# Patient Record
Sex: MALE | Race: WHITE | Employment: UNEMPLOYED | URBAN - METROPOLITAN AREA
[De-identification: names, ages, dates, MRNs, and addresses within clinical notes are randomized per-mention and may not be internally consistent; named-entity substitution may affect disease eponyms.]

---

## 2017-05-10 ENCOUNTER — HOSPITAL ENCOUNTER (OUTPATIENT)
Dept: ULTRASOUND IMAGING | Age: 67
Discharge: HOME | End: 2017-05-10
Attending: UROLOGY
Payer: MEDICARE

## 2017-05-10 DIAGNOSIS — N31.9 NEUROGENIC BLADDER: ICD-10-CM

## 2017-05-10 PROCEDURE — 76770 US EXAM ABDO BACK WALL COMP: CPT

## 2017-05-16 ENCOUNTER — APPOINTMENT (OUTPATIENT)
Dept: PHYSICAL THERAPY | Age: 67
End: 2017-05-16

## 2017-05-25 ENCOUNTER — HOSPITAL ENCOUNTER (OUTPATIENT)
Dept: PHYSICAL THERAPY | Age: 67
End: 2017-05-25

## 2017-06-05 ENCOUNTER — HOSPITAL ENCOUNTER (OUTPATIENT)
Dept: PHYSICAL THERAPY | Age: 67
Discharge: HOME | End: 2017-06-05
Payer: MEDICARE

## 2017-06-05 PROCEDURE — G8979 MOBILITY GOAL STATUS: HCPCS

## 2017-06-05 PROCEDURE — 97162 PT EVAL MOD COMPLEX 30 MIN: CPT

## 2017-06-05 PROCEDURE — G8978 MOBILITY CURRENT STATUS: HCPCS

## 2017-06-05 NOTE — PROGRESS NOTES
Bindu Vaca  : 1950 [facility] [address]  [phone]   [phone]          OUTPATIENT PHYSICAL THERAPY:Initial Assessment 2017    ICD-10: Treatment Diagnosis: Hemiplegia and hemiparesis following cerebral infarction affecting left non-dominant side (I69.354); Difficulty in walking, not elsewhere classified (R26.2)  Precautions/Allergies:   Latex; Adhesive; and Sulfa (sulfonamide antibiotics)   Fall Risk Score: 4 (? 5 = High Risk)  MD Orders: PT eval and treat MEDICAL/REFERRING DIAGNOSIS:  CVA (cerebral vascular accident) [facility]) [I63.9]   DATE OF ONSET: 2016  REFERRING PHYSICIAN: Geetha Burrows, [id number] y 29: unknown  DATE OF PROGRESS NOTE:  62  RECERTIFICATION DATE: 12     INITIAL ASSESSMENT:  Mr. Kristen Sy presents to therapy s/p another Left CVA 2016. Prior CVA 2005. Pt presents with dense left hemiplegia, non functional swollen left arm and little movement in the left elg with swelling as well. Pt has a new double metal upright AFO that may need some adjustments as pt is hip hiking to clear his left foot and he has strong left knee  recurvatum in stance. Pt presents with poor balance and marked gait deviations but has good potential to get back to walking short distances with less assistance. Pt will benefit from at least 16 physical therapy visits to reach the goal of less assistance required for balance and gait. PROBLEM LIST (Impacting functional limitations):  1. Decreased Strength  2. Decreased ADL/Functional Activities  3. Decreased Transfer Abilities  4. Decreased Ambulation Ability/Technique  5. Decreased Balance  6. Increased Pain  7. Decreased Activity Tolerance  8. Decreased Flexibility/Joint Mobility  9. Edema/Girth  10. Decreased Mascot with Home Exercise Program INTERVENTIONS PLANNED:  1. Balance Exercise  2. Family Education  3. Gait Training  4.  Home Exercise Program (HEP)  5. Manual Therapy  6. Neuromuscular Re-education/Strengthening  7. Range of Motion (ROM)  8. Therapeutic Activites  9. Therapeutic Exercise/Strengthening  10. Transfer Training  11. orthotic management    TREATMENT PLAN:  Effective Dates: 6/5/17 TO 9/1/17. Frequency/Duration: 2 times a week for 8 weeks  GOALS: (Goals have been discussed and agreed upon with patient.)  Short-Term Functional Goals: Time Frame: 4 WEEKS  1. Pt will be independent with range of motion, strength and balance home exercise program.  2. Pt will ambulate with upright posture 40 - 60' and increased ant WS over left foot to decrease strong left knee hyperextension in stnace  Discharge Goals: Time Frame: 8 weeks  Pt will show increased range of motion and improved posture to allow for improved safety and ability with all functional mobility. Pt will decrease TUG score by 20 seconds indicating more normalized gait pattern and decrease risk for falls. Pt will increase Blanchard Balance Scale to 24/56 indicating increased balance and decreased risk for falls. Rehabilitation Potential For Stated Goals: Mando Abernathy's therapy, I certify that the treatment plan above will be carried out by a therapist or under their direction. Thank you for this referral,  Alfred Ornelas, MARIS     Referring Physician Signature: [facility], Not On File, MD              Date                    HISTORY:   GOAL:  \"I would like to get to where I was before the second stroke. (walking with straight cane)  Present Symptoms:    73year old right handed white male s/p CVA again 8/2016 - right CVA on the brain stem. 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 Went home [facility] a week later. 3 months. Getting therapy there. November 2016. 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 Lives with spouse. Walks with hemiwalker with supervision. Transfers independently.     Dependent with dressing - assist when he can. Wears pads for security - enlarged prostate. PAIN:  Lower back pain:  No pain now. Hospital bed has an air mattress. Worst pain 9/10 - have to get up. Sitting with support is better than being in bed. Tylenol at night. History of Present Injury/Illness (Reason for Referral):  See above  Past Medical History/Comorbidities:   Mr. Jodie Wang  has a past medical history of Dermatophytosis of nail; History of CVA (cerebrovascular accident); and History of paraplegia. Mr. Jodie Wang  has no past surgical history on file. Social History/Living Environment:     Lives with spouse. Suresh Nicolas is his aid who works with him 40 hours per week helping with exercise, mobility, self care and appointments  Prior Level of Function/Work/Activity:  Prior to 8/2016 pt was walking independent short distances with a straight cane. Current Medications:    Current Outpatient Prescriptions:     acetaminophen (TYLENOL) 500 mg tablet, Take  by mouth every six (6) hours as needed for Pain., Disp: , Rfl:     guaiFENesin (ORGANIDIN) 400 mg tablet, Take  by mouth every four (4) hours. , Disp: , Rfl:     lisinopril (PRINIVIL, ZESTRIL) 40 mg tablet, Take 40 mg by mouth daily. , Disp: , Rfl:     loratadine (CLARITIN) 10 mg tablet, Take 10 mg by mouth., Disp: , Rfl:     b complex-vitamin c-folic acid 0.8 mg (NEPHRO-ALICIA) 0.8 mg tab tablet, Take 1 Tab by mouth daily. , Disp: , Rfl:     Omeprazole delayed release (PRILOSEC D/R) 20 mg tablet, Take 20 mg by mouth daily. , Disp: , Rfl:     phenytoin ER (DILANTIN ER) 100 mg ER capsule, Take  by mouth., Disp: , Rfl:     rosuvastatin (CRESTOR) 40 mg tablet, Take 40 mg by mouth nightly., Disp: , Rfl:     senna (SENOKOT) 8.6 mg tablet, Take 1 Tab by mouth daily. , Disp: , Rfl:     sertraline (ZOLOFT) 100 mg tablet, Take  by mouth daily. , Disp: , Rfl:     tamsulosin (FLOMAX) 0.4 mg capsule, Take 0.4 mg by mouth daily. , Disp: , Rfl:     ALOE VERA/COLLAGEN (ALOE VESTA 2-N-1 CLEANSER EX), by Apply Externally route., Disp: , Rfl:     aspirin delayed-release 81 mg tablet, Take 81 mg by mouth., Disp: , Rfl:     MINERAL OIL/PETROLATUM,WHITE (CLIVE MOISTURE BARRIER EX), by Apply Externally route., Disp: , Rfl:     carvedilol (COREG) 12.5 mg tablet, Take 12.5 mg by mouth., Disp: , Rfl:     cholecalciferol (VITAMIN D3) 1,000 unit cap, Take 1,000 Units by mouth., Disp: , Rfl:     clopidogrel (PLAVIX) 75 mg tab, Take 75 mg by mouth., Disp: , Rfl:     docusate sodium 100 mg/5 mL enem, Insert  into rectum. , Disp: , Rfl:     fluticasone (FLOVENT DISKUS) 50 mcg/actuation inhaler, by Nasal route., Disp: , Rfl:     folic acid (FOLVITE) 1 mg tablet, Take 1 mg by mouth., Disp: , Rfl:    Date Last Reviewed:  6/5/17   Number of Personal Factors/Comorbidities that affect the Plan of Care: 1-2: MODERATE COMPLEXITY   EXAMINATION:   ROM:          Left UE - swollen. Compression sleeve on but still showing marked swelling. Dependent - non functional.  WFL for dressing. Left LE:  Increased swelling. WFL. Tight all end ranges - flexed hips in standing and walking. Tight calf. Strength:          Right side WFL  Left UE - barely a shoulder shrug   Left LE:  Able to sling hip and leg. Functional Mobility:         Gait/Ambulation:  Pt ambulates at least 40 - 61' with Hannah Lori almost daily using davis walker and AFO. Pt ambulates with extremely flexed posture and posterior retracted hip in stance with strong left knee hyperextension in left stance in spite of double metal upright AFO. Pt hip hikes and slings the left leg forward. Transfers:  Close supervision to independent        Bed Mobility:  Assist with lower body. Mental Status:          Alert and oriented x 4;  Pleasant and appropriate. Occ forgetful. Occ slight impulsive    Vision:          Wears glasses   Body Structures Involved:  1. Nerves  2. Thoracic Cage  3. Bones  4. Joints  5. Muscles  6.  Ligaments Body Functions Affected:  1. Mental  2. Sensory/Pain  3. Neuromusculoskeletal  4. Movement Related  5. Skin Related Activities and Participation Affected:  1. Learning and Applying Knowledge  2. General Tasks and Demands  3. Mobility  4. Self Care  5. Domestic Life  6. Interpersonal Interactions and Relationships  7. Community, Social and [place]   Number of elements that affect the Plan of Care: 4+: HIGH COMPLEXITY   CLINICAL PRESENTATION:   Presentation: Stable and uncomplicated: LOW COMPLEXITY   CLINICAL DECISION MAKING:   Outcome Measure: Tool Used: Blanchard Balance Scale  Score:  Initial: 16/56 Most Recent: X/56 (Date: -- )   Interpretation of Score: Each section is scored on a 0-4 scale, 0 representing the patients inability to perform the task and 4 representing independence. The scores of each section are added together for a total score of 56. The higher the patients score, the more independent the patient is. Any score below 45 indicates increased risk for falls. Score 56 55-45 44-34 33-23 22-12 11-1 0   Modifier CH CI CJ CK CL CM CN     ? Mobility - Walking and Moving Around:     - CURRENT STATUS: CL - 60%-79% impaired, limited or restricted    - GOAL STATUS: CK - 40%-59% impaired, limited or restricted    - D/C STATUS:  ---------------To be determined---------------    Tool Used: Timed Up and Go (TUG)  Score:  Initial: 1.23.21 minutes Most Recent: X seconds (Date: -- )   Interpretation of Score: The test measures, in seconds, the time taken by an individual to stand up from a standard arm chair (seat height 46 cm [18 in], arm height 65 cm [25.6 in]), walk a distance of 3 meters (118 in, approx 10 ft), turn, walk back to the chair and sit down. If the individual takes longer than 14 seconds to complete TUG, this indicates risk for falls.   Score 7 7.5-10.5 11-14 14.5-17.5 18-21 21.5-24.5 25+   Modifier CH CI CJ CK CL CM CN     Medical Necessity:   · Skilled intervention continues to be required due to hemiparesis and difficulty walking. Reason for Services/Other Comments:  · Patient continues to require skilled intervention due to hemiparesis and difficulty walking. Use of outcome tool(s) and clinical judgement create a POC that gives a: Questionable prediction of patient's progress: MODERATE COMPLEXITY   TREATMENT:   (In addition to Assessment/Re-Assessment sessions the following treatments were rendered)    ASSESSMENT    Treatment/Session Assessment:  See initial assessment above. Patients response to todays treatment session was tolerated well with no medical complications. .  · Post session pain:  NO PAIN  · Compliance with Program/Exercises: Will assess as treatment progresses. · Recommendations/Intent for next treatment session: \"Next visit will focus on PROGRESS GAIT AND BALANCE RETRAINING. \".  Total Treatment Duration:  PT Patient Time In/Time Out  Time In: 1400  Time Out: 1500    Cristopher Parmar PT

## 2017-06-09 NOTE — PROGRESS NOTES
Ambulatory/Rehab Services H2 Model Falls Risk Assessment    Risk Factor Pts. ·   Confusion/Disorientation/Impulsivity  []    4 ·   Symptomatic Depression  []   2 ·   Altered Elimination  []   1 ·   Dizziness/Vertigo  []   1 ·   Gender (Male)  [x]   1 ·   Any administered antiepileptics (anticonvulsants):  []   2 ·   Any administered benzodiazepines:  []   1 ·   Visual Impairment (specify):  []   1 ·   Portable Oxygen Use  []   1 ·   Orthostatic ? BP  []   1 ·   History of Recent Falls (within 3 mos.)  []   5     Ability to Rise from Chair (choose one) Pts. ·   Ability to rise in a single movement  []   0 ·   Pushes up, successful in one attempt  []   1 ·   Multiple attempts, but successful  [x]   3 ·   Unable to rise without assistance  []   4   Total: (5 or greater = High Risk) 4     Falls Prevention Plan:   []                Physical Limitations to Exercise (specify):   []                Mobility Assistance Device (type):   []                Exercise/Equipment Adaptation (specify):    ©2010 [facility] [address] Patent #[id number].  Federal Law prohibits the replication, distribution or use without written permission from [facility] [employer]

## 2017-06-12 ENCOUNTER — HOSPITAL ENCOUNTER (OUTPATIENT)
Dept: PHYSICAL THERAPY | Age: 67
Discharge: HOME | End: 2017-06-12
Payer: MEDICARE

## 2017-06-12 PROCEDURE — 97110 THERAPEUTIC EXERCISES: CPT

## 2017-06-12 PROCEDURE — 97530 THERAPEUTIC ACTIVITIES: CPT

## 2017-06-12 NOTE — PROGRESS NOTES
Elaine Hanley  : 1950 [facility] at [address]  [phone]   [phone]          OUTPATIENT PHYSICAL THERAPY:Daily Note 2017    ICD-10: Treatment Diagnosis: Hemiplegia and hemiparesis following cerebral infarction affecting left non-dominant side (I69.354); Difficulty in walking, not elsewhere classified (R26.2)  Precautions/Allergies:   Latex; Adhesive; and Sulfa (sulfonamide antibiotics)   Fall Risk Score: 4 (? 5 = High Risk)  MD Orders: PT eval and treat MEDICAL/REFERRING DIAGNOSIS:  CVA (cerebral vascular accident) [facility]) [I63.9]   DATE OF ONSET: 2016  REFERRING PHYSICIAN: Jarad, Not On File, MD  RETURN PHYSICIAN APPOINTMENT: unknown  DATE OF PROGRESS NOTE:  36  RECERTIFICATION DATE: 43     INITIAL ASSESSMENT:  Mr. Jesus Vasquez presents to therapy s/p another Left CVA 2016. Prior CVA 2005. Pt presents with dense left hemiplegia, non functional swollen left arm and little movement in the left elg with swelling as well. Pt has a new double metal upright AFO that may need some adjustments as pt is hip hiking to clear his left foot and he has strong left knee  recurvatum in stance. Pt presents with poor balance and marked gait deviations but has good potential to get back to walking short distances with less assistance. Pt will benefit from at least 16 physical therapy visits to reach the goal of less assistance required for balance and gait. PROBLEM LIST (Impacting functional limitations):  1. Decreased Strength  2. Decreased ADL/Functional Activities  3. Decreased Transfer Abilities  4. Decreased Ambulation Ability/Technique  5. Decreased Balance  6. Increased Pain  7. Decreased Activity Tolerance  8. Decreased Flexibility/Joint Mobility  9. Edema/Girth  10. Decreased [place] with Home Exercise Program INTERVENTIONS PLANNED:  1. Balance Exercise  2. Family Education  3. Gait Training  4.  Home Exercise Program (HEP)  5. Manual Therapy  6. Neuromuscular Re-education/Strengthening  7. Range of Motion (ROM)  8. Therapeutic Activites  9. Therapeutic Exercise/Strengthening  10. Transfer Training  11. orthotic management    TREATMENT PLAN:  Effective Dates: 6/5/17 TO 9/1/17. Frequency/Duration: 2 times a week for 8 weeks  GOALS: (Goals have been discussed and agreed upon with patient.)  Short-Term Functional Goals: Time Frame: 4 WEEKS  1. Pt will be independent with range of motion, strength and balance home exercise program.  2. Pt will ambulate with upright posture 40 - 60' and increased ant WS over left foot to decrease strong left knee hyperextension in stnace  Discharge Goals: Time Frame: 8 weeks  Pt will show increased range of motion and improved posture to allow for improved safety and ability with all functional mobility. Pt will decrease TUG score by 20 seconds indicating more normalized gait pattern and decrease risk for falls. Pt will increase Blanchard Balance Scale to 24/56 indicating increased balance and decreased risk for falls. Rehabilitation Potential For Stated Goals: Mando Abernathy's therapy, I certify that the treatment plan above will be carried out by a therapist or under their direction. Thank you for this referral,  Tesha Ceballos, PT                 HISTORY:   GOAL:  \"I would like to get to where I was before the second stroke. (walking with straight cane)  Present Symptoms:    73year old right handed white male s/p CVA again 8/2016 - right CVA on the brain stem. 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 Went home from [place] [id number] a week later. 3 months. Getting therapy there. November 2016. 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 Lives with spouse. Walks with hemiwalker with supervision. Transfers independently. Dependent with dressing - assist when he can. Wears pads for security - enlarged prostate.        PAIN:  Lower back pain:  No pain now. Hospital bed has an air mattress. Worst pain 9/10 - have to get up. Sitting with support is better than being in bed. Tylenol at night. History of Present Injury/Illness (Reason for Referral):  See above  Past Medical History/Comorbidities:   Mr. Inder Tamez  has a past medical history of Dermatophytosis of nail; History of CVA (cerebrovascular accident); and History of paraplegia. Mr. Inder Tamez  has no past surgical history on file. Social History/Living Environment:     Lives with spouse. LYFE Kitchen is his aid who works with him 40 hours per week helping with exercise, mobility, self care and appointments  Prior Level of Function/Work/Activity:  Prior to 8/2016 pt was walking independent short distances with a straight cane. Current Medications:    Current Outpatient Prescriptions:     acetaminophen (TYLENOL) 500 mg tablet, Take  by mouth every six (6) hours as needed for Pain., Disp: , Rfl:     guaiFENesin (ORGANIDIN) 400 mg tablet, Take  by mouth every four (4) hours. , Disp: , Rfl:     lisinopril (PRINIVIL, ZESTRIL) 40 mg tablet, Take 40 mg by mouth daily. , Disp: , Rfl:     loratadine (CLARITIN) 10 mg tablet, Take 10 mg by mouth., Disp: , Rfl:     b complex-vitamin c-folic acid 0.8 mg (NEPHRO-ALICIA) 0.8 mg tab tablet, Take 1 Tab by mouth daily. , Disp: , Rfl:     Omeprazole delayed release (PRILOSEC D/R) 20 mg tablet, Take 20 mg by mouth daily. , Disp: , Rfl:     phenytoin ER (DILANTIN ER) 100 mg ER capsule, Take  by mouth., Disp: , Rfl:     rosuvastatin (CRESTOR) 40 mg tablet, Take 40 mg by mouth nightly., Disp: , Rfl:     senna (SENOKOT) 8.6 mg tablet, Take 1 Tab by mouth daily. , Disp: , Rfl:     sertraline (ZOLOFT) 100 mg tablet, Take  by mouth daily. , Disp: , Rfl:     tamsulosin (FLOMAX) 0.4 mg capsule, Take 0.4 mg by mouth daily. , Disp: , Rfl:     ALOE VERA/COLLAGEN (ALOE VESTA 2-N-1 CLEANSER EX), by Apply Externally route., Disp: , Rfl:     aspirin delayed-release 81 mg tablet, Take 81 mg by mouth., Disp: , Rfl:     MINERAL OIL/PETROLATUM,WHITE (CLIVE MOISTURE BARRIER EX), by Apply Externally route., Disp: , Rfl:     carvedilol (COREG) 12.5 mg tablet, Take 12.5 mg by mouth., Disp: , Rfl:     cholecalciferol (VITAMIN D3) 1,000 unit cap, Take 1,000 Units by mouth., Disp: , Rfl:     clopidogrel (PLAVIX) 75 mg tab, Take 75 mg by mouth., Disp: , Rfl:     docusate sodium 100 mg/5 mL enem, Insert  into rectum. , Disp: , Rfl:     fluticasone (FLOVENT DISKUS) 50 mcg/actuation inhaler, by Nasal route., Disp: , Rfl:     folic acid (FOLVITE) 1 mg tablet, Take 1 mg by mouth., Disp: , Rfl:    Date Last Reviewed: 6/13/2017   Number of Personal Factors/Comorbidities that affect the Plan of Care: 1-2: MODERATE COMPLEXITY   EXAMINATION:   ROM:          Left UE - swollen. Compression sleeve on but still showing marked swelling. Dependent - non functional.  WFL for dressing. Left LE:  Increased swelling. WFL. Tight all end ranges - flexed hips in standing and walking. Tight calf. Strength:          Right side WFL  Left UE - barely a shoulder shrug   Left LE:  Able to sling hip and leg. Functional Mobility:         Gait/Ambulation:  Pt ambulates at least 40 - 61' with Ashley Crank almost daily using davis walker and AFO. Pt ambulates with extremely flexed posture and posterior retracted hip in stance with strong left knee hyperextension in left stance in spite of double metal upright AFO. Pt hip hikes and slings the left leg forward. Transfers:  Close supervision to independent        Bed Mobility:  Assist with lower body. Mental Status:          Alert and oriented x 4;  Pleasant and appropriate. Occ forgetful. Occ slight impulsive    Vision:          Wears glasses   Body Structures Involved:  1. Nerves  2. Thoracic Cage  3. Bones  4. Joints  5. Muscles  6. Ligaments Body Functions Affected:  1. Mental  2. Sensory/Pain  3. Neuromusculoskeletal  4.  Movement Related  5. Skin Related Activities and Participation Affected:  1. Learning and Applying Knowledge  2. General Tasks and Demands  3. Mobility  4. Self Care  5. Domestic Life  6. Interpersonal Interactions and Relationships  7. Community, Social and [place]   Number of elements that affect the Plan of Care: 4+: HIGH COMPLEXITY   CLINICAL PRESENTATION:   Presentation: Stable and uncomplicated: LOW COMPLEXITY   CLINICAL DECISION MAKING:   Outcome Measure: Tool Used: Blanchard Balance Scale  Score:  Initial: 16/56 Most Recent: X/56 (Date: -- )   Interpretation of Score: Each section is scored on a 0-4 scale, 0 representing the patients inability to perform the task and 4 representing independence. The scores of each section are added together for a total score of 56. The higher the patients score, the more independent the patient is. Any score below 45 indicates increased risk for falls. Score 56 55-45 44-34 33-23 22-12 11-1 0   Modifier CH CI CJ CK CL CM CN     ? Mobility - Walking and Moving Around:     - CURRENT STATUS: CL - 60%-79% impaired, limited or restricted    - GOAL STATUS: CK - 40%-59% impaired, limited or restricted    - D/C STATUS:  ---------------To be determined---------------    Tool Used: Timed Up and Go (TUG)  Score:  Initial: 1.23.21 minutes Most Recent: X seconds (Date: -- )   Interpretation of Score: The test measures, in seconds, the time taken by an individual to stand up from a standard arm chair (seat height 46 cm [18 in], arm height 65 cm [25.6 in]), walk a distance of 3 meters (118 in, approx 10 ft), turn, walk back to the chair and sit down. If the individual takes longer than 14 seconds to complete TUG, this indicates risk for falls. Score 7 7.5-10.5 11-14 14.5-17.5 18-21 21.5-24.5 25+   Modifier CH CI CJ CK CL CM CN     Medical Necessity:   · Skilled intervention continues to be required due to hemiparesis and difficulty walking.   Reason for Services/Other Comments:  · Patient continues to require skilled intervention due to hemiparesis and difficulty walking. Use of outcome tool(s) and clinical judgement create a POC that gives a: Questionable prediction of patient's progress: MODERATE COMPLEXITY   TREATMENT:   (In addition to Assessment/Re-Assessment sessions the following treatments were rendered)    THERAPEUTIC ACTIVITY: ( 40 minutes): Therapeutic activities per grid below to improve mobility, strength, balance and coordination. Required moderate visual, verbal and manual cues to perform activities correctly. .  THERAPEUTIC EXERCISE: (10 minutes):  Exercises per grid below to improve mobility. Required moderate visual, verbal and manual cues to promote proper body alignment, promote proper body posture and promote proper body mechanics. Progressed range, repetitions and complexity of movement as indicated. Pt comes to therapy in his power chair   Date:  6/12/17 Date: Date:   Activity/Exercise Parameters Parameters Parameters   In parallel bars:  Standing WS over left side x10 with PT giving max facilitation to get pt to WS - guarding to keep left knee from buckling  x10 WS with right foot taps. Pt tends to lock out his knee with left hip retracting vs. Extended WS over left leg. Walking in parallel bars 2 x 2 passes with cues and facilitation for ant WS over left leg in stance. Pt tends to be flexed at hip and retracted with strong left knee hyperextension in left       Supine bridging X10. Low on the left. Pt is performing 20 reps x 10 seconds each at home. Instructed to try to switch to 10 full high bridges every other day. Single leg (figure 4 with right leg up) x5     Left piriformis stretch 3 x 30 seconds     Passive right sidelying left hip extension stretch 3 x 30 seconds                               EDUCATION:  Instructed riccardo Brown in above exercises and stretches.   Ashley Brown verbalized understanding/  HEP:  Written HEP given for above    Treatment/Session Assessment: Mild SI pain on the right side after initial few stands and WS. Slightly better after warm up with walking. Overall decreased pain after piriformis stretch. Pt talks a lot. Takes long breaks between almost each activity whether fatigued or not. Patients response to todays treatment session was tolerated well with no medical complications. .  · Post session pain:  NO PAIN  · Compliance with Program/Exercises: Will assess as treatment progresses. · Recommendations/Intent for next treatment session: \"Next visit will focus on PROGRESS GAIT AND BALANCE RETRAINING. \".  Total Treatment Duration:  PT Patient Time In/Time Out  Time In: 1210  Time Out: 325 Juanita Pinon PT

## 2017-06-14 ENCOUNTER — HOSPITAL ENCOUNTER (OUTPATIENT)
Dept: PHYSICAL THERAPY | Age: 67
Discharge: HOME | End: 2017-06-14
Payer: MEDICARE

## 2017-06-14 NOTE — PROGRESS NOTES
Chester Munoz  : 1950 [facility] at [facility] [address]  [phone]   [phone]          2017        Patient's wife called to cancel his appointment due to no one able to bring him. She reports she is very sick and headed to the doctor and the aide had already left for the day. Will follow up with the patient at the next visit.   Idania Jaems, PTA

## 2017-06-20 ENCOUNTER — HOSPITAL ENCOUNTER (OUTPATIENT)
Dept: PHYSICAL THERAPY | Age: 67
Discharge: HOME | End: 2017-06-20
Payer: MEDICARE

## 2017-06-20 PROCEDURE — 97530 THERAPEUTIC ACTIVITIES: CPT

## 2017-06-20 PROCEDURE — 97110 THERAPEUTIC EXERCISES: CPT

## 2017-06-20 NOTE — PROGRESS NOTES
Alexander Peralta  : 1950 [facility]  [address] [place] [address]  [phone]   [phone]          OUTPATIENT PHYSICAL THERAPY:Daily Note 2017    ICD-10: Treatment Diagnosis: Hemiplegia and hemiparesis following cerebral infarction affecting left non-dominant side (I69.354); Difficulty in walking, not elsewhere classified (R26.2)  Precautions/Allergies:   Latex; Adhesive; and Sulfa (sulfonamide antibiotics)   Fall Risk Score: 4 (? 5 = High Risk)  MD Orders: PT eval and treat MEDICAL/REFERRING DIAGNOSIS:  CVA (cerebral vascular accident) [facility]) [I63.9]   DATE OF ONSET: 2016  REFERRING PHYSICIAN: Jarad, Not On File, MD  RETURN PHYSICIAN APPOINTMENT: unknown  DATE OF PROGRESS NOTE:  5/8/10  RECERTIFICATION DATE: 99     INITIAL ASSESSMENT:  Mr. Bry Carcamo presents to therapy s/p another Left CVA 2016. Prior CVA 2005. Pt presents with dense left hemiplegia, non functional swollen left arm and little movement in the left elg with swelling as well. Pt has a new double metal upright AFO that may need some adjustments as pt is hip hiking to clear his left foot and he has strong left knee  recurvatum in stance. Pt presents with poor balance and marked gait deviations but has good potential to get back to walking short distances with less assistance. Pt will benefit from at least 16 physical therapy visits to reach the goal of less assistance required for balance and gait. PROBLEM LIST (Impacting functional limitations):  1. Decreased Strength  2. Decreased ADL/Functional Activities  3. Decreased Transfer Abilities  4. Decreased Ambulation Ability/Technique  5. Decreased Balance  6. Increased Pain  7. Decreased Activity Tolerance  8. Decreased Flexibility/Joint Mobility  9. Edema/Girth  10. Decreased [place] with Home Exercise Program INTERVENTIONS PLANNED:  1. Balance Exercise  2. Family Education  3. Gait Training  4.  Home Exercise Program (HEP)  5. Manual Therapy  6. Neuromuscular Re-education/Strengthening  7. Range of Motion (ROM)  8. Therapeutic Activites  9. Therapeutic Exercise/Strengthening  10. Transfer Training  11. orthotic management    TREATMENT PLAN:  Effective Dates: 6/5/17 TO 9/1/17. Frequency/Duration: 2 times a week for 8 weeks  GOALS: (Goals have been discussed and agreed upon with patient.)  Short-Term Functional Goals: Time Frame: 4 WEEKS  1. Pt will be independent with range of motion, strength and balance home exercise program.  2. Pt will ambulate with upright posture 40 - 60' and increased ant WS over left foot to decrease strong left knee hyperextension in stnace  Discharge Goals: Time Frame: 8 weeks  Pt will show increased range of motion and improved posture to allow for improved safety and ability with all functional mobility. Pt will decrease TUG score by 20 seconds indicating more normalized gait pattern and decrease risk for falls. Pt will increase Blanchard Balance Scale to 24/56 indicating increased balance and decreased risk for falls. Rehabilitation Potential For Stated Goals: Mando Abernathy's therapy, I certify that the treatment plan above will be carried out by a therapist or under their direction. Thank you for this referral,  Cecilia Cano, PT                 HISTORY:   GOAL:  \"I would like to get to where I was before the second stroke. (walking with straight cane)  Present Symptoms:    73year old right handed white male s/p CVA again 8/2016 - right CVA on the brain stem. 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 Went home from [place] [id number] a week later. 3 months. Getting therapy there. November 2016. 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 Lives with spouse. Walks with hemiwalker with supervision. Transfers independently. Dependent with dressing - assist when he can. Wears pads for security - enlarged prostate.        PAIN:  Lower back pain:  No pain now. Hospital bed has an air mattress. Worst pain 9/10 - have to get up. Sitting with support is better than being in bed. Tylenol at night. History of Present Injury/Illness (Reason for Referral):  See above  Past Medical History/Comorbidities:   Mr. Byron Garcia  has a past medical history of Dermatophytosis of nail; History of CVA (cerebrovascular accident); and History of paraplegia. Mr. Byron Garcia  has no past surgical history on file. Social History/Living Environment:     Lives with spouse. Altagracia Cabral is his aid who works with him 40 hours per week helping with exercise, mobility, self care and appointments  Prior Level of Function/Work/Activity:  Prior to 8/2016 pt was walking independent short distances with a straight cane. Current Medications:    Current Outpatient Prescriptions:     acetaminophen (TYLENOL) 500 mg tablet, Take  by mouth every six (6) hours as needed for Pain., Disp: , Rfl:     guaiFENesin (ORGANIDIN) 400 mg tablet, Take  by mouth every four (4) hours. , Disp: , Rfl:     lisinopril (PRINIVIL, ZESTRIL) 40 mg tablet, Take 40 mg by mouth daily. , Disp: , Rfl:     loratadine (CLARITIN) 10 mg tablet, Take 10 mg by mouth., Disp: , Rfl:     b complex-vitamin c-folic acid 0.8 mg (NEPHRO-ALICIA) 0.8 mg tab tablet, Take 1 Tab by mouth daily. , Disp: , Rfl:     Omeprazole delayed release (PRILOSEC D/R) 20 mg tablet, Take 20 mg by mouth daily. , Disp: , Rfl:     phenytoin ER (DILANTIN ER) 100 mg ER capsule, Take  by mouth., Disp: , Rfl:     rosuvastatin (CRESTOR) 40 mg tablet, Take 40 mg by mouth nightly., Disp: , Rfl:     senna (SENOKOT) 8.6 mg tablet, Take 1 Tab by mouth daily. , Disp: , Rfl:     sertraline (ZOLOFT) 100 mg tablet, Take  by mouth daily. , Disp: , Rfl:     tamsulosin (FLOMAX) 0.4 mg capsule, Take 0.4 mg by mouth daily. , Disp: , Rfl:     ALOE VERA/COLLAGEN (ALOE VESTA 2-N-1 CLEANSER EX), by Apply Externally route., Disp: , Rfl:     aspirin delayed-release 81 mg tablet, Take 81 mg by mouth., Disp: , Rfl:     MINERAL OIL/PETROLATUM,WHITE (CLIVE MOISTURE BARRIER EX), by Apply Externally route., Disp: , Rfl:     carvedilol (COREG) 12.5 mg tablet, Take 12.5 mg by mouth., Disp: , Rfl:     cholecalciferol (VITAMIN D3) 1,000 unit cap, Take 1,000 Units by mouth., Disp: , Rfl:     clopidogrel (PLAVIX) 75 mg tab, Take 75 mg by mouth., Disp: , Rfl:     docusate sodium 100 mg/5 mL enem, Insert  into rectum. , Disp: , Rfl:     fluticasone (FLOVENT DISKUS) 50 mcg/actuation inhaler, by Nasal route., Disp: , Rfl:     folic acid (FOLVITE) 1 mg tablet, Take 1 mg by mouth., Disp: , Rfl:    Date Last Reviewed: 6/20/2017   Number of Personal Factors/Comorbidities that affect the Plan of Care: 1-2: MODERATE COMPLEXITY   EXAMINATION:   ROM:          Left UE - swollen. Compression sleeve on but still showing marked swelling. Dependent - non functional.  WFL for dressing. Left LE:  Increased swelling. WFL. Tight all end ranges - flexed hips in standing and walking. Tight calf. Strength:          Right side WFL  Left UE - barely a shoulder shrug   Left LE:  Able to sling hip and leg. Functional Mobility:         Gait/Ambulation:  Pt ambulates at least 40 - 61' with Deatra Christianity almost daily using davis walker and AFO. Pt ambulates with extremely flexed posture and posterior retracted hip in stance with strong left knee hyperextension in left stance in spite of double metal upright AFO. Pt hip hikes and slings the left leg forward. Transfers:  Close supervision to independent        Bed Mobility:  Assist with lower body. Mental Status:          Alert and oriented x 4;  Pleasant and appropriate. Occ forgetful. Occ slight impulsive    Vision:          Wears glasses   Body Structures Involved:  1. Nerves  2. Thoracic Cage  3. Bones  4. Joints  5. Muscles  6. Ligaments Body Functions Affected:  1. Mental  2. Sensory/Pain  3. Neuromusculoskeletal  4.  Movement Related  5. Skin Related Activities and Participation Affected:  1. Learning and Applying Knowledge  2. General Tasks and Demands  3. Mobility  4. Self Care  5. Domestic Life  6. Interpersonal Interactions and Relationships  7. Community, Social and [facility] [place] [facility] [place]   Number of elements that affect the Plan of Care: 4+: HIGH COMPLEXITY   CLINICAL PRESENTATION:   Presentation: Stable and uncomplicated: LOW COMPLEXITY   CLINICAL DECISION MAKING:   Outcome Measure: Tool Used: Blanchard Balance Scale  Score:  Initial: 16/56 Most Recent: X/56 (Date: -- )   Interpretation of Score: Each section is scored on a 0-4 scale, 0 representing the patients inability to perform the task and 4 representing independence. The scores of each section are added together for a total score of 56. The higher the patients score, the more independent the patient is. Any score below 45 indicates increased risk for falls. Score 56 55-45 44-34 33-23 22-12 11-1 0   Modifier CH CI CJ CK CL CM CN     ? Mobility - Walking and Moving Around:     - CURRENT STATUS: CL - 60%-79% impaired, limited or restricted    - GOAL STATUS: CK - 40%-59% impaired, limited or restricted    - D/C STATUS:  ---------------To be determined---------------    Tool Used: Timed Up and Go (TUG)  Score:  Initial: 1.23.21 minutes Most Recent: X seconds (Date: -- )   Interpretation of Score: The test measures, in seconds, the time taken by an individual to stand up from a standard arm chair (seat height 46 cm [18 in], arm height 65 cm [25.6 in]), walk a distance of 3 meters (118 in, approx 10 ft), turn, walk back to the chair and sit down. If the individual takes longer than 14 seconds to complete TUG, this indicates risk for falls. Score 7 7.5-10.5 11-14 14.5-17.5 18-21 21.5-24.5 25+   Modifier CH CI CJ CK CL CM CN     Medical Necessity:   · Skilled intervention continues to be required due to hemiparesis and difficulty walking.   Reason for Services/Other Comments:  · Patient continues to require skilled intervention due to hemiparesis and difficulty walking. Use of outcome tool(s) and clinical judgement create a POC that gives a: Questionable prediction of patient's progress: MODERATE COMPLEXITY   TREATMENT:   (In addition to Assessment/Re-Assessment sessions the following treatments were rendered)    THERAPEUTIC ACTIVITY: ( 40 minutes): Therapeutic activities per grid below to improve mobility, strength, balance and coordination. Required moderate visual, verbal and manual cues to perform activities correctly. .  THERAPEUTIC EXERCISE: (10 minutes):  Exercises per grid below to improve mobility. Required moderate visual, verbal and manual cues to promote proper body alignment, promote proper body posture and promote proper body mechanics. Progressed range, repetitions and complexity of movement as indicated. Pt comes to therapy in his power chair   Date:  6/12/17 Date: Date:   Activity/Exercise Parameters Parameters Parameters   In parallel bars:  Standing WS over left side x10 with PT giving max facilitation to get pt to WS - guarding to keep left knee from buckling  x10 WS with right foot taps. Pt tends to lock out his knee with left hip retracting vs. Extended WS over left leg. Walking in parallel bars 2 x 2 passes with cues and facilitation for ant WS over left leg in stance. Pt tends to be flexed at hip and retracted with strong left knee hyperextension in left       Supine bridging X10. Low on the left. Pt is performing 20 reps x 10 seconds each at home. Instructed to try to switch to 10 full high bridges every other day. x10 with cues to raise hips high  Then x 10 with small orange bolster under hips.     Single leg (figure 4 with right leg up) x5     Left piriformis stretch 3 x 30 seconds     Passive right sidelying left hip extension stretch 3 x 30 seconds                               EDUCATION:  Instructed riccardo Shine in above exercises and stretches. [facility] verbalized understanding/  HEP:  Written HEP given for above    Treatment/Session Assessment: Mild SI pain on the right side after initial few stands and WS. Slightly better after warm up with walking. Overall decreased pain after piriformis stretch. Pt talks a lot. Takes long breaks between almost each activity whether fatigued or not. Patients response to todays treatment session was tolerated well with no medical complications. .  · Post session pain:  NO PAIN  · Compliance with Program/Exercises: Will assess as treatment progresses. · Recommendations/Intent for next treatment session: \"Next visit will focus on PROGRESS GAIT AND BALANCE RETRAINING. \".  Total Treatment Duration:       Dina Pierre, PT              [facility]

## 2017-06-22 ENCOUNTER — HOSPITAL ENCOUNTER (OUTPATIENT)
Dept: PHYSICAL THERAPY | Age: 67
Discharge: HOME | End: 2017-06-22
Payer: MEDICARE

## 2017-06-22 PROCEDURE — 97530 THERAPEUTIC ACTIVITIES: CPT

## 2017-06-22 PROCEDURE — 97110 THERAPEUTIC EXERCISES: CPT

## 2017-06-22 NOTE — PROGRESS NOTES
[facility]  : [facility] [address]  [phone]   [phone]          OUTPATIENT PHYSICAL THERAPY:Daily Note 2017    ICD-10: Treatment Diagnosis: Hemiplegia and hemiparesis following cerebral infarction affecting left non-dominant side (I69.354); Difficulty in walking, not elsewhere classified (R26.2)  Precautions/Allergies:   Latex; Adhesive; and Sulfa (sulfonamide antibiotics)   Fall Risk Score: 4 (? 5 = High Risk)  MD Orders: PT eval and treat MEDICAL/REFERRING DIAGNOSIS:  CVA (cerebral vascular accident) [facility]) [I63.9]   DATE OF ONSET: 2016  REFERRING PHYSICIAN: Jarad, Not On File, MD  RETURN PHYSICIAN APPOINTMENT: unknown  DATE OF PROGRESS NOTE:  01  RECERTIFICATION DATE: 68     INITIAL ASSESSMENT:  Mr. Ramiro Hernadez presents to therapy s/p another Left CVA 2016. Prior CVA 2005. Pt presents with dense left hemiplegia, non functional swollen left arm and little movement in the left elg with swelling as well. Pt has a new double metal upright AFO that may need some adjustments as pt is hip hiking to clear his left foot and he has strong left knee  recurvatum in stance. Pt presents with poor balance and marked gait deviations but has good potential to get back to walking short distances with less assistance. Pt will benefit from at least 16 physical therapy visits to reach the goal of less assistance required for balance and gait. PROBLEM LIST (Impacting functional limitations):  1. Decreased Strength  2. Decreased ADL/Functional Activities  3. Decreased Transfer Abilities  4. Decreased Ambulation Ability/Technique  5. Decreased Balance  6. Increased Pain  7. Decreased Activity Tolerance  8. Decreased Flexibility/Joint Mobility  9. Edema/Girth  10. Decreased [place] with Home Exercise Program INTERVENTIONS PLANNED:  1. Balance Exercise  2. Family Education  3. Gait Training  4.  Home Exercise Program (HEP)  5. Manual Therapy  6. Neuromuscular Re-education/Strengthening  7. Range of Motion (ROM)  8. Therapeutic Activites  9. Therapeutic Exercise/Strengthening  10. Transfer Training  11. orthotic management    TREATMENT PLAN:  Effective Dates: 6/5/17 TO 9/1/17. Frequency/Duration: 2 times a week for 8 weeks  GOALS: (Goals have been discussed and agreed upon with patient.)  Short-Term Functional Goals: Time Frame: 4 WEEKS  1. Pt will be independent with range of motion, strength and balance home exercise program.  2. Pt will ambulate with upright posture 40 - 60' and increased ant WS over left foot to decrease strong left knee hyperextension in stnace  Discharge Goals: Time Frame: 8 weeks  Pt will show increased range of motion and improved posture to allow for improved safety and ability with all functional mobility. Pt will decrease TUG score by 20 seconds indicating more normalized gait pattern and decrease risk for falls. Pt will increase Blanchard Balance Scale to 24/56 indicating increased balance and decreased risk for falls. Rehabilitation Potential For Stated Goals: Mando Abernathy's therapy, I certify that the treatment plan above will be carried out by a therapist or under their direction. Thank you for this referral,  Caitie Sexton, PT                 HISTORY:   GOAL:  \"I would like to get to where I was before the second stroke. (walking with straight cane)  Present Symptoms:    73year old right handed white male s/p CVA again 8/2016 - right CVA on the brain stem. 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 Went home from [place] [id number] a week later. 3 months. Getting therapy there. November 2016. 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 Lives with spouse. Walks with hemiwalker with supervision. Transfers independently. Dependent with dressing - assist when he can. Wears pads for security - enlarged prostate.        PAIN:  Lower back pain:  No pain now. Hospital bed has an air mattress. Worst pain 9/10 - have to get up. Sitting with support is better than being in bed. Tylenol at night. History of Present Injury/Illness (Reason for Referral):  See above  Past Medical History/Comorbidities:   Mr. Gianni Richardson  has a past medical history of Dermatophytosis of nail; History of CVA (cerebrovascular accident); and History of paraplegia. Mr. Gianni Richardson  has no past surgical history on file. Social History/Living Environment:     Lives with spouse. [facility] is his aid who works with him 40 hours per week helping with exercise, mobility, self care and appointments  Prior Level of Function/Work/Activity:  Prior to 8/2016 pt was walking independent short distances with a straight cane. Current Medications:    Current Outpatient Prescriptions:     acetaminophen (TYLENOL) 500 mg tablet, Take  by mouth every six (6) hours as needed for Pain., Disp: , Rfl:     guaiFENesin (ORGANIDIN) 400 mg tablet, Take  by mouth every four (4) hours. , Disp: , Rfl:     lisinopril (PRINIVIL, ZESTRIL) 40 mg tablet, Take 40 mg by mouth daily. , Disp: , Rfl:     loratadine (CLARITIN) 10 mg tablet, Take 10 mg by mouth., Disp: , Rfl:     b complex-vitamin c-folic acid 0.8 mg (NEPHRO-ALICIA) 0.8 mg tab tablet, Take 1 Tab by mouth daily. , Disp: , Rfl:     Omeprazole delayed release (PRILOSEC D/R) 20 mg tablet, Take 20 mg by mouth daily. , Disp: , Rfl:     phenytoin ER (DILANTIN ER) 100 mg ER capsule, Take  by mouth., Disp: , Rfl:     rosuvastatin (CRESTOR) 40 mg tablet, Take 40 mg by mouth nightly., Disp: , Rfl:     senna (SENOKOT) 8.6 mg tablet, Take 1 Tab by mouth daily. , Disp: , Rfl:     sertraline (ZOLOFT) 100 mg tablet, Take  by mouth daily. , Disp: , Rfl:     tamsulosin (FLOMAX) 0.4 mg capsule, Take 0.4 mg by mouth daily. , Disp: , Rfl:     ALOE VERA/COLLAGEN (ALOE VESTA 2-N-1 CLEANSER EX), by Apply Externally route., Disp: , Rfl:     aspirin delayed-release 81 mg tablet, Take 81 mg by mouth., Disp: , Rfl:     MINERAL OIL/PETROLATUM,WHITE (CLIVE MOISTURE BARRIER EX), by Apply Externally route., Disp: , Rfl:     carvedilol (COREG) 12.5 mg tablet, Take 12.5 mg by mouth., Disp: , Rfl:     cholecalciferol (VITAMIN D3) 1,000 unit cap, Take 1,000 Units by mouth., Disp: , Rfl:     clopidogrel (PLAVIX) 75 mg tab, Take 75 mg by mouth., Disp: , Rfl:     docusate sodium 100 mg/5 mL enem, Insert  into rectum. , Disp: , Rfl:     fluticasone (FLOVENT DISKUS) 50 mcg/actuation inhaler, by Nasal route., Disp: , Rfl:     folic acid (FOLVITE) 1 mg tablet, Take 1 mg by mouth., Disp: , Rfl:    Date Last Reviewed: 6/22/2017   Number of Personal Factors/Comorbidities that affect the Plan of Care: 1-2: MODERATE COMPLEXITY   EXAMINATION:   ROM:          Left UE - swollen. Compression sleeve on but still showing marked swelling. Dependent - non functional.  WFL for dressing. Left LE:  Increased swelling. WFL. Tight all end ranges - flexed hips in standing and walking. Tight calf. Strength:          Right side WFL  Left UE - barely a shoulder shrug   Left LE:  Able to sling hip and leg. Functional Mobility:         Gait/Ambulation:  Pt ambulates at least 40 - 61' with Myriam Fátima almost daily using davis walker and AFO. Pt ambulates with extremely flexed posture and posterior retracted hip in stance with strong left knee hyperextension in left stance in spite of double metal upright AFO. Pt hip hikes and slings the left leg forward. Transfers:  Close supervision to independent        Bed Mobility:  Assist with lower body. Mental Status:          Alert and oriented x 4;  Pleasant and appropriate. Occ forgetful. Occ slight impulsive    Vision:          Wears glasses   Body Structures Involved:  1. Nerves  2. Thoracic Cage  3. Bones  4. Joints  5. Muscles  6. Ligaments Body Functions Affected:  1. Mental  2. Sensory/Pain  3. Neuromusculoskeletal  4.  Movement Related  5. Skin Related Activities and Participation Affected:  1. Learning and Applying Knowledge  2. General Tasks and Demands  3. Mobility  4. Self Care  5. Domestic Life  6. Interpersonal Interactions and Relationships  7. Community, Social and [place]   Number of elements that affect the Plan of Care: 4+: HIGH COMPLEXITY   CLINICAL PRESENTATION:   Presentation: Stable and uncomplicated: LOW COMPLEXITY   CLINICAL DECISION MAKING:   Outcome Measure: Tool Used: Blanchard Balance Scale  Score:  Initial: 16/56 Most Recent: X/56 (Date: -- )   Interpretation of Score: Each section is scored on a 0-4 scale, 0 representing the patients inability to perform the task and 4 representing independence. The scores of each section are added together for a total score of 56. The higher the patients score, the more independent the patient is. Any score below 45 indicates increased risk for falls. Score 56 55-45 44-34 33-23 22-12 11-1 0   Modifier CH CI CJ CK CL CM CN     ? Mobility - Walking and Moving Around:     - CURRENT STATUS: CL - 60%-79% impaired, limited or restricted    - GOAL STATUS: CK - 40%-59% impaired, limited or restricted    - D/C STATUS:  ---------------To be determined---------------    Tool Used: Timed Up and Go (TUG)  Score:  Initial: 1.23.21 minutes Most Recent: X seconds (Date: -- )   Interpretation of Score: The test measures, in seconds, the time taken by an individual to stand up from a standard arm chair (seat height 46 cm [18 in], arm height 65 cm [25.6 in]), walk a distance of 3 meters (118 in, approx 10 ft), turn, walk back to the chair and sit down. If the individual takes longer than 14 seconds to complete TUG, this indicates risk for falls. Score 7 7.5-10.5 11-14 14.5-17.5 18-21 21.5-24.5 25+   Modifier CH CI CJ CK CL CM CN     Medical Necessity:   · Skilled intervention continues to be required due to hemiparesis and difficulty walking.   Reason for Services/Other Comments:  · Patient continues to require skilled intervention due to hemiparesis and difficulty walking. Use of outcome tool(s) and clinical judgement create a POC that gives a: Questionable prediction of patient's progress: MODERATE COMPLEXITY   TREATMENT:   (In addition to Assessment/Re-Assessment sessions the following treatments were rendered)    THERAPEUTIC ACTIVITY: (15 minutes): Therapeutic activities per grid below to improve mobility, strength, balance and coordination. Required moderate visual, verbal and manual cues to perform activities correctly. .  THERAPEUTIC EXERCISE: (30 minutes):  Exercises per grid below to improve mobility. Required moderate visual, verbal and manual cues to promote proper body alignment, promote proper body posture and promote proper body mechanics. Progressed range, repetitions and complexity of movement as indicated. Date:  6/22/17 Date: Date:   Activity/Exercise Parameters Parameters Parameters   In parallel bars:  Standing WS over left side x10 with PT giving minima facilitation to get pt to [address] [place] [address] - guarding to keep left knee from buckling  x20 WS with right toe taps. Pt occ lock out his knee with left hip retracting vs. Extended WS over left leg. Much improved today. x20 heel taps  x10 left WS with good knee control      Walking in parallel bars 1 x 2 passes with cues and max facilitation for ant WS over left leg in stance. Pt tends to be flexed at hip and retracted with strong left knee hyperextension in left. Slight improved posture 50% of the time. Supine bridging X10. Even hips today. Single leg (figure 4 with right leg up) x5 very small contraction. Placed drawsheet under hips and stood over pt to increase height of contraction with good results. Jasmin cannot do at home as the pt has a single hospital bed. It would not be safe.        right piriformis stretch 3 x 30 seconds     Passive right sidelying left hip extension stretch 5 x 10 seconds. Left leg supported and stabilized on flat wedge. Pt actively turning hip and trunk ant for active stretch and contraction. EDUCATION:  Instructed riccardo Cabral in above exercises and stretches. Altagracia Cabral verbalized understanding/  HEP:  Written HEP given for above    Treatment/Session Assessment: Slow and less talkative today. Flat affect. 15 minutes late but still with good participation. Slowly increasing hip range and strength. AFO needs adjustments. Have scheduled tentative APPT FOR THE 29TH. Patients response to todays treatment session was tolerated well with no medical complications. .  · Post session pain:  NO PAIN  · Compliance with Program/Exercises: Will assess as treatment progresses. · Recommendations/Intent for next treatment session: \"Next visit will focus on PROGRESS GAIT AND BALANCE RETRAINING. \".  Total Treatment Duration:  PT Patient Time In/Time Out  Time In: 1415  Time Out: Vee 15, PT

## 2017-06-26 ENCOUNTER — HOSPITAL ENCOUNTER (OUTPATIENT)
Dept: PHYSICAL THERAPY | Age: 67
Discharge: HOME | End: 2017-06-26
Payer: MEDICARE

## 2017-06-26 PROCEDURE — 97110 THERAPEUTIC EXERCISES: CPT

## 2017-06-26 PROCEDURE — 97530 THERAPEUTIC ACTIVITIES: CPT

## 2017-06-29 NOTE — PROGRESS NOTES
Regi Bird  : 1950 [facility] [address]  [phone]   [phone]          OUTPATIENT PHYSICAL THERAPY:Daily Note 2017    ICD-10: Treatment Diagnosis: Hemiplegia and hemiparesis following cerebral infarction affecting left non-dominant side (I69.354); Difficulty in walking, not elsewhere classified (R26.2)  Precautions/Allergies:   Latex; Adhesive; and Sulfa (sulfonamide antibiotics)   Fall Risk Score: 4 (? 5 = High Risk)  MD Orders: PT eval and treat MEDICAL/REFERRING DIAGNOSIS:  CVA (cerebral vascular accident) [facility]) [I63.9]   DATE OF ONSET: 2016  REFERRING PHYSICIAN: Jarad, Not On File, MD  RETURN PHYSICIAN APPOINTMENT: unknown  DATE OF PROGRESS NOTE:  81  RECERTIFICATION DATE: 3/6/71     INITIAL ASSESSMENT:  Mr. Ariela Michelle presents to therapy s/p another Left CVA 2016. Prior CVA 2005. Pt presents with dense left hemiplegia, non functional swollen left arm and little movement in the left elg with swelling as well. Pt has a new double metal upright AFO that may need some adjustments as pt is hip hiking to clear his left foot and he has strong left knee  recurvatum in stance. Pt presents with poor balance and marked gait deviations but has good potential to get back to walking short distances with less assistance. Pt will benefit from at least 16 physical therapy visits to reach the goal of less assistance required for balance and gait. PROBLEM LIST (Impacting functional limitations):  1. Decreased Strength  2. Decreased ADL/Functional Activities  3. Decreased Transfer Abilities  4. Decreased Ambulation Ability/Technique  5. Decreased Balance  6. Increased Pain  7. Decreased Activity Tolerance  8. Decreased Flexibility/Joint Mobility  9. Edema/Girth  10. Decreased [place] with Home Exercise Program INTERVENTIONS PLANNED:  1. Balance Exercise  2. Family Education  3. Gait Training  4.  Home Exercise Program (HEP)  5. Manual Therapy  6. Neuromuscular Re-education/Strengthening  7. Range of Motion (ROM)  8. Therapeutic Activites  9. Therapeutic Exercise/Strengthening  10. Transfer Training  11. orthotic management    TREATMENT PLAN:  Effective Dates: 6/5/17 TO 9/1/17. Frequency/Duration: 2 times a week for 8 weeks  GOALS: (Goals have been discussed and agreed upon with patient.)  Short-Term Functional Goals: Time Frame: 4 WEEKS  1. Pt will be independent with range of motion, strength and balance home exercise program.  2. Pt will ambulate with upright posture 40 - 60' and increased ant WS over left foot to decrease strong left knee hyperextension in stnace  Discharge Goals: Time Frame: 8 weeks  Pt will show increased range of motion and improved posture to allow for improved safety and ability with all functional mobility. Pt will decrease TUG score by 20 seconds indicating more normalized gait pattern and decrease risk for falls. Pt will increase Blanchard Balance Scale to 24/56 indicating increased balance and decreased risk for falls. Rehabilitation Potential For Stated Goals: Mando Abernathy's therapy, I certify that the treatment plan above will be carried out by a therapist or under their direction. Thank you for this referral,  Yoli Payne, PT                 HISTORY:   GOAL:  \"I would like to get to where I was before the second stroke. (walking with straight cane)  Present Symptoms:    73year old right handed white male s/p CVA again 8/2016 - right CVA on the brain stem. 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 Went home from [place] [id number] a week later. 3 months. Getting therapy there. November 2016. 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 Lives with spouse. Walks with hemiwalker with supervision. Transfers independently. Dependent with dressing - assist when he can. Wears pads for security - enlarged prostate.        PAIN:  Lower back pain:  No pain now. Hospital bed has an air mattress. Worst pain 9/10 - have to get up. Sitting with support is better than being in bed. Tylenol at night. History of Present Injury/Illness (Reason for Referral):  See above  Past Medical History/Comorbidities:   Mr. Mirela Mathews  has a past medical history of Dermatophytosis of nail; History of CVA (cerebrovascular accident); and History of paraplegia. Mr. Mirela Mathews  has no past surgical history on file. Social History/Living Environment:     Lives with spouse. Ashley Brown is his aid who works with him 40 hours per week helping with exercise, mobility, self care and appointments  Prior Level of Function/Work/Activity:  Prior to 8/2016 pt was walking independent short distances with a straight cane. Current Medications:    Current Outpatient Prescriptions:     acetaminophen (TYLENOL) 500 mg tablet, Take  by mouth every six (6) hours as needed for Pain., Disp: , Rfl:     guaiFENesin (ORGANIDIN) 400 mg tablet, Take  by mouth every four (4) hours. , Disp: , Rfl:     lisinopril (PRINIVIL, ZESTRIL) 40 mg tablet, Take 40 mg by mouth daily. , Disp: , Rfl:     loratadine (CLARITIN) 10 mg tablet, Take 10 mg by mouth., Disp: , Rfl:     b complex-vitamin c-folic acid 0.8 mg (NEPHRO-ALICIA) 0.8 mg tab tablet, Take 1 Tab by mouth daily. , Disp: , Rfl:     Omeprazole delayed release (PRILOSEC D/R) 20 mg tablet, Take 20 mg by mouth daily. , Disp: , Rfl:     phenytoin ER (DILANTIN ER) 100 mg ER capsule, Take  by mouth., Disp: , Rfl:     rosuvastatin (CRESTOR) 40 mg tablet, Take 40 mg by mouth nightly., Disp: , Rfl:     senna (SENOKOT) 8.6 mg tablet, Take 1 Tab by mouth daily. , Disp: , Rfl:     sertraline (ZOLOFT) 100 mg tablet, Take  by mouth daily. , Disp: , Rfl:     tamsulosin (FLOMAX) 0.4 mg capsule, Take 0.4 mg by mouth daily. , Disp: , Rfl:     ALOE VERA/COLLAGEN (ALOE VESTA 2-N-1 CLEANSER EX), by Apply Externally route., Disp: , Rfl:     aspirin delayed-release 81 mg tablet, Take 81 mg by mouth., Disp: , Rfl:     MINERAL OIL/PETROLATUM,WHITE (CLIVE MOISTURE BARRIER EX), by Apply Externally route., Disp: , Rfl:     carvedilol (COREG) 12.5 mg tablet, Take 12.5 mg by mouth., Disp: , Rfl:     cholecalciferol (VITAMIN D3) 1,000 unit cap, Take 1,000 Units by mouth., Disp: , Rfl:     clopidogrel (PLAVIX) 75 mg tab, Take 75 mg by mouth., Disp: , Rfl:     docusate sodium 100 mg/5 mL enem, Insert  into rectum. , Disp: , Rfl:     fluticasone (FLOVENT DISKUS) 50 mcg/actuation inhaler, by Nasal route., Disp: , Rfl:     folic acid (FOLVITE) 1 mg tablet, Take 1 mg by mouth., Disp: , Rfl:    Date Last Reviewed: 6/26/2017   Number of Personal Factors/Comorbidities that affect the Plan of Care: 1-2: MODERATE COMPLEXITY   EXAMINATION:   ROM:          Left UE - swollen. Compression sleeve on but still showing marked swelling. Dependent - non functional.  WFL for dressing. Left LE:  Increased swelling. WFL. Tight all end ranges - flexed hips in standing and walking. Tight calf. Strength:          Right side WFL  Left UE - barely a shoulder shrug   Left LE:  Able to sling hip and leg. Functional Mobility:         Gait/Ambulation:  Pt ambulates at least 40 - 61' with Tito Liming almost daily using davis walker and AFO. Pt ambulates with extremely flexed posture and posterior retracted hip in stance with strong left knee hyperextension in left stance in spite of double metal upright AFO. Pt hip hikes and slings the left leg forward. Transfers:  Close supervision to independent        Bed Mobility:  Assist with lower body. Mental Status:          Alert and oriented x 4;  Pleasant and appropriate. Occ forgetful. Occ slight impulsive    Vision:          Wears glasses   Body Structures Involved:  1. Nerves  2. Thoracic Cage  3. Bones  4. Joints  5. Muscles  6. Ligaments Body Functions Affected:  1. Mental  2. Sensory/Pain  3. Neuromusculoskeletal  4.  Movement Related  5. Skin Related Activities and Participation Affected:  1. Learning and Applying Knowledge  2. General Tasks and Demands  3. Mobility  4. Self Care  5. Domestic Life  6. Interpersonal Interactions and Relationships  7. Community, Social and [place]   Number of elements that affect the Plan of Care: 4+: HIGH COMPLEXITY   CLINICAL PRESENTATION:   Presentation: Stable and uncomplicated: LOW COMPLEXITY   CLINICAL DECISION MAKING:   Outcome Measure: Tool Used: Blanchard Balance Scale  Score:  Initial: 16/56 Most Recent: X/56 (Date: -- )   Interpretation of Score: Each section is scored on a 0-4 scale, 0 representing the patients inability to perform the task and 4 representing independence. The scores of each section are added together for a total score of 56. The higher the patients score, the more independent the patient is. Any score below 45 indicates increased risk for falls. Score 56 55-45 44-34 33-23 22-12 11-1 0   Modifier CH CI CJ CK CL CM CN     ? Mobility - Walking and Moving Around:     - CURRENT STATUS: CL - 60%-79% impaired, limited or restricted    - GOAL STATUS: CK - 40%-59% impaired, limited or restricted    - D/C STATUS:  ---------------To be determined---------------    Tool Used: Timed Up and Go (TUG)  Score:  Initial: 1.23.21 minutes Most Recent: X seconds (Date: -- )   Interpretation of Score: The test measures, in seconds, the time taken by an individual to stand up from a standard arm chair (seat height 46 cm [18 in], arm height 65 cm [25.6 in]), walk a distance of 3 meters (118 in, approx 10 ft), turn, walk back to the chair and sit down. If the individual takes longer than 14 seconds to complete TUG, this indicates risk for falls. Score 7 7.5-10.5 11-14 14.5-17.5 18-21 21.5-24.5 25+   Modifier CH CI CJ CK CL CM CN     Medical Necessity:   · Skilled intervention continues to be required due to hemiparesis and difficulty walking.   Reason for Services/Other Comments:  · Patient continues to require skilled intervention due to hemiparesis and difficulty walking. Use of outcome tool(s) and clinical judgement create a POC that gives a: Questionable prediction of patient's progress: MODERATE COMPLEXITY   TREATMENT:   (In addition to Assessment/Re-Assessment sessions the following treatments were rendered)    THERAPEUTIC ACTIVITY: (30 minutes): Therapeutic activities per grid below to improve mobility, strength, balance and coordination. Required moderate visual, verbal and manual cues to perform activities correctly. .  THERAPEUTIC EXERCISE: (30 minutes):  Exercises per grid below to improve mobility. Required moderate visual, verbal and manual cues to promote proper body alignment, promote proper body posture and promote proper body mechanics. Progressed range, repetitions and complexity of movement as indicated. Date:  6/22/17 Date:  6/26/17 Date:   Activity/Exercise Parameters Parameters Parameters   In parallel bars:  Standing WS over left side x10 with PT giving minima facilitation to get pt to [address] [place] [address] - guarding to keep left knee from buckling  x20 WS with right toe taps. Pt occ lock out his knee with left hip retracting vs. Extended WS over left leg. Much improved today. x20 heel taps  x10 left WS with good knee control  x10 with PT giving moderate facilitation to get pt to WS - guarding to keep left knee from buckling  x20 WS with right toe taps. Pt occ lock out his knee with left hip retracting vs. Extended WS over left leg. Having to cue pt more to get a good WS  x20 heel taps  x10 left WS with good knee control     Walking in parallel bars 1 x 2 passes with cues and max facilitation for ant WS over left leg in stance. Pt tends to be flexed at hip and retracted with strong left knee hyperextension in left. Slight improved posture 50% of the time.    -    Pelvic clocks  12:00 to 6:00  2 x 10 with good mobility  3:00 to 9:00 2 x 10  Entire clock x 3 each direction. Supine bridging X10. Even hips today. x10 with post tilt first    Single leg (figure 4 with right leg up) x5 very small contraction. Placed drawsheet under hips and stood over pt to increase height of contraction with good results. Jasmin cannot do at home as the pt has a single hospital bed. It would not be safe. x5 with right leg over left.      right piriformis stretch 3 x 30 seconds 3 x 30     Passive right sidelying left hip extension stretch 5 x 10 seconds. Left leg supported and stabilized on flat wedge. Pt actively turning hip and trunk ant for active stretch and contraction. 5 x 10 seconds. Left leg supported and stabilized on flat wedge. Pt actively turning hip and trunk ant for active stretch and contraction    Active assisted sidelying hip extension  x10 with moderate guiding    Sidelying hamstring curl  Nothing active. walking  With hemiwalker x 60' with w/c behind. Tends to sling left leg forward with strong left hip flexion/retraction and knee hyperextension in left stance in spite of double metal upright AFO in place. Cues for larger right step. EDUCATION:  Instructed aide Rojean Leventhal in above exercises and stretches. Rojean Leventhal verbalized understanding/  HEP:  Written HEP given for above    Treatment/Session Assessment:  Good work today. Need more hip extension and better support from AFO. Appt on June 29th with [facility]. Patients response to todays treatment session was tolerated well with no medical complications. .  · Post session pain:  NO PAIN  · Compliance with Program/Exercises: Will assess as treatment progresses. · Recommendations/Intent for next treatment session: \"Next visit will focus on PROGRESS GAIT AND BALANCE RETRAINING. \".  Total Treatment Duration:  PT Patient Time In/Time Out  Time In: 1400  Time Out: 1500    Chacha Bueno, PT

## 2017-07-17 ENCOUNTER — HOSPITAL ENCOUNTER (OUTPATIENT)
Dept: PHYSICAL THERAPY | Age: 67
Discharge: HOME | End: 2017-07-17
Payer: MEDICARE

## 2017-07-17 PROCEDURE — 97530 THERAPEUTIC ACTIVITIES: CPT

## 2017-07-17 PROCEDURE — 97110 THERAPEUTIC EXERCISES: CPT

## 2017-07-17 NOTE — PROGRESS NOTES
Cami Phipps  : 1950 [facility] [address]  [phone]   [phone]          OUTPATIENT PHYSICAL THERAPY:Daily Note 2017    ICD-10: Treatment Diagnosis: Hemiplegia and hemiparesis following cerebral infarction affecting left non-dominant side (I69.354); Difficulty in walking, not elsewhere classified (R26.2)  Precautions/Allergies:   Latex; Adhesive; and Sulfa (sulfonamide antibiotics)   Fall Risk Score: 4 (? 5 = High Risk)  MD Orders: PT eval and treat MEDICAL/REFERRING DIAGNOSIS:  CVA (cerebral vascular accident) [facility]) [I63.9]   DATE OF ONSET: 2016  REFERRING PHYSICIAN: Jarad, Not On File, MD  RETURN PHYSICIAN APPOINTMENT: unknown  DATE OF PROGRESS NOTE:  97  RECERTIFICATION DATE: 82     INITIAL ASSESSMENT:  Mr. Cody Anthony presents to therapy s/p another Left CVA 2016. Prior CVA 2005. Pt presents with dense left hemiplegia, non functional swollen left arm and little movement in the left elg with swelling as well. Pt has a new double metal upright AFO that may need some adjustments as pt is hip hiking to clear his left foot and he has strong left knee  recurvatum in stance. Pt presents with poor balance and marked gait deviations but has good potential to get back to walking short distances with less assistance. Pt will benefit from at least 16 physical therapy visits to reach the goal of less assistance required for balance and gait. PROBLEM LIST (Impacting functional limitations):  1. Decreased Strength  2. Decreased ADL/Functional Activities  3. Decreased Transfer Abilities  4. Decreased Ambulation Ability/Technique  5. Decreased Balance  6. Increased Pain  7. Decreased Activity Tolerance  8. Decreased Flexibility/Joint Mobility  9. Edema/Girth  10. Decreased [place] with Home Exercise Program INTERVENTIONS PLANNED:  1. Balance Exercise  2. Family Education  3. Gait Training  4.  Home Exercise Program (HEP)  5. Manual Therapy  6. Neuromuscular Re-education/Strengthening  7. Range of Motion (ROM)  8. Therapeutic Activites  9. Therapeutic Exercise/Strengthening  10. Transfer Training  11. orthotic management    TREATMENT PLAN:  Effective Dates: 6/5/17 TO 9/1/17. Frequency/Duration: 2 times a week for 8 weeks  GOALS: (Goals have been discussed and agreed upon with patient.)  Short-Term Functional Goals: Time Frame: 4 WEEKS  1. Pt will be independent with range of motion, strength and balance home exercise program.  2. Pt will ambulate with upright posture 40 - 60' and increased ant WS over left foot to decrease strong left knee hyperextension in stnace  Discharge Goals: Time Frame: 8 weeks  Pt will show increased range of motion and improved posture to allow for improved safety and ability with all functional mobility. Pt will decrease TUG score by 20 seconds indicating more normalized gait pattern and decrease risk for falls. Pt will increase Blanchard Balance Scale to 24/56 indicating increased balance and decreased risk for falls. Rehabilitation Potential For Stated Goals: Mando Abernathy's therapy, I certify that the treatment plan above will be carried out by a therapist or under their direction. Thank you for this referral,  Zuleika King, PT                 HISTORY:   GOAL:  \"I would like to get to where I was before the second stroke. (walking with straight cane)  Present Symptoms:    73year old right handed white male s/p CVA again 8/2016 - right CVA on the brain stem. 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 Went home from [place] [id number] a week later. 3 months. Getting therapy there. November 2016. 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 Lives with spouse. Walks with hemiwalker with supervision. Transfers independently. Dependent with dressing - assist when he can. Wears pads for security - enlarged prostate.        PAIN:  Lower back pain:  No pain now. Hospital bed has an air mattress. Worst pain 9/10 - have to get up. Sitting with support is better than being in bed. Tylenol at night. History of Present Injury/Illness (Reason for Referral):  See above  Past Medical History/Comorbidities:   Mr. William Pulido  has a past medical history of Dermatophytosis of nail; History of CVA (cerebrovascular accident); and History of paraplegia. Mr. William Pulido  has no past surgical history on file. Social History/Living Environment:     Lives with spouse. Darnell Silva is his aid who works with him 40 hours per week helping with exercise, mobility, self care and appointments  Prior Level of Function/Work/Activity:  Prior to 8/2016 pt was walking independent short distances with a straight cane. Current Medications:    Current Outpatient Prescriptions:     acetaminophen (TYLENOL) 500 mg tablet, Take  by mouth every six (6) hours as needed for Pain., Disp: , Rfl:     guaiFENesin (ORGANIDIN) 400 mg tablet, Take  by mouth every four (4) hours. , Disp: , Rfl:     lisinopril (PRINIVIL, ZESTRIL) 40 mg tablet, Take 40 mg by mouth daily. , Disp: , Rfl:     loratadine (CLARITIN) 10 mg tablet, Take 10 mg by mouth., Disp: , Rfl:     b complex-vitamin c-folic acid 0.8 mg (NEPHRO-ALICIA) 0.8 mg tab tablet, Take 1 Tab by mouth daily. , Disp: , Rfl:     Omeprazole delayed release (PRILOSEC D/R) 20 mg tablet, Take 20 mg by mouth daily. , Disp: , Rfl:     phenytoin ER (DILANTIN ER) 100 mg ER capsule, Take  by mouth., Disp: , Rfl:     rosuvastatin (CRESTOR) 40 mg tablet, Take 40 mg by mouth nightly., Disp: , Rfl:     senna (SENOKOT) 8.6 mg tablet, Take 1 Tab by mouth daily. , Disp: , Rfl:     sertraline (ZOLOFT) 100 mg tablet, Take  by mouth daily. , Disp: , Rfl:     tamsulosin (FLOMAX) 0.4 mg capsule, Take 0.4 mg by mouth daily. , Disp: , Rfl:     ALOE VERA/COLLAGEN (ALOE VESTA 2-N-1 CLEANSER EX), by Apply Externally route., Disp: , Rfl:     aspirin delayed-release 81 mg tablet, Take 81 mg by mouth., Disp: , Rfl:     MINERAL OIL/PETROLATUM,WHITE (CLIVE MOISTURE BARRIER EX), by Apply Externally route., Disp: , Rfl:     carvedilol (COREG) 12.5 mg tablet, Take 12.5 mg by mouth., Disp: , Rfl:     cholecalciferol (VITAMIN D3) 1,000 unit cap, Take 1,000 Units by mouth., Disp: , Rfl:     clopidogrel (PLAVIX) 75 mg tab, Take 75 mg by mouth., Disp: , Rfl:     docusate sodium 100 mg/5 mL enem, Insert  into rectum. , Disp: , Rfl:     fluticasone (FLOVENT DISKUS) 50 mcg/actuation inhaler, by Nasal route., Disp: , Rfl:     folic acid (FOLVITE) 1 mg tablet, Take 1 mg by mouth., Disp: , Rfl:    Date Last Reviewed: 6/26/2017   Number of Personal Factors/Comorbidities that affect the Plan of Care: 1-2: MODERATE COMPLEXITY   EXAMINATION:   ROM:          Left UE - swollen. Compression sleeve on but still showing marked swelling. Dependent - non functional.  WFL for dressing. Left LE:  Increased swelling. WFL. Tight all end ranges - flexed hips in standing and walking. Tight calf. Strength:          Right side WFL  Left UE - barely a shoulder shrug   Left LE:  Able to sling hip and leg. Functional Mobility:         Gait/Ambulation:  Pt ambulates at least 40 - 61' with Con Colander almost daily using davis walker and AFO. Pt ambulates with extremely flexed posture and posterior retracted hip in stance with strong left knee hyperextension in left stance in spite of double metal upright AFO. Pt hip hikes and slings the left leg forward. Transfers:  Close supervision to independent        Bed Mobility:  Assist with lower body. Mental Status:          Alert and oriented x 4;  Pleasant and appropriate. Occ forgetful. Occ slight impulsive    Vision:          Wears glasses   Body Structures Involved:  1. Nerves  2. Thoracic Cage  3. Bones  4. Joints  5. Muscles  6. Ligaments Body Functions Affected:  1. Mental  2. Sensory/Pain  3. Neuromusculoskeletal  4.  Movement Related  5. Skin Related Activities and Participation Affected:  1. Learning and Applying Knowledge  2. General Tasks and Demands  3. Mobility  4. Self Care  5. Domestic Life  6. Interpersonal Interactions and Relationships  7. Community, Social and [place]   Number of elements that affect the Plan of Care: 4+: HIGH COMPLEXITY   CLINICAL PRESENTATION:   Presentation: Stable and uncomplicated: LOW COMPLEXITY   CLINICAL DECISION MAKING:   Outcome Measure: Tool Used: Blanchard Balance Scale  Score:  Initial: 16/56 Most Recent: X/56 (Date: -- )   Interpretation of Score: Each section is scored on a 0-4 scale, 0 representing the patients inability to perform the task and 4 representing independence. The scores of each section are added together for a total score of 56. The higher the patients score, the more independent the patient is. Any score below 45 indicates increased risk for falls. Score 56 55-45 44-34 33-23 22-12 11-1 0   Modifier CH CI CJ CK CL CM CN     ? Mobility - Walking and Moving Around:     - CURRENT STATUS: CL - 60%-79% impaired, limited or restricted    - GOAL STATUS: CK - 40%-59% impaired, limited or restricted    - D/C STATUS:  ---------------To be determined---------------    Tool Used: Timed Up and Go (TUG)  Score:  Initial: 1.23.21 minutes Most Recent: X seconds (Date: -- )   Interpretation of Score: The test measures, in seconds, the time taken by an individual to stand up from a standard arm chair (seat height 46 cm [18 in], arm height 65 cm [25.6 in]), walk a distance of 3 meters (118 in, approx 10 ft), turn, walk back to the chair and sit down. If the individual takes longer than 14 seconds to complete TUG, this indicates risk for falls. Score 7 7.5-10.5 11-14 14.5-17.5 18-21 21.5-24.5 25+   Modifier CH CI CJ CK CL CM CN     Medical Necessity:   · Skilled intervention continues to be required due to hemiparesis and difficulty walking.   Reason for Services/Other Comments:  · Patient continues to require skilled intervention due to hemiparesis and difficulty walking. Use of outcome tool(s) and clinical judgement create a POC that gives a: Questionable prediction of patient's progress: MODERATE COMPLEXITY   TREATMENT:   (In addition to Assessment/Re-Assessment sessions the following treatments were rendered)    THERAPEUTIC ACTIVITY: (20 minutes): Therapeutic activities per grid below to improve mobility, strength, balance and coordination. Required moderate visual, verbal and manual cues to perform activities correctly. Rodney Yates :  Here to assess double upright AFO. Agrees needs new higher one. Walking x 40+ with hemiwalker and supervision to contact guard. Pt uses hip and and leg sling to advance left leg and land foot flat contact with strongly extended knee in stance. Rodney states screws in AFO are fully engaged for max knee control. Agrees that AFO needs to be 1 - 2 inches high. Will obtain order for pt and send to Arvind and then pt will see him in his office. THERAPEUTIC EXERCISE: (40 minutes):  Exercises per grid below to improve mobility. Required moderate visual, verbal and manual cues to promote proper body alignment, promote proper body posture and promote proper body mechanics. Progressed range, repetitions and complexity of movement as indicated. Date:  6/22/17 Date:  6/26/17 Date:  7/17/17   Activity/Exercise Parameters Parameters Parameters   In parallel bars:  Standing WS over left side x10 with PT giving minima facilitation to get pt to [facility] - guarding to keep left knee from buckling  x20 WS with right toe taps. Pt occ lock out his knee with left hip retracting vs. Extended WS over left leg. Much improved today. x20 heel taps  x10 left WS with good knee control  x10 with PT giving moderate facilitation to get pt to WS - guarding to keep left knee from buckling  x20 WS with right toe taps.   Pt occ lock out his knee with left hip retracting vs. Extended WS over left leg. Having to cue pt more to get a good WS  x20 heel taps  x10 left WS with good knee control  -   Walking in parallel bars 1 x 2 passes with cues and max facilitation for ant WS over left leg in stance. Pt tends to be flexed at hip and retracted with strong left knee hyperextension in left. Slight improved posture 50% of the time. -    Pelvic clocks  12:00 to 6:00  2 x 10 with good mobility  3:00 to 9:00 2 x 10  Entire clock x 3 each direction. Supine bridging X10. Even hips today. x10 with post tilt first x10 then with small bolster under hips to increase range then without bolster with improved range. Single leg (figure 4 with right leg up) x5 very small contraction. Placed drawsheet under hips and stood over pt to increase height of contraction with good results. Jasmin cannot do at home as the pt has a single hospital bed. It would not be safe. x5 with right leg over left. x5   right piriformis stretch 3 x 30 seconds 3 x 30  3 x 30 seconds   Passive right sidelying left hip extension stretch 5 x 10 seconds. Left leg supported and stabilized on flat wedge. Pt actively turning hip and trunk ant for active stretch and contraction. 5 x 10 seconds. Left leg supported and stabilized on flat wedge. Pt actively turning hip and trunk ant for active stretch and contraction 5 x 10 seconds. Left leg supported and stabilized on flat wedge. Pt actively turning hip and trunk ant for active stretch and contraction. x10 resisted hip flexion into hip extension. Good contraction. Active assisted sidelying hip extension  x10 with moderate guiding    Sidelying hamstring curl  Nothing active. walking  With MemSQLwalker x 60' with w/c behind. Tends to sling left leg forward with strong left hip flexion/retraction and knee hyperextension in left stance in spite of double metal upright AFO in place. Cues for larger right step.  With hemiwalker x 60' with w/c behind. Tends to sling left leg forward with strong left hip flexion/retraction and knee hyperextension in left stance in spite of double metal upright AFO in place. Cues for larger right step. More upright - stronger in hip           EDUCATION:  Instructed riccardo Martinez in above exercises and stretches. Geetha Martinez verbalized understanding/  HEP:  Written HEP given for above    Treatment/Session Assessment:  Improved hip extension in stance and acive exercises. Will get order for new AFO. Patients response to todays treatment session was tolerated well with no medical complications. .  · Post session pain:  NO PAIN  · Compliance with Program/Exercises: Will assess as treatment progresses. · Recommendations/Intent for next treatment session: \"Next visit will focus on PROGRESS GAIT AND BALANCE RETRAINING. \".  Total Treatment Duration:  PT Patient Time In/Time Out  Time In: 1400  Time Out: 1500    Rivka Evans PT

## 2017-07-28 ENCOUNTER — HOSPITAL ENCOUNTER (OUTPATIENT)
Dept: PHYSICAL THERAPY | Age: 67
Discharge: HOME | End: 2017-07-28
Payer: MEDICARE

## 2017-07-28 PROCEDURE — G8985 CARRY GOAL STATUS: HCPCS

## 2017-07-28 PROCEDURE — 97167 OT EVAL HIGH COMPLEX 60 MIN: CPT

## 2017-07-28 PROCEDURE — G8984 CARRY CURRENT STATUS: HCPCS

## 2017-07-28 PROCEDURE — 97110 THERAPEUTIC EXERCISES: CPT

## 2017-07-28 PROCEDURE — G8979 MOBILITY GOAL STATUS: HCPCS

## 2017-07-28 PROCEDURE — G8978 MOBILITY CURRENT STATUS: HCPCS

## 2017-07-28 PROCEDURE — 97530 THERAPEUTIC ACTIVITIES: CPT

## 2017-07-28 NOTE — PROGRESS NOTES
Nilay Eubanks  : 1950 [facility], [address]  [phone]   [phone]         OUTPATIENT OCCUPATIONAL THERAPY: Initial Assessment 17   ICD-10: Treatment Diagnosis:   Hemiplegia, unspecified affecting left nondominant side (G81.94)  Pain in left shoulder (M25.512)    Precautions/Allergies:   Latex; Adhesive; and Sulfa (sulfonamide antibiotics)   Fall Risk Score: 4 (? 5 = High Risk)  MD Orders: OT Evaluate and Treat MEDICAL/REFERRING DIAGNOSIS:   Pain in left shoulder [M25.512]  CVA (cerebral vascular accident) [facility]) [I63.9]   DATE OF ONSET: 2016 and 2005  REFERRING PHYSICIAN: Lavelle English MD  RETURN PHYSICIAN APPOINTMENT: TBD     INITIAL ASSESSMENT:  Mr. Kyle Duarte presents to outpatient occupational therapy services to address L shoulder pain due to L hemiplegia since . Pt has no functional movement in L UE and reports he has not had rehab for L UE since initial CVA in . Pt has 2 finger width subluxation in L shoulder, which could be source of L shoulder pain. Pt also has poor posture in sitting with forward rounded shoulders. Pt will benefit from OT services to work on positioning of L UE during transfers/ADL to address L shoulder pain. Pt will benefit from modalities, manual therapy, therapeutic exercises/activities to address L shoulder pain. Pt will be seen by OT services to address the below stated goals and plan of care. PLAN OF CARE:   PROBLEM LIST:  1. Decreased Strength  2. Decreased ADL/Functional Activities  3. Decreased Transfer Abilities  4. Decreased Ambulation Ability/Technique  5. Decreased Balance  6. Increased Pain  7. Decreased Activity Tolerance  8. Decreased Flexibility/Joint Mobility  9. Edema/Girth  10. Decreased Clarke with Home Exercise Program  11. Decreased Cognition INTERVENTIONS PLANNED:  1. Activities of daily living training  2.  Adaptive equipment training  3. Balance training  4. Clothing management  5. Cognitive training  6. Manual therapy training  7. Modalities  8. Neuromuscular re-eduation  9. Therapeutic activity  10. Therapeutic exercise  11. Wheelchair management   TREATMENT PLAN:  Effective Dates: 7/28/17 TO 10/28/17. Frequency/Duration: 2 times a week for 8 weeks  GOALS: (Goals have been discussed and agreed upon with patient.)  Short-Term Functional Goals: Time Frame: 4 weeks   1. Jd Keller will report decreased pain in L shoulder from 8/10 to 5-6/10 to demonstrate improved comfort with daily activity. 2. Jd Keller will tolerate weightbearing activities in L UE with moderate facilitation to improve shoulder strength for daily activity. 3. Jd Keller will verbalize with independence 3 ways he is using positioning throughout the day/night to help with pain reduction in L shoulder. Jacquelyn Sheikh will complete ROM exercises in sitting with moderate cues for posture x 15 minutes to improve posture for daily activity. Discharge Goals: Time Frame: 8 weeks  1. dJ Keller will report decreased L shoulder pain from 8/10 to 0-2/10 to demonstrate improved positioning and comfort for ADL. 2. Jd Keller will tolerate weightbearing activities in L UE with minimal facilitation to improve L shoulder subluxation. 3. Jd Keller will limit L shoulder subluxation to 1 finger width to decrease pain in L shoulder for daily activity. 4. Jd Keller will be modified independent with HEP for L UE to decrease pain levels in L UE.   5. Jd Keller will complete sitting with fair+ posture for 10 minutes to improve sitting posture for ADL. Rehabilitation Potential For Stated Goals: Good  Regarding Debbie Abernathy's therapy, I certify that the treatment plan above will be carried out by a therapist or under their direction.   Thank you for this referral,  Ijeoma Enriquez, OT     Referring Physician Signature: Marco A Paulino, MD _________________________  Date _________            The information in this section was collected on 7/28/17 (except where otherwise noted). OCCUPATIONAL PROFILE & HISTORY:   History of Present Injury/Illness (Reason for Referral):  Patient had initial CVA in 2005 with L sided hemiplegia. Pt reports he had therapy for L UE at that time but has had no other rehab for his L UE since his initial CVA. Pt had second CVA in 2016 with pt declining in his ability to complete functional mobility, but states his L UE has been non-functional since 2005. Pt has severe swelling in the L UE, especially in the L hand. Pt has compression machine that he uses for 1 hour for once a day. Pt's arm is elevated on a pillow during the day and positions it at night on wedge. Pt has a sling but states he can't use it because it pulls on his neck and he has more severe pain with use of the sling. .Pt able to get up and complete functional mobility one time per day. Pt has arm wedge/trough but pt is having difficulty using his new wheelchair and the arm trough is on his new wheelchair and he is currently using his old wheelchair. Pt assists with bathing himself with minimal assistance; Assists with dressing with moderate assistance; minimal assistance with upper body ; grooming with minimal assistance. Pt presents today to outpatient therapy services due to pain in the L shoulder. Past Medical History/Comorbidities:   Mr. Cassandra Sorto  has a past medical history of Dermatophytosis of nail; History of CVA (cerebrovascular accident); and History of paraplegia. Mr. Cassandra Sorto  has no past surgical history on file.   Social History/Living Environment:    Lives with wife; [employer] is his aide and is with him 40 hours per week; tub-shower (grab bars) with getting into tub/shower; sits in showerr; Pt is R handed  Prior Level of Function/Work/Activity:  No function in the L UE prior to 8/2016; Pt hasn't had any rehab since 2005- Pt was getting therapy for L UE but states it wasn't very long; Pt was completing functional mobility with single-point cane   Personal Factors:          Patient behavior: Decreased attention to tasks         Past/Current Experience:  Limited rehab of L upper extremity        Other factors that influence how disability is experienced by the patient:  Cognitive deficits; hx of multiple CVAs  Current Medications:    Current Outpatient Prescriptions:     acetaminophen (TYLENOL) 500 mg tablet, Take  by mouth every six (6) hours as needed for Pain., Disp: , Rfl:     guaiFENesin (ORGANIDIN) 400 mg tablet, Take  by mouth every four (4) hours. , Disp: , Rfl:     lisinopril (PRINIVIL, ZESTRIL) 40 mg tablet, Take 40 mg by mouth daily. , Disp: , Rfl:     loratadine (CLARITIN) 10 mg tablet, Take 10 mg by mouth., Disp: , Rfl:     b complex-vitamin c-folic acid 0.8 mg (NEPHRO-ALICIA) 0.8 mg tab tablet, Take 1 Tab by mouth daily. , Disp: , Rfl:     Omeprazole delayed release (PRILOSEC D/R) 20 mg tablet, Take 20 mg by mouth daily. , Disp: , Rfl:     phenytoin ER (DILANTIN ER) 100 mg ER capsule, Take  by mouth., Disp: , Rfl:     rosuvastatin (CRESTOR) 40 mg tablet, Take 40 mg by mouth nightly., Disp: , Rfl:     senna (SENOKOT) 8.6 mg tablet, Take 1 Tab by mouth daily. , Disp: , Rfl:     sertraline (ZOLOFT) 100 mg tablet, Take  by mouth daily. , Disp: , Rfl:     tamsulosin (FLOMAX) 0.4 mg capsule, Take 0.4 mg by mouth daily. , Disp: , Rfl:     ALOE VERA/COLLAGEN (ALOE VESTA 2-N-1 CLEANSER EX), by Apply Externally route., Disp: , Rfl:     aspirin delayed-release 81 mg tablet, Take 81 mg by mouth., Disp: , Rfl:     MINERAL OIL/PETROLATUM,WHITE (CLIVE MOISTURE BARRIER EX), by Apply Externally route., Disp: , Rfl:     carvedilol (COREG) 12.5 mg tablet, Take 12.5 mg by mouth., Disp: , Rfl:     cholecalciferol (VITAMIN D3) 1,000 unit cap, Take 1,000 Units by mouth., Disp: , Rfl:     clopidogrel (PLAVIX) 75 mg tab, Take 75 mg by mouth., Disp: , Rfl:     docusate sodium 100 mg/5 mL enem, Insert  into rectum. , Disp: , Rfl:     fluticasone (FLOVENT DISKUS) 50 mcg/actuation inhaler, by Nasal route., Disp: , Rfl:     folic acid (FOLVITE) 1 mg tablet, Take 1 mg by mouth., Disp: , Rfl:             Date Last Reviewed:  7/28/17   Complexity Level : Extensive review of physical, cognitive, and psychosocial performance (3+):  HIGH COMPLEXITY   ASSESSMENT OF OCCUPATIONAL PERFORMANCE:   Palpation:          Patient noted to have 1.5 to 2 finger width subluxation in L shoulder with dense hemiplegia in L UE. Pt's L UE could be easily passively ranged with no increased tone at the shoulder, forearm, or wrist. Pt did have some increased tone in the finger flexors with hand resting in finger flexion.    Observation:  Patient sits in posterior pelvic tilt with severely rounded shoulders and forward head flexion  ROM/Strength:             Date:  7/28/17 Date:  7/28/17  Date:  7/28/17    AROM PROM  STRENGTH   Movement LEFT LEFT  LEFT   SCAPULAR       Protraction     0   Retraction    0   Elevation    1/5   SHOULDER:       Flexion  0 90 degrees  0/5   Abduction  0 90 degrees  0/5   External rotation  0 WFL (arm adducted at side)  0/5   Internal rotation  0 WFL  0/5   Horizontal abduction 0 WFL  0/5   Horizontal adduction  0 WFL  0/5   Extension  0 WFL  0/5   ELBOW:       Flexion  0 110  0/5   Extension  0 WFL  0/5   FOREARM:       Supination  0 48   0/5   Pronation        WRIST:       Wrist flexion  0 35  0/5   Wrist extension  0 70  0/5   HAND:       Finger Extension  0 WFL  0/5          Hand Strength:           0/5     Sensation:          Absent to light touch and deep pressure in L UE with vision occluded   Functional Mobility:         Gait/Ambulation:  Pt completes functional mobility using electric power chair: short distances with davis-walker with minimal assistance        Transfers:  Supervision to [facility] for stand pivot transfer   Coordination: Non-functional in L UE  Mental Status:          Decreased attention to tasks; to be further assessed    Edema/Girth: Severe edema in L UE   Left Right    Initial Most Recent Initial Most Recent   Upper  Extremity  L Hand DPC 10.1 inches          Lower  Extremity              Activities of Daily Living:           Basic ADLs (From Assessment) Complex ADLs (From Assessment)   Basic ADL  Feeding: Modified independent  Oral Facial Hygiene/Grooming: Minimum assistance  Bathing: Minimum assistance  Upper Body Dressing: Minimum assistance  Lower Body Dressing: Moderate assistance  Toileting: Moderate assistance Instrumental ADL  Meal Preparation: Total assistance  Homemaking: Maximum assistance   Grooming/Bathing/Dressing Activities of Daily Living                                      Physical Skills Involved:  1. Range of Motion  2. Balance  3. Strength  4. Activity Tolerance  5. Sensation  6. Fine Motor Control  7. Gross Motor Control  8. Pain (Chronic)  9. Edema Cognitive Skills Affected (resulting in the inability to perform in a timely and safe manner):  1. Executive Function  2. Short Term Recall  3. Sustained Attention  4. Divided Attention Psychosocial Skills Affected:  1. Habits/Routines   Number of elements that affect the Plan of Care: 5+:  HIGH COMPLEXITY   CLINICAL DECISION MAKING:   Outcome Measure: Tool Used: Disabilities of the Arm, Shoulder and Hand (DASH) Questionnaire - Quick Version  Score:  Initial: 38/55  Most Recent: X/55 (Date: -- )   Interpretation of Score: The DASH is designed to measure the activities of daily living in person's with upper extremity dysfunction or pain. Each section is scored on a 1-5 scale, 5 representing the greatest disability. The scores of each section are added together for a total score of 55. Score 11 12-19 20-28 29-37 38-45 46-54 55   Modifier CH CI CJ CK CL CM CN     ?  Carrying, Moving, and Handling Objects:     - CURRENT STATUS: CL - 60%-79% impaired, limited or restricted    - GOAL STATUS: CK - 40%-59% impaired, limited or restricted    - D/C STATUS:  ---------------To be determined---------------    Tool Used: Fugl-Malone Upper Extremity Motor Assessment   Score:  Initial: 1/66 Most Recent: X (Date: -- )   Interpretation of Score: Outcome Measure Conversion Site    ? Carrying, Moving, and Handling Objects:     - CURRENT STATUS: CM - 80%-99% impaired, limited or restricted    - GOAL STATUS: CL - 60%-79% impaired, limited or restricted    - D/C STATUS:  ---------------To be determined---------------       Medical Necessity:   · Patient demonstrates fair rehab potential due to higher previous functional level. Reason for Services/Other Comments:  · Patient continues to require skilled intervention due to increased pain and swelling in L UE limiting him with daily activity. Clinical Decision-Making Assessment:     Assessment process, impact of co-morbidities, assessment modification\need for assistance, and selection of interventions: Analytical Complexity:HIGH COMPLEXITY   TREATMENT:   (In addition to Assessment/Re-Assessment sessions the following treatments were rendered)    Pre-treatment Symptoms/Complaints:    Pain: Initial:   Pain Intensity 1: 8  Pain Location 1: Shoulder  Pain Orientation 1: Left  Post Session:  same     Assessment/Reassessment only, no treatment provided today      Treatment/Session Assessment:    · Response to Treatment:  Evaluation only. · Compliance with Program/Exercises: Will assess as treatment progresses. · Recommendations/Intent for next treatment session: \"Next visit will focus on advancements to more challenging activities and reduction in assistance provided\".   Total Treatment Duration:  OT Patient Time In/Time Out  Time In: 1345  Time Out: [address], OT

## 2017-07-28 NOTE — PROGRESS NOTES
Nain Serna  : 1950 [facility] [address]  [phone]   [phone]          OUTPATIENT PHYSICAL THERAPY:Daily Note 2017    ICD-10: Treatment Diagnosis: Hemiplegia and hemiparesis following cerebral infarction affecting left non-dominant side (I69.354); Difficulty in walking, not elsewhere classified (R26.2)  Precautions/Allergies:   Latex; Adhesive; and Sulfa (sulfonamide antibiotics)   Fall Risk Score: 4 (? 5 = High Risk)  MD Orders: PT eval and treat MEDICAL/REFERRING DIAGNOSIS:  CVA (cerebral vascular accident) [facility]) [I63.9]   DATE OF ONSET: 2016  REFERRING PHYSICIAN: Merlin Arts, NP  RETURN PHYSICIAN APPOINTMENT: unknown  DATE OF PROGRESS NOTE:  47  RECERTIFICATION DATE: 6/4/15     PROGRESS NOTE:  Pt has attended 6 physical therapy sessions from 17 to date including initial assessment. Sessions consist of range of motion, therapeutic activity, therapeutic exercise, balance and gait refinement. Pt has shown improvement in balance and gait ability. Pt has increased Blanchard Balance Score by 2 points indicating improved static standing balance and decreased risk for fall. Pt has decreased TUG time by 12 seconds indicating more normalized walking pattern. Pt's AFO was not tall enough and was not supporting his left knee hyperextension so a new brace has been ordered which should further increase his safety and gait ability. We are waiting for approval and fabrication for that. Pt will benefit from at least 6 more sessions once the new AFO is in place to maximize functional ability and safety with gait. INITIAL ASSESSMENT:  Mr. Luis Kenny presents to therapy s/p another Left CVA 2016. Prior CVA 2005. Pt presents with dense left hemiplegia, non functional swollen left arm and little movement in the left elg with swelling as well.   Pt has a new double metal upright AFO that may need some adjustments as pt is hip hiking to clear his left foot and he has strong left knee  recurvatum in stance. Pt presents with poor balance and marked gait deviations but has good potential to get back to walking short distances with less assistance. Pt will benefit from at least 16 physical therapy visits to reach the goal of less assistance required for balance and gait. PROBLEM LIST (Impacting functional limitations):  1. Decreased Strength  2. Decreased ADL/Functional Activities  3. Decreased Transfer Abilities  4. Decreased Ambulation Ability/Technique  5. Decreased Balance  6. Increased Pain  7. Decreased Activity Tolerance  8. Decreased Flexibility/Joint Mobility  9. Edema/Girth  10. Decreased [place] with Home Exercise Program INTERVENTIONS PLANNED:  1. Balance Exercise  2. Family Education  3. Gait Training  4. Home Exercise Program (HEP)  5. Manual Therapy  6. Neuromuscular Re-education/Strengthening  7. Range of Motion (ROM)  8. Therapeutic Activites  9. Therapeutic Exercise/Strengthening  10. Transfer Training  11. orthotic management    TREATMENT PLAN:  Effective Dates: 6/5/17 TO 9/1/17. Frequency/Duration: 2 times a week for 8 weeks  GOALS: (Goals have been discussed and agreed upon with patient.)  Short-Term Functional Goals: Time Frame: 4 WEEKS  1. Pt will be independent with range of motion, strength and balance home exercise program.  STATUS:  MET with Caregivers assistance. 2. Pt will ambulate with upright posture 40 - 60' and increased ant WS over left foot to decrease strong left knee hyperextension in stance. STATUS:  MET distance, progressing with gait - needs the new AFO. Discharge Goals: Time Frame: 8 weeks  Pt will show increased range of motion and improved posture to allow for improved safety and ability with all functional mobility. STATUS:  PROGRESSING, CONTINUE 4 WEEKS. Pt will decrease TUG score by 20 seconds indicating more normalized gait pattern and decrease risk for falls. STATUS:  PROGRESSING, CONTINUE 4 WEEKS. Pt will increase Blanchard Balance Scale to 24/56 indicating increased balance and decreased risk for falls. STATUS:  PROGRESSING, CONTINUE 4 WEEKS. Rehabilitation Potential For Stated Goals: Mando Abernathy's therapy, I certify that the treatment plan above will be carried out by a therapist or under their direction. Thank you for this referral,  Tushar Linares PTA                 HISTORY:   GOAL:  \"I would like to get to where I was before the second stroke. (walking with straight cane)  Present Symptoms:    73year old right handed white male s/p CVA again 8/2016 - right CVA on the brain stem. 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 Went home from [place] [id number] a week later. 3 months. Getting therapy there. November 2016. 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 Lives with spouse. Walks with hemiwalker with supervision. Transfers independently. Dependent with dressing - assist when he can. Wears pads for security - enlarged prostate. PAIN:  Lower back pain:  No pain now. Hospital bed has an air mattress. Worst pain 9/10 - have to get up. Sitting with support is better than being in bed. Tylenol at night. History of Present Injury/Illness (Reason for Referral):  See above  Past Medical History/Comorbidities:   Mr. Jeanie Chavez  has a past medical history of Dermatophytosis of nail; History of CVA (cerebrovascular accident); and History of paraplegia. Mr. Jeanie Chavez  has no past surgical history on file. Social History/Living Environment:     Lives with spouse. Pati Mcgrath is his aid who works with him 40 hours per week helping with exercise, mobility, self care and appointments  Prior Level of Function/Work/Activity:  Prior to 8/2016 pt was walking independent short distances with a straight cane.       Current Medications:    Current Outpatient Prescriptions:     acetaminophen (TYLENOL) 500 mg tablet, Take  by mouth every six (6) hours as needed for Pain., Disp: , Rfl:     guaiFENesin (ORGANIDIN) 400 mg tablet, Take  by mouth every four (4) hours. , Disp: , Rfl:     lisinopril (PRINIVIL, ZESTRIL) 40 mg tablet, Take 40 mg by mouth daily. , Disp: , Rfl:     loratadine (CLARITIN) 10 mg tablet, Take 10 mg by mouth., Disp: , Rfl:     b complex-vitamin c-folic acid 0.8 mg (NEPHRO-ALICIA) 0.8 mg tab tablet, Take 1 Tab by mouth daily. , Disp: , Rfl:     Omeprazole delayed release (PRILOSEC D/R) 20 mg tablet, Take 20 mg by mouth daily. , Disp: , Rfl:     phenytoin ER (DILANTIN ER) 100 mg ER capsule, Take  by mouth., Disp: , Rfl:     rosuvastatin (CRESTOR) 40 mg tablet, Take 40 mg by mouth nightly., Disp: , Rfl:     senna (SENOKOT) 8.6 mg tablet, Take 1 Tab by mouth daily. , Disp: , Rfl:     sertraline (ZOLOFT) 100 mg tablet, Take  by mouth daily. , Disp: , Rfl:     tamsulosin (FLOMAX) 0.4 mg capsule, Take 0.4 mg by mouth daily. , Disp: , Rfl:     ALOE VERA/COLLAGEN (ALOE VESTA 2-N-1 CLEANSER EX), by Apply Externally route., Disp: , Rfl:     aspirin delayed-release 81 mg tablet, Take 81 mg by mouth., Disp: , Rfl:     MINERAL OIL/PETROLATUM,WHITE (CLIVE MOISTURE BARRIER EX), by Apply Externally route., Disp: , Rfl:     carvedilol (COREG) 12.5 mg tablet, Take 12.5 mg by mouth., Disp: , Rfl:     cholecalciferol (VITAMIN D3) 1,000 unit cap, Take 1,000 Units by mouth., Disp: , Rfl:     clopidogrel (PLAVIX) 75 mg tab, Take 75 mg by mouth., Disp: , Rfl:     docusate sodium 100 mg/5 mL enem, Insert  into rectum. , Disp: , Rfl:     fluticasone (FLOVENT DISKUS) 50 mcg/actuation inhaler, by Nasal route., Disp: , Rfl:     folic acid (FOLVITE) 1 mg tablet, Take 1 mg by mouth., Disp: , Rfl:    Date Last Reviewed: 7/28/2017   Number of Personal Factors/Comorbidities that affect the Plan of Care: 1-2: MODERATE COMPLEXITY   EXAMINATION:   ROM:          Left UE - swollen. Compression sleeve on but still showing marked swelling. Dependent - non functional.  WFL for dressing. Left LE:  Increased swelling. WFL. Tight all end ranges - flexed hips in standing and walking. Tight calf. Strength:          Right side WFL  Left UE - barely a shoulder shrug   Left LE:  Able to sling hip and leg. Functional Mobility:         Gait/Ambulation:  Pt ambulates at least 40 - 61' with Genora Lis almost daily using davis walker and AFO. Pt ambulates with extremely flexed posture and posterior retracted hip in stance with strong left knee hyperextension in left stance in spite of double metal upright AFO. Pt hip hikes and slings the left leg forward. Transfers:  Close supervision to independent        Bed Mobility:  Assist with lower body. Mental Status:          Alert and oriented x 4;  Pleasant and appropriate. Occ forgetful. Occ slight impulsive    Vision:          Wears glasses   Body Structures Involved:  1. Nerves  2. Thoracic Cage  3. Bones  4. Joints  5. Muscles  6. Ligaments Body Functions Affected:  1. Mental  2. Sensory/Pain  3. Neuromusculoskeletal  4. Movement Related  5. Skin Related Activities and Participation Affected:  1. Learning and Applying Knowledge  2. General Tasks and Demands  3. Mobility  4. Self Care  5. Domestic Life  6. Interpersonal Interactions and Relationships  7. Community, Social and [place]   Number of elements that affect the Plan of Care: 4+: HIGH COMPLEXITY   CLINICAL PRESENTATION:   Presentation: Stable and uncomplicated: LOW COMPLEXITY   CLINICAL DECISION MAKING:   Outcome Measure: Tool Used: Blanchard Balance Scale  Score:  Initial: 16/56 Most Recent: 18/56 (Date: 7/28/17 )   Interpretation of Score: Each section is scored on a 0-4 scale, 0 representing the patients inability to perform the task and 4 representing independence. The scores of each section are added together for a total score of 56. The higher the patients score, the more independent the patient is.   Any score below 45 indicates increased risk for falls. Score 56 55-45 44-34 33-23 22-12 11-1 0   Modifier CH CI CJ CK CL CM CN     ? Mobility - Walking and Moving Around:     - CURRENT STATUS: CL - 60%-79% impaired, limited or restricted    - GOAL STATUS: CK - 40%-59% impaired, limited or restricted    - D/C STATUS:  ---------------To be determined---------------    Tool Used: Timed Up and Go (TUG)  Score:  Initial: 1.23.21 minutes Most Recent: X seconds (Date: 7/28/17 1.11 )   Interpretation of Score: The test measures, in seconds, the time taken by an individual to stand up from a standard arm chair (seat height 46 cm [18 in], arm height 65 cm [25.6 in]), walk a distance of 3 meters (118 in, approx 10 ft), turn, walk back to the chair and sit down. If the individual takes longer than 14 seconds to complete TUG, this indicates risk for falls. Score 7 7.5-10.5 11-14 14.5-17.5 18-21 21.5-24.5 25+   Modifier CH CI CJ CK CL CM CN     Medical Necessity:   · Skilled intervention continues to be required due to hemiparesis and difficulty walking. Reason for Services/Other Comments:  · Patient continues to require skilled intervention due to hemiparesis and difficulty walking. Use of outcome tool(s) and clinical judgement create a POC that gives a: Questionable prediction of patient's progress: MODERATE COMPLEXITY   TREATMENT:   (In addition to Assessment/Re-Assessment sessions the following treatments were rendered)    THERAPEUTIC ACTIVITY: (15 minutes): Therapeutic activities per above and  grid below to assess and  improve mobility, strength, balance and coordination. Required moderate visual, verbal and manual cues to perform activities correctly. THERAPEUTIC EXERCISE: (30 minutes):  Exercises per grid below to improve mobility. Required moderate visual, verbal and manual cues to promote proper body alignment, promote proper body posture and promote proper body mechanics.   Progressed range, repetitions and complexity of movement as indicated. Date:  6/22/17 Date:  6/26/17 Date:  7/17/17 Date  7/28/17   Activity/Exercise Parameters Parameters Parameters    In parallel bars:  Standing WS over left side x10 with PT giving minima facilitation to get pt to [facility] - guarding to keep left knee from buckling  x20 WS with right toe taps. Pt occ lock out his knee with left hip retracting vs. Extended WS over left leg. Much improved today. x20 heel taps  x10 left WS with good knee control  x10 with PT giving moderate facilitation to get pt to WS - guarding to keep left knee from buckling  x20 WS with right toe taps. Pt occ lock out his knee with left hip retracting vs. Extended WS over left leg. Having to cue pt more to get a good WS  x20 heel taps  x10 left WS with good knee control  -    Walking in parallel bars 1 x 2 passes with cues and max facilitation for ant WS over left leg in stance. Pt tends to be flexed at hip and retracted with strong left knee hyperextension in left. Slight improved posture 50% of the time. -     Pelvic clocks  12:00 to 6:00  2 x 10 with good mobility  3:00 to 9:00 2 x 10  Entire clock x 3 each direction. Supine bridging X10. Even hips today. x10 with post tilt first x10 then with small bolster under hips to increase range then without bolster with improved range. 3 x 5 reps   Single leg (figure 4 with right leg up) x5 very small contraction. Placed drawsheet under hips and stood over pt to increase height of contraction with good results. Jasmin cannot do at home as the pt has a single hospital bed. It would not be safe. x5 with right leg over left. x5 X 5 reps   right piriformis stretch 3 x 30 seconds 3 x 30  3 x 30 seconds 3 x 30 second hold    Passive right sidelying left hip extension stretch 5 x 10 seconds. Left leg supported and stabilized on flat wedge. Pt actively turning hip and trunk ant for active stretch and contraction. 5 x 10 seconds.   Left leg supported and stabilized on flat wedge. Pt actively turning hip and trunk ant for active stretch and contraction 5 x 10 seconds. Left leg supported and stabilized on flat wedge. Pt actively turning hip and trunk ant for active stretch and contraction. x10 resisted hip flexion into hip extension. Good contraction. x   Active assisted sidelying hip extension  x10 with moderate guiding     Sidelying hamstring curl  Nothing active. walking  With hemiwalker x 60' with w/c behind. Tends to sling left leg forward with strong left hip flexion/retraction and knee hyperextension in left stance in spite of double metal upright AFO in place. Cues for larger right step. With hemiwalker x 60' with w/c behind. Tends to sling left leg forward with strong left hip flexion/retraction and knee hyperextension in left stance in spite of double metal upright AFO in place. Cues for larger right step. More upright - stronger in hip TUG test    Ambulation with hemiwalker x 100'   With wheechair behind and therapist with CGA on left. Patient continues to sling left leg forward  With AFO in place. Cues for upright posture. Patient with good length steps most of the time. EDUCATION:    HEP:  Written HEP given for above    Treatment/Session Assessment:  Patient with improved upright posture when walking with fewer cues needed. Good increase in ambulation distance today. Patient well motivated. At times slow to respond but with time patient follows instructions well. Patients response to todays treatment session was tolerated well with no medical complications. .  · Post session pain:  NO PAIN  · Compliance with Program/Exercises: Will assess as treatment progresses. · Recommendations/Intent for next treatment session: \"Next visit will focus on PROGRESS GAIT AND BALANCE RETRAINING. \".  Total Treatment Duration:  PT Patient Time In/Time Out  Time In: 1300  Time Out: Azam Zamorano [facility], PTA

## 2017-08-04 ENCOUNTER — HOSPITAL ENCOUNTER (OUTPATIENT)
Dept: PHYSICAL THERAPY | Age: 67
Discharge: HOME | End: 2017-08-04
Payer: MEDICARE

## 2017-08-04 PROCEDURE — 97140 MANUAL THERAPY 1/> REGIONS: CPT

## 2017-08-04 PROCEDURE — 97112 NEUROMUSCULAR REEDUCATION: CPT

## 2017-08-04 NOTE — PROGRESS NOTES
Tremaine Narvaez  : 1950 [facility]  [address] [facility], [address] [place] [address], [address]  [phone]   [phone]         OUTPATIENT OCCUPATIONAL THERAPY: Daily Note 17   ICD-10: Treatment Diagnosis:   Hemiplegia, unspecified affecting left nondominant side (G81.94)  Pain in left shoulder (M25.512)    Precautions/Allergies:   Latex; Adhesive; and Sulfa (sulfonamide antibiotics)   Fall Risk Score: 4 (? 5 = High Risk)  MD Orders: OT Evaluate and Treat MEDICAL/REFERRING DIAGNOSIS:   Pain in left shoulder [M25.512]  CVA (cerebral vascular accident) [facility]) [I63.9]   DATE OF ONSET: 2016 and 2005  REFERRING PHYSICIAN: Marco A Lewis MD  RETURN PHYSICIAN APPOINTMENT: TBD     INITIAL ASSESSMENT:  Mr. Vera Cortez presents to outpatient occupational therapy services to address L shoulder pain due to L hemiplegia since . Pt has no functional movement in L UE and reports he has not had rehab for L UE since initial CVA in . Pt has 2 finger width subluxation in L shoulder, which could be source of L shoulder pain. Pt also has poor posture in sitting with forward rounded shoulders. Pt will benefit from OT services to work on positioning of L UE during transfers/ADL to address L shoulder pain. Pt will benefit from modalities, manual therapy, therapeutic exercises/activities to address L shoulder pain. Pt will be seen by OT services to address the below stated goals and plan of care. PLAN OF CARE:   PROBLEM LIST:  1. Decreased Strength  2. Decreased ADL/Functional Activities  3. Decreased Transfer Abilities  4. Decreased Ambulation Ability/Technique  5. Decreased Balance  6. Increased Pain  7. Decreased Activity Tolerance  8. Decreased Flexibility/Joint Mobility  9. Edema/Girth  10. Decreased [place] with Home Exercise Program  11. Decreased Cognition INTERVENTIONS PLANNED:  1. Activities of daily living training  2. Adaptive equipment training  3.  Balance training  4. Clothing management  5. Cognitive training  6. Manual therapy training  7. Modalities  8. Neuromuscular re-eduation  9. Therapeutic activity  10. Therapeutic exercise  11. Wheelchair management   TREATMENT PLAN:  Effective Dates: 7/28/17 TO 10/28/17. Frequency/Duration: 2 times a week for 8 weeks  GOALS: (Goals have been discussed and agreed upon with patient.)  Short-Term Functional Goals: Time Frame: 4 weeks   1. Juju Solo will report decreased pain in L shoulder from 8/10 to 5-6/10 to demonstrate improved comfort with daily activity. 2. Juju Solo will tolerate weightbearing activities in L UE with moderate facilitation to improve shoulder strength for daily activity. 3. Juju Solo will verbalize with independence 3 ways he is using positioning throughout the day/night to help with pain reduction in L shoulder. Jacquelyn Sheikh will complete ROM exercises in sitting with moderate cues for posture x 15 minutes to improve posture for daily activity. Discharge Goals: Time Frame: 8 weeks  1. Juju Solo will report decreased L shoulder pain from 8/10 to 0-2/10 to demonstrate improved positioning and comfort for ADL. 2. Juju Solo will tolerate weightbearing activities in L UE with minimal facilitation to improve L shoulder subluxation. 3. Juju Solo will limit L shoulder subluxation to 1 finger width to decrease pain in L shoulder for daily activity. 4. Juju Solo will be modified independent with HEP for L UE to decrease pain levels in L UE.   5. Juju Solo will complete sitting with fair+ posture for 10 minutes to improve sitting posture for ADL. Rehabilitation Potential For Stated Goals: Good  Regarding Radha Abernathy's therapy, I certify that the treatment plan above will be carried out by a therapist or under their direction.   Thank you for this referral,  Wolfgang Lorenzo OT                 The information in this section was collected on 7/28/17 (except where otherwise noted). OCCUPATIONAL PROFILE & HISTORY:   History of Present Injury/Illness (Reason for Referral):  Patient had initial CVA in 2005 with L sided hemiplegia. Pt reports he had therapy for L UE at that time but has had no other rehab for his L UE since his initial CVA. Pt had second CVA in 2016 with pt declining in his ability to complete functional mobility, but states his L UE has been non-functional since 2005. Pt has severe swelling in the L UE, especially in the L hand. Pt has compression machine that he uses for 1 hour for once a day. Pt's arm is elevated on a pillow during the day and positions it at night on wedge. Pt has a sling but states he can't use it because it pulls on his neck and he has more severe pain with use of the sling. .Pt able to get up and complete functional mobility one time per day. Pt has arm wedge/trough but pt is having difficulty using his new wheelchair and the arm trough is on his new wheelchair and he is currently using his old wheelchair. Pt assists with bathing himself with minimal assistance; Assists with dressing with moderate assistance; minimal assistance with upper body ; grooming with minimal assistance. Pt presents today to outpatient therapy services due to pain in the L shoulder. Past Medical History/Comorbidities:   Mr. Austin Salazar  has a past medical history of Dermatophytosis of nail; History of CVA (cerebrovascular accident); and History of paraplegia. Mr. Austin Salazar  has no past surgical history on file. Social History/Living Environment:    Lives with wife; Mely Roy is his aide and is with him 40 hours per week; tub-shower (grab bars) with getting into tub/shower; sits in showerr; Pt is R handed  Prior Level of Function/Work/Activity:  No function in the L UE prior to 8/2016; Pt hasn't had any rehab since 2005- Pt was getting therapy for L UE but states it wasn't very long;  Pt was completing functional mobility with single-point cane Personal Factors:          Patient behavior: Decreased attention to tasks         Past/Current Experience:  Limited rehab of L upper extremity        Other factors that influence how disability is experienced by the patient:  Cognitive deficits; hx of multiple CVAs  Current Medications:    Current Outpatient Prescriptions:     acetaminophen (TYLENOL) 500 mg tablet, Take  by mouth every six (6) hours as needed for Pain., Disp: , Rfl:     guaiFENesin (ORGANIDIN) 400 mg tablet, Take  by mouth every four (4) hours. , Disp: , Rfl:     lisinopril (PRINIVIL, ZESTRIL) 40 mg tablet, Take 40 mg by mouth daily. , Disp: , Rfl:     loratadine (CLARITIN) 10 mg tablet, Take 10 mg by mouth., Disp: , Rfl:     b complex-vitamin c-folic acid 0.8 mg (NEPHRO-ALICIA) 0.8 mg tab tablet, Take 1 Tab by mouth daily. , Disp: , Rfl:     Omeprazole delayed release (PRILOSEC D/R) 20 mg tablet, Take 20 mg by mouth daily. , Disp: , Rfl:     phenytoin ER (DILANTIN ER) 100 mg ER capsule, Take  by mouth., Disp: , Rfl:     rosuvastatin (CRESTOR) 40 mg tablet, Take 40 mg by mouth nightly., Disp: , Rfl:     senna (SENOKOT) 8.6 mg tablet, Take 1 Tab by mouth daily. , Disp: , Rfl:     sertraline (ZOLOFT) 100 mg tablet, Take  by mouth daily. , Disp: , Rfl:     tamsulosin (FLOMAX) 0.4 mg capsule, Take 0.4 mg by mouth daily. , Disp: , Rfl:     ALOE VERA/COLLAGEN (ALOE VESTA 2-N-1 CLEANSER EX), by Apply Externally route., Disp: , Rfl:     aspirin delayed-release 81 mg tablet, Take 81 mg by mouth., Disp: , Rfl:     MINERAL OIL/PETROLATUM,WHITE (CLIVE MOISTURE BARRIER EX), by Apply Externally route., Disp: , Rfl:     carvedilol (COREG) 12.5 mg tablet, Take 12.5 mg by mouth., Disp: , Rfl:     cholecalciferol (VITAMIN D3) 1,000 unit cap, Take 1,000 Units by mouth., Disp: , Rfl:     clopidogrel (PLAVIX) 75 mg tab, Take 75 mg by mouth., Disp: , Rfl:     docusate sodium 100 mg/5 mL enem, Insert  into rectum. , Disp: , Rfl:     fluticasone (FLOVENT DISKUS) 50 mcg/actuation inhaler, by Nasal route., Disp: , Rfl:     folic acid (FOLVITE) 1 mg tablet, Take 1 mg by mouth., Disp: , Rfl:             Date Last Reviewed:  7/28/17   Complexity Level : Extensive review of physical, cognitive, and psychosocial performance (3+):  HIGH COMPLEXITY   ASSESSMENT OF OCCUPATIONAL PERFORMANCE:   Palpation:          Patient noted to have 1.5 to 2 finger width subluxation in L shoulder with dense hemiplegia in L UE. Pt's L UE could be easily passively ranged with no increased tone at the shoulder, forearm, or wrist. Pt did have some increased tone in the finger flexors with hand resting in finger flexion.    Observation:  Patient sits in posterior pelvic tilt with severely rounded shoulders and forward head flexion  ROM/Strength:             Date:  7/28/17 Date:  7/28/17  Date:  7/28/17    AROM PROM  STRENGTH   Movement LEFT LEFT  LEFT   SCAPULAR       Protraction     0   Retraction    0   Elevation    1/5   SHOULDER:       Flexion  0 90 degrees  0/5   Abduction  0 90 degrees  0/5   External rotation  0 WFL (arm adducted at side)  0/5   Internal rotation  0 WFL  0/5   Horizontal abduction 0 WFL  0/5   Horizontal adduction  0 WFL  0/5   Extension  0 WFL  0/5   ELBOW:       Flexion  0 110  0/5   Extension  0 WFL  0/5   FOREARM:       Supination  0 48   0/5   Pronation        WRIST:       Wrist flexion  0 35  0/5   Wrist extension  0 70  0/5   HAND:       Finger Extension  0 WFL  0/5          Hand Strength:           0/5     Sensation:          Absent to light touch and deep pressure in L UE with vision occluded   Functional Mobility:         Gait/Ambulation:  Pt completes functional mobility using electric power chair: short distances with davis-walker with minimal assistance        Transfers:  Supervision to [facility] for stand pivot transfer   Coordination:          Non-functional in L UE  Mental Status:          Decreased attention to tasks; to be further assessed    Edema/Girth: Severe edema in L UE   Left Right    Initial Most Recent Initial Most Recent   Upper  Extremity  L Hand DPC 10.1 inches          Lower  Extremity              Activities of Daily Living:           Basic ADLs (From Assessment) Complex ADLs (From Assessment)         Grooming/Bathing/Dressing Activities of Daily Living                                      Physical Skills Involved:  1. Range of Motion  2. Balance  3. Strength  4. Activity Tolerance  5. Sensation  6. Fine Motor Control  7. Gross Motor Control  8. Pain (Chronic)  9. Edema Cognitive Skills Affected (resulting in the inability to perform in a timely and safe manner):  1. Executive Function  2. Short Term Recall  3. Sustained Attention  4. Divided Attention Psychosocial Skills Affected:  1. Habits/Routines   Number of elements that affect the Plan of Care: 5+:  HIGH COMPLEXITY   CLINICAL DECISION MAKING:   Outcome Measure: Tool Used: Disabilities of the Arm, Shoulder and Hand (DASH) Questionnaire - Quick Version  Score:  Initial: 38/55  Most Recent: X/55 (Date: -- )   Interpretation of Score: The DASH is designed to measure the activities of daily living in person's with upper extremity dysfunction or pain. Each section is scored on a 1-5 scale, 5 representing the greatest disability. The scores of each section are added together for a total score of 55. Score 11 12-19 20-28 29-37 38-45 46-54 55   Modifier CH CI CJ CK CL CM CN     ? Carrying, Moving, and Handling Objects:     - CURRENT STATUS: CL - 60%-79% impaired, limited or restricted    - GOAL STATUS: CK - 40%-59% impaired, limited or restricted    - D/C STATUS:  ---------------To be determined---------------    Tool Used: Fugl-Malone Upper Extremity Motor Assessment   Score:  Initial: 1/66 Most Recent: X (Date: -- )   Interpretation of Score: Outcome Measure Conversion Site    ?  Carrying, Moving, and Handling Objects:     - CURRENT STATUS: CM - 80%-99% impaired, limited or restricted    - GOAL STATUS: CL - 60%-79% impaired, limited or restricted    - D/C STATUS:  ---------------To be determined---------------       Medical Necessity:   · Patient demonstrates fair rehab potential due to higher previous functional level. Reason for Services/Other Comments:  · Patient continues to require skilled intervention due to increased pain and swelling in L UE limiting him with daily activity. Clinical Decision-Making Assessment:     Assessment process, impact of co-morbidities, assessment modification\need for assistance, and selection of interventions: Analytical Complexity:HIGH COMPLEXITY   TREATMENT:   (In addition to Assessment/Re-Assessment sessions the following treatments were rendered)    Pre-treatment Symptoms/Complaints:    Pain: Initial:   Pain Intensity 1: 0  Post Session:  same        Neuromuscular Re-education: (35 minutes):  Exercise/activities per grid below to improve coordination, kinesthetic sense, posture and proprioception. Required moderate visual, verbal, manual and tactile cues to promote motor control of left, upper extremity(s), lower extremity(s). Date:  8/4/17 Date:   Date:     Activity/Exercise Parameters Parameters Parameters   Scapular elevation/depressing  Sidelying x 15 reps (compensates with trunk) with facilitation at the scapula     Postural Exercises Anterior pelvic tilt and throacic ext with moderate facilitation in beginning but progressed to minimal facilitation     Tone reduction in hand Pt received barakat stretching and L thumb CMC stretch progressing to finger extension stretching to help reduce tone in the hand     Weightbearing  Pt completed into the elbow with moderate cues/facailitation x 10 reps and then into the hand with elbow extended x 10 reps and maximal facilitation at the elbow                          Manual Therapy: (10):  Soft tissue mobilization was utilized and necessary because of the patient's restricted joint motion, loss of articular motion and restricted motion of soft tissue. Patient received trigger point massage to trapezius, levator scapulae, and along anterior/poster/medial shoulder. Pt also transitioned into side lying with scapular mobilizations of L scapula in all planes of movement to decrease stiffness and prepare for active movement. Treatment/Session Assessment:    · Response to Treatment: Pt states his arm feels better s/p tx and less stiff than when he arrived today. Pt to continue per plan of care. · Compliance with Program/Exercises: Will assess as treatment progresses. · Recommendations/Intent for next treatment session: \"Next visit will focus on advancements to more challenging activities and reduction in assistance provided\".   Total Treatment Duration:  OT Patient Time In/Time Out  Time In: 7892  Time Out: Troy Mai OT

## 2017-08-11 ENCOUNTER — HOSPITAL ENCOUNTER (OUTPATIENT)
Dept: PHYSICAL THERAPY | Age: 67
Discharge: HOME | End: 2017-08-11
Payer: MEDICARE

## 2017-08-11 PROCEDURE — 97140 MANUAL THERAPY 1/> REGIONS: CPT

## 2017-08-11 PROCEDURE — 97112 NEUROMUSCULAR REEDUCATION: CPT

## 2017-08-11 NOTE — PROGRESS NOTES
Hayes Sherrill  : 1950 [facility], [address], [place], [address] [facility]  [phone]   [phone]         OUTPATIENT OCCUPATIONAL THERAPY: Daily Note 17   ICD-10: Treatment Diagnosis:   Hemiplegia, unspecified affecting left nondominant side (G81.94)  Pain in left shoulder (M25.512)    Precautions/Allergies:   Latex; Adhesive; and Sulfa (sulfonamide antibiotics)   Fall Risk Score: 4 (? 5 = High Risk)  MD Orders: OT Evaluate and Treat MEDICAL/REFERRING DIAGNOSIS:   Pain in left shoulder [M25.512]  CVA (cerebral vascular accident) [facility]) [I63.9]   DATE OF ONSET: 2016 and 2005  REFERRING PHYSICIAN: Lashonda Raygoza MD  RETURN PHYSICIAN APPOINTMENT: TBD     INITIAL ASSESSMENT:  Mr. Jaky Prado presents to outpatient occupational therapy services to address L shoulder pain due to L hemiplegia since . Pt has no functional movement in L UE and reports he has not had rehab for L UE since initial CVA in . Pt has 2 finger width subluxation in L shoulder, which could be source of L shoulder pain. Pt also has poor posture in sitting with forward rounded shoulders. Pt will benefit from OT services to work on positioning of L UE during transfers/ADL to address L shoulder pain. Pt will benefit from modalities, manual therapy, therapeutic exercises/activities to address L shoulder pain. Pt will be seen by OT services to address the below stated goals and plan of care. PLAN OF CARE:   PROBLEM LIST:  1. Decreased Strength  2. Decreased ADL/Functional Activities  3. Decreased Transfer Abilities  4. Decreased Ambulation Ability/Technique  5. Decreased Balance  6. Increased Pain  7. Decreased Activity Tolerance  8. Decreased Flexibility/Joint Mobility  9. Edema/Girth  10. Decreased [place] with Home Exercise Program  11. Decreased Cognition INTERVENTIONS PLANNED:  1. Activities of daily living training  2. Adaptive equipment training  3.  Balance training  4. Clothing management  5. Cognitive training  6. Manual therapy training  7. Modalities  8. Neuromuscular re-eduation  9. Therapeutic activity  10. Therapeutic exercise  11. Wheelchair management   TREATMENT PLAN:  Effective Dates: 7/28/17 TO 10/28/17. Frequency/Duration: 2 times a week for 8 weeks  GOALS: (Goals have been discussed and agreed upon with patient.)  Short-Term Functional Goals: Time Frame: 4 weeks   1. Chicho Whitaker will report decreased pain in L shoulder from 8/10 to 5-6/10 to demonstrate improved comfort with daily activity. 2. Chicho Whitaker will tolerate weightbearing activities in L UE with moderate facilitation to improve shoulder strength for daily activity. 3. Chicho Whitaker will verbalize with independence 3 ways he is using positioning throughout the day/night to help with pain reduction in L shoulder. Jacquelyn Sheikh will complete ROM exercises in sitting with moderate cues for posture x 15 minutes to improve posture for daily activity. Discharge Goals: Time Frame: 8 weeks  1. Chicho Whitaker will report decreased L shoulder pain from 8/10 to 0-2/10 to demonstrate improved positioning and comfort for ADL. 2. Chicho Whitaker will tolerate weightbearing activities in L UE with minimal facilitation to improve L shoulder subluxation. 3. Chicho Whitaker will limit L shoulder subluxation to 1 finger width to decrease pain in L shoulder for daily activity. 4. Chicho Whitaker will be modified independent with HEP for L UE to decrease pain levels in L UE.   5. Chicho Whitaker will complete sitting with fair+ posture for 10 minutes to improve sitting posture for ADL. Rehabilitation Potential For Stated Goals: Good  Regarding Faiza Abernathy's therapy, I certify that the treatment plan above will be carried out by a therapist or under their direction.   Thank you for this referral,  Isiah Martin OT                 The information in this section was collected on 7/28/17 (except where otherwise noted). OCCUPATIONAL PROFILE & HISTORY:   History of Present Injury/Illness (Reason for Referral):  Patient had initial CVA in 2005 with L sided hemiplegia. Pt reports he had therapy for L UE at that time but has had no other rehab for his L UE since his initial CVA. Pt had second CVA in 2016 with pt declining in his ability to complete functional mobility, but states his L UE has been non-functional since 2005. Pt has severe swelling in the L UE, especially in the L hand. Pt has compression machine that he uses for 1 hour for once a day. Pt's arm is elevated on a pillow during the day and positions it at night on wedge. Pt has a sling but states he can't use it because it pulls on his neck and he has more severe pain with use of the sling. .Pt able to get up and complete functional mobility one time per day. Pt has arm wedge/trough but pt is having difficulty using his new wheelchair and the arm trough is on his new wheelchair and he is currently using his old wheelchair. Pt assists with bathing himself with minimal assistance; Assists with dressing with moderate assistance; minimal assistance with upper body ; grooming with minimal assistance. Pt presents today to outpatient therapy services due to pain in the L shoulder. Past Medical History/Comorbidities:   Mr. Ji Terry  has a past medical history of Dermatophytosis of nail; History of CVA (cerebrovascular accident); and History of paraplegia. Mr. Ji eTrry  has no past surgical history on file. Social History/Living Environment:    Lives with wife; [facility] is his aide and is with him 40 hours per week; tub-shower (grab bars) with getting into tub/shower; sits in showerr; Pt is R handed  Prior Level of Function/Work/Activity:  No function in the L UE prior to 8/2016; Pt hasn't had any rehab since 2005- Pt was getting therapy for L UE but states it wasn't very long;  Pt was completing functional mobility with single-point cane Personal Factors:          Patient behavior: Decreased attention to tasks         Past/Current Experience:  Limited rehab of L upper extremity        Other factors that influence how disability is experienced by the patient:  Cognitive deficits; hx of multiple CVAs  Current Medications:    Current Outpatient Prescriptions:     acetaminophen (TYLENOL) 500 mg tablet, Take  by mouth every six (6) hours as needed for Pain., Disp: , Rfl:     guaiFENesin (ORGANIDIN) 400 mg tablet, Take  by mouth every four (4) hours. , Disp: , Rfl:     lisinopril (PRINIVIL, ZESTRIL) 40 mg tablet, Take 40 mg by mouth daily. , Disp: , Rfl:     loratadine (CLARITIN) 10 mg tablet, Take 10 mg by mouth., Disp: , Rfl:     b complex-vitamin c-folic acid 0.8 mg (NEPHRO-ALICIA) 0.8 mg tab tablet, Take 1 Tab by mouth daily. , Disp: , Rfl:     Omeprazole delayed release (PRILOSEC D/R) 20 mg tablet, Take 20 mg by mouth daily. , Disp: , Rfl:     phenytoin ER (DILANTIN ER) 100 mg ER capsule, Take  by mouth., Disp: , Rfl:     rosuvastatin (CRESTOR) 40 mg tablet, Take 40 mg by mouth nightly., Disp: , Rfl:     senna (SENOKOT) 8.6 mg tablet, Take 1 Tab by mouth daily. , Disp: , Rfl:     sertraline (ZOLOFT) 100 mg tablet, Take  by mouth daily. , Disp: , Rfl:     tamsulosin (FLOMAX) 0.4 mg capsule, Take 0.4 mg by mouth daily. , Disp: , Rfl:     ALOE VERA/COLLAGEN (ALOE VESTA 2-N-1 CLEANSER EX), by Apply Externally route., Disp: , Rfl:     aspirin delayed-release 81 mg tablet, Take 81 mg by mouth., Disp: , Rfl:     MINERAL OIL/PETROLATUM,WHITE (CLIVE MOISTURE BARRIER EX), by Apply Externally route., Disp: , Rfl:     carvedilol (COREG) 12.5 mg tablet, Take 12.5 mg by mouth., Disp: , Rfl:     cholecalciferol (VITAMIN D3) 1,000 unit cap, Take 1,000 Units by mouth., Disp: , Rfl:     clopidogrel (PLAVIX) 75 mg tab, Take 75 mg by mouth., Disp: , Rfl:     docusate sodium 100 mg/5 mL enem, Insert  into rectum. , Disp: , Rfl:     fluticasone (FLOVENT DISKUS) 50 mcg/actuation inhaler, by Nasal route., Disp: , Rfl:     folic acid (FOLVITE) 1 mg tablet, Take 1 mg by mouth., Disp: , Rfl:             Date Last Reviewed:  7/28/17   Complexity Level : Extensive review of physical, cognitive, and psychosocial performance (3+):  HIGH COMPLEXITY   ASSESSMENT OF OCCUPATIONAL PERFORMANCE:   Palpation:          Patient noted to have 1.5 to 2 finger width subluxation in L shoulder with dense hemiplegia in L UE. Pt's L UE could be easily passively ranged with no increased tone at the shoulder, forearm, or wrist. Pt did have some increased tone in the finger flexors with hand resting in finger flexion.    Observation:  Patient sits in posterior pelvic tilt with severely rounded shoulders and forward head flexion  ROM/Strength:             Date:  7/28/17 Date:  7/28/17  Date:  7/28/17    AROM PROM  STRENGTH   Movement LEFT LEFT  LEFT   SCAPULAR       Protraction     0   Retraction    0   Elevation    1/5   SHOULDER:       Flexion  0 90 degrees  0/5   Abduction  0 90 degrees  0/5   External rotation  0 WFL (arm adducted at side)  0/5   Internal rotation  0 WFL  0/5   Horizontal abduction 0 WFL  0/5   Horizontal adduction  0 WFL  0/5   Extension  0 WFL  0/5   ELBOW:       Flexion  0 110  0/5   Extension  0 WFL  0/5   FOREARM:       Supination  0 48   0/5   Pronation        WRIST:       Wrist flexion  0 35  0/5   Wrist extension  0 70  0/5   HAND:       Finger Extension  0 WFL  0/5          Hand Strength:           0/5     Sensation:          Absent to light touch and deep pressure in L UE with vision occluded   Functional Mobility:         Gait/Ambulation:  Pt completes functional mobility using electric power chair: short distances with davis-walker with minimal assistance        Transfers:  Supervision to [facility] for stand pivot transfer   Coordination:          Non-functional in L UE  Mental Status:          Decreased attention to tasks; to be further assessed    Edema/Girth: Severe edema in L UE   Left Right    Initial Most Recent Initial Most Recent   Upper  Extremity  L Hand DPC 10.1 inches          Lower  Extremity              Activities of Daily Living:           Basic ADLs (From Assessment) Complex ADLs (From Assessment)         Grooming/Bathing/Dressing Activities of Daily Living                                      Physical Skills Involved:  1. Range of Motion  2. Balance  3. Strength  4. Activity Tolerance  5. Sensation  6. Fine Motor Control  7. Gross Motor Control  8. Pain (Chronic)  9. Edema Cognitive Skills Affected (resulting in the inability to perform in a timely and safe manner):  1. Executive Function  2. Short Term Recall  3. Sustained Attention  4. Divided Attention Psychosocial Skills Affected:  1. Habits/Routines   Number of elements that affect the Plan of Care: 5+:  HIGH COMPLEXITY   CLINICAL DECISION MAKING:   Outcome Measure: Tool Used: Disabilities of the Arm, Shoulder and Hand (DASH) Questionnaire - Quick Version  Score:  Initial: 38/55  Most Recent: X/55 (Date: -- )   Interpretation of Score: The DASH is designed to measure the activities of daily living in person's with upper extremity dysfunction or pain. Each section is scored on a 1-5 scale, 5 representing the greatest disability. The scores of each section are added together for a total score of 55. Score 11 12-19 20-28 29-37 38-45 46-54 55   Modifier CH CI CJ CK CL CM CN     ? Carrying, Moving, and Handling Objects:     - CURRENT STATUS: CL - 60%-79% impaired, limited or restricted    - GOAL STATUS: CK - 40%-59% impaired, limited or restricted    - D/C STATUS:  ---------------To be determined---------------    Tool Used: Fugl-Malone Upper Extremity Motor Assessment   Score:  Initial: 1/66 Most Recent: X (Date: -- )   Interpretation of Score: Outcome Measure Conversion Site    ?  Carrying, Moving, and Handling Objects:     - CURRENT STATUS: CM - 80%-99% impaired, limited or restricted    - GOAL STATUS: CL - 60%-79% impaired, limited or restricted    - D/C STATUS:  ---------------To be determined---------------       Medical Necessity:   · Patient demonstrates fair rehab potential due to higher previous functional level. Reason for Services/Other Comments:  · Patient continues to require skilled intervention due to increased pain and swelling in L UE limiting him with daily activity. Clinical Decision-Making Assessment:     Assessment process, impact of co-morbidities, assessment modification\need for assistance, and selection of interventions: Analytical Complexity:HIGH COMPLEXITY   TREATMENT:   (In addition to Assessment/Re-Assessment sessions the following treatments were rendered)    Pre-treatment Symptoms/Complaints:    Pain: Initial:      Post Session:  same        Manual Therapy: (15): Joint mobilization and Soft tissue mobilization was utilized and necessary because of the patient's restricted joint motion and restricted motion of soft tissue  Trigger point massage to trapezius, levator scapulae, and along anterior/poster/medial shoulder. Pt also recieved scapular mobilizations and posteior mobilization of humeral head. Pt tolerated without complaint. Neuromuscular Re-education: ( 30):  Exercise/activities per grid below to improve balance, coordination, kinesthetic sense, posture and proprioception. Required moderate visual, verbal, manual and tactile cues to promote motor control of left, upper extremity(s), lower extremity(s).             Date:  8/4/17 Date:  8/11/17 Date:     Activity/Exercise Parameters Parameters Parameters   Scapular elevation/depressing  Sidelying x 15 reps (compensates with trunk) Sidelying x 15 reps (compensates with trunk)    Postural Exercises Anterior pelvic tilt and throacic ext with moderate facilitation in beginning but progressed to minimal  Anterior pelvic tilt and throacic ext with minimal facilitation in beginning but progressed to supervision; pectoral spread    Tone reduction in hand  Bragg stretching and CMC stretch    Weightbearing  Into the elbow and then into the hand  Pt completed into the elbow x 3-4 reps and then into hand with elbow extended 3-4 reps    Mobile Arm Support  10 reps with total facilitation     Side Scooting   Pt completed in supine and in sitting with minimal to moderate facilitation               Treatment/Session Assessment:    · Response to Treatment: States his arm feels better s/p tx and less stiff and decrease feeling of subluxation. Some decreased swelling in L hand today. Pt to continue per plan of care. · Compliance with Program/Exercises: Will assess as treatment progresses. · Recommendations/Intent for next treatment session: \"Next visit will focus on advancements to more challenging activities and reduction in assistance provided\".   Total Treatment Duration:     Alannah Calvin, OT

## 2017-08-14 ENCOUNTER — HOSPITAL ENCOUNTER (OUTPATIENT)
Dept: PHYSICAL THERAPY | Age: 67
Discharge: HOME | End: 2017-08-14
Payer: MEDICARE

## 2017-08-14 PROCEDURE — 97112 NEUROMUSCULAR REEDUCATION: CPT

## 2017-08-14 NOTE — PROGRESS NOTES
Jd Keller  : 1950 [facility] [address], [address]  [phone]   [phone]         OUTPATIENT OCCUPATIONAL THERAPY: Daily Note 17   ICD-10: Treatment Diagnosis:   Hemiplegia, unspecified affecting left nondominant side (G81.94)  Pain in left shoulder (M25.512)    Precautions/Allergies:   Latex; Adhesive; and Sulfa (sulfonamide antibiotics)   Fall Risk Score: 4 (? 5 = High Risk)  MD Orders: OT Evaluate and Treat MEDICAL/REFERRING DIAGNOSIS:   Pain in left shoulder [M25.512]  CVA (cerebral vascular accident) [facility]) [I63.9]   DATE OF ONSET: 2016 and 2005  REFERRING PHYSICIAN: Marco A Paulino MD  RETURN PHYSICIAN APPOINTMENT: TBD     INITIAL ASSESSMENT:  Mr. Francie Morillo presents to outpatient occupational therapy services to address L shoulder pain due to L hemiplegia since . Pt has no functional movement in L UE and reports he has not had rehab for L UE since initial CVA in . Pt has 2 finger width subluxation in L shoulder, which could be source of L shoulder pain. Pt also has poor posture in sitting with forward rounded shoulders. Pt will benefit from OT services to work on positioning of L UE during transfers/ADL to address L shoulder pain. Pt will benefit from modalities, manual therapy, therapeutic exercises/activities to address L shoulder pain. Pt will be seen by OT services to address the below stated goals and plan of care. PLAN OF CARE:   PROBLEM LIST:  1. Decreased Strength  2. Decreased ADL/Functional Activities  3. Decreased Transfer Abilities  4. Decreased Ambulation Ability/Technique  5. Decreased Balance  6. Increased Pain  7. Decreased Activity Tolerance  8. Decreased Flexibility/Joint Mobility  9. Edema/Girth  10. Decreased [place] with Home Exercise Program  11. Decreased Cognition INTERVENTIONS PLANNED:  1. Activities of daily living training  2. Adaptive equipment training  3.  Balance training  4. Clothing management  5. Cognitive training  6. Manual therapy training  7. Modalities  8. Neuromuscular re-eduation  9. Therapeutic activity  10. Therapeutic exercise  11. Wheelchair management   TREATMENT PLAN:  Effective Dates: 7/28/17 TO 10/28/17. Frequency/Duration: 2 times a week for 8 weeks  GOALS: (Goals have been discussed and agreed upon with patient.)  Short-Term Functional Goals: Time Frame: 4 weeks   1. Chuckie Salvador will report decreased pain in L shoulder from 8/10 to 5-6/10 to demonstrate improved comfort with daily activity. 2. Chuckie Salvador will tolerate weightbearing activities in L UE with moderate facilitation to improve shoulder strength for daily activity. 3. Chuckie Salvador will verbalize with independence 3 ways he is using positioning throughout the day/night to help with pain reduction in L shoulder. Jacquelyn Sheikh will complete ROM exercises in sitting with moderate cues for posture x 15 minutes to improve posture for daily activity. Discharge Goals: Time Frame: 8 weeks  1. Chuckie Salvador will report decreased L shoulder pain from 8/10 to 0-2/10 to demonstrate improved positioning and comfort for ADL. 2. Chuckie Salvador will tolerate weightbearing activities in L UE with minimal facilitation to improve L shoulder subluxation. 3. Chuckie Salvador will limit L shoulder subluxation to 1 finger width to decrease pain in L shoulder for daily activity. 4. Chuckie Salvador will be modified independent with HEP for L UE to decrease pain levels in L UE.   5. Chuckie Salvador will complete sitting with fair+ posture for 10 minutes to improve sitting posture for ADL. Rehabilitation Potential For Stated Goals: Good  Regarding Yady Abernathy's therapy, I certify that the treatment plan above will be carried out by a therapist or under their direction.   Thank you for this referral,  Zachery Kinney OT                 The information in this section was collected on 7/28/17 (except where otherwise noted). OCCUPATIONAL PROFILE & HISTORY:   History of Present Injury/Illness (Reason for Referral):  Patient had initial CVA in 2005 with L sided hemiplegia. Pt reports he had therapy for L UE at that time but has had no other rehab for his L UE since his initial CVA. Pt had second CVA in 2016 with pt declining in his ability to complete functional mobility, but states his L UE has been non-functional since 2005. Pt has severe swelling in the L UE, especially in the L hand. Pt has compression machine that he uses for 1 hour for once a day. Pt's arm is elevated on a pillow during the day and positions it at night on wedge. Pt has a sling but states he can't use it because it pulls on his neck and he has more severe pain with use of the sling. .Pt able to get up and complete functional mobility one time per day. Pt has arm wedge/trough but pt is having difficulty using his new wheelchair and the arm trough is on his new wheelchair and he is currently using his old wheelchair. Pt assists with bathing himself with minimal assistance; Assists with dressing with moderate assistance; minimal assistance with upper body ; grooming with minimal assistance. Pt presents today to outpatient therapy services due to pain in the L shoulder. Past Medical History/Comorbidities:   Mr. Joelle Bauer  has a past medical history of Dermatophytosis of nail; History of CVA (cerebrovascular accident); and History of paraplegia. Mr. Joelle Bauer  has no past surgical history on file. Social History/Living Environment:    Lives with wife; Marivel Pickard is his aide and is with him 40 hours per week; tub-shower (grab bars) with getting into tub/shower; sits in showerr; Pt is R handed  Prior Level of Function/Work/Activity:  No function in the L UE prior to 8/2016; Pt hasn't had any rehab since 2005- Pt was getting therapy for L UE but states it wasn't very long;  Pt was completing functional mobility with single-point cane Personal Factors:          Patient behavior: Decreased attention to tasks         Past/Current Experience:  Limited rehab of L upper extremity        Other factors that influence how disability is experienced by the patient:  Cognitive deficits; hx of multiple CVAs  Current Medications:    Current Outpatient Prescriptions:     acetaminophen (TYLENOL) 500 mg tablet, Take  by mouth every six (6) hours as needed for Pain., Disp: , Rfl:     guaiFENesin (ORGANIDIN) 400 mg tablet, Take  by mouth every four (4) hours. , Disp: , Rfl:     lisinopril (PRINIVIL, ZESTRIL) 40 mg tablet, Take 40 mg by mouth daily. , Disp: , Rfl:     loratadine (CLARITIN) 10 mg tablet, Take 10 mg by mouth., Disp: , Rfl:     b complex-vitamin c-folic acid 0.8 mg (NEPHRO-ALICIA) 0.8 mg tab tablet, Take 1 Tab by mouth daily. , Disp: , Rfl:     Omeprazole delayed release (PRILOSEC D/R) 20 mg tablet, Take 20 mg by mouth daily. , Disp: , Rfl:     phenytoin ER (DILANTIN ER) 100 mg ER capsule, Take  by mouth., Disp: , Rfl:     rosuvastatin (CRESTOR) 40 mg tablet, Take 40 mg by mouth nightly., Disp: , Rfl:     senna (SENOKOT) 8.6 mg tablet, Take 1 Tab by mouth daily. , Disp: , Rfl:     sertraline (ZOLOFT) 100 mg tablet, Take  by mouth daily. , Disp: , Rfl:     tamsulosin (FLOMAX) 0.4 mg capsule, Take 0.4 mg by mouth daily. , Disp: , Rfl:     ALOE VERA/COLLAGEN (ALOE VESTA 2-N-1 CLEANSER EX), by Apply Externally route., Disp: , Rfl:     aspirin delayed-release 81 mg tablet, Take 81 mg by mouth., Disp: , Rfl:     MINERAL OIL/PETROLATUM,WHITE (CLIVE MOISTURE BARRIER EX), by Apply Externally route., Disp: , Rfl:     carvedilol (COREG) 12.5 mg tablet, Take 12.5 mg by mouth., Disp: , Rfl:     cholecalciferol (VITAMIN D3) 1,000 unit cap, Take 1,000 Units by mouth., Disp: , Rfl:     clopidogrel (PLAVIX) 75 mg tab, Take 75 mg by mouth., Disp: , Rfl:     docusate sodium 100 mg/5 mL enem, Insert  into rectum. , Disp: , Rfl:     fluticasone (FLOVENT DISKUS) 50 mcg/actuation inhaler, by Nasal route., Disp: , Rfl:     folic acid (FOLVITE) 1 mg tablet, Take 1 mg by mouth., Disp: , Rfl:             Date Last Reviewed:  7/28/17   Complexity Level : Extensive review of physical, cognitive, and psychosocial performance (3+):  HIGH COMPLEXITY   ASSESSMENT OF OCCUPATIONAL PERFORMANCE:   Palpation:          Patient noted to have 1.5 to 2 finger width subluxation in L shoulder with dense hemiplegia in L UE. Pt's L UE could be easily passively ranged with no increased tone at the shoulder, forearm, or wrist. Pt did have some increased tone in the finger flexors with hand resting in finger flexion.    Observation:  Patient sits in posterior pelvic tilt with severely rounded shoulders and forward head flexion  ROM/Strength:             Date:  7/28/17 Date:  7/28/17  Date:  7/28/17    AROM PROM  STRENGTH   Movement LEFT LEFT  LEFT   SCAPULAR       Protraction     0   Retraction    0   Elevation    1/5   SHOULDER:       Flexion  0 90 degrees  0/5   Abduction  0 90 degrees  0/5   External rotation  0 WFL (arm adducted at side)  0/5   Internal rotation  0 WFL  0/5   Horizontal abduction 0 WFL  0/5   Horizontal adduction  0 WFL  0/5   Extension  0 WFL  0/5   ELBOW:       Flexion  0 110  0/5   Extension  0 WFL  0/5   FOREARM:       Supination  0 48   0/5   Pronation        WRIST:       Wrist flexion  0 35  0/5   Wrist extension  0 70  0/5   HAND:       Finger Extension  0 WFL  0/5          Hand Strength:           0/5     Sensation:          Absent to light touch and deep pressure in L UE with vision occluded   Functional Mobility:         Gait/Ambulation:  Pt completes functional mobility using electric power chair: short distances with davis-walker with minimal assistance        Transfers:  Supervision to [facility] for stand pivot transfer   Coordination:          Non-functional in L UE  Mental Status:          Decreased attention to tasks; to be further assessed    Edema/Girth: Severe edema in L UE   Left Right    Initial Most Recent Initial Most Recent   Upper  Extremity  L Hand DPC 10.1 inches          Lower  Extremity              Activities of Daily Living:           Basic ADLs (From Assessment) Complex ADLs (From Assessment)         Grooming/Bathing/Dressing Activities of Daily Living                                      Physical Skills Involved:  1. Range of Motion  2. Balance  3. Strength  4. Activity Tolerance  5. Sensation  6. Fine Motor Control  7. Gross Motor Control  8. Pain (Chronic)  9. Edema Cognitive Skills Affected (resulting in the inability to perform in a timely and safe manner):  1. Executive Function  2. Short Term Recall  3. Sustained Attention  4. Divided Attention Psychosocial Skills Affected:  1. Habits/Routines   Number of elements that affect the Plan of Care: 5+:  HIGH COMPLEXITY   CLINICAL DECISION MAKING:   Outcome Measure: Tool Used: Disabilities of the Arm, Shoulder and Hand (DASH) Questionnaire - Quick Version  Score:  Initial: 38/55  Most Recent: X/55 (Date: -- )   Interpretation of Score: The DASH is designed to measure the activities of daily living in person's with upper extremity dysfunction or pain. Each section is scored on a 1-5 scale, 5 representing the greatest disability. The scores of each section are added together for a total score of 55. Score 11 12-19 20-28 29-37 38-45 46-54 55   Modifier CH CI CJ CK CL CM CN     ? Carrying, Moving, and Handling Objects:     - CURRENT STATUS: CL - 60%-79% impaired, limited or restricted    - GOAL STATUS: CK - 40%-59% impaired, limited or restricted    - D/C STATUS:  ---------------To be determined---------------    Tool Used: Fugl-Malone Upper Extremity Motor Assessment   Score:  Initial: 1/66 Most Recent: X (Date: -- )   Interpretation of Score: Outcome Measure Conversion Site    ?  Carrying, Moving, and Handling Objects:     - CURRENT STATUS: CM - 80%-99% impaired, limited or restricted    - GOAL STATUS: CL - 60%-79% impaired, limited or restricted    - D/C STATUS:  ---------------To be determined---------------       Medical Necessity:   · Patient demonstrates fair rehab potential due to higher previous functional level. Reason for Services/Other Comments:  · Patient continues to require skilled intervention due to increased pain and swelling in L UE limiting him with daily activity. Clinical Decision-Making Assessment:     Assessment process, impact of co-morbidities, assessment modification\need for assistance, and selection of interventions: Analytical Complexity:HIGH COMPLEXITY   TREATMENT:   (In addition to Assessment/Re-Assessment sessions the following treatments were rendered)    Pre-treatment Symptoms/Complaints:    Pain: Initial:      Post Session:  same     Neuromuscular Re-education: ( 45):  Exercise/activities per grid below to improve balance, coordination, kinesthetic sense, posture and proprioception. Required moderate visual, verbal, manual and tactile cues to promote motor control of left, upper extremity(s), lower extremity(s).             Date:  8/4/17 Date:  8/11/17 Date:  8/14/17   Activity/Exercise Parameters Parameters Parameters   Scapular elevation/depression  Sidelying x 15 reps (compensates with trunk) Sidelying x 15 reps (compensates with trunk) Sitting x 10 reps with moderate to maximal facilitation at L scapula   Postural Exercises Anterior pelvic tilt and throacic ext with moderate facilitation in beginning but progressed to minimal  Anterior pelvic tilt and throacic ext with minimal facilitation in beginning but progressed to supervision; pectoral spread Anterior pelvic tilt throughout session to work on posture with minimal verbal cues and tactile cues   Tone reduction in hand  Bragg stretching and CMC stretch Bragg stretching, thumb extension with CMC stretch to reduce tone and to prepare for weightbearing   Weightbearing  Into the elbow and then into the hand  Pt completed into the elbow x 3-4 reps and then into hand with elbow extended 3-4 reps 1) pressing with elbow extended  2) forward trunk flexion with arm at side  3) pressing into medicine ball  4) leaning into elbow and reaching to L of mildine    Mobile Arm Support  10 reps with total facilitation     Side Scooting   Pt completed in supine and in sitting with minimal to moderate facilitation             Treatment/Session Assessment:    · Response to Treatment: Patient required additional time with tasks today due to poor attention to task with activities. Pt needed constant cueing throughout to stay on task. Pt not able to complete quite as much activity today due to distractibility. Will continue to work on increasing attention to tasks. Pt to continue per plan of care. · Compliance with Program/Exercises: Will assess as treatment progresses. · Recommendations/Intent for next treatment session: \"Next visit will focus on advancements to more challenging activities and reduction in assistance provided\".   Total Treatment Duration:  OT Patient Time In/Time Out  Time In: 4084  Time Out: Troy Mai OT

## 2017-08-21 ENCOUNTER — APPOINTMENT (OUTPATIENT)
Dept: PHYSICAL THERAPY | Age: 67
End: 2017-08-21
Payer: MEDICARE

## 2017-08-25 ENCOUNTER — HOSPITAL ENCOUNTER (OUTPATIENT)
Dept: PHYSICAL THERAPY | Age: 67
Discharge: HOME | End: 2017-08-25
Payer: MEDICARE

## 2017-08-25 PROCEDURE — 97112 NEUROMUSCULAR REEDUCATION: CPT

## 2017-08-25 PROCEDURE — 97530 THERAPEUTIC ACTIVITIES: CPT

## 2017-08-25 NOTE — PROGRESS NOTES
Javy Radford  : 1950 [facility]  [address], [address], [place] [address] [address] [facility]  [phone]   [phone]         OUTPATIENT OCCUPATIONAL THERAPY: Daily Note 17   ICD-10: Treatment Diagnosis:   Hemiplegia, unspecified affecting left nondominant side (G81.94)  Pain in left shoulder (M25.512)    Precautions/Allergies:   Latex; Adhesive; and Sulfa (sulfonamide antibiotics)   Fall Risk Score: 4 (? 5 = High Risk)  MD Orders: OT Evaluate and Treat MEDICAL/REFERRING DIAGNOSIS:   Pain in left shoulder [M25.512]  CVA (cerebral vascular accident) [facility]) [I63.9]   DATE OF ONSET: 2016 and 2005  REFERRING PHYSICIAN: Cinda Schroeder MD  RETURN PHYSICIAN APPOINTMENT: TBD     INITIAL ASSESSMENT:  Mr. Carmen Munoz presents to outpatient occupational therapy services to address L shoulder pain due to L hemiplegia since . Pt has no functional movement in L UE and reports he has not had rehab for L UE since initial CVA in . Pt has 2 finger width subluxation in L shoulder, which could be source of L shoulder pain. Pt also has poor posture in sitting with forward rounded shoulders. Pt will benefit from OT services to work on positioning of L UE during transfers/ADL to address L shoulder pain. Pt will benefit from modalities, manual therapy, therapeutic exercises/activities to address L shoulder pain. Pt will be seen by OT services to address the below stated goals and plan of care. PLAN OF CARE:   PROBLEM LIST:  1. Decreased Strength  2. Decreased ADL/Functional Activities  3. Decreased Transfer Abilities  4. Decreased Ambulation Ability/Technique  5. Decreased Balance  6. Increased Pain  7. Decreased Activity Tolerance  8. Decreased Flexibility/Joint Mobility  9. Edema/Girth  10. Decreased [place] with Home Exercise Program  11. Decreased Cognition INTERVENTIONS PLANNED:  1. Activities of daily living training  2. Adaptive equipment training  3.  Balance training  4. Clothing management  5. Cognitive training  6. Manual therapy training  7. Modalities  8. Neuromuscular re-eduation  9. Therapeutic activity  10. Therapeutic exercise  11. Wheelchair management   TREATMENT PLAN:  Effective Dates: 7/28/17 TO 10/28/17. Frequency/Duration: 2 times a week for 8 weeks  GOALS: (Goals have been discussed and agreed upon with patient.)  Short-Term Functional Goals: Time Frame: 4 weeks   1. Jeanne Carr will report decreased pain in L shoulder from 8/10 to 5-6/10 to demonstrate improved comfort with daily activity. 2. Jeanne Carr will tolerate weightbearing activities in L UE with moderate facilitation to improve shoulder strength for daily activity. 3. Jeanne Carr will verbalize with independence 3 ways he is using positioning throughout the day/night to help with pain reduction in L shoulder. Jacquelyn Sheikh will complete ROM exercises in sitting with moderate cues for posture x 15 minutes to improve posture for daily activity. Discharge Goals: Time Frame: 8 weeks  1. Jeanne Carr will report decreased L shoulder pain from 8/10 to 0-2/10 to demonstrate improved positioning and comfort for ADL. 2. Jeanne Carr will tolerate weightbearing activities in L UE with minimal facilitation to improve L shoulder subluxation. 3. Jeanne Carr will limit L shoulder subluxation to 1 finger width to decrease pain in L shoulder for daily activity. 4. Jeanne Carr will be modified independent with HEP for L UE to decrease pain levels in L UE.   5. Jeanne Carr will complete sitting with fair+ posture for 10 minutes to improve sitting posture for ADL. Rehabilitation Potential For Stated Goals: Good  Regarding José Antonio Starr Josemanuel's therapy, I certify that the treatment plan above will be carried out by a therapist or under their direction.   Thank you for this referral,  Hue Zhang OT                 The information in this section was collected on 7/28/17 (except where otherwise noted). OCCUPATIONAL PROFILE & HISTORY:   History of Present Injury/Illness (Reason for Referral):  Patient had initial CVA in 2005 with L sided hemiplegia. Pt reports he had therapy for L UE at that time but has had no other rehab for his L UE since his initial CVA. Pt had second CVA in 2016 with pt declining in his ability to complete functional mobility, but states his L UE has been non-functional since 2005. Pt has severe swelling in the L UE, especially in the L hand. Pt has compression machine that he uses for 1 hour for once a day. Pt's arm is elevated on a pillow during the day and positions it at night on wedge. Pt has a sling but states he can't use it because it pulls on his neck and he has more severe pain with use of the sling. .Pt able to get up and complete functional mobility one time per day. Pt has arm wedge/trough but pt is having difficulty using his new wheelchair and the arm trough is on his new wheelchair and he is currently using his old wheelchair. Pt assists with bathing himself with minimal assistance; Assists with dressing with moderate assistance; minimal assistance with upper body ; grooming with minimal assistance. Pt presents today to outpatient therapy services due to pain in the L shoulder. Past Medical History/Comorbidities:   Mr. Suhas Vides  has a past medical history of Dermatophytosis of nail; History of CVA (cerebrovascular accident); and History of paraplegia. Mr. Suhas Vides  has no past surgical history on file. Social History/Living Environment:    Lives with wife; Enedina is his aide and is with him 40 hours per week; tub-shower (grab bars) with getting into tub/shower; sits in showerr; Pt is R handed  Prior Level of Function/Work/Activity:  No function in the L UE prior to 8/2016; Pt hasn't had any rehab since 2005- Pt was getting therapy for L UE but states it wasn't very long;  Pt was completing functional mobility with single-point cane Personal Factors:          Patient behavior: Decreased attention to tasks         Past/Current Experience:  Limited rehab of L upper extremity        Other factors that influence how disability is experienced by the patient:  Cognitive deficits; hx of multiple CVAs  Current Medications:    Current Outpatient Prescriptions:     acetaminophen (TYLENOL) 500 mg tablet, Take  by mouth every six (6) hours as needed for Pain., Disp: , Rfl:     guaiFENesin (ORGANIDIN) 400 mg tablet, Take  by mouth every four (4) hours. , Disp: , Rfl:     lisinopril (PRINIVIL, ZESTRIL) 40 mg tablet, Take 40 mg by mouth daily. , Disp: , Rfl:     loratadine (CLARITIN) 10 mg tablet, Take 10 mg by mouth., Disp: , Rfl:     b complex-vitamin c-folic acid 0.8 mg (NEPHRO-ALICIA) 0.8 mg tab tablet, Take 1 Tab by mouth daily. , Disp: , Rfl:     Omeprazole delayed release (PRILOSEC D/R) 20 mg tablet, Take 20 mg by mouth daily. , Disp: , Rfl:     phenytoin ER (DILANTIN ER) 100 mg ER capsule, Take  by mouth., Disp: , Rfl:     rosuvastatin (CRESTOR) 40 mg tablet, Take 40 mg by mouth nightly., Disp: , Rfl:     senna (SENOKOT) 8.6 mg tablet, Take 1 Tab by mouth daily. , Disp: , Rfl:     sertraline (ZOLOFT) 100 mg tablet, Take  by mouth daily. , Disp: , Rfl:     tamsulosin (FLOMAX) 0.4 mg capsule, Take 0.4 mg by mouth daily. , Disp: , Rfl:     ALOE VERA/COLLAGEN (ALOE VESTA 2-N-1 CLEANSER EX), by Apply Externally route., Disp: , Rfl:     aspirin delayed-release 81 mg tablet, Take 81 mg by mouth., Disp: , Rfl:     MINERAL OIL/PETROLATUM,WHITE (CLIVE MOISTURE BARRIER EX), by Apply Externally route., Disp: , Rfl:     carvedilol (COREG) 12.5 mg tablet, Take 12.5 mg by mouth., Disp: , Rfl:     cholecalciferol (VITAMIN D3) 1,000 unit cap, Take 1,000 Units by mouth., Disp: , Rfl:     clopidogrel (PLAVIX) 75 mg tab, Take 75 mg by mouth., Disp: , Rfl:     docusate sodium 100 mg/5 mL enem, Insert  into rectum. , Disp: , Rfl:     fluticasone (FLOVENT DISKUS) 50 mcg/actuation inhaler, by Nasal route., Disp: , Rfl:     folic acid (FOLVITE) 1 mg tablet, Take 1 mg by mouth., Disp: , Rfl:             Date Last Reviewed:  7/28/17   Complexity Level : Extensive review of physical, cognitive, and psychosocial performance (3+):  HIGH COMPLEXITY   ASSESSMENT OF OCCUPATIONAL PERFORMANCE:   Palpation:          Patient noted to have 1.5 to 2 finger width subluxation in L shoulder with dense hemiplegia in L UE. Pt's L UE could be easily passively ranged with no increased tone at the shoulder, forearm, or wrist. Pt did have some increased tone in the finger flexors with hand resting in finger flexion.    Observation:  Patient sits in posterior pelvic tilt with severely rounded shoulders and forward head flexion  ROM/Strength:             Date:  7/28/17 Date:  7/28/17  Date:  7/28/17    AROM PROM  STRENGTH   Movement LEFT LEFT  LEFT   SCAPULAR       Protraction     0   Retraction    0   Elevation    1/5   SHOULDER:       Flexion  0 90 degrees  0/5   Abduction  0 90 degrees  0/5   External rotation  0 WFL (arm adducted at side)  0/5   Internal rotation  0 WFL  0/5   Horizontal abduction 0 WFL  0/5   Horizontal adduction  0 WFL  0/5   Extension  0 WFL  0/5   ELBOW:       Flexion  0 110  0/5   Extension  0 WFL  0/5   FOREARM:       Supination  0 48   0/5   Pronation        WRIST:       Wrist flexion  0 35  0/5   Wrist extension  0 70  0/5   HAND:       Finger Extension  0 WFL  0/5          Hand Strength:           0/5     Sensation:          Absent to light touch and deep pressure in L UE with vision occluded   Functional Mobility:         Gait/Ambulation:  Pt completes functional mobility using electric power chair: short distances with davis-walker with minimal assistance        Transfers:  Supervision to [facility] for stand pivot transfer   Coordination:          Non-functional in L UE  Mental Status:          Decreased attention to tasks; to be further assessed    Edema/Girth: Severe edema in L UE   Left Right    Initial Most Recent Initial Most Recent   Upper  Extremity  L Hand DPC 10.1 inches          Lower  Extremity              Activities of Daily Living:           Basic ADLs (From Assessment) Complex ADLs (From Assessment)         Grooming/Bathing/Dressing Activities of Daily Living                                      Physical Skills Involved:  1. Range of Motion  2. Balance  3. Strength  4. Activity Tolerance  5. Sensation  6. Fine Motor Control  7. Gross Motor Control  8. Pain (Chronic)  9. Edema Cognitive Skills Affected (resulting in the inability to perform in a timely and safe manner):  1. Executive Function  2. Short Term Recall  3. Sustained Attention  4. Divided Attention Psychosocial Skills Affected:  1. Habits/Routines   Number of elements that affect the Plan of Care: 5+:  HIGH COMPLEXITY   CLINICAL DECISION MAKING:   Outcome Measure: Tool Used: Disabilities of the Arm, Shoulder and Hand (DASH) Questionnaire - Quick Version  Score:  Initial: 38/55  Most Recent: X/55 (Date: -- )   Interpretation of Score: The DASH is designed to measure the activities of daily living in person's with upper extremity dysfunction or pain. Each section is scored on a 1-5 scale, 5 representing the greatest disability. The scores of each section are added together for a total score of 55. Score 11 12-19 20-28 29-37 38-45 46-54 55   Modifier CH CI CJ CK CL CM CN     ? Carrying, Moving, and Handling Objects:     - CURRENT STATUS: CL - 60%-79% impaired, limited or restricted    - GOAL STATUS: CK - 40%-59% impaired, limited or restricted    - D/C STATUS:  ---------------To be determined---------------    Tool Used: Fugl-Malone Upper Extremity Motor Assessment   Score:  Initial: 1/66 Most Recent: X (Date: -- )   Interpretation of Score: Outcome Measure Conversion Site    ?  Carrying, Moving, and Handling Objects:     - CURRENT STATUS: CM - 80%-99% impaired, limited or restricted    - GOAL STATUS: CL - 60%-79% impaired, limited or restricted    - D/C STATUS:  ---------------To be determined---------------       Medical Necessity:   · Patient demonstrates fair rehab potential due to higher previous functional level. Reason for Services/Other Comments:  · Patient continues to require skilled intervention due to increased pain and swelling in L UE limiting him with daily activity. Clinical Decision-Making Assessment:     Assessment process, impact of co-morbidities, assessment modification\need for assistance, and selection of interventions: Analytical Complexity:HIGH COMPLEXITY   TREATMENT:   (In addition to Assessment/Re-Assessment sessions the following treatments were rendered)    Pre-treatment Symptoms/Complaints:    Pain: Initial:   Pain Intensity 1: 0  Post Session:  same      Therapeutic Activity (   15): Therapeutic activities per grid below to improve mobility, strength, balance and coordination. Required minimal to moderate visual, verbal and manual cues to promote motor control of upper extremity(s), lower extremity(s).    Date:  8/25/17 Date:   Date:     Activity/Exercise Parameters Parameters Parameters   Wheelchair to [employer] CGA with assistance to protect L UE     Mat to Wheelchair CGA with assistance to protect L LUE and to scoot closer to chair for improved safety     Rolling Side to Side  1) Pt completed working rolling to the L x 5 reps with assistance to pull scapula in protraction  2) Rolling to the R x 5 reps with education of protection of R UE     Side scooting in supine Bridging and cues for pressing through elbows to scoot upper body; minimal to moderate assistance     Supine to sit  Moderate assistance with L LE and lifting trunk upright      Sit to Supine Moderate assistance with trunk/L LE                Neuromuscular Re-education: (  30):  Exercise/activities per grid below to improve balance, coordination, kinesthetic sense, posture and proprioception. Required moderate visual, verbal and manual cues to promote motor control of left, upper extremity(s), lower extremity(s).             Date:  8/4/17 Date:  8/11/17 Date:  8/14/17 Date:  8/25/17   Activity/Exercise Parameters Parameters Parameters    Scapular elevation/depressing  Sidelying x 15 reps (compensates with trunk) Sidelying x 15 reps (compensates with trunk) Sitting x 10 reps with faciltiation Side-lying with pt working on assisting with elevation x 15 reps    Postural Exercises Anterior pelvic tilt and throacic ext with moderate facilitation in beginning but progressed to minimal  Anterior pelvic tilt and throacic ext with minimal facilitation in beginning but progressed to supervision; pectoral spread Anterior pelvic tilt throughout session to work on posture with minimal verbal cues  Anterior pelvic tilt with thoracic extension with facilitation of pectoral spread; minimal to moderate cues to stay on task    Tone reduction   Bragg stretching and CMC stretch Bragg stretching, thumb extension with CMC stretch  1)Bragg stretching with thumb extension   2) SROM External Rotation with moderate facilitation      Weightbearing  Into the elbow and then into the hand  Pt completed into the elbow x 3-4 reps and then into hand with elbow extended 3-4 reps 1) pressing with elbow extended  2) forward trunk flexion with arm at side  3) pressing into medicine ball  4) leaning into elbow and reaching to L of mildine  1) weightbearing into L elbow on wedge and pressing back to midline with moderate cues for alignment of trunk and to press through elbow   2) weightbearing into L UE with elbow extended providing wrist/digit extension stretch with maximal facilitation at the elbow    Mobile Arm Support  10 reps with total facilitation      Side Scooting   Pt completed in supine and in sitting with minimal to moderate facilitation      Trunk Rotation     1) 5 reps to each side in sitting position and holding for 15 seconds; Pt tolerated well  2) Supine with L UE abducted and pt rotating to the R and turning head; minimal assistance with good stretch felt through trunk and pectoral muscles          Treatment/Session Assessment:    · Response to Treatment: Patient did well with today's session. Pt still distracted but better ability to re-direct attention today. Pt very stiff in trunk on L side with good stretch today. Pt and caregiver educated on protection of L UE during transfers with both reporting that they are working on that with activity at home. Pt reports that he feels \"great\" after today's session with decreased stiffness. Pt to continue per plan of care. · Compliance with Program/Exercises: Will assess as treatment progresses. · Recommendations/Intent for next treatment session: \"Next visit will focus on advancements to more challenging activities and reduction in assistance provided\".   Total Treatment Duration:  OT Patient Time In/Time Out  Time In: 1015  Time Out: [address], OT

## 2017-08-28 ENCOUNTER — HOSPITAL ENCOUNTER (OUTPATIENT)
Dept: PHYSICAL THERAPY | Age: 67
Discharge: HOME | End: 2017-08-28
Payer: MEDICARE

## 2017-08-28 NOTE — PROGRESS NOTES
8/28/2017  Patient called and cancelled today's appointment. Will follow-up at next scheduled appointment.   Thank you,  Angelito Nunez OT

## 2017-09-01 ENCOUNTER — HOSPITAL ENCOUNTER (OUTPATIENT)
Dept: PHYSICAL THERAPY | Age: 67
Discharge: HOME | End: 2017-09-01
Payer: MEDICARE

## 2017-09-01 PROCEDURE — 97112 NEUROMUSCULAR REEDUCATION: CPT

## 2017-09-01 PROCEDURE — 97530 THERAPEUTIC ACTIVITIES: CPT

## 2017-09-01 NOTE — PROGRESS NOTES
Tracy Rosario  : 1950 [facility] [address], [place] [address] [facility]  [phone]   [phone]         OUTPATIENT OCCUPATIONAL THERAPY: Daily Note 17   ICD-10: Treatment Diagnosis:   Hemiplegia, unspecified affecting left nondominant side (G81.94)  Pain in left shoulder (M25.512)    Precautions/Allergies:   Latex; Adhesive; and Sulfa (sulfonamide antibiotics)   Fall Risk Score: 4 (? 5 = High Risk)  MD Orders: OT Evaluate and Treat MEDICAL/REFERRING DIAGNOSIS:   Pain in left shoulder [M25.512]  CVA (cerebral vascular accident) [facility]) [I63.9]   DATE OF ONSET: 2016 and 2005  REFERRING PHYSICIAN: Joana Barger MD  RETURN PHYSICIAN APPOINTMENT: TBD     INITIAL ASSESSMENT:  Mr. Mariya Culp presents to outpatient occupational therapy services to address L shoulder pain due to L hemiplegia since . Pt has no functional movement in L UE and reports he has not had rehab for L UE since initial CVA in . Pt has 2 finger width subluxation in L shoulder, which could be source of L shoulder pain. Pt also has poor posture in sitting with forward rounded shoulders. Pt will benefit from OT services to work on positioning of L UE during transfers/ADL to address L shoulder pain. Pt will benefit from modalities, manual therapy, therapeutic exercises/activities to address L shoulder pain. Pt will be seen by OT services to address the below stated goals and plan of care. PLAN OF CARE:   PROBLEM LIST:  1. Decreased Strength  2. Decreased ADL/Functional Activities  3. Decreased Transfer Abilities  4. Decreased Ambulation Ability/Technique  5. Decreased Balance  6. Increased Pain  7. Decreased Activity Tolerance  8. Decreased Flexibility/Joint Mobility  9. Edema/Girth  10. Decreased [place] with Home Exercise Program  11. Decreased Cognition INTERVENTIONS PLANNED:  1. Activities of daily living training  2. Adaptive equipment training  3.  Balance training  4. Clothing management  5. Cognitive training  6. Manual therapy training  7. Modalities  8. Neuromuscular re-eduation  9. Therapeutic activity  10. Therapeutic exercise  11. Wheelchair management   TREATMENT PLAN:  Effective Dates: 7/28/17 TO 10/28/17. Frequency/Duration: 2 times a week for 8 weeks  GOALS: (Goals have been discussed and agreed upon with patient.)  Short-Term Functional Goals: Time Frame: 4 weeks   1. Kel Wang will report decreased pain in L shoulder from 8/10 to 5-6/10 to demonstrate improved comfort with daily activity. 2. Kel Wang will tolerate weightbearing activities in L UE with moderate facilitation to improve shoulder strength for daily activity. 3. Kel Wang will verbalize with independence 3 ways he is using positioning throughout the day/night to help with pain reduction in L shoulder. Jacquelyn Sheikh will complete ROM exercises in sitting with moderate cues for posture x 15 minutes to improve posture for daily activity. Discharge Goals: Time Frame: 8 weeks  1. Kel Wang will report decreased L shoulder pain from 8/10 to 0-2/10 to demonstrate improved positioning and comfort for ADL. 2. Kel Wang will tolerate weightbearing activities in L UE with minimal facilitation to improve L shoulder subluxation. 3. Kel Wang will limit L shoulder subluxation to 1 finger width to decrease pain in L shoulder for daily activity. 4. Kel Wang will be modified independent with HEP for L UE to decrease pain levels in L UE.   5. Kel Wang will complete sitting with fair+ posture for 10 minutes to improve sitting posture for ADL. Rehabilitation Potential For Stated Goals: Good  Regarding Patricia Cardenasnohelia Abernathy's therapy, I certify that the treatment plan above will be carried out by a therapist or under their direction.   Thank you for this referral,  Eris Estrada OT                 The information in this section was collected on 7/28/17 (except where otherwise noted). OCCUPATIONAL PROFILE & HISTORY:   History of Present Injury/Illness (Reason for Referral):  Patient had initial CVA in 2005 with L sided hemiplegia. Pt reports he had therapy for L UE at that time but has had no other rehab for his L UE since his initial CVA. Pt had second CVA in 2016 with pt declining in his ability to complete functional mobility, but states his L UE has been non-functional since 2005. Pt has severe swelling in the L UE, especially in the L hand. Pt has compression machine that he uses for 1 hour for once a day. Pt's arm is elevated on a pillow during the day and positions it at night on wedge. Pt has a sling but states he can't use it because it pulls on his neck and he has more severe pain with use of the sling. .Pt able to get up and complete functional mobility one time per day. Pt has arm wedge/trough but pt is having difficulty using his new wheelchair and the arm trough is on his new wheelchair and he is currently using his old wheelchair. Pt assists with bathing himself with minimal assistance; Assists with dressing with moderate assistance; minimal assistance with upper body ; grooming with minimal assistance. Pt presents today to outpatient therapy services due to pain in the L shoulder. Past Medical History/Comorbidities:   Mr. Luis Kenny  has a past medical history of Dermatophytosis of nail; History of CVA (cerebrovascular accident); and History of paraplegia. Mr. Luis Kenny  has no past surgical history on file. Social History/Living Environment:    Lives with wife; Veronica Regan is his aide and is with him 40 hours per week; tub-shower (grab bars) with getting into tub/shower; sits in showerr; Pt is R handed  Prior Level of Function/Work/Activity:  No function in the L UE prior to 8/2016; Pt hasn't had any rehab since 2005- Pt was getting therapy for L UE but states it wasn't very long;  Pt was completing functional mobility with single-point cane Personal Factors:          Patient behavior: Decreased attention to tasks         Past/Current Experience:  Limited rehab of L upper extremity        Other factors that influence how disability is experienced by the patient:  Cognitive deficits; hx of multiple CVAs  Current Medications:    Current Outpatient Prescriptions:     acetaminophen (TYLENOL) 500 mg tablet, Take  by mouth every six (6) hours as needed for Pain., Disp: , Rfl:     guaiFENesin (ORGANIDIN) 400 mg tablet, Take  by mouth every four (4) hours. , Disp: , Rfl:     lisinopril (PRINIVIL, ZESTRIL) 40 mg tablet, Take 40 mg by mouth daily. , Disp: , Rfl:     loratadine (CLARITIN) 10 mg tablet, Take 10 mg by mouth., Disp: , Rfl:     b complex-vitamin c-folic acid 0.8 mg (NEPHRO-ALICIA) 0.8 mg tab tablet, Take 1 Tab by mouth daily. , Disp: , Rfl:     Omeprazole delayed release (PRILOSEC D/R) 20 mg tablet, Take 20 mg by mouth daily. , Disp: , Rfl:     phenytoin ER (DILANTIN ER) 100 mg ER capsule, Take  by mouth., Disp: , Rfl:     rosuvastatin (CRESTOR) 40 mg tablet, Take 40 mg by mouth nightly., Disp: , Rfl:     senna (SENOKOT) 8.6 mg tablet, Take 1 Tab by mouth daily. , Disp: , Rfl:     sertraline (ZOLOFT) 100 mg tablet, Take  by mouth daily. , Disp: , Rfl:     tamsulosin (FLOMAX) 0.4 mg capsule, Take 0.4 mg by mouth daily. , Disp: , Rfl:     ALOE VERA/COLLAGEN (ALOE VESTA 2-N-1 CLEANSER EX), by Apply Externally route., Disp: , Rfl:     aspirin delayed-release 81 mg tablet, Take 81 mg by mouth., Disp: , Rfl:     MINERAL OIL/PETROLATUM,WHITE (CLIVE MOISTURE BARRIER EX), by Apply Externally route., Disp: , Rfl:     carvedilol (COREG) 12.5 mg tablet, Take 12.5 mg by mouth., Disp: , Rfl:     cholecalciferol (VITAMIN D3) 1,000 unit cap, Take 1,000 Units by mouth., Disp: , Rfl:     clopidogrel (PLAVIX) 75 mg tab, Take 75 mg by mouth., Disp: , Rfl:     docusate sodium 100 mg/5 mL enem, Insert  into rectum. , Disp: , Rfl:     fluticasone (FLOVENT DISKUS) 50 mcg/actuation inhaler, by Nasal route., Disp: , Rfl:     folic acid (FOLVITE) 1 mg tablet, Take 1 mg by mouth., Disp: , Rfl:             Date Last Reviewed:  7/28/17   Complexity Level : Extensive review of physical, cognitive, and psychosocial performance (3+):  HIGH COMPLEXITY   ASSESSMENT OF OCCUPATIONAL PERFORMANCE:   Palpation:          Patient noted to have 1.5 to 2 finger width subluxation in L shoulder with dense hemiplegia in L UE. Pt's L UE could be easily passively ranged with no increased tone at the shoulder, forearm, or wrist. Pt did have some increased tone in the finger flexors with hand resting in finger flexion.    Observation:  Patient sits in posterior pelvic tilt with severely rounded shoulders and forward head flexion  ROM/Strength:             Date:  7/28/17 Date:  7/28/17  Date:  7/28/17    AROM PROM  STRENGTH   Movement LEFT LEFT  LEFT   SCAPULAR       Protraction     0   Retraction    0   Elevation    1/5   SHOULDER:       Flexion  0 90 degrees  0/5   Abduction  0 90 degrees  0/5   External rotation  0 WFL (arm adducted at side)  0/5   Internal rotation  0 WFL  0/5   Horizontal abduction 0 WFL  0/5   Horizontal adduction  0 WFL  0/5   Extension  0 WFL  0/5   ELBOW:       Flexion  0 110  0/5   Extension  0 WFL  0/5   FOREARM:       Supination  0 48   0/5   Pronation        WRIST:       Wrist flexion  0 35  0/5   Wrist extension  0 70  0/5   HAND:       Finger Extension  0 WFL  0/5          Hand Strength:           0/5     Sensation:          Absent to light touch and deep pressure in L UE with vision occluded   Functional Mobility:         Gait/Ambulation:  Pt completes functional mobility using electric power chair: short distances with davis-walker with minimal assistance        Transfers:  Supervision to [facility] for stand pivot transfer   Coordination:          Non-functional in L UE  Mental Status:          Decreased attention to tasks; to be further assessed    Edema/Girth: Severe edema in L UE   Left Right    Initial Most Recent Initial Most Recent   Upper  Extremity  L Hand DPC 10.1 inches          Lower  Extremity              Activities of Daily Living:           Basic ADLs (From Assessment) Complex ADLs (From Assessment)         Grooming/Bathing/Dressing Activities of Daily Living                                      Physical Skills Involved:  1. Range of Motion  2. Balance  3. Strength  4. Activity Tolerance  5. Sensation  6. Fine Motor Control  7. Gross Motor Control  8. Pain (Chronic)  9. Edema Cognitive Skills Affected (resulting in the inability to perform in a timely and safe manner):  1. Executive Function  2. Short Term Recall  3. Sustained Attention  4. Divided Attention Psychosocial Skills Affected:  1. Habits/Routines   Number of elements that affect the Plan of Care: 5+:  HIGH COMPLEXITY   CLINICAL DECISION MAKING:   Outcome Measure: Tool Used: Disabilities of the Arm, Shoulder and Hand (DASH) Questionnaire - Quick Version  Score:  Initial: 38/55  Most Recent: X/55 (Date: -- )   Interpretation of Score: The DASH is designed to measure the activities of daily living in person's with upper extremity dysfunction or pain. Each section is scored on a 1-5 scale, 5 representing the greatest disability. The scores of each section are added together for a total score of 55. Score 11 12-19 20-28 29-37 38-45 46-54 55   Modifier CH CI CJ CK CL CM CN     ? Carrying, Moving, and Handling Objects:     - CURRENT STATUS: CL - 60%-79% impaired, limited or restricted    - GOAL STATUS: CK - 40%-59% impaired, limited or restricted    - D/C STATUS:  ---------------To be determined---------------    Tool Used: Fugl-Malone Upper Extremity Motor Assessment   Score:  Initial: 1/66 Most Recent: X (Date: -- )   Interpretation of Score: Outcome Measure Conversion Site    ?  Carrying, Moving, and Handling Objects:     - CURRENT STATUS: CM - 80%-99% impaired, limited or restricted    - GOAL STATUS: CL - 60%-79% impaired, limited or restricted    - D/C STATUS:  ---------------To be determined---------------       Medical Necessity:   · Patient demonstrates fair rehab potential due to higher previous functional level. Reason for Services/Other Comments:  · Patient continues to require skilled intervention due to increased pain and swelling in L UE limiting him with daily activity. Clinical Decision-Making Assessment:     Assessment process, impact of co-morbidities, assessment modification\need for assistance, and selection of interventions: Analytical Complexity:HIGH COMPLEXITY   TREATMENT:   (In addition to Assessment/Re-Assessment sessions the following treatments were rendered)    Pre-treatment Symptoms/Complaints:    Pain: Initial:   Pain Intensity 1: 0  Post Session:  same      Therapeutic Activity (   15): Therapeutic activities per grid below to improve mobility, strength, balance and coordination. Required minimal to moderate visual, verbal and manual cues to promote motor control of upper extremity(s), lower extremity(s).    Date:  8/25/17 Date:  9/1/17 Date:     Activity/Exercise Parameters Parameters Parameters   Wheelchair to Mat CGA with assistance to protect L UE CGA with assistance to protect L UE    Mat to Wheelchair CGA with assistance to protect L LUE and to scoot closer to chair for improved safety CGA to minimal assistance with cues to pull wheelchair closer     Rolling Side to Side  1) Pt completed working rolling to the L x 5 reps with assistance to pull scapula in protraction  2) Rolling to the R x 5 reps with education of protection of R UE 1) Pt worked on rolling to the L x 5 reps with CGA and assistance to pull scapula in protraction   2) Rolling to the R x 5 reps with pt working on rocking side to side to gain momentum with CGA to minimal assistance      Side scooting in supine Bridging and cues for pressing through elbows to scoot upper body; minimal to moderate assistance     Supine to sit  Moderate assistance with L LE and lifting trunk upright  Moderate assistance with L LE and lifting trunk upright     Sit to Supine Moderate assistance with trunk/L LE  Moderate assistance with trunk/L LE               Neuromuscular Re-education: ( 25):  Exercise/activities per grid below to improve balance, coordination, kinesthetic sense, posture and proprioception. Required moderate visual, verbal and manual cues to promote motor control of left, upper extremity(s), lower extremity(s).             Date:  8/4/17 Date:  8/11/17 Date:  8/14/17 Date:  8/25/17 Date:  9/1/17   Activity/Exercise Parameters Parameters Parameters     Scapular elevation/depressing  Sidelying x 15 reps (compensates with trunk) Sidelying x 15 reps (compensates with trunk) Sitting x 10 reps with faciltiation Side-lying with pt working on assisting with elevation x 15 reps  Sitting 6-8 reps with decreased attention to task    Postural Exercises Anterior pelvic tilt and throacic ext with moderate facilitation in beginning but progressed to minimal  Anterior pelvic tilt and throacic ext with minimal facilitation in beginning but progressed to supervision; pectoral spread Anterior pelvic tilt throughout session to work on posture with minimal verbal cues  Anterior pelvic tilt with thoracic extension with facilitation of pectoral spread; minimal to moderate cues to stay on task  Anterior pelvic tilt with thoracic extension with facilitation of pectoral spread; minimal to moderate cues to stay on task ; pt completed for 15 reps    Tone reduction   Bragg stretching and CMC stretch Bragg stretching, thumb extension with CMC stretch  1)Bragg stretching with thumb extension   2) SROM External Rotation with moderate facilitation    Bragg stretching with thumb extension/finger extension stretching    Weightbearing  Into the elbow and then into the hand  Pt completed into the elbow x 3-4 reps and then into hand with elbow extended 3-4 reps 1) pressing with elbow extended  2) forward trunk flexion with arm at side  3) pressing into medicine ball  4) leaning into elbow and reaching to L of mildine  1) weightbearing into L elbow on wedge and pressing back to midline with moderate cues for alignment of trunk and to press through elbow   2) weightbearing into L UE with elbow extended providing wrist/digit extension stretch with maximal facilitation at the elbow  1) weightbearing into L elbow on wedge and pressing back to midline with moderate cues for alignment of trunk and to press through elbow   2) weightbearing into L UE with elbow extended providing wrist/digit extension stretch with maximal facilitation at the elbow   (5 reps each with additional time)   Mobile Arm Support  10 reps with total facilitation       Side Scooting   Pt completed in supine and in sitting with minimal to moderate facilitation       Trunk Rotation     1) 5 reps to each side in sitting position and holding for 15 seconds; Pt tolerated well  2) Supine with L UE abducted and pt rotating to the R and turning head; minimal assistance with good stretch felt through trunk and pectoral muscles  1) 5 reps to each side in sitting position and holding for 15 seconds; Pt tolerated well  2) Supine with L UE abducted and pt rotating to the R and turning head; minimal assistance with good stretch felt through trunk and pectoral muscles          Treatment/Session Assessment:    · Response to Treatment: Patient needed more cueing to stay on task today. Pt extremely talkative at times and needed multiple cues to complete task. Pt reports overall his shoulder is feeling better and caregiver states they are working on weightbearing at home. Caregiver also states she feels that the subluxation is less than when he first started OT treatment. Pt not coming to OT appointments next week.  Pt given offer to come to see different therapist, since primary therapist is on vacation, but they choose to follow-up the next week. · Compliance with Program/Exercises: Will assess as treatment progresses. · Recommendations/Intent for next treatment session: \"Next visit will focus on advancements to more challenging activities and reduction in assistance provided\".   Total Treatment Duration:  OT Patient Time In/Time Out  Time In: 1110  Time Out: 2[facility], OT

## 2017-09-15 ENCOUNTER — HOSPITAL ENCOUNTER (OUTPATIENT)
Dept: PHYSICAL THERAPY | Age: 67
Discharge: HOME | End: 2017-09-15
Payer: MEDICARE

## 2017-09-15 PROCEDURE — 97112 NEUROMUSCULAR REEDUCATION: CPT

## 2017-09-15 PROCEDURE — 97530 THERAPEUTIC ACTIVITIES: CPT

## 2017-09-15 NOTE — PROGRESS NOTES
Rianna Colindres  : 1950 [facility], [address], [place] [address] [facility]  [phone]   [phone]         OUTPATIENT OCCUPATIONAL THERAPY: Daily Note 9/15/2017     ICD-10: Treatment Diagnosis:   Hemiplegia, unspecified affecting left nondominant side (G81.94)  Pain in left shoulder (M25.512)    Precautions/Allergies:   Latex; Adhesive; and Sulfa (sulfonamide antibiotics)   Fall Risk Score: 4 (? 5 = High Risk)  MD Orders: OT Evaluate and Treat MEDICAL/REFERRING DIAGNOSIS:   Pain in left shoulder [M25.512]  CVA (cerebral vascular accident) [facility]) [I63.9]   DATE OF ONSET: 2016 and 2005  REFERRING PHYSICIAN: Ana Guzmán MD  RETURN PHYSICIAN APPOINTMENT: TBD     INITIAL ASSESSMENT:  Mr. Tha Quiroga presents to outpatient occupational therapy services to address L shoulder pain due to L hemiplegia since . Pt has no functional movement in L UE and reports he has not had rehab for L UE since initial CVA in . Pt has 2 finger width subluxation in L shoulder, which could be source of L shoulder pain. Pt also has poor posture in sitting with forward rounded shoulders. Pt will benefit from OT services to work on positioning of L UE during transfers/ADL to address L shoulder pain. Pt will benefit from modalities, manual therapy, therapeutic exercises/activities to address L shoulder pain. Pt will be seen by OT services to address the below stated goals and plan of care. PLAN OF CARE:   PROBLEM LIST:  1. Decreased Strength  2. Decreased ADL/Functional Activities  3. Decreased Transfer Abilities  4. Decreased Ambulation Ability/Technique  5. Decreased Balance  6. Increased Pain  7. Decreased Activity Tolerance  8. Decreased Flexibility/Joint Mobility  9. Edema/Girth  10. Decreased [place] with Home Exercise Program  11. Decreased Cognition INTERVENTIONS PLANNED:  1. Activities of daily living training  2. Adaptive equipment training  3.  Balance training  4. Clothing management  5. Cognitive training  6. Manual therapy training  7. Modalities  8. Neuromuscular re-eduation  9. Therapeutic activity  10. Therapeutic exercise  11. Wheelchair management   TREATMENT PLAN:  Effective Dates: 7/28/17 TO 10/28/17. Frequency/Duration: 2 times a week for 8 weeks  GOALS: (Goals have been discussed and agreed upon with patient.)  Short-Term Functional Goals: Time Frame: 4 weeks   1. Deni Stratton will report decreased pain in L shoulder from 8/10 to 5-6/10 to demonstrate improved comfort with daily activity. 2. Deni Stratton will tolerate weightbearing activities in L UE with moderate facilitation to improve shoulder strength for daily activity. 3. Deni Stratton will verbalize with independence 3 ways he is using positioning throughout the day/night to help with pain reduction in L shoulder. Jacquelyn Sheikh will complete ROM exercises in sitting with moderate cues for posture x 15 minutes to improve posture for daily activity. Discharge Goals: Time Frame: 8 weeks  1. Deni Stratton will report decreased L shoulder pain from 8/10 to 0-2/10 to demonstrate improved positioning and comfort for ADL. 2. Deni Stratton will tolerate weightbearing activities in L UE with minimal facilitation to improve L shoulder subluxation. 3. Deni Stratton will limit L shoulder subluxation to 1 finger width to decrease pain in L shoulder for daily activity. 4. Deni Stratton will be modified independent with HEP for L UE to decrease pain levels in L UE.   5. Deni Stratton will complete sitting with fair+ posture for 10 minutes to improve sitting posture for ADL. Rehabilitation Potential For Stated Goals: Good  Regarding Kelly Abernathy's therapy, I certify that the treatment plan above will be carried out by a therapist or under their direction.   Thank you for this referral,  Talya Priest OT                 The information in this section was collected on 7/28/17 (except where otherwise noted). OCCUPATIONAL PROFILE & HISTORY:   History of Present Injury/Illness (Reason for Referral):  Patient had initial CVA in 2005 with L sided hemiplegia. Pt reports he had therapy for L UE at that time but has had no other rehab for his L UE since his initial CVA. Pt had second CVA in 2016 with pt declining in his ability to complete functional mobility, but states his L UE has been non-functional since 2005. Pt has severe swelling in the L UE, especially in the L hand. Pt has compression machine that he uses for 1 hour for once a day. Pt's arm is elevated on a pillow during the day and positions it at night on wedge. Pt has a sling but states he can't use it because it pulls on his neck and he has more severe pain with use of the sling. .Pt able to get up and complete functional mobility one time per day. Pt has arm wedge/trough but pt is having difficulty using his new wheelchair and the arm trough is on his new wheelchair and he is currently using his old wheelchair. Pt assists with bathing himself with minimal assistance; Assists with dressing with moderate assistance; minimal assistance with upper body ; grooming with minimal assistance. Pt presents today to outpatient therapy services due to pain in the L shoulder. Past Medical History/Comorbidities:   Mr. Max Delaney  has a past medical history of Dermatophytosis of nail; History of CVA (cerebrovascular accident); and History of paraplegia. Mr. Max Delaney  has no past surgical history on file. Social History/Living Environment:    Lives with wife; Amol is his aide and is with him 40 hours per week; tub-shower (grab bars) with getting into tub/shower; sits in showerr; Pt is R handed  Prior Level of Function/Work/Activity:  No function in the L UE prior to 8/2016; Pt hasn't had any rehab since 2005- Pt was getting therapy for L UE but states it wasn't very long;  Pt was completing functional mobility with single-point cane Personal Factors:          Patient behavior: Decreased attention to tasks         Past/Current Experience:  Limited rehab of L upper extremity        Other factors that influence how disability is experienced by the patient:  Cognitive deficits; hx of multiple CVAs  Current Medications:    Current Outpatient Prescriptions:     acetaminophen (TYLENOL) 500 mg tablet, Take  by mouth every six (6) hours as needed for Pain., Disp: , Rfl:     guaiFENesin (ORGANIDIN) 400 mg tablet, Take  by mouth every four (4) hours. , Disp: , Rfl:     lisinopril (PRINIVIL, ZESTRIL) 40 mg tablet, Take 40 mg by mouth daily. , Disp: , Rfl:     loratadine (CLARITIN) 10 mg tablet, Take 10 mg by mouth., Disp: , Rfl:     b complex-vitamin c-folic acid 0.8 mg (NEPHRO-ALICIA) 0.8 mg tab tablet, Take 1 Tab by mouth daily. , Disp: , Rfl:     Omeprazole delayed release (PRILOSEC D/R) 20 mg tablet, Take 20 mg by mouth daily. , Disp: , Rfl:     phenytoin ER (DILANTIN ER) 100 mg ER capsule, Take  by mouth., Disp: , Rfl:     rosuvastatin (CRESTOR) 40 mg tablet, Take 40 mg by mouth nightly., Disp: , Rfl:     senna (SENOKOT) 8.6 mg tablet, Take 1 Tab by mouth daily. , Disp: , Rfl:     sertraline (ZOLOFT) 100 mg tablet, Take  by mouth daily. , Disp: , Rfl:     tamsulosin (FLOMAX) 0.4 mg capsule, Take 0.4 mg by mouth daily. , Disp: , Rfl:     ALOE VERA/COLLAGEN (ALOE VESTA 2-N-1 CLEANSER EX), by Apply Externally route., Disp: , Rfl:     aspirin delayed-release 81 mg tablet, Take 81 mg by mouth., Disp: , Rfl:     MINERAL OIL/PETROLATUM,WHITE (CLIVE MOISTURE BARRIER EX), by Apply Externally route., Disp: , Rfl:     carvedilol (COREG) 12.5 mg tablet, Take 12.5 mg by mouth., Disp: , Rfl:     cholecalciferol (VITAMIN D3) 1,000 unit cap, Take 1,000 Units by mouth., Disp: , Rfl:     clopidogrel (PLAVIX) 75 mg tab, Take 75 mg by mouth., Disp: , Rfl:     docusate sodium 100 mg/5 mL enem, Insert  into rectum. , Disp: , Rfl:     fluticasone (FLOVENT DISKUS) 50 mcg/actuation inhaler, by Nasal route., Disp: , Rfl:     folic acid (FOLVITE) 1 mg tablet, Take 1 mg by mouth., Disp: , Rfl:             Date Last Reviewed:  7/28/17   Complexity Level : Extensive review of physical, cognitive, and psychosocial performance (3+):  HIGH COMPLEXITY   ASSESSMENT OF OCCUPATIONAL PERFORMANCE:   Palpation:          Patient noted to have 1.5 to 2 finger width subluxation in L shoulder with dense hemiplegia in L UE. Pt's L UE could be easily passively ranged with no increased tone at the shoulder, forearm, or wrist. Pt did have some increased tone in the finger flexors with hand resting in finger flexion.    Observation:  Patient sits in posterior pelvic tilt with severely rounded shoulders and forward head flexion  ROM/Strength:             Date:  7/28/17 Date:  7/28/17  Date:  7/28/17    AROM PROM  STRENGTH   Movement LEFT LEFT  LEFT   SCAPULAR       Protraction     0   Retraction    0   Elevation    1/5   SHOULDER:       Flexion  0 90 degrees  0/5   Abduction  0 90 degrees  0/5   External rotation  0 WFL (arm adducted at side)  0/5   Internal rotation  0 WFL  0/5   Horizontal abduction 0 WFL  0/5   Horizontal adduction  0 WFL  0/5   Extension  0 WFL  0/5   ELBOW:       Flexion  0 110  0/5   Extension  0 WFL  0/5   FOREARM:       Supination  0 48   0/5   Pronation        WRIST:       Wrist flexion  0 35  0/5   Wrist extension  0 70  0/5   HAND:       Finger Extension  0 WFL  0/5          Hand Strength:           0/5     Sensation:          Absent to light touch and deep pressure in L UE with vision occluded   Functional Mobility:         Gait/Ambulation:  Pt completes functional mobility using electric power chair: short distances with davis-walker with minimal assistance        Transfers:  Supervision to [facility] for stand pivot transfer   Coordination:          Non-functional in L UE  Mental Status:          Decreased attention to tasks; to be further assessed    Edema/Girth: Severe edema in L UE   Left Right    Initial Most Recent Initial Most Recent   Upper  Extremity  L Hand DPC 10.1 inches          Lower  Extremity              Activities of Daily Living:           Basic ADLs (From Assessment) Complex ADLs (From Assessment)         Grooming/Bathing/Dressing Activities of Daily Living                                      Physical Skills Involved:  1. Range of Motion  2. Balance  3. Strength  4. Activity Tolerance  5. Sensation  6. Fine Motor Control  7. Gross Motor Control  8. Pain (Chronic)  9. Edema Cognitive Skills Affected (resulting in the inability to perform in a timely and safe manner):  1. Executive Function  2. Short Term Recall  3. Sustained Attention  4. Divided Attention Psychosocial Skills Affected:  1. Habits/Routines   Number of elements that affect the Plan of Care: 5+:  HIGH COMPLEXITY   CLINICAL DECISION MAKING:   Outcome Measure: Tool Used: Disabilities of the Arm, Shoulder and Hand (DASH) Questionnaire - Quick Version  Score:  Initial: 38/55  Most Recent: X/55 (Date: -- )   Interpretation of Score: The DASH is designed to measure the activities of daily living in person's with upper extremity dysfunction or pain. Each section is scored on a 1-5 scale, 5 representing the greatest disability. The scores of each section are added together for a total score of 55. Score 11 12-19 20-28 29-37 38-45 46-54 55   Modifier CH CI CJ CK CL CM CN     ? Carrying, Moving, and Handling Objects:     - CURRENT STATUS: CL - 60%-79% impaired, limited or restricted    - GOAL STATUS: CK - 40%-59% impaired, limited or restricted    - D/C STATUS:  ---------------To be determined---------------    Tool Used: Fugl-Malone Upper Extremity Motor Assessment   Score:  Initial: 1/66 Most Recent: X (Date: -- )   Interpretation of Score: Outcome Measure Conversion Site    ?  Carrying, Moving, and Handling Objects:     - CURRENT STATUS: CM - 80%-99% impaired, limited or restricted    - GOAL STATUS: CL - 60%-79% impaired, limited or restricted    - D/C STATUS:  ---------------To be determined---------------       Medical Necessity:   · Patient demonstrates fair rehab potential due to higher previous functional level. Reason for Services/Other Comments:  · Patient continues to require skilled intervention due to increased pain and swelling in L UE limiting him with daily activity. Clinical Decision-Making Assessment:     Assessment process, impact of co-morbidities, assessment modification\need for assistance, and selection of interventions: Analytical Complexity:HIGH COMPLEXITY   TREATMENT:   (In addition to Assessment/Re-Assessment sessions the following treatments were rendered)    Pre-treatment Symptoms/Complaints:    Pain: Initial:      Post Session:  same      Therapeutic Activity (15): Therapeutic activities per grid below to improve mobility, strength, balance and coordination. Required minimal to moderate visual, verbal and manual cues to promote motor control of upper extremity(s), lower extremity(s).    Date:  8/25/17 Date:  9/1/17 Date:  9/15/17   Activity/Exercise Parameters Parameters Parameters   Wheelchair to Mat CGA with assistance to protect L UE CGA with assistance to protect L UE Minimal assistance x 2 due to L knee pain today   Mat to Wheelchair CGA with assistance to protect L LUE and to scoot closer to chair for improved safety CGA to minimal assistance with cues to pull wheelchair closer  Minimal assistance x 2 due to L knee pain today   Rolling Side to Side  1) Pt completed working rolling to the L x 5 reps with assistance to pull scapula in protraction  2) Rolling to the R x 5 reps with education of protection of R UE 1) Pt worked on rolling to the L x 5 reps with CGA and assistance to pull scapula in protraction   2) Rolling to the R x 5 reps with pt working on rocking side to side to gain momentum with CGA to minimal assistance   Pt worked on rolling to the L with CGA and reaching for L UE and then rolling to the R with minimal assistance    Side scooting in supine Bridging and cues for pressing through elbows to scoot upper body; minimal to moderate assistance     Supine to sit  Moderate assistance with L LE and lifting trunk upright  Moderate assistance with L LE and lifting trunk upright  Moderate assistance with L LE and lifting trunk (minimally)   Sit to Supine Moderate assistance with trunk/L LE  Moderate assistance with trunk/L LE  Minimal assistance with L LE              Neuromuscular Re-education: ( 25):  Exercise/activities per grid below to improve balance, coordination, kinesthetic sense, posture and proprioception. Required moderate visual, verbal and manual cues to promote motor control of left, upper extremity(s), lower extremity(s).             Date:  9/1/17 Date:  9/15/17   Activity/Exercise     Scapular elevation/depressing  Sitting 6-8 reps with decreased attention to task  1) Scapular mobilizations x 15 reps   2) AAROM in side-lying x 15 reps    Postural Exercises Anterior pelvic tilt with thoracic extension with facilitation of pectoral spread; minimal to moderate cues to stay on task ; pt completed for 15 reps  Anterior pelvic tilt with thoracic extension with facilitation of pectoral spread; minimal to moderate cues to stay on task ; pt completed for 15 reps    Tone reduction  Bragg stretching with thumb extension/finger extension stretching  Bragg stretching with thumb extension/finger extension stretching    Weightbearing  1) weightbearing into L elbow on wedge and pressing back to midline with moderate cues for alignment of trunk and to press through elbow   2) weightbearing into L UE with elbow extended providing wrist/digit extension stretch with maximal facilitation at the elbow   (5 reps each with additional time) 1)weightbearing into L elbow on wedge and pressing back to midline with moderate cues for alignment of trunk and to press through elbow   2)  weightbearing into L UE with elbow extended providing wrist/digit extension stretch with moderate to maximal facilitation at the elbow   3) Sit to squat weightbearing into B UE with maximal cues for midline orientation    Mobile Arm Support     Side Scooting      Trunk Rotation  1) 5 reps to each side in sitting position and holding for 15 seconds; Pt tolerated well  2) Supine with L UE abducted and pt rotating to the R and turning head; minimal assistance with good stretch felt through trunk and pectoral muscles  1) 5 reps to each side in sitting position and holding for 15 seconds; Pt tolerated well  2) supine for 2 minutes with pectoral and trunk stretch      Therapeutic Exercise: ( 5 minutes):  Exercises per grid below to improve mobility. Required maximal manual cues to promote proper body alignment and promote proper body posture. Progressed repetitions as indicated. Date:  9/15/17 Date:   Date:     Activity/Exercise Parameters Parameters Parameters   Shoulder External Rotation  10 reps with arm abducted to 60 degrees     Shoulder flexion  PROM to 90 degrees 8-10 reps      Shoulder Abduction  PROM to 90 degrees 8-10 reps                                    Treatment/Session Assessment:    · Response to Treatment: Patient reports that he is having pain in L shoulder today due to incident where he was sleeping and arm slid off the side of bed \"yanking the arm. \" Pt tolerated session well with decreased overall pain levels by end of session. Pt had significant decreased edema in L UE/LE as compared to previous treatments today. Pt to continue per plan of care. · Compliance with Program/Exercises: Will assess as treatment progresses. · Recommendations/Intent for next treatment session: \"Next visit will focus on advancements to more challenging activities and reduction in assistance provided\".   Total Treatment Duration:  OT Patient Time In/Time Out  Time In: 1100  [facility]

## 2017-09-18 ENCOUNTER — HOSPITAL ENCOUNTER (OUTPATIENT)
Dept: PHYSICAL THERAPY | Age: 67
Discharge: HOME | End: 2017-09-18
Payer: MEDICARE

## 2017-09-18 PROCEDURE — 97112 NEUROMUSCULAR REEDUCATION: CPT

## 2017-09-18 PROCEDURE — 97530 THERAPEUTIC ACTIVITIES: CPT

## 2017-09-18 NOTE — PROGRESS NOTES
Camila Martinez  : 1950 Therapy Center at [facility]  [address] [facility], [address] [place] [address], [address]  [phone]   [phone]         OUTPATIENT OCCUPATIONAL THERAPY: Daily Note and Progress Report 2017     ICD-10: Treatment Diagnosis:   Hemiplegia, unspecified affecting left nondominant side (G81.94)  Pain in left shoulder (M25.512)    Precautions/Allergies:   Latex; Adhesive; and Sulfa (sulfonamide antibiotics)   Fall Risk Score: 4 (? 5 = High Risk)  MD Orders: OT Evaluate and Treat MEDICAL/REFERRING DIAGNOSIS:   Pain in left shoulder [M25.512]  CVA (cerebral vascular accident) [facility]) [I63.9]   DATE OF ONSET: 2016 and 2005  REFERRING PHYSICIAN: Roman Easton MD  RETURN PHYSICIAN APPOINTMENT: TBD     INITIAL ASSESSMENT:  Mr. Wesley Peraza has been seen in OT services for 7 visits so far to address L shoulder pain. Pt is demonstrating some improvement with pt and caregiver educated on L UE protection and position during ADL/functional transfers. Pt has also been working on weightbearing through L UE with some improved ability and decreased pain. Pt's edema in L UE has also decreased with caregiver reporting that patient has been more aware of L UE lately and she feels that this is helping. Pt also working on improving posture and midline orientation during sitting/transfers. Pt is making progress towards goals and with continue to benefit from OT services per plan of care. PLAN OF CARE:   PROBLEM LIST:  1. Decreased Strength  2. Decreased ADL/Functional Activities  3. Decreased Transfer Abilities  4. Decreased Ambulation Ability/Technique  5. Decreased Balance  6. Increased Pain  7. Decreased Activity Tolerance  8. Decreased Flexibility/Joint Mobility  9. Edema/Girth  10. Decreased [place] with Home Exercise Program  11. Decreased Cognition INTERVENTIONS PLANNED:  1. Activities of daily living training  2. Adaptive equipment training  3.  Balance training  4. Clothing management  5. Cognitive training  6. Manual therapy training  7. Modalities  8. Neuromuscular re-eduation  9. Therapeutic activity  10. Therapeutic exercise  11. Wheelchair management   TREATMENT PLAN:  Effective Dates: 7/28/17 TO 10/28/17. Frequency/Duration: 2 times a week for 8 weeks  GOALS: (Goals have been discussed and agreed upon with patient.)  Short-Term Functional Goals: Time Frame: 4 weeks   1. Ray Mendieta will report decreased pain in L shoulder from 8/10 to 5-6/10 to demonstrate improved comfort with daily activity. MET  2. Ray Mendieta will tolerate weightbearing activities in L UE with moderate facilitation to improve shoulder strength for daily activity. PROGRESSING TOWARDS  3. Ray Mendieta will verbalize with independence 3 ways he is using positioning throughout the day/night to help with pain reduction in L shoulder. MET  4. Ray Mendieta will complete ROM exercises in sitting with moderate cues for posture x 15 minutes to improve posture for daily activity. CONTINUE  Discharge Goals: Time Frame: 8 weeks  1. Ray Mendieta will report decreased L shoulder pain from 8/10 to 0-2/10 to demonstrate improved positioning and comfort for ADL. CONTINUE  2. Ray Mendieta will tolerate weightbearing activities in L UE with minimal facilitation to improve L shoulder subluxation. CONTINUE  3. Ray Mendieta will limit L shoulder subluxation to 1 finger width to decrease pain in L shoulder for daily activity. CONTINUE  4. Ray Mendieta will be modified independent with HEP for L UE to decrease pain levels in L UE. CONTINUE  5. Ray Mendieta will complete sitting with fair+ posture for 10 minutes to improve sitting posture for ADL. MET  Rehabilitation Potential For Stated Goals: Good  Regarding Alfa Abernathy's therapy, I certify that the treatment plan above will be carried out by a therapist or under their direction.   Thank you for this referral,  Chelsey Pfeiffer OT The information in this section was collected on 7/28/17 (except where otherwise noted). OCCUPATIONAL PROFILE & HISTORY:   History of Present Injury/Illness (Reason for Referral):  Patient had initial CVA in 2005 with L sided hemiplegia. Pt reports he had therapy for L UE at that time but has had no other rehab for his L UE since his initial CVA. Pt had second CVA in 2016 with pt declining in his ability to complete functional mobility, but states his L UE has been non-functional since 2005. Pt has severe swelling in the L UE, especially in the L hand. Pt has compression machine that he uses for 1 hour for once a day. Pt's arm is elevated on a pillow during the day and positions it at night on wedge. Pt has a sling but states he can't use it because it pulls on his neck and he has more severe pain with use of the sling. .Pt able to get up and complete functional mobility one time per day. Pt has arm wedge/trough but pt is having difficulty using his new wheelchair and the arm trough is on his new wheelchair and he is currently using his old wheelchair. Pt assists with bathing himself with minimal assistance; Assists with dressing with moderate assistance; minimal assistance with upper body ; grooming with minimal assistance. Pt presents today to outpatient therapy services due to pain in the L shoulder. Past Medical History/Comorbidities:   Mr. Vidal Mendoza  has a past medical history of Dermatophytosis of nail; History of CVA (cerebrovascular accident); and History of paraplegia. Mr. Vidal Mendoza  has no past surgical history on file.   Social History/Living Environment:    Lives with wife; Media Iman is his aide and is with him 40 hours per week; tub-shower (grab bars) with getting into tub/shower; sits in showerr; Pt is R handed  Prior Level of Function/Work/Activity:  No function in the L UE prior to 8/2016; Pt hasn't had any rehab since 2005- Pt was getting therapy for L UE but states it wasn't very long; Pt was completing functional mobility with single-point cane   Personal Factors:          Patient behavior: Decreased attention to tasks         Past/Current Experience:  Limited rehab of L upper extremity        Other factors that influence how disability is experienced by the patient:  Cognitive deficits; hx of multiple CVAs  Current Medications:    Current Outpatient Prescriptions:     acetaminophen (TYLENOL) 500 mg tablet, Take  by mouth every six (6) hours as needed for Pain., Disp: , Rfl:     guaiFENesin (ORGANIDIN) 400 mg tablet, Take  by mouth every four (4) hours. , Disp: , Rfl:     lisinopril (PRINIVIL, ZESTRIL) 40 mg tablet, Take 40 mg by mouth daily. , Disp: , Rfl:     loratadine (CLARITIN) 10 mg tablet, Take 10 mg by mouth., Disp: , Rfl:     b complex-vitamin c-folic acid 0.8 mg (NEPHRO-ALICIA) 0.8 mg tab tablet, Take 1 Tab by mouth daily. , Disp: , Rfl:     Omeprazole delayed release (PRILOSEC D/R) 20 mg tablet, Take 20 mg by mouth daily. , Disp: , Rfl:     phenytoin ER (DILANTIN ER) 100 mg ER capsule, Take  by mouth., Disp: , Rfl:     rosuvastatin (CRESTOR) 40 mg tablet, Take 40 mg by mouth nightly., Disp: , Rfl:     senna (SENOKOT) 8.6 mg tablet, Take 1 Tab by mouth daily. , Disp: , Rfl:     sertraline (ZOLOFT) 100 mg tablet, Take  by mouth daily. , Disp: , Rfl:     tamsulosin (FLOMAX) 0.4 mg capsule, Take 0.4 mg by mouth daily. , Disp: , Rfl:     ALOE VERA/COLLAGEN (ALOE VESTA 2-N-1 CLEANSER EX), by Apply Externally route., Disp: , Rfl:     aspirin delayed-release 81 mg tablet, Take 81 mg by mouth., Disp: , Rfl:     MINERAL OIL/PETROLATUM,WHITE (CLIVE MOISTURE BARRIER EX), by Apply Externally route., Disp: , Rfl:     carvedilol (COREG) 12.5 mg tablet, Take 12.5 mg by mouth., Disp: , Rfl:     cholecalciferol (VITAMIN D3) 1,000 unit cap, Take 1,000 Units by mouth., Disp: , Rfl:     clopidogrel (PLAVIX) 75 mg tab, Take 75 mg by mouth., Disp: , Rfl:     docusate sodium 100 mg/5 mL enem, Insert  into rectum. , Disp: , Rfl:     fluticasone (FLOVENT DISKUS) 50 mcg/actuation inhaler, by Nasal route., Disp: , Rfl:     folic acid (FOLVITE) 1 mg tablet, Take 1 mg by mouth., Disp: , Rfl:             Date Last Reviewed:  7/28/17   Complexity Level : Extensive review of physical, cognitive, and psychosocial performance (3+):  HIGH COMPLEXITY   ASSESSMENT OF OCCUPATIONAL PERFORMANCE:   Palpation:          Patient noted to have 1.5 to 2 finger width subluxation in L shoulder with dense hemiplegia in L UE. Pt's L UE could be easily passively ranged with no increased tone at the shoulder, forearm, or wrist. Pt did have some increased tone in the finger flexors with hand resting in finger flexion.    Observation:  Patient sits in posterior pelvic tilt with severely rounded shoulders and forward head flexion  ROM/Strength:             Date:  7/28/17 Date:  7/28/17  Date:  7/28/17 Date:  9/18/17    AROM PROM  STRENGTH STRENGTH   Movement LEFT LEFT  LEFT LEFT   SCAPULAR        Protraction     0 0   Retraction    0 1   Elevation    1/5 1   SHOULDER:        Flexion  0 90 degrees  0/5 0   Abduction  0 90 degrees  0/5 0   External rotation  0 WFL (arm adducted at side)  0/5 0   Internal rotation  0 WFL  0/5 0   Horizontal abduction 0 WFL  0/5 0   Horizontal adduction  0 WFL  0/5 0   Extension  0 WFL  0/5 0   ELBOW:        Flexion  0 110  0/5 0   Extension  0 WFL  0/5 0   FOREARM:        Supination  0 48   0/5 0   Pronation         WRIST:        Wrist flexion  0 35  0/5 0   Wrist extension  0 70  0/5 0   HAND:        Finger Extension  0 WFL  0/5 0           Hand Strength:            0/5 0     Sensation:          Absent to light touch and deep pressure in L UE with vision occluded   Functional Mobility:         Gait/Ambulation:  Pt completes functional mobility using electric power chair: short distances with davis-walker with minimal assistance        Transfers:  Supervision to [facility] for stand pivot transfer Coordination:          Non-functional in L UE  Mental Status:          Decreased attention to tasks; to be further assessed    Edema/Girth: Severe edema in L UE   Left Right    Initial Most Recent Initial Most Recent   Upper  Extremity  L Hand DPC 10.1 inches          Lower  Extremity              Activities of Daily Living:           Basic ADLs (From Assessment) Complex ADLs (From Assessment)         Grooming/Bathing/Dressing Activities of Daily Living                                      Physical Skills Involved:  1. Range of Motion  2. Balance  3. Strength  4. Activity Tolerance  5. Sensation  6. Fine Motor Control  7. Gross Motor Control  8. Pain (Chronic)  9. Edema Cognitive Skills Affected (resulting in the inability to perform in a timely and safe manner):  1. Executive Function  2. Short Term Recall  3. Sustained Attention  4. Divided Attention Psychosocial Skills Affected:  1. Habits/Routines   Number of elements that affect the Plan of Care: 5+:  HIGH COMPLEXITY   CLINICAL DECISION MAKING:   Outcome Measure: Tool Used: Disabilities of the Arm, Shoulder and Hand (DASH) Questionnaire - Quick Version  Score:  Initial: 38/55  Most Recent: X/55 (Date: -- )   Interpretation of Score: The DASH is designed to measure the activities of daily living in person's with upper extremity dysfunction or pain. Each section is scored on a 1-5 scale, 5 representing the greatest disability. The scores of each section are added together for a total score of 55. Score 11 12-19 20-28 29-37 38-45 46-54 55   Modifier CH CI CJ CK CL CM CN     ?  Carrying, Moving, and Handling Objects:     - CURRENT STATUS: CL - 60%-79% impaired, limited or restricted    - GOAL STATUS: CK - 40%-59% impaired, limited or restricted    - D/C STATUS:  ---------------To be determined---------------    Tool Used: Fugl-Malone Upper Extremity Motor Assessment   Score:  Initial: 1/66 Most Recent: X (Date: -- )   Interpretation of Score: Outcome Measure Conversion Site    ? Carrying, Moving, and Handling Objects:     - CURRENT STATUS: CM - 80%-99% impaired, limited or restricted    - GOAL STATUS: CL - 60%-79% impaired, limited or restricted    - D/C STATUS:  ---------------To be determined---------------       Medical Necessity:   · Patient demonstrates fair rehab potential due to higher previous functional level. Reason for Services/Other Comments:  · Patient continues to require skilled intervention due to increased pain and swelling in L UE limiting him with daily activity. Clinical Decision-Making Assessment:     Assessment process, impact of co-morbidities, assessment modification\need for assistance, and selection of interventions: Analytical Complexity:HIGH COMPLEXITY   TREATMENT:   (In addition to Assessment/Re-Assessment sessions the following treatments were rendered)    Pre-treatment Symptoms/Complaints:    Pain: Initial:   Pain Intensity 1: 4  Pain Location 1: Shoulder  Pain Orientation 1: Left  Post Session:  same      Therapeutic Activity (10):  Therapeutic activities per grid below to improve mobility, strength, balance and coordination. Required minimal to moderate visual, verbal and manual cues to promote motor control of upper extremity(s), lower extremity(s).    Date:  8/25/17 Date:  9/1/17 Date:  9/15/17 Date:  9/18/17   Activity/Exercise Parameters Parameters Parameters    Wheelchair to Mat CGA with assistance to protect L UE CGA with assistance to protect L UE Minimal assistance x 2 due to L knee pain today CGA with assistance to protect  L UE   Mat to Wheelchair CGA with assistance to protect L LUE and to scoot closer to chair for improved safety CGA to minimal assistance with cues to pull wheelchair closer  Minimal assistance x 2 due to L knee pain today CGA with assistance for turning    Rolling Side to Side  1) Pt completed working rolling to the L x 5 reps with assistance to pull scapula in protraction  2) Rolling to the R x 5 reps with education of protection of R UE 1) Pt worked on rolling to the L x 5 reps with CGA and assistance to pull scapula in protraction   2) Rolling to the R x 5 reps with pt working on rocking side to side to gain momentum with CGA to minimal assistance   Pt worked on rolling to the L with CGA and reaching for L UE and then rolling to the R with minimal assistance     Side scooting in supine Bridging and cues for pressing through elbows to scoot upper body; minimal to moderate assistance      Supine to sit  Moderate assistance with L LE and lifting trunk upright  Moderate assistance with L LE and lifting trunk upright  Moderate assistance with L LE and lifting trunk (minimally) Moderate assistance with L LE and lifting trunk   Sit to Supine Moderate assistance with trunk/L LE  Moderate assistance with trunk/L LE  Minimal assistance with L LE  Minimal assistance with L LE               Neuromuscular Re-education: ( 30):  Exercise/activities per grid below to improve balance, coordination, kinesthetic sense, posture and proprioception. Required moderate visual, verbal and manual cues to promote motor control of left, upper extremity(s), lower extremity(s).             Date:  9/1/17 Date:  9/15/17 Date:  9/18/17   Activity/Exercise      Scapular elevation/depressing  Sitting 6-8 reps with decreased attention to task  1) Scapular mobilizations x 15 reps   2) AAROM in side-lying x 15 reps  10 reps AAROM with mobilization and cues to stay on task    Postural Exercises Anterior pelvic tilt with thoracic extension with facilitation of pectoral spread; minimal to moderate cues to stay on task ; pt completed for 15 reps  Anterior pelvic tilt with thoracic extension with facilitation of pectoral spread; minimal to moderate cues to stay on task ; pt completed for 15 reps  Anterior pelvic tilt with thoracic extension with facilitation of pectoral spread; minimal to moderate cues to stay on task ; pt completed for 15 reps    Tone reduction  Bragg stretching with thumb extension/finger extension stretching  Bragg stretching with thumb extension/finger extension stretching  Bragg stretching with thumb extension/finger extension stretching    Weightbearing  1) weightbearing into L elbow on wedge and pressing back to midline with moderate cues for alignment of trunk and to press through elbow   2) weightbearing into L UE with elbow extended providing wrist/digit extension stretch with maximal facilitation at the elbow   (5 reps each with additional time) 1)weightbearing into L elbow on wedge and pressing back to midline with moderate cues for alignment of trunk and to press through elbow   2)  weightbearing into L UE with elbow extended providing wrist/digit extension stretch with moderate to maximal facilitation at the elbow   3) Sit to squat weightbearing into B UE with maximal cues for midline orientation  1) Weightbearing into L UE in full extension with moderate facilitation and pt reaching to clips to the L of midline to encourage weight into L side  2) 10 reps weightbearing into elbow with moderate verbal cues to stay on task    Mobile Arm Support   10 reps AAROM with L UE in mobile arm support and R hand assisting forward with maximal facilitation of the L UE   Side Scooting       Trunk Rotation  1) 5 reps to each side in sitting position and holding for 15 seconds; Pt tolerated well  2) Supine with L UE abducted and pt rotating to the R and turning head; minimal assistance with good stretch felt through trunk and pectoral muscles  1) 5 reps to each side in sitting position and holding for 15 seconds; Pt tolerated well  2) supine for 2 minutes with pectoral and trunk stretch  1) 5 reps to each side in sitting position and holding for 15 seconds;  Pt tolerated well  2) supine for 2 minutes with pectoral and trunk stretch     Therapeutic Exercise: ( 5 minutes):  Exercises per grid below to improve mobility. Required maximal manual cues to promote proper body alignment and promote proper body posture. Progressed repetitions as indicated. Date:  9/15/17 Date:  9/18/17 Date:     Activity/Exercise Parameters Parameters Parameters   Shoulder External Rotation  10 reps with arm abducted to 60 degrees 10 reps with arm abduction to 60 degrees     Shoulder flexion  PROM to 90 degrees 8-10 reps      Shoulder Abduction  PROM to 90 degrees 8-10 reps  PROM to 90 degrees 8-10 reps                                   Treatment/Session Assessment:    · Response to Treatment: See above. · Compliance with Program/Exercises: Will assess as treatment progresses. · Recommendations/Intent for next treatment session: \"Next visit will focus on advancements to more challenging activities and reduction in assistance provided\".   Total Treatment Duration:  OT Patient Time In/Time Out  Time In: 1100  Time Out: 76 [facility], OT

## 2017-09-22 ENCOUNTER — HOSPITAL ENCOUNTER (OUTPATIENT)
Dept: PHYSICAL THERAPY | Age: 67
Discharge: HOME | End: 2017-09-22
Payer: MEDICARE

## 2017-09-22 PROCEDURE — 97112 NEUROMUSCULAR REEDUCATION: CPT

## 2017-09-22 PROCEDURE — 97530 THERAPEUTIC ACTIVITIES: CPT

## 2017-09-25 ENCOUNTER — HOSPITAL ENCOUNTER (OUTPATIENT)
Dept: PHYSICAL THERAPY | Age: 67
Discharge: HOME | End: 2017-09-25
Payer: MEDICARE

## 2017-09-25 NOTE — PROGRESS NOTES
9/25/2017  OT appointment was cancelled due to not having authorization for visits. Will follow-up at next scheduled appointment.    Thank you,  Karolina Valdez, OT

## 2017-09-27 ENCOUNTER — APPOINTMENT (OUTPATIENT)
Dept: PHYSICAL THERAPY | Age: 67
End: 2017-09-27
Payer: MEDICARE

## 2017-09-29 ENCOUNTER — APPOINTMENT (OUTPATIENT)
Dept: PHYSICAL THERAPY | Age: 67
End: 2017-09-29
Payer: MEDICARE

## 2017-11-10 NOTE — PROGRESS NOTES
Anish Rios  : 1950 Therapy Center at [address], [facility], [address] [facility]  [phone]   [phone]          OUTPATIENT PHYSICAL THERAPY:Discontinuation Summary 11/10/2017    ICD-10: Treatment Diagnosis: Hemiplegia and hemiparesis following cerebral infarction affecting left non-dominant side (I69.354); Difficulty in walking, not elsewhere classified (R26.2)  Precautions/Allergies:   Latex; Adhesive; and Sulfa (sulfonamide antibiotics)   Fall Risk Score: 4 (? 5 = High Risk)  MD Orders: PT eval and treat MEDICAL/REFERRING DIAGNOSIS:  CVA (cerebral vascular accident) [facility]) [I63.9]   DATE OF ONSET: 2016  REFERRING PHYSICIAN: Vinayak Dobbins NP  RETURN PHYSICIAN APPOINTMENT: unknown  DATE OF PROGRESS NOTE:  25  RECERTIFICATION DATE: 3/7/76     Anish Rios has been seen in physical therapy from 17 to 17 for 6 visits. Further treatment and AFO recommendations were made and appealed for which were not approved. As of 10/24/17 there were still questions as to who would be covering a new AFO when the one he had in place was not tall enough to begin with. Treatment has been discontinued at this time due to non approval for more visits and no progress on a new AFO. Goals that were met were as of 17. I still think this patient needs a better fitting AFO and would benefit from 12 more therapy appointments at that time. Thank you for this referral.       PROGRESS NOTE:  Pt has attended 6 physical therapy sessions from 17 to date including initial assessment. Sessions consist of range of motion, therapeutic activity, therapeutic exercise, balance and gait refinement. Pt has shown improvement in balance and gait ability. Pt has increased Blanchard Balance Score by 2 points indicating improved static standing balance and decreased risk for fall. Pt has decreased TUG time by 12 seconds indicating more normalized walking pattern.   Pt's AFO was not tall enough and was not supporting his left knee hyperextension so a new brace has been ordered which should further increase his safety and gait ability. We are waiting for approval and fabrication for that. Pt will benefit from at least 6 more sessions once the new AFO is in place to maximize functional ability and safety with gait. INITIAL ASSESSMENT:  Mr. Luis Kenny presents to therapy s/p another Left CVA 8/2016. Prior CVA 11/2005. Pt presents with dense left hemiplegia, non functional swollen left arm and little movement in the left elg with swelling as well. Pt has a new double metal upright AFO that may need some adjustments as pt is hip hiking to clear his left foot and he has strong left knee  recurvatum in stance. Pt presents with poor balance and marked gait deviations but has good potential to get back to walking short distances with less assistance. Pt will benefit from at least 16 physical therapy visits to reach the goal of less assistance required for balance and gait. PROBLEM LIST (Impacting functional limitations):  1. Decreased Strength  2. Decreased ADL/Functional Activities  3. Decreased Transfer Abilities  4. Decreased Ambulation Ability/Technique  5. Decreased Balance  6. Increased Pain  7. Decreased Activity Tolerance  8. Decreased Flexibility/Joint Mobility  9. Edema/Girth  10. Decreased [place] with Home Exercise Program INTERVENTIONS PLANNED:  1. Balance Exercise  2. Family Education  3. Gait Training  4. Home Exercise Program (HEP)  5. Manual Therapy  6. Neuromuscular Re-education/Strengthening  7. Range of Motion (ROM)  8. Therapeutic Activites  9. Therapeutic Exercise/Strengthening  10. Transfer Training  11. orthotic management    TREATMENT PLAN:  Effective Dates: 6/5/17 TO 9/1/17. Frequency/Duration: 2 times a week for 8 weeks  GOALS: (Goals have been discussed and agreed upon with patient.)  Short-Term Functional Goals: Time Frame: 4 WEEKS  1.  Pt will be independent with range of motion, strength and balance home exercise program.  STATUS:  MET with Caregivers assistance. 2. Pt will ambulate with upright posture 40 - 60' and increased ant WS over left foot to decrease strong left knee hyperextension in stance. STATUS:  MET distance, progressing with gait - needs the new AFO. Discharge Goals: Time Frame: 8 weeks  Pt will show increased range of motion and improved posture to allow for improved safety and ability with all functional mobility. STATUS:  PROGRESSING, CONTINUE 4 WEEKS. Pt will decrease TUG score by 20 seconds indicating more normalized gait pattern and decrease risk for falls. STATUS:  PROGRESSING, CONTINUE 4 WEEKS. Pt will increase Blanchard Balance Scale to 24/56 indicating increased balance and decreased risk for falls. STATUS:  PROGRESSING, CONTINUE 4 WEEKS. Rehabilitation Potential For Stated Goals: Mando Abernathy's therapy, I certify that the treatment plan above will be carried out by a therapist or under their direction.   Thank you for this referral,  Ashlee Villalobos, PT

## 2017-12-06 NOTE — PROGRESS NOTES
Chicho Whitaker  : 1950 [facility], [address], [place], [facility]  [phone]   [phone]         OUTPATIENT OCCUPATIONAL THERAPY: Discontinuation Summary 2017     ICD-10: Treatment Diagnosis:   Hemiplegia, unspecified affecting left nondominant side (G81.94)  Pain in left shoulder (M25.512)    Precautions/Allergies:   Latex; Adhesive; and Sulfa (sulfonamide antibiotics)   Fall Risk Score: 4 (? 5 = High Risk)  MD Orders: OT Evaluate and Treat MEDICAL/REFERRING DIAGNOSIS:   Pain in left shoulder [M25.512]  CVA (cerebral vascular accident) [facility]) [I63.9]   DATE OF ONSET: 2016 and 2005  REFERRING PHYSICIAN: Trevin Swanson MD  RETURN PHYSICIAN APPOINTMENT: TBD     INITIAL ASSESSMENT:  Mr. Bernarda Reyes was seen by OT services for 8 visits so far to address L shoulder pain. Per last re-assessment \" Pt is demonstrating some improvement with pt and caregiver educated on L UE protection and position during ADL/functional transfers. Pt has also been working on weightbearing through L UE with some improved ability and decreased pain. Pt's edema in L UE has also decreased with caregiver reporting that patient has been more aware of L UE lately and she feels that this is helping. Pt also working on improving posture and midline orientation during sitting/transfers. \" Pt's insurance with no further approved OT visits, therefore OT services terminated at this time. Pt to be discontinued due to insurance purposes. Thank you for the opportunity to work with Chicho Whitaker ! PLAN OF CARE:   PROBLEM LIST:  1. Decreased Strength  2. Decreased ADL/Functional Activities  3. Decreased Transfer Abilities  4. Decreased Ambulation Ability/Technique  5. Decreased Balance  6. Increased Pain  7. Decreased Activity Tolerance  8. Decreased Flexibility/Joint Mobility  9. Edema/Girth  10. Decreased [place] with Home Exercise Program  11.  Decreased Cognition INTERVENTIONS PLANNED:  1. Activities of daily living training  2. Adaptive equipment training  3. Balance training  4. Clothing management  5. Cognitive training  6. Manual therapy training  7. Modalities  8. Neuromuscular re-eduation  9. Therapeutic activity  10. Therapeutic exercise  11. Wheelchair management   TREATMENT PLAN:  Effective Dates: 7/28/17 TO 10/28/17. Frequency/Duration: 2 times a week for 8 weeks  GOALS: (Goals have been discussed and agreed upon with patient.)  Short-Term Functional Goals: Time Frame: 4 weeks   1. Minh Bonilla will report decreased pain in L shoulder from 8/10 to 5-6/10 to demonstrate improved comfort with daily activity. MET  2. Minh Bonilla will tolerate weightbearing activities in L UE with moderate facilitation to improve shoulder strength for daily activity. PROGRESSING TOWARDS  3. Minh Bonilla will verbalize with independence 3 ways he is using positioning throughout the day/night to help with pain reduction in L shoulder. MET  4. Minh Bonilla will complete ROM exercises in sitting with moderate cues for posture x 15 minutes to improve posture for daily activity. CONTINUE  Discharge Goals: Time Frame: 8 weeks  1. Minh Bonilla will report decreased L shoulder pain from 8/10 to 0-2/10 to demonstrate improved positioning and comfort for ADL. CONTINUE  2. Minh Bonilla will tolerate weightbearing activities in L UE with minimal facilitation to improve L shoulder subluxation. CONTINUE  3. Minh Bonilla will limit L shoulder subluxation to 1 finger width to decrease pain in L shoulder for daily activity. CONTINUE  4. Minh Bonilla will be modified independent with HEP for L UE to decrease pain levels in L UE. CONTINUE  5. Minh Bonilla will complete sitting with fair+ posture for 10 minutes to improve sitting posture for ADL.  MET  Rehabilitation Potential For Stated Goals: Good  Regarding Joel Abernathy's therapy, I certify that the treatment plan above will be carried out by a therapist or under their direction. Thank you for this referral,  Eris Estrada OT                 The information in this section was collected on 7/28/17 (except where otherwise noted). OCCUPATIONAL PROFILE & HISTORY:   History of Present Injury/Illness (Reason for Referral):  Patient had initial CVA in 2005 with L sided hemiplegia. Pt reports he had therapy for L UE at that time but has had no other rehab for his L UE since his initial CVA. Pt had second CVA in 2016 with pt declining in his ability to complete functional mobility, but states his L UE has been non-functional since 2005. Pt has severe swelling in the L UE, especially in the L hand. Pt has compression machine that he uses for 1 hour for once a day. Pt's arm is elevated on a pillow during the day and positions it at night on wedge. Pt has a sling but states he can't use it because it pulls on his neck and he has more severe pain with use of the sling. .Pt able to get up and complete functional mobility one time per day. Pt has arm wedge/trough but pt is having difficulty using his new wheelchair and the arm trough is on his new wheelchair and he is currently using his old wheelchair. Pt assists with bathing himself with minimal assistance; Assists with dressing with moderate assistance; minimal assistance with upper body ; grooming with minimal assistance. Pt presents today to outpatient therapy services due to pain in the L shoulder. Past Medical History/Comorbidities:   Mr. Jeanie Chavez  has a past medical history of Dermatophytosis of nail; History of CVA (cerebrovascular accident); and History of paraplegia. Mr. Jeanie Chavez  has no past surgical history on file.   Social History/Living Environment:    Lives with wife; Pati Mcgrath is his aide and is with him 40 hours per week; tub-shower (grab bars) with getting into tub/shower; sits in showerr; Pt is R handed  Prior Level of Function/Work/Activity:  No function in the L UE prior to 8/2016; Pt hasn't had any rehab since 2005- Pt was getting therapy for L UE but states it wasn't very long; Pt was completing functional mobility with single-point cane   Personal Factors:          Patient behavior: Decreased attention to tasks         Past/Current Experience:  Limited rehab of L upper extremity        Other factors that influence how disability is experienced by the patient:  Cognitive deficits; hx of multiple CVAs  Current Medications:    Current Outpatient Prescriptions:     acetaminophen (TYLENOL) 500 mg tablet, Take  by mouth every six (6) hours as needed for Pain., Disp: , Rfl:     guaiFENesin (ORGANIDIN) 400 mg tablet, Take  by mouth every four (4) hours. , Disp: , Rfl:     lisinopril (PRINIVIL, ZESTRIL) 40 mg tablet, Take 40 mg by mouth daily. , Disp: , Rfl:     loratadine (CLARITIN) 10 mg tablet, Take 10 mg by mouth., Disp: , Rfl:     b complex-vitamin c-folic acid 0.8 mg (NEPHRO-ALICIA) 0.8 mg tab tablet, Take 1 Tab by mouth daily. , Disp: , Rfl:     Omeprazole delayed release (PRILOSEC D/R) 20 mg tablet, Take 20 mg by mouth daily. , Disp: , Rfl:     phenytoin ER (DILANTIN ER) 100 mg ER capsule, Take  by mouth., Disp: , Rfl:     rosuvastatin (CRESTOR) 40 mg tablet, Take 40 mg by mouth nightly., Disp: , Rfl:     senna (SENOKOT) 8.6 mg tablet, Take 1 Tab by mouth daily. , Disp: , Rfl:     sertraline (ZOLOFT) 100 mg tablet, Take  by mouth daily. , Disp: , Rfl:     tamsulosin (FLOMAX) 0.4 mg capsule, Take 0.4 mg by mouth daily. , Disp: , Rfl:     ALOE VERA/COLLAGEN (ALOE VESTA 2-N-1 CLEANSER EX), by Apply Externally route., Disp: , Rfl:     aspirin delayed-release 81 mg tablet, Take 81 mg by mouth., Disp: , Rfl:     MINERAL OIL/PETROLATUM,WHITE (CLIVE MOISTURE BARRIER EX), by Apply Externally route., Disp: , Rfl:     carvedilol (COREG) 12.5 mg tablet, Take 12.5 mg by mouth., Disp: , Rfl:     cholecalciferol (VITAMIN D3) 1,000 unit cap, Take 1,000 Units by mouth., Disp: , Rfl:     clopidogrel (PLAVIX) 75 mg tab, Take 75 mg by mouth., Disp: , Rfl:     docusate sodium 100 mg/5 mL enem, Insert  into rectum. , Disp: , Rfl:     fluticasone (FLOVENT DISKUS) 50 mcg/actuation inhaler, by Nasal route., Disp: , Rfl:     folic acid (FOLVITE) 1 mg tablet, Take 1 mg by mouth., Disp: , Rfl:             Date Last Reviewed:  7/28/17   Complexity Level : Extensive review of physical, cognitive, and psychosocial performance (3+):  HIGH COMPLEXITY   ASSESSMENT OF OCCUPATIONAL PERFORMANCE:   Palpation:          Patient noted to have 1.5 to 2 finger width subluxation in L shoulder with dense hemiplegia in L UE. Pt's L UE could be easily passively ranged with no increased tone at the shoulder, forearm, or wrist. Pt did have some increased tone in the finger flexors with hand resting in finger flexion.    Observation:  Patient sits in posterior pelvic tilt with severely rounded shoulders and forward head flexion  ROM/Strength:             Date:  7/28/17 Date:  7/28/17  Date:  7/28/17 Date:  9/18/17    AROM PROM  STRENGTH STRENGTH   Movement LEFT LEFT  LEFT LEFT   SCAPULAR        Protraction     0 0   Retraction    0 1   Elevation    1/5 1   SHOULDER:        Flexion  0 90 degrees  0/5 0   Abduction  0 90 degrees  0/5 0   External rotation  0 WFL (arm adducted at side)  0/5 0   Internal rotation  0 WFL  0/5 0   Horizontal abduction 0 WFL  0/5 0   Horizontal adduction  0 WFL  0/5 0   Extension  0 WFL  0/5 0   ELBOW:        Flexion  0 110  0/5 0   Extension  0 WFL  0/5 0   FOREARM:        Supination  0 48   0/5 0   Pronation         WRIST:        Wrist flexion  0 35  0/5 0   Wrist extension  0 70  0/5 0   HAND:        Finger Extension  0 WFL  0/5 0           Hand Strength:            0/5 0     Sensation:          Absent to light touch and deep pressure in L UE with vision occluded   Functional Mobility:         Gait/Ambulation:  Pt completes functional mobility using electric power chair: short distances with davis-walker with minimal assistance        Transfers:  Supervision to [facility] for stand pivot transfer   Coordination:          Non-functional in L UE  Mental Status:          Decreased attention to tasks; to be further assessed    Edema/Girth: Severe edema in L UE   Left Right    Initial Most Recent Initial Most Recent   Upper  Extremity  L Hand DPC 10.1 inches          Lower  Extremity              Activities of Daily Living:           Basic ADLs (From Assessment) Complex ADLs (From Assessment)         Grooming/Bathing/Dressing Activities of Daily Living                                      Physical Skills Involved:  1. Range of Motion  2. Balance  3. Strength  4. Activity Tolerance  5. Sensation  6. Fine Motor Control  7. Gross Motor Control  8. Pain (Chronic)  9. Edema Cognitive Skills Affected (resulting in the inability to perform in a timely and safe manner):  1. Executive Function  2. Short Term Recall  3. Sustained Attention  4. Divided Attention Psychosocial Skills Affected:  1. Habits/Routines   Number of elements that affect the Plan of Care: 5+:  HIGH COMPLEXITY   CLINICAL DECISION MAKING:   Outcome Measure: Tool Used: Disabilities of the Arm, Shoulder and Hand (DASH) Questionnaire - Quick Version  Score:  Initial: 38/55  Most Recent: X/55 (Date: -- )   Interpretation of Score: The DASH is designed to measure the activities of daily living in person's with upper extremity dysfunction or pain. Each section is scored on a 1-5 scale, 5 representing the greatest disability. The scores of each section are added together for a total score of 55. Score 11 12-19 20-28 29-37 38-45 46-54 55   Modifier CH CI CJ CK CL CM CN     ?  Carrying, Moving, and Handling Objects:     - CURRENT STATUS: CL - 60%-79% impaired, limited or restricted    - GOAL STATUS: CK - 40%-59% impaired, limited or restricted    - D/C STATUS:  ---------------To be determined---------------    Tool Used: Fugl-Malone Upper Extremity Motor Assessment   Score:  Initial: 1/66 Most Recent: X (Date: -- )   Interpretation of Score: Outcome Measure Conversion Site    ? Carrying, Moving, and Handling Objects:     - CURRENT STATUS: CM - 80%-99% impaired, limited or restricted    - GOAL STATUS: CL - 60%-79% impaired, limited or restricted    - D/C STATUS:  ---------------To be determined---------------       Medical Necessity:   · Patient demonstrates fair rehab potential due to higher previous functional level. Reason for Services/Other Comments:  · Patient continues to require skilled intervention due to increased pain and swelling in L UE limiting him with daily activity. Clinical Decision-Making Assessment:     Assessment process, impact of co-morbidities, assessment modification\need for assistance, and selection of interventions: Analytical Complexity:HIGH COMPLEXITY   TREATMENT:   (In addition to Assessment/Re-Assessment sessions the following treatments were rendered)    Pre-treatment Symptoms/Complaints:    Pain: Initial:   Pain Intensity 1: 3  Pain Location 1: Shoulder  Pain Orientation 1: Left  Post Session:  0/10          Treatment/Session Assessment:    · Response to Treatment: No treatment provided this date of service. · Compliance with Program/Exercises: Will assess as treatment progresses. · Recommendations/Intent for next treatment session: \"Next visit will focus on advancements to more challenging activities and reduction in assistance provided\".   Total Treatment Duration:  Sakina Ayala, OT

## 2018-07-14 ENCOUNTER — APPOINTMENT (OUTPATIENT)
Dept: GENERAL RADIOLOGY | Age: 68
DRG: 190 | End: 2018-07-14
Attending: EMERGENCY MEDICINE
Payer: MEDICARE

## 2018-07-14 ENCOUNTER — HOSPITAL ENCOUNTER (INPATIENT)
Age: 68
LOS: 1 days | Discharge: HOME HEALTH | DRG: 190 | End: 2018-07-16
Attending: EMERGENCY MEDICINE | Admitting: FAMILY MEDICINE
Payer: MEDICARE

## 2018-07-14 DIAGNOSIS — J44.1 ACUTE EXACERBATION OF CHRONIC OBSTRUCTIVE PULMONARY DISEASE (COPD) (HCC): Primary | ICD-10-CM

## 2018-07-14 DIAGNOSIS — J44.1 COPD EXACERBATION (HCC): ICD-10-CM

## 2018-07-14 DIAGNOSIS — J96.01 ACUTE RESPIRATORY FAILURE WITH HYPOXEMIA (HCC): ICD-10-CM

## 2018-07-14 PROBLEM — G81.94 LEFT HEMIPLEGIA (HCC): Status: ACTIVE | Noted: 2018-07-14

## 2018-07-14 PROBLEM — I10 ESSENTIAL HYPERTENSION: Status: ACTIVE | Noted: 2018-07-14

## 2018-07-14 PROBLEM — E78.5 HYPERLIPIDEMIA: Status: ACTIVE | Noted: 2018-07-14

## 2018-07-14 PROBLEM — K21.9 GERD (GASTROESOPHAGEAL REFLUX DISEASE): Status: ACTIVE | Noted: 2018-07-14

## 2018-07-14 PROBLEM — F32.A DEPRESSION: Status: ACTIVE | Noted: 2018-07-14

## 2018-07-14 PROBLEM — F17.210 DEPENDENCE ON NICOTINE FROM CIGARETTES: Status: ACTIVE | Noted: 2018-07-14

## 2018-07-14 PROBLEM — R56.9 SEIZURE (HCC): Status: ACTIVE | Noted: 2018-07-14

## 2018-07-14 PROBLEM — N40.0 BPH (BENIGN PROSTATIC HYPERPLASIA): Status: ACTIVE | Noted: 2018-07-14

## 2018-07-14 PROBLEM — I63.9 CVA (CEREBRAL VASCULAR ACCIDENT) (HCC): Status: ACTIVE | Noted: 2018-07-14

## 2018-07-14 LAB
ALBUMIN SERPL-MCNC: 3.7 G/DL (ref 3.2–4.6)
ALBUMIN/GLOB SERPL: 1.1 {RATIO} (ref 1.2–3.5)
ALP SERPL-CCNC: 131 U/L (ref 50–136)
ALT SERPL-CCNC: 31 U/L (ref 12–65)
ANION GAP SERPL CALC-SCNC: 10 MMOL/L (ref 7–16)
ARTERIAL PATENCY WRIST A: POSITIVE
AST SERPL-CCNC: 18 U/L (ref 15–37)
BASE DEFICIT BLDA-SCNC: 0.1 MMOL/L (ref 0–2)
BASOPHILS # BLD: 0 K/UL (ref 0–0.2)
BASOPHILS NFR BLD: 0 % (ref 0–2)
BDY SITE: ABNORMAL
BILIRUB SERPL-MCNC: 0.3 MG/DL (ref 0.2–1.1)
BNP SERPL-MCNC: 11 PG/ML
BUN SERPL-MCNC: 7 MG/DL (ref 8–23)
CALCIUM SERPL-MCNC: 8.6 MG/DL (ref 8.3–10.4)
CHLORIDE SERPL-SCNC: 107 MMOL/L (ref 98–107)
CO2 SERPL-SCNC: 27 MMOL/L (ref 21–32)
COHGB MFR BLD: 2.7 % (ref 0.5–1.5)
CREAT SERPL-MCNC: 0.62 MG/DL (ref 0.8–1.5)
DIFFERENTIAL METHOD BLD: ABNORMAL
DO-HGB BLD-MCNC: 2 % (ref 0–5)
EOSINOPHIL # BLD: 0.9 K/UL (ref 0–0.8)
EOSINOPHIL NFR BLD: 8 % (ref 0.5–7.8)
ERYTHROCYTE [DISTWIDTH] IN BLOOD BY AUTOMATED COUNT: 12.3 % (ref 11.9–14.6)
FIO2 ON VENT: 55 %
GLOBULIN SER CALC-MCNC: 3.4 G/DL (ref 2.3–3.5)
GLUCOSE SERPL-MCNC: 124 MG/DL (ref 65–100)
HCO3 BLDA-SCNC: 25 MMOL/L (ref 22–26)
HCT VFR BLD AUTO: 47.5 % (ref 41.1–50.3)
HGB BLD-MCNC: 17 G/DL (ref 13.6–17.2)
HGB BLDMV-MCNC: 16.9 GM/DL (ref 11.7–15)
IMM GRANULOCYTES # BLD: 0 K/UL (ref 0–0.5)
IMM GRANULOCYTES NFR BLD AUTO: 0 % (ref 0–5)
LACTATE BLD-SCNC: 1 MMOL/L (ref 0.5–1.9)
LYMPHOCYTES # BLD: 2.3 K/UL (ref 0.5–4.6)
LYMPHOCYTES NFR BLD: 19 % (ref 13–44)
MCH RBC QN AUTO: 32.6 PG (ref 26.1–32.9)
MCHC RBC AUTO-ENTMCNC: 35.8 G/DL (ref 31.4–35)
MCV RBC AUTO: 91.2 FL (ref 79.6–97.8)
METHGB MFR BLD: 0.4 % (ref 0–1.5)
MONOCYTES # BLD: 0.5 K/UL (ref 0.1–1.3)
MONOCYTES NFR BLD: 5 % (ref 4–12)
NEUTS SEG # BLD: 8 K/UL (ref 1.7–8.2)
NEUTS SEG NFR BLD: 68 % (ref 43–78)
OXYHGB MFR BLDA: 94.8 % (ref 94–97)
PCO2 BLDA: 43 MMHG (ref 35–45)
PH BLDA: 7.39 [PH] (ref 7.35–7.45)
PHENYTOIN SERPL-MCNC: 14.2 UG/ML (ref 10–20)
PLATELET # BLD AUTO: 196 K/UL (ref 150–450)
PMV BLD AUTO: 10.4 FL (ref 10.8–14.1)
PO2 BLDA: 123 MMHG (ref 80–105)
POTASSIUM SERPL-SCNC: 4 MMOL/L (ref 3.5–5.1)
PROT SERPL-MCNC: 7.1 G/DL (ref 6.3–8.2)
RBC # BLD AUTO: 5.21 M/UL (ref 4.23–5.67)
RESP RATE: 12
SAO2 % BLD: 98 % (ref 92–98.5)
SODIUM SERPL-SCNC: 144 MMOL/L (ref 136–145)
TROPONIN I BLD-MCNC: 0 NG/ML (ref 0.02–0.05)
VENTILATION MODE VENT: ABNORMAL
WBC # BLD AUTO: 11.7 K/UL (ref 4.3–11.1)

## 2018-07-14 PROCEDURE — 80053 COMPREHEN METABOLIC PANEL: CPT | Performed by: EMERGENCY MEDICINE

## 2018-07-14 PROCEDURE — 36600 WITHDRAWAL OF ARTERIAL BLOOD: CPT

## 2018-07-14 PROCEDURE — 71045 X-RAY EXAM CHEST 1 VIEW: CPT

## 2018-07-14 PROCEDURE — 84484 ASSAY OF TROPONIN QUANT: CPT

## 2018-07-14 PROCEDURE — 80185 ASSAY OF PHENYTOIN TOTAL: CPT | Performed by: EMERGENCY MEDICINE

## 2018-07-14 PROCEDURE — 74011250636 HC RX REV CODE- 250/636: Performed by: FAMILY MEDICINE

## 2018-07-14 PROCEDURE — 65660000000 HC RM CCU STEPDOWN

## 2018-07-14 PROCEDURE — 74011000258 HC RX REV CODE- 258: Performed by: FAMILY MEDICINE

## 2018-07-14 PROCEDURE — 96374 THER/PROPH/DIAG INJ IV PUSH: CPT | Performed by: EMERGENCY MEDICINE

## 2018-07-14 PROCEDURE — 83880 ASSAY OF NATRIURETIC PEPTIDE: CPT | Performed by: EMERGENCY MEDICINE

## 2018-07-14 PROCEDURE — 82803 BLOOD GASES ANY COMBINATION: CPT

## 2018-07-14 PROCEDURE — 85025 COMPLETE CBC W/AUTO DIFF WBC: CPT | Performed by: EMERGENCY MEDICINE

## 2018-07-14 PROCEDURE — 93005 ELECTROCARDIOGRAM TRACING: CPT | Performed by: FAMILY MEDICINE

## 2018-07-14 PROCEDURE — 94640 AIRWAY INHALATION TREATMENT: CPT

## 2018-07-14 PROCEDURE — 74011000250 HC RX REV CODE- 250: Performed by: EMERGENCY MEDICINE

## 2018-07-14 PROCEDURE — 93005 ELECTROCARDIOGRAM TRACING: CPT | Performed by: EMERGENCY MEDICINE

## 2018-07-14 PROCEDURE — 74011250636 HC RX REV CODE- 250/636: Performed by: EMERGENCY MEDICINE

## 2018-07-14 PROCEDURE — 74011250637 HC RX REV CODE- 250/637: Performed by: FAMILY MEDICINE

## 2018-07-14 PROCEDURE — 83605 ASSAY OF LACTIC ACID: CPT

## 2018-07-14 PROCEDURE — 99285 EMERGENCY DEPT VISIT HI MDM: CPT | Performed by: EMERGENCY MEDICINE

## 2018-07-14 RX ORDER — SERTRALINE HYDROCHLORIDE 100 MG/1
100 TABLET, FILM COATED ORAL DAILY
Status: DISCONTINUED | OUTPATIENT
Start: 2018-07-15 | End: 2018-07-16 | Stop reason: HOSPADM

## 2018-07-14 RX ORDER — ALBUTEROL SULFATE 0.83 MG/ML
5 SOLUTION RESPIRATORY (INHALATION)
Status: COMPLETED | OUTPATIENT
Start: 2018-07-14 | End: 2018-07-14

## 2018-07-14 RX ORDER — IPRATROPIUM BROMIDE AND ALBUTEROL SULFATE 2.5; .5 MG/3ML; MG/3ML
3 SOLUTION RESPIRATORY (INHALATION)
Status: DISCONTINUED | OUTPATIENT
Start: 2018-07-14 | End: 2018-07-16

## 2018-07-14 RX ORDER — SODIUM CHLORIDE 0.9 % (FLUSH) 0.9 %
5-10 SYRINGE (ML) INJECTION EVERY 8 HOURS
Status: DISCONTINUED | OUTPATIENT
Start: 2018-07-14 | End: 2018-07-16 | Stop reason: HOSPADM

## 2018-07-14 RX ORDER — MELATONIN
1000 DAILY
Status: DISCONTINUED | OUTPATIENT
Start: 2018-07-15 | End: 2018-07-16 | Stop reason: HOSPADM

## 2018-07-14 RX ORDER — ENALAPRILAT 1.25 MG/ML
1.25 INJECTION INTRAVENOUS
Status: DISCONTINUED | OUTPATIENT
Start: 2018-07-14 | End: 2018-07-16 | Stop reason: HOSPADM

## 2018-07-14 RX ORDER — ACETAMINOPHEN 325 MG/1
650 TABLET ORAL
Status: DISCONTINUED | OUTPATIENT
Start: 2018-07-14 | End: 2018-07-16 | Stop reason: HOSPADM

## 2018-07-14 RX ORDER — HYDROCODONE BITARTRATE AND ACETAMINOPHEN 5; 325 MG/1; MG/1
1 TABLET ORAL
Status: DISCONTINUED | OUTPATIENT
Start: 2018-07-14 | End: 2018-07-16 | Stop reason: HOSPADM

## 2018-07-14 RX ORDER — MORPHINE SULFATE 2 MG/ML
1 INJECTION, SOLUTION INTRAMUSCULAR; INTRAVENOUS
Status: DISCONTINUED | OUTPATIENT
Start: 2018-07-14 | End: 2018-07-16 | Stop reason: HOSPADM

## 2018-07-14 RX ORDER — PHENYTOIN SODIUM 100 MG/1
300 CAPSULE, EXTENDED RELEASE ORAL 2 TIMES DAILY
Status: DISCONTINUED | OUTPATIENT
Start: 2018-07-15 | End: 2018-07-16 | Stop reason: HOSPADM

## 2018-07-14 RX ORDER — CARVEDILOL 12.5 MG/1
12.5 TABLET ORAL 2 TIMES DAILY WITH MEALS
Status: DISCONTINUED | OUTPATIENT
Start: 2018-07-14 | End: 2018-07-16 | Stop reason: HOSPADM

## 2018-07-14 RX ORDER — TAMSULOSIN HYDROCHLORIDE 0.4 MG/1
0.4 CAPSULE ORAL DAILY
Status: DISCONTINUED | OUTPATIENT
Start: 2018-07-15 | End: 2018-07-16 | Stop reason: HOSPADM

## 2018-07-14 RX ORDER — CLOPIDOGREL BISULFATE 75 MG/1
75 TABLET ORAL DAILY
Status: DISCONTINUED | OUTPATIENT
Start: 2018-07-15 | End: 2018-07-16 | Stop reason: HOSPADM

## 2018-07-14 RX ORDER — HEPARIN SODIUM 5000 [USP'U]/ML
5000 INJECTION, SOLUTION INTRAVENOUS; SUBCUTANEOUS EVERY 8 HOURS
Status: DISCONTINUED | OUTPATIENT
Start: 2018-07-14 | End: 2018-07-16 | Stop reason: HOSPADM

## 2018-07-14 RX ORDER — LISINOPRIL 20 MG/1
40 TABLET ORAL DAILY
Status: DISCONTINUED | OUTPATIENT
Start: 2018-07-15 | End: 2018-07-16 | Stop reason: HOSPADM

## 2018-07-14 RX ORDER — SENNOSIDES 8.6 MG/1
1 TABLET ORAL DAILY
Status: DISCONTINUED | OUTPATIENT
Start: 2018-07-15 | End: 2018-07-16 | Stop reason: HOSPADM

## 2018-07-14 RX ORDER — DOCUSATE SODIUM 100 MG/1
100 CAPSULE, LIQUID FILLED ORAL
Status: DISCONTINUED | OUTPATIENT
Start: 2018-07-14 | End: 2018-07-16 | Stop reason: HOSPADM

## 2018-07-14 RX ORDER — PANTOPRAZOLE SODIUM 40 MG/1
40 TABLET, DELAYED RELEASE ORAL
Status: DISCONTINUED | OUTPATIENT
Start: 2018-07-15 | End: 2018-07-16 | Stop reason: HOSPADM

## 2018-07-14 RX ORDER — PHENYTOIN SODIUM 100 MG/1
100 CAPSULE, EXTENDED RELEASE ORAL DAILY
Status: DISCONTINUED | OUTPATIENT
Start: 2018-07-15 | End: 2018-07-14 | Stop reason: CLARIF

## 2018-07-14 RX ORDER — ASPIRIN 81 MG/1
81 TABLET ORAL DAILY
Status: DISCONTINUED | OUTPATIENT
Start: 2018-07-15 | End: 2018-07-16 | Stop reason: HOSPADM

## 2018-07-14 RX ORDER — CLONIDINE HYDROCHLORIDE 0.1 MG/1
0.1 TABLET ORAL
Status: DISCONTINUED | OUTPATIENT
Start: 2018-07-14 | End: 2018-07-16 | Stop reason: HOSPADM

## 2018-07-14 RX ORDER — LORATADINE 10 MG/1
10 TABLET ORAL DAILY
Status: DISCONTINUED | OUTPATIENT
Start: 2018-07-15 | End: 2018-07-16 | Stop reason: HOSPADM

## 2018-07-14 RX ORDER — SODIUM CHLORIDE 0.9 % (FLUSH) 0.9 %
5-10 SYRINGE (ML) INJECTION AS NEEDED
Status: DISCONTINUED | OUTPATIENT
Start: 2018-07-14 | End: 2018-07-16 | Stop reason: HOSPADM

## 2018-07-14 RX ORDER — ROSUVASTATIN CALCIUM 20 MG/1
40 TABLET, COATED ORAL
Status: DISCONTINUED | OUTPATIENT
Start: 2018-07-14 | End: 2018-07-16 | Stop reason: HOSPADM

## 2018-07-14 RX ADMIN — ALBUTEROL SULFATE 5 MG: 2.5 SOLUTION RESPIRATORY (INHALATION) at 14:01

## 2018-07-14 RX ADMIN — ROSUVASTATIN CALCIUM 40 MG: 20 TABLET, FILM COATED ORAL at 22:42

## 2018-07-14 RX ADMIN — METHYLPREDNISOLONE SODIUM SUCCINATE 40 MG: 40 INJECTION, POWDER, FOR SOLUTION INTRAMUSCULAR; INTRAVENOUS at 22:42

## 2018-07-14 RX ADMIN — CEFTRIAXONE SODIUM 1 G: 1 INJECTION, POWDER, FOR SOLUTION INTRAMUSCULAR; INTRAVENOUS at 17:23

## 2018-07-14 RX ADMIN — Medication 10 ML: at 22:42

## 2018-07-14 RX ADMIN — HEPARIN SODIUM 5000 UNITS: 5000 INJECTION, SOLUTION INTRAVENOUS; SUBCUTANEOUS at 17:24

## 2018-07-14 RX ADMIN — CARVEDILOL 12.5 MG: 12.5 TABLET, FILM COATED ORAL at 17:24

## 2018-07-14 RX ADMIN — METHYLPREDNISOLONE SODIUM SUCCINATE 125 MG: 125 INJECTION, POWDER, FOR SOLUTION INTRAMUSCULAR; INTRAVENOUS at 12:20

## 2018-07-14 NOTE — PROGRESS NOTES
Pt admitted to room 810. Alert to person, place, time. On RA. Productive cough with white sputum, Wheezing. L sided weakness from prior CVA. Dual skin assessment completed with Nisa Ward. BLE with discoloration and 3+ edema. L arm in compression sleeve. Skin intact, no open wounds noted. Wife at bedside. Call bell in reach.     NSR on remote tele, hr 87

## 2018-07-14 NOTE — ED TRIAGE NOTES
Patient arrives via [facility] for respiratory distress. States patient woke up around 0400 with SOB. 92% on NRB initially for EMS, then placed on CPAP, increased to 97%. 1 NTG SL given by EMS and 1\" NTG placed on patient's chest. Temp 98.1 oral for EMS. /160 at scene. 5 mg Albuterol neb given. States patient complained of coughing clear sputum.

## 2018-07-14 NOTE — ED PROVIDER NOTES
HPI Comments: Patient presents to the ER for shortness of breath. Reportedly around 4 AM patient presents to the ER complaining of worsening shortness of breath. He states no improvement with nebulizer treatments. EMS was called. Found to be hypoxic, tachycardic and hypertensive. Was initially on a non-rebreather was then placed on CPAP. Patient is a 78 y.o. male presenting with respiratory distress syndrome. The history is provided by the patient. Respiratory Distress   This is a new problem. The current episode started 2 days ago. Associated symptoms include cough, sputum production, wheezing and leg swelling. Pertinent negatives include no abdominal pain and no rash. He has tried beta-agonist inhalers for the symptoms. Associated medical issues include COPD and chronic lung disease. Past Medical History:   Diagnosis Date    Chronic obstructive pulmonary disease (HCC)     Dermatophytosis of nail     History of CVA (cerebrovascular accident)     History of paraplegia     Hypertension        No past surgical history on file. No family history on file. Social History     Social History    Marital status:      Spouse name: N/A    Number of children: N/A    Years of education: N/A     Occupational History    Not on file. Social History Main Topics    Smoking status: Never Smoker    Smokeless tobacco: Never Used    Alcohol use No    Drug use: Not on file    Sexual activity: Not on file     Other Topics Concern    Not on file     Social History Narrative         ALLERGIES: Latex; Adhesive; and Sulfa (sulfonamide antibiotics)    Review of Systems   Constitutional: Negative for diaphoresis. HENT: Negative for congestion. Eyes: Negative for photophobia and visual disturbance. Respiratory: Positive for cough, sputum production, chest tightness and wheezing. Cardiovascular: Positive for leg swelling. Gastrointestinal: Negative for abdominal pain.    Endocrine: Negative for polydipsia, polyphagia and polyuria. Genitourinary: Negative for flank pain, frequency and urgency. Musculoskeletal: Negative for back pain and gait problem. Skin: Negative for pallor and rash. Allergic/Immunologic: Negative for food allergies and immunocompromised state. Neurological: Negative for light-headedness and numbness. Hematological: Negative for adenopathy. Does not bruise/bleed easily. Psychiatric/Behavioral: Negative for confusion. All other systems reviewed and are negative. Vitals:    07/14/18 1141   BP: (!) (P) 181/92   Pulse: (P) 94   Resp: (P) 26   Temp: (P) 97.2 °F (36.2 °C)   SpO2: (P) 95%            Physical Exam   Constitutional: He is oriented to person, place, and time. He appears well-developed and well-nourished. HENT:   Head: Normocephalic and atraumatic. Eyes: Conjunctivae and EOM are normal. Pupils are equal, round, and reactive to light. Neck: Normal range of motion. Neck supple. No tracheal deviation present. No thyromegaly present. Cardiovascular: Normal rate and regular rhythm. Exam reveals no friction rub. No murmur heard. Pulmonary/Chest: Accessory muscle usage present. He has wheezes. Course breath sounds bilaterally   Abdominal: Soft. Musculoskeletal: He exhibits edema. Neurological: He is alert and oriented to person, place, and time. He has normal reflexes. Nursing note reviewed. MDM  Number of Diagnoses or Management Options  Diagnosis management comments: Differential diagnosis includes pneumonia, COPD exacerbation, volume overload    12:54 PM  Chest x-ray appears clear, ABG shows a pH of 7.39, PCO2 43 and PO2 of 123. Patient has been transitioned to 3 L nasal cannula. Respiratory rate as well as work of breathing has seemed to improve      We'll continue to monitor. 1:57 PM  Patient still with some wheezing, saturations drop on room air.   Will give another treatment, discussed case with hospitalist for admission       Amount and/or Complexity of Data Reviewed  Clinical lab tests: ordered and reviewed  Tests in the radiology section of CPT®: ordered and reviewed    Risk of Complications, Morbidity, and/or Mortality  Presenting problems: high  Diagnostic procedures: moderate  Management options: moderate    Patient Progress  Patient progress: stable        ED Course       Procedures    Results Include:    Recent Results (from the past 24 hour(s))   PHENYTOIN    Collection Time: 07/14/18 11:00 AM   Result Value Ref Range    Phenytoin 14.2 10 - 20 ug/mL   EKG, 12 LEAD, INITIAL    Collection Time: 07/14/18 11:43 AM   Result Value Ref Range    Ventricular Rate 94 BPM    Atrial Rate 66 BPM    QRS Duration 88 ms    Q-T Interval 352 ms    QTC Calculation (Bezet) 440 ms    Calculated R Axis 70 degrees    Calculated T Axis 72 degrees    Diagnosis       !! AGE AND GENDER SPECIFIC ECG ANALYSIS !! Atrial fibrillation  Abnormal ECG  No previous ECGs available     CBC WITH AUTOMATED DIFF    Collection Time: 07/14/18 11:45 AM   Result Value Ref Range    WBC 11.7 (H) 4.3 - 11.1 K/uL    RBC 5.21 4.23 - 5.67 M/uL    HGB 17.0 13.6 - 17.2 g/dL    HCT 47.5 41.1 - 50.3 %    MCV 91.2 79.6 - 97.8 FL    MCH 32.6 26.1 - 32.9 PG    MCHC 35.8 (H) 31.4 - 35.0 g/dL    RDW 12.3 11.9 - 14.6 %    PLATELET 653 970 - 052 K/uL    MPV 10.4 (L) 10.8 - 14.1 FL    DF AUTOMATED      NEUTROPHILS 68 43 - 78 %    LYMPHOCYTES 19 13 - 44 %    MONOCYTES 5 4.0 - 12.0 %    EOSINOPHILS 8 (H) 0.5 - 7.8 %    BASOPHILS 0 0.0 - 2.0 %    IMMATURE GRANULOCYTES 0 0.0 - 5.0 %    ABS. NEUTROPHILS 8.0 1.7 - 8.2 K/UL    ABS. LYMPHOCYTES 2.3 0.5 - 4.6 K/UL    ABS. MONOCYTES 0.5 0.1 - 1.3 K/UL    ABS. EOSINOPHILS 0.9 (H) 0.0 - 0.8 K/UL    ABS. BASOPHILS 0.0 0.0 - 0.2 K/UL    ABS. IMM.  GRANS. 0.0 0.0 - 0.5 K/UL   METABOLIC PANEL, COMPREHENSIVE    Collection Time: 07/14/18 11:45 AM   Result Value Ref Range    Sodium 144 136 - 145 mmol/L    Potassium 4.0 3.5 - 5.1 mmol/L Chloride 107 98 - 107 mmol/L    CO2 27 21 - 32 mmol/L    Anion gap 10 7 - 16 mmol/L    Glucose 124 (H) 65 - 100 mg/dL    BUN 7 (L) 8 - 23 MG/DL    Creatinine 0.62 (L) 0.8 - 1.5 MG/DL    GFR est AA >60 >60 ml/min/1.73m2    GFR est non-AA >60 >60 ml/min/1.73m2    Calcium 8.6 8.3 - 10.4 MG/DL    Bilirubin, total 0.3 0.2 - 1.1 MG/DL    ALT (SGPT) 31 12 - 65 U/L    AST (SGOT) 18 15 - 37 U/L    Alk.  phosphatase 131 50 - 136 U/L    Protein, total 7.1 6.3 - 8.2 g/dL    Albumin 3.7 3.2 - 4.6 g/dL    Globulin 3.4 2.3 - 3.5 g/dL    A-G Ratio 1.1 (L) 1.2 - 3.5     BLOOD GAS, ARTERIAL    Collection Time: 07/14/18 11:53 AM   Result Value Ref Range    pH 7.39 7.35 - 7.45      PCO2 43 35 - 45 mmHg    PO2 123 (H) 80 - 105 mmHg    BICARBONATE 25 22 - 26 mmol/L    BASE DEFICIT 0.1 0 - 2 mmol/L    TOTAL HEMOGLOBIN 16.9 (H) 11.7 - 15.0 GM/DL    O2 SAT 98 92 - 98.5 %    Arterial O2 Hgb 94.8 94 - 97 %    CARBOXYHEMOGLOBIN 2.7 (H) 0.5 - 1.5 %    METHEMOGLOBIN 0.4 0.0 - 1.5 %    DEOXYHEMOGLOBIN 2 0.0 - 5.0 %    SITE RR     ALLENS TEST POSITIVE      MODE BIPAP 12 6     FIO2 55.0 %    RATE 12.0     POC LACTIC ACID    Collection Time: 07/14/18 11:57 AM   Result Value Ref Range    Lactic Acid (POC) 1.0 0.5 - 1.9 mmol/L

## 2018-07-14 NOTE — H&P
Hospitalist H&P Note Admit Date:  2018 11:38 AM  
Name:  Mala Duke Age:  78 y.o. 
:  1950 MRN:  [id number] PCP:  Sandi Cruz MD 
Treatment Team: Attending Provider: Dante Anton MD; Primary Nurse: Manuelito Lloyd RN Shortness of breath since this am 
HPI:  
78 yr old male pt with multiple cva- left hemiplegia- mostly in wheel chair ,can walk a short distance with davis walker, gerd,htn,chronic nicotine dependence,depression,hyperlipidemia,seizure disorder,[facility], ---- Pt says he has chronic cough, noticed that this am he was more sob(says from time time gets sob- uses nebulization at home),not improved on breathing treatments(says never was given a diagnosis of copd- had 4 cigarettes)--- EMS was called- as per er note pt was tachypnea,hypoxic , in respiratory distress- pt was put on non-rebreather, In er was initially started in bipap and changed to nasal cannula on 2 lit/min. 
 
cxr- normal 
Labs normal 
ekg- though computer read it as afib-- ekg looks sinus with pacs,spoke with - agreed with sinus with pacs. Repeat EKG ordered. Pt is being admitted for hypoxic resp failure sec to copd exa. 10 systems reviewed and negative except as noted in HPI. Past Medical History:  
Diagnosis Date  Chronic obstructive pulmonary disease ([facility] [address]  Dermatophytosis of nail  History of CVA (cerebrovascular accident)  History of paraplegia  Hypertension   
  
 surgical history - left carotid surgery Allergies Allergen Reactions  Latex Rash  Adhesive Unknown (comments)  Sulfa (Sulfonamide Antibiotics) Other (comments) Social History Substance Use Topics  Smoking status: Every day smoker  Smokeless tobacco: Never Used  Alcohol use No  
  
 family history- cva There is no immunization history on file for this patient. PTA Medications: 
Prior to Admission Medications Prescriptions Last Dose Informant Patient Reported? Taking?   
ALOE VERA/COLLAGEN (ALOE VESTA 2-N-1 CLEANSER EX)   Yes No  
Sig: by Apply Externally route. MINERAL OIL/PETROLATUM,WHITE (CLIVE MOISTURE BARRIER EX)   Yes No  
Sig: by Apply Externally route. Omeprazole delayed release (PRILOSEC D/R) 20 mg tablet   Yes No  
Sig: Take 20 mg by mouth daily. acetaminophen (TYLENOL) 500 mg tablet   Yes No  
Sig: Take  by mouth every six (6) hours as needed for Pain. aspirin delayed-release 81 mg tablet   Yes No  
Sig: Take 81 mg by mouth.  
b complex-vitamin c-folic acid 0.8 mg (NEPHRO-ALICIA) 0.8 mg tab tablet   Yes No  
Sig: Take 1 Tab by mouth daily. carvedilol (COREG) 12.5 mg tablet   Yes No  
Sig: Take 12.5 mg by mouth. cholecalciferol (VITAMIN D3) 1,000 unit cap   Yes No  
Sig: Take 1,000 Units by mouth. clopidogrel (PLAVIX) 75 mg tab   Yes No  
Sig: Take 75 mg by mouth. docusate sodium 100 mg/5 mL enem   Yes No  
Sig: Insert  into rectum. fluticasone (FLOVENT DISKUS) 50 mcg/actuation inhaler   Yes No  
Sig: by Nasal route. folic acid (FOLVITE) 1 mg tablet   Yes No  
Sig: Take 1 mg by mouth.  
guaiFENesin (ORGANIDIN) 400 mg tablet   Yes No  
Sig: Take  by mouth every four (4) hours. lisinopril (PRINIVIL, ZESTRIL) 40 mg tablet   Yes No  
Sig: Take 40 mg by mouth daily. loratadine (CLARITIN) 10 mg tablet   Yes No  
Sig: Take 10 mg by mouth.  
phenytoin ER (DILANTIN ER) 100 mg ER capsule   Yes No  
Sig: Take  by mouth. rosuvastatin (CRESTOR) 40 mg tablet   Yes No  
Sig: Take 40 mg by mouth nightly. senna (SENOKOT) 8.6 mg tablet   Yes No  
Sig: Take 1 Tab by mouth daily. sertraline (ZOLOFT) 100 mg tablet   Yes No  
Sig: Take  by mouth daily. tamsulosin (FLOMAX) 0.4 mg capsule   Yes No  
Sig: Take 0.4 mg by mouth daily. Facility-Administered Medications: None Objective:  
Patient Vitals for the past 24 hrs: 
 Temp Pulse Resp BP SpO2  
07/14/18 1401 - - - - 93 % 07/14/18 1335 - 83 19 - (!) 89 % 07/14/18 1330 - 79 21 140/67 90 % 07/14/18 1259 - 77 17 133/64 91 %  
07/14/18 1156 - 87 17 - -  
07/14/18 1143 - 95 23 - 95 % 07/14/18 1141 97.2 °F (36.2 °C) 94 26 (!) 181/92 95 % Oxygen Therapy O2 Sat (%): 93 % (07/14/18 1401) Pulse via Oximetry: 84 beats per minute (07/14/18 1401) O2 Device: Nasal cannula (07/14/18 1401) O2 Flow Rate (L/min): 2 l/min (07/14/18 1401) No intake or output data in the 24 hours ending 07/14/18 1502 Physical Exam: 
General:    Well nourished. Alert. On 2lit/min- doesn't appear in respiratory distress at the time of my examination,rt side of mouth pulled up- sec to previous stroke. Eyes:   Normal sclera. Extraocular movements intact. ENT:  Normocephalic, atraumatic. Moist mucous membranes CV:   RRR. No murmur, rub, or gallop. Lungs:  Coarse breath sounds, mild wheezing Abdomen: Soft, nontender, nondistended. Bowel sounds normal.  
Extremities: Warm and dry. No cyanosis or edema. Neurologic: CN II-XII grossly intact. Sensation intact. Left hemiplegia. Chronic mild hyperpigmentation lower 1/3 rd of both legs. Skin:     No rashes or jaundice. Psych:  Normal mood and affect. I reviewed the labs, imaging, EKGs, telemetry, and other studies done this admission. Data Review:  
Recent Results (from the past 24 hour(s)) PHENYTOIN Collection Time: 07/14/18 11:00 AM  
Result Value Ref Range Phenytoin 14.2 10 - 20 ug/mL EKG, 12 LEAD, INITIAL Collection Time: 07/14/18 11:43 AM  
Result Value Ref Range Ventricular Rate 94 BPM  
 Atrial Rate 66 BPM  
 QRS Duration 88 ms Q-T Interval 352 ms QTC Calculation (Bezet) 440 ms Calculated R Axis 70 degrees Calculated T Axis 72 degrees Diagnosis    
  !! AGE AND GENDER SPECIFIC ECG ANALYSIS !! Atrial fibrillation Abnormal ECG No previous ECGs available CBC WITH AUTOMATED DIFF Collection Time: 07/14/18 11:45 AM  
Result Value Ref Range  WBC 11.7 (H) 4.3 - 11.1 K/uL  
 RBC 5.21 4.23 - 5.67 M/uL  
 HGB 17.0 13.6 - 17.2 g/dL HCT 47.5 41.1 - 50.3 % MCV 91.2 79.6 - 97.8 FL  
 MCH 32.6 26.1 - 32.9 PG  
 MCHC 35.8 (H) 31.4 - 35.0 g/dL  
 RDW 12.3 11.9 - 14.6 % PLATELET 959 453 - 021 K/uL MPV 10.4 (L) 10.8 - 14.1 FL  
 DF AUTOMATED NEUTROPHILS 68 43 - 78 % LYMPHOCYTES 19 13 - 44 % MONOCYTES 5 4.0 - 12.0 % EOSINOPHILS 8 (H) 0.5 - 7.8 % BASOPHILS 0 0.0 - 2.0 % IMMATURE GRANULOCYTES 0 0.0 - 5.0 %  
 ABS. NEUTROPHILS 8.0 1.7 - 8.2 K/UL  
 ABS. LYMPHOCYTES 2.3 0.5 - 4.6 K/UL  
 ABS. MONOCYTES 0.5 0.1 - 1.3 K/UL  
 ABS. EOSINOPHILS 0.9 (H) 0.0 - 0.8 K/UL  
 ABS. BASOPHILS 0.0 0.0 - 0.2 K/UL  
 ABS. IMM. GRANS. 0.0 0.0 - 0.5 K/UL METABOLIC PANEL, COMPREHENSIVE Collection Time: 07/14/18 11:45 AM  
Result Value Ref Range Sodium 144 136 - 145 mmol/L Potassium 4.0 3.5 - 5.1 mmol/L Chloride 107 98 - 107 mmol/L  
 CO2 27 21 - 32 mmol/L Anion gap 10 7 - 16 mmol/L Glucose 124 (H) 65 - 100 mg/dL BUN 7 (L) 8 - 23 MG/DL Creatinine 0.62 (L) 0.8 - 1.5 MG/DL  
 GFR est AA >60 >60 ml/min/1.73m2 GFR est non-AA >60 >60 ml/min/1.73m2 Calcium 8.6 8.3 - 10.4 MG/DL Bilirubin, total 0.3 0.2 - 1.1 MG/DL  
 ALT (SGPT) 31 12 - 65 U/L  
 AST (SGOT) 18 15 - 37 U/L Alk. phosphatase 131 50 - 136 U/L Protein, total 7.1 6.3 - 8.2 g/dL Albumin 3.7 3.2 - 4.6 g/dL Globulin 3.4 2.3 - 3.5 g/dL A-G Ratio 1.1 (L) 1.2 - 3.5 BNP Collection Time: 07/14/18 11:45 AM  
Result Value Ref Range BNP 11 pg/mL BLOOD GAS, ARTERIAL Collection Time: 07/14/18 11:53 AM  
Result Value Ref Range pH 7.39 7.35 - 7.45    
 PCO2 43 35 - 45 mmHg PO2 123 (H) 80 - 105 mmHg BICARBONATE 25 22 - 26 mmol/L  
 BASE DEFICIT 0.1 0 - 2 mmol/L  
 TOTAL HEMOGLOBIN 16.9 (H) 11.7 - 15.0 GM/DL  
 O2 SAT 98 92 - 98.5 % Arterial O2 Hgb 94.8 94 - 97 % CARBOXYHEMOGLOBIN 2.7 (H) 0.5 - 1.5 % METHEMOGLOBIN 0.4 0.0 - 1.5 % DEOXYHEMOGLOBIN 2 0.0 - 5.0 % SITE RR   
 ALLENS TEST POSITIVE    
 MODE BIPAP 12 6 FIO2 55.0 % RATE 12.0 POC LACTIC ACID Collection Time: 07/14/18 11:57 AM  
Result Value Ref Range Lactic Acid (POC) 1.0 0.5 - 1.9 mmol/L All Micro Results None Other Studies: Xr Chest [facility] Result Date: 7/14/2018 Chest portable CLINICAL INDICATION: Acute dyspnea with coughing, moderate to severe. History of COPD, hypertension, stroke. Long-term smoker. COMPARISON: None TECHNIQUE: single AP portable view chest at 11:50 AM upright FINDINGS:  There is no evidence of consolidation, pneumothorax, pleural effusion or pulmonary edema. There is no discrete mass. The mediastinal and hilar contours are normal given technique. Patient is slightly rotated. Lung volumes are well-inflated. IMPRESSION: No acute disease. Assessment and Plan:  
 
Hospital Problems as of 7/14/2018  Date Reviewed: 9/5/2017 Codes Class Noted - Resolved POA Acute respiratory failure with hypoxemia [facility]) ICD-10-CM: J96.01 
ICD-9-CM: 518.81  7/14/2018 - Present Unknown Acute copd exa PLAN: 
Acute hypoxic resp failure- presently on 2 lit/min nasal cannula oxygen Copd exa- duonebs,steroids,rocpehin. 
htn uncontrolled- pt didn't take any of his home medications this am- will cont home medications. prn clonidine. hyperlipidemia 
cva with left hemiplegia. Seizure disorder. Nicotine dependence- advised on smoking cessation. DVT ppx:  heparin Anticipated DC needs:   
Code status:  Full Estimated LOS:  Greater than 2 midnights Risk:  high Signed: 
Mitchell Sweeney MD

## 2018-07-14 NOTE — IP AVS SNAPSHOT
[address] 322 [facility], [place] 
[phone] Patient: Castro Chiu MRN: [id number] KCV:[id number] About your hospitalization You were admitted on:  July 14, 2018 You last received care in the:  [facility] 8 MED SURG You were discharged on:  July 16, 2018 Why you were hospitalized Your primary diagnosis was:  Acute Respiratory Failure With Hypoxemia (Hcc) Your diagnoses also included:  Copd Exacerbation (Hcc), Essential Hypertension, Hyperlipidemia, Left Hemiplegia (Hcc), Cva (Cerebral Vascular Accident) (Hcc), Depression, Seizure (Hcc), Dependence On Nicotine From Cigarettes, Bph (Benign Prostatic Hyperplasia), Gerd (Gastroesophageal Reflux Disease), Acute Respiratory Failure With Hypoxia (Hcc), Emphysema Lung (Hcc) Follow-up Information Follow up With Details Comments Contact Info Kevin Greenwood MD In 1 week Office will call you with appt date and time. -[facility] [phone] 
[phone] DANIEL Bernabe On 7/31/2018 12:30 PM [address] [facility] 
[address] [facility] [address] 
[phone]  35 [address] [place] [address] 
[phone] Your Scheduled Appointments Tuesday July 31, 2018  1:00 PM EDT  
(Arrive by 12:30 PM) [facility] ([facility]) [address] [place] [address]  
[phone] Discharge Orders None A check yoav indicates which time of day the medication should be taken. My Medications START taking these medications Instructions Each Dose to Equal  
 Morning Noon Evening Bedtime  
 albuterol 90 mcg/actuation inhaler Commonly known as:  PROVENTIL HFA, VENTOLIN HFA, PROAIR HFA Take 1 Puff by inhalation every four (4) hours as needed for Wheezing. 1 Puff cefpodoxime 200 mg tablet Commonly known as:  Eric Peak Your next dose is: This evening Take 1 Tab by mouth two (2) times a day. 200 mg  
    
  
   
   
  
   
  
 methylPREDNISolone 4 mg tablet Commonly known as:  Blease Getting Take as directed. CONTINUE taking these medications Instructions Each Dose to Equal  
 Morning Noon Evening Bedtime  
 acetaminophen 500 mg tablet Commonly known as:  TYLENOL Take  by mouth every six (6) hours as needed for Pain. ALOE VESTA 2-N-1 CLEANSER EX  
   
 by Apply Externally route. aspirin delayed-release 81 mg tablet Take 81 mg by mouth. 81 mg CLIVE MOISTURE BARRIER EX  
   
 by Apply Externally route. carvedilol 12.5 mg tablet Commonly known as:  Cynthia Buster Take 12.5 mg by mouth.  
 12.5 mg  
    
  
   
   
  
   
  
 cholecalciferol 1,000 unit Cap Commonly known as:  VITAMIN D3 Take 1,000 Units by mouth. 1000 Units  
    
  
   
   
   
  
 clopidogrel 75 mg Tab Commonly known as:  PLAVIX Take 75 mg by mouth. 75 mg  
    
  
   
   
   
  
 docusate sodium 100 mg/5 mL Enem Insert  into rectum. fluticasone 50 mcg/actuation inhaler Commonly known as:  FLOVENT DISKUS  
   
 by Nasal route. folic acid 1 mg tablet Commonly known as:  Google Take 1 mg by mouth. 1 mg  
    
  
   
   
   
  
 guaiFENesin 400 mg tablet Commonly known as:  Imer Breath Take  by mouth every four (4) hours. lisinopril 40 mg tablet Commonly known as:  Silvina Acosta Your next dose is:  Tomorrow Morning Take 40 mg by mouth daily. 40 mg  
    
  
   
   
   
  
 loratadine 10 mg tablet Commonly known as:  [place] Eufemia Take 10 mg by mouth.   
 10 mg  
    
  
   
 NEPHRO-ALICIA 0.8 mg Tab tablet Generic drug:  b complex-vitamin c-folic acid 0.8 mg Your next dose is:  Tomorrow Morning Take 1 Tab by mouth daily. 1 Tab Omeprazole delayed release 20 mg tablet Commonly known as:  PRILOSEC D/R Your next dose is:  Tomorrow Morning Take 20 mg by mouth daily. 20 mg  
    
  
   
   
   
  
 phenytoin  mg ER capsule Commonly known as:  DILANTIN ER Take  by mouth. rosuvastatin 40 mg tablet Commonly known as:  CRESTOR Your next dose is: Take tonight Take 40 mg by mouth nightly. 40 mg  
    
   
   
   
  
  
 senna 8.6 mg tablet Commonly known as:  Peter Jacob Lorenzana Your next dose is:  Tomorrow Morning Take 1 Tab by mouth daily. 1 Tab  
    
  
   
   
   
  
 sertraline 100 mg tablet Commonly known as:  ZOLOFT Your next dose is:  Tomorrow Morning Take  by mouth daily. tamsulosin 0.4 mg capsule Commonly known as:  FLOMAX Your next dose is:  Tomorrow Morning Take 0.4 mg by mouth daily. 0.4 mg Where to Get Your Medications Information on where to get these meds will be given to you by the nurse or doctor. ! Ask your nurse or doctor about these medications  
  albuterol 90 mcg/actuation inhaler  
 cefpodoxime 200 mg tablet  
 methylPREDNISolone 4 mg tablet Discharge Instructions Chronic Obstructive Pulmonary Disease (COPD): Care Instructions Your Care Instructions Chronic obstructive pulmonary disease (COPD) is a general term for a group of lung diseases, including emphysema and chronic bronchitis. People with COPD have decreased airflow in and out of the lungs, which makes it hard to breathe. The airways also can get clogged with thick mucus. Cigarette smoking is a major cause of COPD. Although there is no cure for COPD, you can slow its progress. Following your treatment plan and taking care of yourself can help you feel better and live longer. Follow-up care is a key part of your treatment and safety. Be sure to make and go to all appointments, and call your doctor if you are having problems. It's also a good idea to know your test results and keep a list of the medicines you take. How can you care for yourself at home? 
 Staying healthy 
  · Do not smoke. This is the most important step you can take to prevent more damage to your lungs. If you need help quitting, talk to your doctor about stop-smoking programs and medicines. These can increase your chances of quitting for good.  
  · Avoid colds and flu. Get a pneumococcal vaccine shot. If you have had one before, ask your doctor whether you need a second dose. Get the flu vaccine every fall. If you must be around people with colds or the flu, wash your hands often.  
  · Avoid secondhand smoke, air pollution, and high altitudes. Also avoid cold, dry air and hot, humid air. Stay at home with your windows closed when air pollution is bad.  
 Medicines and oxygen therapy 
  · Take your medicines exactly as prescribed. Call your doctor if you think you are having a problem with your medicine.  
  · You may be taking medicines such as: ¨ Bronchodilators. These help open your airways and make breathing easier. Bronchodilators are either short-acting (work for 6 to 9 hours) or long-acting (work for 24 hours). You inhale most bronchodilators, so they start to act quickly. Always carry your quick-relief inhaler with you in case you need it while you are away from home. ¨ Corticosteroids (prednisone, budesonide). These reduce airway inflammation. They come in pill or inhaled form. You must take these medicines every day for them to work well.  
  · A spacer may help you get more inhaled medicine to your lungs.  Ask your doctor or pharmacist if a spacer is right for you. If it is, ask how to use it properly.  
  · Do not take any vitamins, over-the-counter medicine, or herbal products without talking to your doctor first.  
  · If your doctor prescribed antibiotics, take them as directed. Do not stop taking them just because you feel better. You need to take the full course of antibiotics.  
  · Oxygen therapy boosts the amount of oxygen in your blood and helps you breathe easier. Use the flow rate your doctor has recommended, and do not change it without talking to your doctor first.  
Activity 
  · Get regular exercise. Walking is an easy way to get exercise. Start out slowly, and walk a little more each day.  
  · Pay attention to your breathing. You are exercising too hard if you cannot talk while you are exercising.  
  · Take short rest breaks when doing household chores and other activities.  
  · Learn breathing methods-such as breathing through pursed lips-to help you become less short of breath.  
  · If your doctor has not set you up with a pulmonary rehabilitation program, talk to him or her about whether rehab is right for you. Rehab includes exercise programs, education about your disease and how to manage it, help with diet and other changes, and emotional support. Diet 
  · Eat regular, healthy meals. Use bronchodilators about 1 hour before you eat to make it easier to eat. Eat several small meals instead of three large ones. Drink beverages at the end of the meal. Avoid foods that are hard to chew.  
  · Eat foods that contain protein so that you do not lose muscle mass.  
  · Talk with your doctor if you gain too much weight or if you lose weight without trying.  
 Mental health 
  · Talk to your family, friends, or a therapist about your feelings. It is normal to feel frightened, angry, hopeless, helpless, and even guilty. Talking openly about bad feelings can help you cope.  If these feelings last, talk to your doctor. When should you call for help? Call 911 anytime you think you may need emergency care. For example, call if: 
  · You have severe trouble breathing.  
 Call your doctor now or seek immediate medical care if: 
  · You have new or worse trouble breathing.  
  · You cough up blood.  
  · You have a fever.  
 Watch closely for changes in your health, and be sure to contact your doctor if: 
  · You cough more deeply or more often, especially if you notice more mucus or a change in the color of your mucus.  
  · You have new or worse swelling in your legs or belly.  
  · You are not getting better as expected. Where can you learn more? Go to http://jose f-walter.info/. Ashwini Moreno in the search box to learn more about \"Chronic Obstructive Pulmonary Disease (COPD): Care Instructions. \" Current as of: December 6, 2017 Content Version: 11.7 © 6332-5096 [employer]. Care instructions adapted under license by [employer] (which disclaims liability or warranty for this information). If you have questions about a medical condition or this instruction, always ask your healthcare professional. [place] [address] any warranty or liability for your use of this information. Oxygen Therapy: Care Instructions Your Care Instructions Oxygen therapy helps you get more oxygen into your lungs and bloodstream. You may use it if you have a disease that makes it hard to breathe, such as COPD, pulmonary fibrosis (scarring of the lungs), or heart failure. Oxygen therapy can make it easier for you to breathe and can reduce your heart's workload. Some people need extra oxygen all the time. Others need it from time to time throughout the day or overnight. A doctor will prescribe how much oxygen you need and how often to use it.  
To breathe the oxygen, most people use a nasal cannula (say \"SANGEETHA-yuh-kamilla\"). This is a thin tube with two prongs that fit just inside your nose. People who need a lot of oxygen may need to use a mask that fits over the nose and mouth. Follow-up care is a key part of your treatment and safety. Be sure to make and go to all appointments, and call your doctor if you are having problems. It's also a good idea to know your test results and keep a list of the medicines you take. How can you care for yourself at home? To help yourself · Using oxygen may dry out your nose or lips. Use water-based lubricants on your lips or nostrils. Do not use an oil-based product like petroleum jelly. · If you use a nasal cannula, the tubing may rub under your nostrils and around your ears. To keep your skin from getting sore, tuck some gauze under the tubing. Use a water-based lotion on rubbed areas. · Do not use alcohol or take drugs that relax you, because they will slow your breathing rate. · Keep track of how much oxygen is in the tank, and reorder before it runs out. If a holiday is coming up or you expect bad weather, order in advance or make your regular order larger. · You may need extra oxygen when you travel to high altitudes or travel by plane. Ask your doctor about this. · If you are getting oxygen directly to your windpipe through an opening in your neck, your doctor will teach you how to care for the equipment. To make sure oxygen is flowing · Check the flow by holding your mask or cannula up to your ear and listening for the \"hiss\" of airflow. · If you have a nasal cannula, dip the prongs in a glass of water. If you see bubbles, oxygen is coming through. · Check your pressure gauge or contents indicator. · If you use an oxygen concentrator, make sure it is turned on and plugged in. If you use a cylinder, make sure the valve is open. · Look for kinks, blockages, or water in the tubing. Be sure the tubing is connected to the oxygen source. · Do not change your oxygen flow rate. Your doctor sets this at the correct level. Higher flow rates usually do not help and can increase the risk of harmful carbon dioxide buildup in the blood. To be safe · Do not leave cords or tubing running across an area where you or someone else may trip on it. · Do not let oxygen containers get hot. Store them in a cool place where there is airflow. Do not leave them in a car trunk or a hot vehicle. · Keep oxygen containers upright. Make sure they do not fall over and get damaged. Try securing the tanks in a sturdy container or securing them with a rope or a chain. · Watch for signs of oxygen leaks. If you hear a loud hissing from your container or if it empties too fast, stay away from the container. Open windows right away and call the company that brought the oxygen system to your home. · Do not use oxygen around anything that could spark or easily cause a fire. ¨ Do not smoke or let others smoke while you are using oxygen. Put up \"no smoking\" signs in your home. ¨ Do not use oxygen near open flames, such as candles, fireplaces, gas stoves, or hot water heaters. Do not use it near electric razors, hair dryers, heating pads, or anything that may spark. ¨ Keep a working fire extinguisher in your home where it is easy to get to. ¨ If a fire starts, turn off the oxygen right away and leave the house. ¨ If you have an oxygen concentrator, do not use it if the cord looks damaged. Do not use an extension cord to plug it in. Do not plug it into an outlet that has other appliances plugged into it. To care for the equipment · Follow the directions that come with the equipment for using and caring for it. · Wash your cannula or mask with a liquid soap and warm water 1 or 2 times a week. Replace them every 2 to 4 weeks. · If you have a cold, change the nasal prongs when your cold symptoms are done.  
· If you have an oxygen concentrator, unplug the unit and wipe down the cabinet with a damp cloth daily. Clean the air filter at least 2 times a week. Where can you learn more? Go to http://jose f-walter.info/. Enter E117 in the search box to learn more about \"Oxygen Therapy: Care Instructions. \" Current as of: December 6, 2017 Content Version: 11.7 © 6455-8540 [employer]. Care instructions adapted under license by [employer] (which disclaims liability or warranty for this information). If you have questions about a medical condition or this instruction, always ask your healthcare professional. [place] [address] any warranty or liability for your use of this information. DISCHARGE SUMMARY from Nurse PATIENT INSTRUCTIONS: 
 
After general anesthesia or intravenous sedation, for 24 hours or while taking prescription Narcotics: · Limit your activities · Do not drive and operate hazardous machinery · Do not make important personal or business decisions · Do  not drink alcoholic beverages · If you have not urinated within 8 hours after discharge, please contact your surgeon on call. Report the following to your surgeon: 
· Excessive pain, swelling, redness or odor of or around the surgical area · Temperature over 100.5 · Nausea and vomiting lasting longer than 4 hours or if unable to take medications · Any signs of decreased circulation or nerve impairment to extremity: change in color, persistent  numbness, tingling, coldness or increase pain · Any questions What to do at Home: *  Please give a list of your current medications to your Primary Care Provider. *  Please update this list whenever your medications are discontinued, doses are 
    changed, or new medications (including over-the-counter products) are added. *  Please carry medication information at all times in case of emergency situations. These are general instructions for a healthy lifestyle: No smoking/ No tobacco products/ Avoid exposure to second hand smoke Surgeon General's Warning:  Quitting smoking now greatly reduces serious risk to your health. Obesity, smoking, and sedentary lifestyle greatly increases your risk for illness A healthy diet, regular physical exercise & weight monitoring are important for maintaining a healthy lifestyle You may be retaining fluid if you have a history of heart failure or if you experience any of the following symptoms:  Weight gain of 3 pounds or more overnight or 5 pounds in a week, increased swelling in our hands or feet or shortness of breath while lying flat in bed. Please call your doctor as soon as you notice any of these symptoms; do not wait until your next office visit. Recognize signs and symptoms of STROKE: 
 
F-face looks uneven A-arms unable to move or move unevenly S-speech slurred or non-existent T-time-call 911 as soon as signs and symptoms begin-DO NOT go Back to bed or wait to see if you get better-TIME IS BRAIN. Warning Signs of HEART ATTACK Call 911 if you have these symptoms: 
? Chest discomfort. Most heart attacks involve discomfort in the center of the chest that lasts more than a few minutes, or that goes away and comes back. It can feel like uncomfortable pressure, squeezing, fullness, or pain. ? Discomfort in other areas of the upper body. Symptoms can include pain or discomfort in one or both arms, the back, neck, jaw, or stomach. ? Shortness of breath with or without chest discomfort. ? Other signs may include breaking out in a cold sweat, nausea, or lightheadedness. Don't wait more than five minutes to call [address]! Fast action can save your life. Calling 911 is almost always the fastest way to get lifesaving treatment. Emergency Medical Services staff can begin treatment when they arrive  up to an hour sooner than if someone gets to the hospital by car. The discharge information has been reviewed with the patient. The patient verbalized understanding. Discharge medications reviewed with the patient and appropriate educational materials and side effects teaching were provided. ___________________________________________________________________________________________________________________________________ Scalix[employer] Announcement We are excited to announce that we are making your provider's discharge notes available to you in Laiyaoyao. You will see these notes when they are completed and signed by the physician that discharged you from your recent hospital stay. If you have any questions or concerns about any information you see in Laiyaoyao, please call the Health Information Department where you were seen or reach out to your Primary Care Provider for more information about your plan of care. Introducing [facility]! Kizzy Thrasher introduces Laiyaoyao patient portal. Now you can access parts of your medical record, email your doctor's office, and request medication refills online. 1. In your internet browser, go to [email or web address] 2. Click on the First Time User? Click Here link in the Sign In box. You will see the New Member Sign Up page. 3. Enter your Laiyaoyao Access Code exactly as it appears below. You will not need to use this code after youve completed the sign-up process. If you do not sign up before the expiration date, you must request a new code. · Laiyaoyao Access Code: [email or web address] Expires: 10/12/2018 11:56 AM 
 
4. Enter the last four digits of your Social Security Number (xxxx) and Date of Birth (mm/dd/yyyy) as indicated and click Submit. You will be taken to the next sign-up page. 5. Create a Laiyaoyao ID. This will be your MyChart login ID and cannot be changed, so think of one that is secure and easy to remember. 6. Create a Laiyaoyao password. You can change your password at any time. 7. Enter your Password Reset Question and Answer. This can be used at a later time if you forget your password. 8. Enter your e-mail address. You will receive e-mail notification when new information is available in [address] 9. Click Sign Up. You can now view and download portions of your medical record. 10. Click the Download Summary menu link to download a portable copy of your medical information. If you have questions, please visit the Frequently Asked Questions section of the [employer] website. Remember, [employer] is NOT to be used for urgent needs. For medical emergencies, dial 911. Now available from your iPhone and Android! Introducing Edward Chadwick As a Johana Bazan patient, I wanted to make you aware of our electronic visit tool called Edward Farrukh. Johana MoveinBlue 24/7 allows you to connect within minutes with a medical provider 24 hours a day, seven days a week via a mobile device or tablet or logging into a secure website from your computer. You can access Edward [email or web address] from anywhere in the [place]. A virtual visit might be right for you when you have a simple condition and feel like you just dont want to get out of bed, or cant get away from work for an appointment, when your regular Johana Toledo provider is not available (evenings, weekends or holidays), or when youre out of town and need minor care. Electronic visits cost only $49 and if the [employer] provider determines a prescription is needed to treat your condition, one can be electronically transmitted to a nearby pharmacy*. Please take a moment to enroll today if you have not already done so. The enrollment process is free and takes just a few minutes. To enroll, please download the Meiaoju/UnLtdWorld riky to your tablet or phone, or visit [email or web address] to enroll on your computer.    
And, as an [address] [place] [address] patient with a [employer] account, the results of your visits will be scanned into your electronic medical record and your primary care provider will be able to view the scanned results. We urge you to continue to see your regular Mago Laurita provider for your ongoing medical care. And while your primary care provider may not be the one available when you seek a Edward Phillipsfin virtual visit, the peace of mind you get from getting a real diagnosis real time can be priceless. For more information on WeDeliveradairfin, view our Frequently Asked Questions (FAQs) at [email or web address]. Sincerely, 
 
Chiquita Chisholm MD 
Chief Medical Officer [address] *:  certain medications cannot be prescribed via Bujbu Providers Seen During Your Hospitalization Provider Specialty Primary office phone Cindy oHrta MD Emergency Medicine [phone] Zoya Mckeon MD Internal Medicine [phone] Your Primary Care Physician (PCP) Primary Care Physician Office Phone Office Fax Gabriela Phanserina [phone] You are allergic to the following Allergen Reactions Latex Rash Adhesive Unknown (comments) Sulfa (Sulfonamide Antibiotics) Other (comments) Recent Documentation Smoking Status Never Smoker Emergency Contacts Name Discharge Info Relation Home Work Mobile [address] CAREGIVER [3] Spouse [3] [phone] Patient Belongings The following personal items are in your possession at time of discharge: 
  Dental Appliances: Partials, Lowers, Uppers  Visual Aid: Glasses      Home Medications: None   Jewelry: None  Clothing: None    Other Valuables: None Please provide this summary of care documentation to your next provider.  
  
  
 
  
Signatures-by signing, you are acknowledging that this After Visit Summary has been reviewed with you and you have received a copy. Patient Signature:  ____________________________________________________________ Date:  ____________________________________________________________  
  
Penny Moulds Provider Signature:  ____________________________________________________________ Date:  ____________________________________________________________

## 2018-07-14 NOTE — PROGRESS NOTES
Pt was placed initially on BIPAP 12/6 55% R12, but due to improved WOB and blood gas, pt was placed on 3L nc per Dr Kingsley Salinas.

## 2018-07-14 NOTE — PROGRESS NOTES
TRANSFER - IN REPORT:    Verbal report received from jie(name) on Joe Ortiz  being received from er(unit) for routine progression of care      Report consisted of patients Situation, Background, Assessment and   Recommendations(SBAR). Information from the following report(s) ED Summary was reviewed with the receiving nurse. Opportunity for questions and clarification was provided. Assessment completed upon patients arrival to unit and care assumed.  Awaiting arrival,report to bradley calzada rn

## 2018-07-15 ENCOUNTER — APPOINTMENT (OUTPATIENT)
Dept: CT IMAGING | Age: 68
DRG: 190 | End: 2018-07-15
Attending: FAMILY MEDICINE
Payer: MEDICARE

## 2018-07-15 PROBLEM — J96.01 ACUTE RESPIRATORY FAILURE WITH HYPOXIA (HCC): Status: ACTIVE | Noted: 2018-07-15

## 2018-07-15 LAB
ANION GAP SERPL CALC-SCNC: 6 MMOL/L (ref 7–16)
BUN SERPL-MCNC: 7 MG/DL (ref 8–23)
CALCIUM SERPL-MCNC: 8.3 MG/DL (ref 8.3–10.4)
CHLORIDE SERPL-SCNC: 107 MMOL/L (ref 98–107)
CO2 SERPL-SCNC: 31 MMOL/L (ref 21–32)
CREAT SERPL-MCNC: 0.6 MG/DL (ref 0.8–1.5)
GLUCOSE SERPL-MCNC: 118 MG/DL (ref 65–100)
POTASSIUM SERPL-SCNC: 4 MMOL/L (ref 3.5–5.1)
SODIUM SERPL-SCNC: 144 MMOL/L (ref 136–145)

## 2018-07-15 PROCEDURE — 94640 AIRWAY INHALATION TREATMENT: CPT

## 2018-07-15 PROCEDURE — 74011636320 HC RX REV CODE- 636/320: Performed by: FAMILY MEDICINE

## 2018-07-15 PROCEDURE — 74011250636 HC RX REV CODE- 250/636: Performed by: FAMILY MEDICINE

## 2018-07-15 PROCEDURE — 80048 BASIC METABOLIC PNL TOTAL CA: CPT | Performed by: FAMILY MEDICINE

## 2018-07-15 PROCEDURE — 74011250637 HC RX REV CODE- 250/637: Performed by: HOSPITALIST

## 2018-07-15 PROCEDURE — 74011000250 HC RX REV CODE- 250: Performed by: HOSPITALIST

## 2018-07-15 PROCEDURE — 36415 COLL VENOUS BLD VENIPUNCTURE: CPT | Performed by: FAMILY MEDICINE

## 2018-07-15 PROCEDURE — 71260 CT THORAX DX C+: CPT

## 2018-07-15 PROCEDURE — 74011000258 HC RX REV CODE- 258: Performed by: FAMILY MEDICINE

## 2018-07-15 PROCEDURE — 65660000000 HC RM CCU STEPDOWN

## 2018-07-15 PROCEDURE — 94760 N-INVAS EAR/PLS OXIMETRY 1: CPT

## 2018-07-15 PROCEDURE — 76937 US GUIDE VASCULAR ACCESS: CPT

## 2018-07-15 PROCEDURE — 74011250637 HC RX REV CODE- 250/637: Performed by: FAMILY MEDICINE

## 2018-07-15 RX ORDER — IBUPROFEN 200 MG
1 TABLET ORAL DAILY
Status: DISCONTINUED | OUTPATIENT
Start: 2018-07-15 | End: 2018-07-16 | Stop reason: HOSPADM

## 2018-07-15 RX ORDER — SODIUM CHLORIDE 0.9 % (FLUSH) 0.9 %
10 SYRINGE (ML) INJECTION
Status: COMPLETED | OUTPATIENT
Start: 2018-07-15 | End: 2018-07-15

## 2018-07-15 RX ADMIN — CEFTRIAXONE SODIUM 1 G: 1 INJECTION, POWDER, FOR SOLUTION INTRAMUSCULAR; INTRAVENOUS at 14:54

## 2018-07-15 RX ADMIN — PHENYTOIN SODIUM 300 MG: 100 CAPSULE ORAL at 00:17

## 2018-07-15 RX ADMIN — HEPARIN SODIUM 5000 UNITS: 5000 INJECTION, SOLUTION INTRAVENOUS; SUBCUTANEOUS at 00:18

## 2018-07-15 RX ADMIN — VITAMIN D, TAB 1000IU (100/BT) 1000 UNITS: 25 TAB at 08:17

## 2018-07-15 RX ADMIN — CLOPIDOGREL BISULFATE 75 MG: 75 TABLET ORAL at 08:17

## 2018-07-15 RX ADMIN — Medication 5 ML: at 15:01

## 2018-07-15 RX ADMIN — CARVEDILOL 12.5 MG: 12.5 TABLET, FILM COATED ORAL at 16:22

## 2018-07-15 RX ADMIN — LISINOPRIL 40 MG: 20 TABLET ORAL at 08:17

## 2018-07-15 RX ADMIN — HEPARIN SODIUM 5000 UNITS: 5000 INJECTION, SOLUTION INTRAVENOUS; SUBCUTANEOUS at 16:22

## 2018-07-15 RX ADMIN — ROSUVASTATIN CALCIUM 40 MG: 20 TABLET, FILM COATED ORAL at 22:29

## 2018-07-15 RX ADMIN — PHENYTOIN SODIUM 300 MG: 100 CAPSULE ORAL at 08:17

## 2018-07-15 RX ADMIN — PHENYTOIN SODIUM 300 MG: 100 CAPSULE ORAL at 16:23

## 2018-07-15 RX ADMIN — METHYLPREDNISOLONE SODIUM SUCCINATE 40 MG: 40 INJECTION, POWDER, FOR SOLUTION INTRAMUSCULAR; INTRAVENOUS at 14:55

## 2018-07-15 RX ADMIN — METHYLPREDNISOLONE SODIUM SUCCINATE 40 MG: 40 INJECTION, POWDER, FOR SOLUTION INTRAMUSCULAR; INTRAVENOUS at 22:29

## 2018-07-15 RX ADMIN — LORATADINE 10 MG: 10 TABLET ORAL at 08:18

## 2018-07-15 RX ADMIN — TAMSULOSIN HYDROCHLORIDE 0.4 MG: 0.4 CAPSULE ORAL at 08:17

## 2018-07-15 RX ADMIN — SERTRALINE HYDROCHLORIDE 100 MG: 100 TABLET ORAL at 08:18

## 2018-07-15 RX ADMIN — Medication 10 ML: at 06:14

## 2018-07-15 RX ADMIN — Medication 10 ML: at 22:29

## 2018-07-15 RX ADMIN — HEPARIN SODIUM 5000 UNITS: 5000 INJECTION, SOLUTION INTRAVENOUS; SUBCUTANEOUS at 08:18

## 2018-07-15 RX ADMIN — PANTOPRAZOLE SODIUM 40 MG: 40 TABLET, DELAYED RELEASE ORAL at 06:14

## 2018-07-15 RX ADMIN — METHYLPREDNISOLONE SODIUM SUCCINATE 40 MG: 40 INJECTION, POWDER, FOR SOLUTION INTRAMUSCULAR; INTRAVENOUS at 06:14

## 2018-07-15 RX ADMIN — CARVEDILOL 12.5 MG: 12.5 TABLET, FILM COATED ORAL at 08:18

## 2018-07-15 RX ADMIN — ASPIRIN 81 MG: 81 TABLET, COATED ORAL at 08:18

## 2018-07-15 RX ADMIN — SENNOSIDES 8.6 MG: 8.6 TABLET, FILM COATED ORAL at 08:17

## 2018-07-15 RX ADMIN — IPRATROPIUM BROMIDE AND ALBUTEROL SULFATE 3 ML: .5; 3 SOLUTION RESPIRATORY (INHALATION) at 00:04

## 2018-07-15 RX ADMIN — IOPAMIDOL 100 ML: 755 INJECTION, SOLUTION INTRAVENOUS at 17:47

## 2018-07-15 RX ADMIN — Medication 10 ML: at 17:48

## 2018-07-15 RX ADMIN — SODIUM CHLORIDE 100 ML: 900 INJECTION, SOLUTION INTRAVENOUS at 17:47

## 2018-07-15 NOTE — PROGRESS NOTES
Patient feels SOB. Lung sounds wheezing. MD called. Albuterol nebulizer ordered. Respiratory therapist notified.

## 2018-07-15 NOTE — PROGRESS NOTES
Received report from [place], [address] [place] [address]. Patient awake in bed. RR present and chest expansion symmetrical.  S1 and S2 noted. NSR HR 84 per monitor room. Right radial pulse palpable. Left arm wrapped in compression up to axillary. Pedal pulses palpable bilaterally. Swelling noted in bilateral lower extremities and upper left extremity. Redness noted on left lower extremity. Left sided weakness present. Patient states this is not a new finding. Bowel sounds active. No complaints at this time. Bed low, locked, and side rails x3. Call light within reach. Will continue to monitor.

## 2018-07-15 NOTE — PROGRESS NOTES
Wife called nurse. Wife concerned about patient not received phenytoin tonight. MD paged. MD ordered phenytoin. Wife made aware. Will continue to monitor patient.

## 2018-07-15 NOTE — PROGRESS NOTES
Floor made aware patient needs 20g IV in [facility] or higher for CT PE scan. Please call when iv has been placed.

## 2018-07-15 NOTE — PROGRESS NOTES
Oxygen Qualifier       Room air: SpO2 with O2 and liter flow   Resting SpO2  88%  94% on 4L   Ambulating SpO2         Completed by:    Huey Fan, RT

## 2018-07-15 NOTE — PROGRESS NOTES
Pt resting in bed, listening to tape. Alert to person, place, time. On 2L nc, coarse lung sounds with scattered wheezing. Non productive cough. NSR on remote tele, hr 76. L arm and L leg in compression sleeves. 3+ edema to BLE.

## 2018-07-15 NOTE — PROGRESS NOTES
Hospitalist Progress Note Admit Date:  2018 11:38 AM  
Name:  Christy Gatica Age:  78 y.o. 
:  1950 MRN:  [id number] PCP:  Angeline Moya MD 
Treatment Team: Attending Provider: Destiny Rose MD 
 
Subjective:  
From h&p- 
76 yr old male pt with multiple cva- left hemiplegia- mostly in wheel chair ,can walk a short distance with davis walker, gerd,htn,chronic nicotine dependence,depression,hyperlipidemia,seizure disorder,[facility], ---- Pt says he has chronic cough, noticed that this am he was more sob(says from time time gets sob- uses nebulization at home),not improved on breathing treatments(says never was given a diagnosis of copd- had 4 cigarettes)--- EMS was called- as per er note pt was tachypnea,hypoxic , in respiratory distress- pt was put on non-rebreather, In er was initially started in bipap and changed to nasal cannula on 2 lit/min. 
 cxr- normal,Labs normal. 
ekg- though computer read it as afib-- ekg looks sinus with pacs,spoke with - agreed with sinus with pacs. Repeat EKG ordered. Pt is being admitted for hypoxic resp failure sec to copd exa. 
 
7/15/18 Pt sitting in chair - says hsi breathing is better- still requiring oxygen. Objective:  
Patient Vitals for the past 24 hrs: 
 Temp Pulse Resp BP SpO2  
07/15/18 1120 97.6 °F (36.4 °C) 76 18 112/69 93 % 07/15/18 0826 98.3 °F (36.8 °C) 83 18 139/85 95 % 07/15/18 0309 98.2 °F (36.8 °C) 87 17 139/69 94 % 07/15/18 0005 - - - - 93 % 18 2332 - - - 152/82 -  
18 2313 98.3 °F (36.8 °C) 86 18 (!) 153/101 93 % 18 1928 98.1 °F (36.7 °C) 84 17 142/86 90 % 18 1713 98.2 °F (36.8 °C) 84 20 150/80 94 % Oxygen Therapy O2 Sat (%): 93 % (07/15/18 1120) Pulse via Oximetry: 72 beats per minute (07/15/18 0005) O2 Device: Nasal cannula (07/15/18 0005) O2 Flow Rate (L/min): 2 l/min (07/15/18 0005) Intake/Output Summary (Last 24 hours) at 07/15/18 1406 Last data filed at 07/15/18 5325 
 Gross per 24 hour Intake              240 ml Output                0 ml Net              240 ml General:    Well nourished. Alert. Improved respiratory distress- on 3-4 lit of oxygen /min. HEENT- rt side of the mouth pulled up sec to the stroke CV:   RRR. No murmur, rub, or gallop. Lungs:   Bilateral wheeing- mild, coarse breath sounds. Peggyann Gang Abdomen:   Soft, nontender, nondistended. Cns- left hemiplegia. Normal power and range of movements rt upper and lower ext. Extremities: Warm and dry. No cyanosis or edema. Skin:     No rashes or jaundice. Data Review: 
I have reviewed all labs, meds, telemetry events, and studies from the last 24 hours. Recent Results (from the past 24 hour(s)) METABOLIC PANEL, BASIC Collection Time: 07/15/18  6:06 AM  
Result Value Ref Range Sodium 144 136 - 145 mmol/L Potassium 4.0 3.5 - 5.1 mmol/L Chloride 107 98 - 107 mmol/L  
 CO2 31 21 - 32 mmol/L Anion gap 6 (L) 7 - 16 mmol/L Glucose 118 (H) 65 - 100 mg/dL BUN 7 (L) 8 - 23 MG/DL Creatinine 0.60 (L) 0.8 - 1.5 MG/DL  
 GFR est AA >60 >60 ml/min/1.73m2 GFR est non-AA >60 >60 ml/min/1.73m2 Calcium 8.3 8.3 - 10.4 MG/DL All Micro Results None Current Meds: 
Current Facility-Administered Medications Medication Dose Route Frequency  nicotine (NICODERM CQ) 21 mg/24 hr patch 1 Patch  1 Patch TransDERmal DAILY  aspirin delayed-release tablet 81 mg  81 mg Oral DAILY  carvedilol (COREG) tablet 12.5 mg  12.5 mg Oral BID WITH MEALS  cholecalciferol (VITAMIN D3) tablet 1,000 Units  1,000 Units Oral DAILY  clopidogrel (PLAVIX) tablet 75 mg  75 mg Oral DAILY  docusate sodium (COLACE) capsule 100 mg  100 mg Oral DAILY PRN  
 lisinopril (PRINIVIL, ZESTRIL) tablet 40 mg  40 mg Oral DAILY  loratadine (CLARITIN) tablet 10 mg  10 mg Oral DAILY  pantoprazole (PROTONIX) tablet 40 mg  40 mg Oral ACB  rosuvastatin (CRESTOR) tablet 40 mg  40 mg Oral QHS  
 senna (SENOKOT) tablet 8.6 mg  1 Tab Oral DAILY  sertraline (ZOLOFT) tablet 100 mg  100 mg Oral DAILY  tamsulosin (FLOMAX) capsule 0.4 mg  0.4 mg Oral DAILY  sodium chloride (NS) flush 5-10 mL  5-10 mL IntraVENous Q8H  
 sodium chloride (NS) flush 5-10 mL  5-10 mL IntraVENous PRN  
 acetaminophen (TYLENOL) tablet 650 mg  650 mg Oral Q4H PRN  
 HYDROcodone-acetaminophen (NORCO) 5-325 mg per tablet 1 Tab  1 Tab Oral Q4H PRN  
 morphine injection 1 mg  1 mg IntraVENous Q4H PRN  
 heparin (porcine) injection 5,000 Units  5,000 Units SubCUTAneous Q8H  
 cloNIDine HCl (CATAPRES) tablet 0.1 mg  0.1 mg Oral Q6H PRN  
 enalaprilat (VASOTEC) injection 1.25 mg  1.25 mg IntraVENous Q6H PRN  
 methylPREDNISolone (PF) (SOLU-MEDROL) injection 40 mg  40 mg IntraVENous Q8H  
 cefTRIAXone (ROCEPHIN) 1 g in 0.9% sodium chloride (MBP/ADV) 50 mL  1 g IntraVENous Q24H  
 albuterol-ipratropium (DUO-NEB) 2.5 MG-0.5 MG/3 ML  3 mL Nebulization Q4H PRN  phenytoin ER (DILANTIN ER) ER capsule 300 mg  300 mg Oral BID Other Studies (last 24 hours): No results found. Assessment and Plan:  
 
Hospital Problems as of 7/15/2018  Date Reviewed: 9/5/2017 Codes Class Noted - Resolved POA Acute respiratory failure with hypoxia [facility]) ICD-10-CM: J96.01 
ICD-9-CM: 518.81  7/15/2018 - Present Unknown * (Principal)Acute respiratory failure with hypoxemia ([facility] ICD-10-CM: J96.01 
ICD-9-CM: 518.81  7/14/2018 - Present Yes COPD exacerbation ([facility] ICD-10-CM: J44.1 ICD-9-CM: 491.21  7/14/2018 - Present Yes Essential hypertension ICD-10-CM: I10 
ICD-9-CM: 401.9  7/14/2018 - Present Yes Hyperlipidemia ICD-10-CM: E78.5 ICD-9-CM: 272.4  7/14/2018 - Present Yes Left hemiplegia ([facility] ICD-10-CM: G81.94 
ICD-9-CM: 342.90  7/14/2018 - Present Yes  
   
 CVA (cerebral vascular accident) ([facility] ICD-10-CM: I63.9 ICD-9-CM: 434.91  7/14/2018 - Present Yes  Overview Signed 7/14/2018  3:17 PM by Inés Ruby MD  
  Multiple with left hemiplegia. Depression ICD-10-CM: F32.9 ICD-9-CM: 865  7/14/2018 - Present Yes Seizure ([address] ICD-10-CM: R56.9 ICD-9-CM: 780.39  7/14/2018 - Present Yes Dependence on nicotine from cigarettes ICD-10-CM: F17.210 ICD-9-CM: 305.1  7/14/2018 - Present Yes  
   
 BPH (benign prostatic hyperplasia) ICD-10-CM: N40.0 ICD-9-CM: 600.00  7/14/2018 - Present Yes GERD (gastroesophageal reflux disease) ICD-10-CM: K21.9 ICD-9-CM: 530.81  7/14/2018 - Present Yes PLAN:   
Acute hypoxic resp failure- still requiring oxygen 3- 4 lit/min Copd exa- improving - cont iv steroids, duonebs. cva with left hemiplegia. htn- improving. Hld. Seizure Nicotine dependence- did not want nicotine patch. DC planning/Dispo:  24-48 hrs DVT ppx:   
 
Signed: 
Inés Ruby MD

## 2018-07-15 NOTE — PROGRESS NOTES
Pt sitting in bedside chair. Respirations even and unlabored on 3L nc. Family at bedside.  Call bell in reach

## 2018-07-15 NOTE — PROGRESS NOTES
Patient resting in bed. RR present and chest expansion symmetrical.  Bed low, locked, and side rails x3. Call light within reach. Gave report to oncoming RN.

## 2018-07-15 NOTE — PROGRESS NOTES
CT completed. Pt sitting up in bedside chair. Wife at bedside. SR on remote tele hr 73.  Call bell in reach

## 2018-07-16 ENCOUNTER — HOME HEALTH ADMISSION (OUTPATIENT)
Dept: HOME HEALTH SERVICES | Facility: HOME HEALTH | Age: 68
End: 2018-07-16

## 2018-07-16 VITALS
SYSTOLIC BLOOD PRESSURE: 166 MMHG | OXYGEN SATURATION: 94 % | RESPIRATION RATE: 20 BRPM | TEMPERATURE: 98.1 F | DIASTOLIC BLOOD PRESSURE: 93 MMHG | HEART RATE: 86 BPM

## 2018-07-16 PROBLEM — J43.9 EMPHYSEMA LUNG (HCC): Status: ACTIVE | Noted: 2018-07-16

## 2018-07-16 LAB
ATRIAL RATE: 66 BPM
ATRIAL RATE: 80 BPM
CALCULATED P AXIS, ECG09: 70 DEGREES
CALCULATED R AXIS, ECG10: 54 DEGREES
CALCULATED R AXIS, ECG10: 70 DEGREES
CALCULATED T AXIS, ECG11: 72 DEGREES
CALCULATED T AXIS, ECG11: 79 DEGREES
DIAGNOSIS, 93000: NORMAL
DIAGNOSIS, 93000: NORMAL
P-R INTERVAL, ECG05: 156 MS
Q-T INTERVAL, ECG07: 352 MS
Q-T INTERVAL, ECG07: 412 MS
QRS DURATION, ECG06: 88 MS
QRS DURATION, ECG06: 94 MS
QTC CALCULATION (BEZET), ECG08: 440 MS
QTC CALCULATION (BEZET), ECG08: 475 MS
VENTRICULAR RATE, ECG03: 80 BPM
VENTRICULAR RATE, ECG03: 94 BPM

## 2018-07-16 PROCEDURE — 74011250637 HC RX REV CODE- 250/637: Performed by: HOSPITALIST

## 2018-07-16 PROCEDURE — 97161 PT EVAL LOW COMPLEX 20 MIN: CPT

## 2018-07-16 PROCEDURE — 77010033678 HC OXYGEN DAILY

## 2018-07-16 PROCEDURE — 74011250637 HC RX REV CODE- 250/637: Performed by: FAMILY MEDICINE

## 2018-07-16 PROCEDURE — 99223 1ST HOSP IP/OBS HIGH 75: CPT | Performed by: INTERNAL MEDICINE

## 2018-07-16 PROCEDURE — 74011000250 HC RX REV CODE- 250: Performed by: FAMILY MEDICINE

## 2018-07-16 PROCEDURE — 94761 N-INVAS EAR/PLS OXIMETRY MLT: CPT

## 2018-07-16 PROCEDURE — 94640 AIRWAY INHALATION TREATMENT: CPT

## 2018-07-16 PROCEDURE — 74011250636 HC RX REV CODE- 250/636: Performed by: FAMILY MEDICINE

## 2018-07-16 PROCEDURE — 94760 N-INVAS EAR/PLS OXIMETRY 1: CPT

## 2018-07-16 RX ORDER — METHYLPREDNISOLONE 4 MG/1
TABLET ORAL
Qty: 1 DOSE PACK | Refills: 0 | Status: SHIPPED | OUTPATIENT
Start: 2018-07-16 | End: 2019-02-26

## 2018-07-16 RX ORDER — CEFPODOXIME PROXETIL 200 MG/1
200 TABLET, FILM COATED ORAL 2 TIMES DAILY
Qty: 10 TAB | Refills: 0 | Status: SHIPPED | OUTPATIENT
Start: 2018-07-16 | End: 2019-02-26

## 2018-07-16 RX ORDER — BUDESONIDE 0.5 MG/2ML
500 INHALANT ORAL
Status: DISCONTINUED | OUTPATIENT
Start: 2018-07-16 | End: 2018-07-16 | Stop reason: HOSPADM

## 2018-07-16 RX ORDER — IPRATROPIUM BROMIDE AND ALBUTEROL SULFATE 2.5; .5 MG/3ML; MG/3ML
3 SOLUTION RESPIRATORY (INHALATION)
Status: DISCONTINUED | OUTPATIENT
Start: 2018-07-16 | End: 2018-07-16 | Stop reason: HOSPADM

## 2018-07-16 RX ORDER — ALBUTEROL SULFATE 90 UG/1
1 AEROSOL, METERED RESPIRATORY (INHALATION)
Qty: 1 INHALER | Refills: 12 | Status: SHIPPED | OUTPATIENT
Start: 2018-07-16

## 2018-07-16 RX ADMIN — BUDESONIDE 500 MCG: 0.5 INHALANT RESPIRATORY (INHALATION) at 08:47

## 2018-07-16 RX ADMIN — HEPARIN SODIUM 5000 UNITS: 5000 INJECTION, SOLUTION INTRAVENOUS; SUBCUTANEOUS at 09:23

## 2018-07-16 RX ADMIN — IPRATROPIUM BROMIDE AND ALBUTEROL SULFATE 3 ML: .5; 3 SOLUTION RESPIRATORY (INHALATION) at 10:37

## 2018-07-16 RX ADMIN — SENNOSIDES 8.6 MG: 8.6 TABLET, FILM COATED ORAL at 09:24

## 2018-07-16 RX ADMIN — HEPARIN SODIUM 5000 UNITS: 5000 INJECTION, SOLUTION INTRAVENOUS; SUBCUTANEOUS at 01:03

## 2018-07-16 RX ADMIN — VITAMIN D, TAB 1000IU (100/BT) 1000 UNITS: 25 TAB at 09:23

## 2018-07-16 RX ADMIN — LISINOPRIL 40 MG: 20 TABLET ORAL at 09:25

## 2018-07-16 RX ADMIN — METHYLPREDNISOLONE SODIUM SUCCINATE 40 MG: 40 INJECTION, POWDER, FOR SOLUTION INTRAMUSCULAR; INTRAVENOUS at 05:53

## 2018-07-16 RX ADMIN — LORATADINE 10 MG: 10 TABLET ORAL at 09:24

## 2018-07-16 RX ADMIN — PANTOPRAZOLE SODIUM 40 MG: 40 TABLET, DELAYED RELEASE ORAL at 05:53

## 2018-07-16 RX ADMIN — TAMSULOSIN HYDROCHLORIDE 0.4 MG: 0.4 CAPSULE ORAL at 09:24

## 2018-07-16 RX ADMIN — PHENYTOIN SODIUM 300 MG: 100 CAPSULE ORAL at 09:25

## 2018-07-16 RX ADMIN — Medication 10 ML: at 05:54

## 2018-07-16 RX ADMIN — CLOPIDOGREL BISULFATE 75 MG: 75 TABLET ORAL at 09:24

## 2018-07-16 RX ADMIN — SERTRALINE HYDROCHLORIDE 100 MG: 100 TABLET ORAL at 09:24

## 2018-07-16 RX ADMIN — ASPIRIN 81 MG: 81 TABLET, COATED ORAL at 09:24

## 2018-07-16 RX ADMIN — CARVEDILOL 12.5 MG: 12.5 TABLET, FILM COATED ORAL at 09:24

## 2018-07-16 RX ADMIN — IPRATROPIUM BROMIDE AND ALBUTEROL SULFATE 3 ML: .5; 3 SOLUTION RESPIRATORY (INHALATION) at 08:48

## 2018-07-16 NOTE — PROGRESS NOTES
Patient asleep in bed. RR present and chest expansion symmetrical.  Bed low, locked, and side rails x3. Call light within reach. Gave report to oncoming RN.

## 2018-07-16 NOTE — PROGRESS NOTES
Discharge instructions and prescriptions provided and explained to the pt. Med side effect sheet reviewed. Opportunity for questions provided. Pt leaving floor via wheelchair. Fam Melgar

## 2018-07-16 NOTE — DISCHARGE INSTRUCTIONS
Chronic Obstructive Pulmonary Disease (COPD): Care Instructions  Your Care Instructions    Chronic obstructive pulmonary disease (COPD) is a general term for a group of lung diseases, including emphysema and chronic bronchitis. People with COPD have decreased airflow in and out of the lungs, which makes it hard to breathe. The airways also can get clogged with thick mucus. Cigarette smoking is a major cause of COPD. Although there is no cure for COPD, you can slow its progress. Following your treatment plan and taking care of yourself can help you feel better and live longer. Follow-up care is a key part of your treatment and safety. Be sure to make and go to all appointments, and call your doctor if you are having problems. It's also a good idea to know your test results and keep a list of the medicines you take. How can you care for yourself at home?   Staying healthy    · Do not smoke. This is the most important step you can take to prevent more damage to your lungs. If you need help quitting, talk to your doctor about stop-smoking programs and medicines. These can increase your chances of quitting for good.     · Avoid colds and flu. Get a pneumococcal vaccine shot. If you have had one before, ask your doctor whether you need a second dose. Get the flu vaccine every fall. If you must be around people with colds or the flu, wash your hands often.     · Avoid secondhand smoke, air pollution, and high altitudes. Also avoid cold, dry air and hot, humid air. Stay at home with your windows closed when air pollution is bad.    Medicines and oxygen therapy    · Take your medicines exactly as prescribed. Call your doctor if you think you are having a problem with your medicine.     · You may be taking medicines such as:  ¨ Bronchodilators. These help open your airways and make breathing easier. Bronchodilators are either short-acting (work for 6 to 9 hours) or long-acting (work for 24 hours).  You inhale most bronchodilators, so they start to act quickly. Always carry your quick-relief inhaler with you in case you need it while you are away from home. ¨ Corticosteroids (prednisone, budesonide). These reduce airway inflammation. They come in pill or inhaled form. You must take these medicines every day for them to work well.     · A spacer may help you get more inhaled medicine to your lungs. Ask your doctor or pharmacist if a spacer is right for you. If it is, ask how to use it properly.     · Do not take any vitamins, over-the-counter medicine, or herbal products without talking to your doctor first.     · If your doctor prescribed antibiotics, take them as directed. Do not stop taking them just because you feel better. You need to take the full course of antibiotics.     · Oxygen therapy boosts the amount of oxygen in your blood and helps you breathe easier. Use the flow rate your doctor has recommended, and do not change it without talking to your doctor first.   Activity    · Get regular exercise. Walking is an easy way to get exercise. Start out slowly, and walk a little more each day.     · Pay attention to your breathing. You are exercising too hard if you cannot talk while you are exercising.     · Take short rest breaks when doing household chores and other activities.     · Learn breathing methods-such as breathing through pursed lips-to help you become less short of breath.     · If your doctor has not set you up with a pulmonary rehabilitation program, talk to him or her about whether rehab is right for you. Rehab includes exercise programs, education about your disease and how to manage it, help with diet and other changes, and emotional support. Diet    · Eat regular, healthy meals. Use bronchodilators about 1 hour before you eat to make it easier to eat. Eat several small meals instead of three large ones.  Drink beverages at the end of the meal. Avoid foods that are hard to chew.     · Eat foods that contain protein so that you do not lose muscle mass.     · Talk with your doctor if you gain too much weight or if you lose weight without trying.    Mental health    · Talk to your family, friends, or a therapist about your feelings. It is normal to feel frightened, angry, hopeless, helpless, and even guilty. Talking openly about bad feelings can help you cope. If these feelings last, talk to your doctor. When should you call for help? Call 911 anytime you think you may need emergency care. For example, call if:    · You have severe trouble breathing.    Call your doctor now or seek immediate medical care if:    · You have new or worse trouble breathing.     · You cough up blood.     · You have a fever.    Watch closely for changes in your health, and be sure to contact your doctor if:    · You cough more deeply or more often, especially if you notice more mucus or a change in the color of your mucus.     · You have new or worse swelling in your legs or belly.     · You are not getting better as expected. Where can you learn more? Go to http://jose f-walter.info/. Bonnie Patel in the search box to learn more about \"Chronic Obstructive Pulmonary Disease (COPD): Care Instructions. \"  Current as of: December 6, 2017  Content Version: 11.7  © 2982-9120 [employer]. Care instructions adapted under license by [employer] (which disclaims liability or warranty for this information). If you have questions about a medical condition or this instruction, always ask your healthcare professional. [place] [address] any warranty or liability for your use of this information. Oxygen Therapy: Care Instructions  Your Care Instructions    Oxygen therapy helps you get more oxygen into your lungs and bloodstream. You may use it if you have a disease that makes it hard to breathe, such as COPD, pulmonary fibrosis (scarring of the lungs), or heart failure.  Oxygen therapy can make it easier for you to breathe and can reduce your heart's workload. Some people need extra oxygen all the time. Others need it from time to time throughout the day or overnight. A doctor will prescribe how much oxygen you need and how often to use it. To breathe the oxygen, most people use a nasal cannula (say \"SANGEETHA-natanaelh-kamilla\"). This is a thin tube with two prongs that fit just inside your nose. People who need a lot of oxygen may need to use a mask that fits over the nose and mouth. Follow-up care is a key part of your treatment and safety. Be sure to make and go to all appointments, and call your doctor if you are having problems. It's also a good idea to know your test results and keep a list of the medicines you take. How can you care for yourself at home? To help yourself  · Using oxygen may dry out your nose or lips. Use water-based lubricants on your lips or nostrils. Do not use an oil-based product like petroleum jelly. · If you use a nasal cannula, the tubing may rub under your nostrils and around your ears. To keep your skin from getting sore, tuck some gauze under the tubing. Use a water-based lotion on rubbed areas. · Do not use alcohol or take drugs that relax you, because they will slow your breathing rate. · Keep track of how much oxygen is in the tank, and reorder before it runs out. If a holiday is coming up or you expect bad weather, order in advance or make your regular order larger. · You may need extra oxygen when you travel to high altitudes or travel by plane. Ask your doctor about this. · If you are getting oxygen directly to your windpipe through an opening in your neck, your doctor will teach you how to care for the equipment. To make sure oxygen is flowing  · Check the flow by holding your mask or cannula up to your ear and listening for the \"hiss\" of airflow. · If you have a nasal cannula, dip the prongs in a glass of water.  If you see bubbles, oxygen is coming through. · Check your pressure gauge or contents indicator. · If you use an oxygen concentrator, make sure it is turned on and plugged in. If you use a cylinder, make sure the valve is open. · Look for kinks, blockages, or water in the tubing. Be sure the tubing is connected to the oxygen source. · Do not change your oxygen flow rate. Your doctor sets this at the correct level. Higher flow rates usually do not help and can increase the risk of harmful carbon dioxide buildup in the blood. To be safe  · Do not leave cords or tubing running across an area where you or someone else may trip on it. · Do not let oxygen containers get hot. Store them in a cool place where there is airflow. Do not leave them in a car trunk or a hot vehicle. · Keep oxygen containers upright. Make sure they do not fall over and get damaged. Try securing the tanks in a sturdy container or securing them with a rope or a chain. · Watch for signs of oxygen leaks. If you hear a loud hissing from your container or if it empties too fast, stay away from the container. Open windows right away and call the company that brought the oxygen system to your home. · Do not use oxygen around anything that could spark or easily cause a fire. ¨ Do not smoke or let others smoke while you are using oxygen. Put up \"no smoking\" signs in your home. ¨ Do not use oxygen near open flames, such as candles, fireplaces, gas stoves, or hot water heaters. Do not use it near electric razors, hair dryers, heating pads, or anything that may spark. ¨ Keep a working fire extinguisher in your home where it is easy to get to. ¨ If a fire starts, turn off the oxygen right away and leave the house. ¨ If you have an oxygen concentrator, do not use it if the cord looks damaged. Do not use an extension cord to plug it in. Do not plug it into an outlet that has other appliances plugged into it.   To care for the equipment  · Follow the directions that come with the equipment for using and caring for it. · Wash your cannula or mask with a liquid soap and warm water 1 or 2 times a week. Replace them every 2 to 4 weeks. · If you have a cold, change the nasal prongs when your cold symptoms are done. · If you have an oxygen concentrator, unplug the unit and wipe down the cabinet with a damp cloth daily. Clean the air filter at least 2 times a week. Where can you learn more? Go to http://jose f-walter.info/. Enter E117 in the search box to learn more about \"Oxygen Therapy: Care Instructions. \"  Current as of: December 6, 2017  Content Version: 11.7  © 8025-5597 [employer]. Care instructions adapted under license by [employer] (which disclaims liability or warranty for this information). If you have questions about a medical condition or this instruction, always ask your healthcare professional. [place] [address] any warranty or liability for your use of this information. DISCHARGE SUMMARY from Nurse    PATIENT INSTRUCTIONS:    After general anesthesia or intravenous sedation, for 24 hours or while taking prescription Narcotics:  · Limit your activities  · Do not drive and operate hazardous machinery  · Do not make important personal or business decisions  · Do  not drink alcoholic beverages  · If you have not urinated within 8 hours after discharge, please contact your surgeon on call. Report the following to your surgeon:  · Excessive pain, swelling, redness or odor of or around the surgical area  · Temperature over 100.5  · Nausea and vomiting lasting longer than 4 hours or if unable to take medications  · Any signs of decreased circulation or nerve impairment to extremity: change in color, persistent  numbness, tingling, coldness or increase pain  · Any questions    What to do at Home:    *  Please give a list of your current medications to your Primary Care Provider.     *  Please update this list whenever your medications are discontinued, doses are      changed, or new medications (including over-the-counter products) are added. *  Please carry medication information at all times in case of emergency situations. These are general instructions for a healthy lifestyle:    No smoking/ No tobacco products/ Avoid exposure to second hand smoke  Surgeon General's Warning:  Quitting smoking now greatly reduces serious risk to your health. Obesity, smoking, and sedentary lifestyle greatly increases your risk for illness    A healthy diet, regular physical exercise & weight monitoring are important for maintaining a healthy lifestyle    You may be retaining fluid if you have a history of heart failure or if you experience any of the following symptoms:  Weight gain of 3 pounds or more overnight or 5 pounds in a week, increased swelling in our hands or feet or shortness of breath while lying flat in bed. Please call your doctor as soon as you notice any of these symptoms; do not wait until your next office visit. Recognize signs and symptoms of STROKE:    F-face looks uneven    A-arms unable to move or move unevenly    S-speech slurred or non-existent    T-time-call 911 as soon as signs and symptoms begin-DO NOT go       Back to bed or wait to see if you get better-TIME IS BRAIN. Warning Signs of HEART ATTACK     Call 911 if you have these symptoms:   Chest discomfort. Most heart attacks involve discomfort in the center of the chest that lasts more than a few minutes, or that goes away and comes back. It can feel like uncomfortable pressure, squeezing, fullness, or pain.  Discomfort in other areas of the upper body. Symptoms can include pain or discomfort in one or both arms, the back, neck, jaw, or stomach.  Shortness of breath with or without chest discomfort.  Other signs may include breaking out in a cold sweat, nausea, or lightheadedness.   Don't wait more than five minutes to call 911 - MINUTES MATTER! Fast action can save your life. Calling 911 is almost always the fastest way to get lifesaving treatment. Emergency Medical Services staff can begin treatment when they arrive -- up to an hour sooner than if someone gets to the hospital by car. The discharge information has been reviewed with the patient. The patient verbalized understanding. Discharge medications reviewed with the patient and appropriate educational materials and side effects teaching were provided.   ___________________________________________________________________________________________________________________________________

## 2018-07-16 NOTE — CONSULTS
CONSULT NOTE    Melonie Roque    7/16/2018    Date of Admission:  7/14/2018    The patient's chart is reviewed and the patient is discussed with the staff. Subjective:     Patient is a 78 y.o.  male seen and evaluated at the request of Dr. Jazlyn Hirsch. He was brought in by EMS for respiratory distress and placed on BIPAP, had LE edema and was hypertensive on arrival.  Was able to be weaned to [place]. States has chronic cough but has never been diagnosed with COPD. Uses nebulizer at home but has still been short of breath. Chronic medical:  multiple cva- left hemiplegia- mostly in wheel chair, can walk a short distance with davis walker, GERD, HTN,  chronic nicotine dependence, depression, HLD, seizure disorder, f/u at [place]. Sitting up in chair at the bedside and states is much less short of breath and feels he can get in a deep breath. No significant sputum production. Wife at the bedside.     Review of Systems  A comprehensive review of systems was negative except for: Constitutional: positive for fatigue and malaise  Respiratory: positive for cough, wheezing or emphysema  Cardiovascular: positive for fatigue, lower extremity edema, dyspnea on exertion  Gastrointestinal: positive for reflux symptoms  Musculoskeletal: positive for muscle weakness and left side weakness  Neurological: positive for seizures, coordination problems, gait problems, weakness and Hx multiple CVA, left side weakness  Behvioral/Psych: positive for depression, obesity and tobacco use    Patient Active Problem List   Diagnosis Code    Acute respiratory failure with hypoxemia ([facility]) J96.01    COPD exacerbation ([facility]) J44.1    Essential hypertension I10    Hyperlipidemia E78.5    Left hemiplegia ([facility]) G81.94    CVA (cerebral vascular accident) ([facility] [address] I63.9    Depression F32.9    Seizure ([facility] [address] R56.9    Dependence on nicotine from cigarettes F17.210    BPH (benign prostatic hyperplasia) N40.0    GERD (gastroesophageal reflux disease) K21.9    Acute respiratory failure with hypoxia ([facility] [address] J96.01       Home DME company none. Prior to Admission Medications   Prescriptions Last Dose Informant Patient Reported? Taking? ALOE VERA/COLLAGEN (ALOE VESTA 2-N-1 CLEANSER EX)   Yes No   Sig: by Apply Externally route. MINERAL OIL/PETROLATUM,WHITE (CLIVE MOISTURE BARRIER EX)   Yes No   Sig: by Apply Externally route. Omeprazole delayed release (PRILOSEC D/R) 20 mg tablet   Yes No   Sig: Take 20 mg by mouth daily. acetaminophen (TYLENOL) 500 mg tablet   Yes No   Sig: Take  by mouth every six (6) hours as needed for Pain. aspirin delayed-release 81 mg tablet   Yes No   Sig: Take 81 mg by mouth.   b complex-vitamin c-folic acid 0.8 mg (NEPHRO-ALICIA) 0.8 mg tab tablet   Yes No   Sig: Take 1 Tab by mouth daily. carvedilol (COREG) 12.5 mg tablet   Yes No   Sig: Take 12.5 mg by mouth. cholecalciferol (VITAMIN D3) 1,000 unit cap   Yes No   Sig: Take 1,000 Units by mouth. clopidogrel (PLAVIX) 75 mg tab   Yes No   Sig: Take 75 mg by mouth. docusate sodium 100 mg/5 mL enem   Yes No   Sig: Insert  into rectum. fluticasone (FLOVENT DISKUS) 50 mcg/actuation inhaler   Yes No   Sig: by Nasal route. folic acid (FOLVITE) 1 mg tablet   Yes No   Sig: Take 1 mg by mouth.   guaiFENesin (ORGANIDIN) 400 mg tablet   Yes No   Sig: Take  by mouth every four (4) hours. lisinopril (PRINIVIL, ZESTRIL) 40 mg tablet   Yes No   Sig: Take 40 mg by mouth daily. loratadine (CLARITIN) 10 mg tablet   Yes No   Sig: Take 10 mg by mouth.   phenytoin ER (DILANTIN ER) 100 mg ER capsule   Yes No   Sig: Take  by mouth. rosuvastatin (CRESTOR) 40 mg tablet   Yes No   Sig: Take 40 mg by mouth nightly. senna (SENOKOT) 8.6 mg tablet   Yes No   Sig: Take 1 Tab by mouth daily. sertraline (ZOLOFT) 100 mg tablet   Yes No   Sig: Take  by mouth daily. tamsulosin (FLOMAX) 0.4 mg capsule   Yes No   Sig: Take 0.4 mg by mouth daily. Facility-Administered Medications: None       Past Medical History:   Diagnosis Date    Chronic obstructive pulmonary disease (HCC)     Dermatophytosis of nail     History of CVA (cerebrovascular accident)     History of paraplegia     Hypertension      No past surgical history on file. Social History     Social History    Marital status:      Spouse name: N/A    Number of children: N/A    Years of education: N/A     Occupational History    Not on file. Social History Main Topics    Smoking status: Never Smoker    Smokeless tobacco: Never Used    Alcohol use No    Drug use: Not on file    Sexual activity: Not on file     Other Topics Concern    Not on file     Social History Narrative     No family history on file.   Allergies   Allergen Reactions    Latex Rash    Adhesive Unknown (comments)    Sulfa (Sulfonamide Antibiotics) Other (comments)       Current Facility-Administered Medications   Medication Dose Route Frequency    albuterol-ipratropium (DUO-NEB) 2.5 MG-0.5 MG/3 ML  3 mL Nebulization Q4H RT    budesonide (PULMICORT) 500 mcg/2 ml nebulizer suspension  500 mcg Nebulization BID RT    nicotine (NICODERM CQ) 21 mg/24 hr patch 1 Patch  1 Patch TransDERmal DAILY    aspirin delayed-release tablet 81 mg  81 mg Oral DAILY    carvedilol (COREG) tablet 12.5 mg  12.5 mg Oral BID WITH MEALS    cholecalciferol (VITAMIN D3) tablet 1,000 Units  1,000 Units Oral DAILY    clopidogrel (PLAVIX) tablet 75 mg  75 mg Oral DAILY    docusate sodium (COLACE) capsule 100 mg  100 mg Oral DAILY PRN    lisinopril (PRINIVIL, ZESTRIL) tablet 40 mg  40 mg Oral DAILY    loratadine (CLARITIN) tablet 10 mg  10 mg Oral DAILY    pantoprazole (PROTONIX) tablet 40 mg  40 mg Oral ACB    rosuvastatin (CRESTOR) tablet 40 mg  40 mg Oral QHS    senna (SENOKOT) tablet 8.6 mg  1 Tab Oral DAILY    sertraline (ZOLOFT) tablet 100 mg  100 mg Oral DAILY    tamsulosin (FLOMAX) capsule 0.4 mg  0.4 mg Oral DAILY  sodium chloride (NS) flush 5-10 mL  5-10 mL IntraVENous Q8H    sodium chloride (NS) flush 5-10 mL  5-10 mL IntraVENous PRN    acetaminophen (TYLENOL) tablet 650 mg  650 mg Oral Q4H PRN    HYDROcodone-acetaminophen (NORCO) 5-325 mg per tablet 1 Tab  1 Tab Oral Q4H PRN    morphine injection 1 mg  1 mg IntraVENous Q4H PRN    heparin (porcine) injection 5,000 Units  5,000 Units SubCUTAneous Q8H    cloNIDine HCl (CATAPRES) tablet 0.1 mg  0.1 mg Oral Q6H PRN    enalaprilat (VASOTEC) injection 1.25 mg  1.25 mg IntraVENous Q6H PRN    methylPREDNISolone (PF) (SOLU-MEDROL) injection 40 mg  40 mg IntraVENous Q8H    cefTRIAXone (ROCEPHIN) 1 g in 0.9% sodium chloride (MBP/ADV) 50 mL  1 g IntraVENous Q24H    phenytoin ER (DILANTIN ER) ER capsule 300 mg  300 mg Oral BID         Objective:     Vitals:    07/16/18 0407 07/16/18 0744 07/16/18 0848 07/16/18 0900   BP: 164/80 (!) 166/93     Pulse: 85 86     Resp: 18 20     Temp: 98 °F (36.7 °C) 98.1 °F (36.7 °C)     SpO2: 95% 95% 92% (!) 88%       PHYSICAL EXAM     Constitutional:  the patient isobese and in no acute distress, NC 3L, sat 92%  EENMT:  Sclera clear, pupils equal, oral mucosa moist  Respiratory: diminished in bases, few anterior crackles, no wheezing  Cardiovascular:  RRR without M,G,R  Gastrointestinal: soft and non-tender; with positive bowel sounds. Musculoskeletal: warm without cyanosis. There is trace lower leg edema and left arm /hand edema. Skin:  no jaundice or rashes, no wounds   Neurologic: no gross neuro deficits     Psychiatric:  alert and oriented x 3    CXR:  None today    Chest CT 7/15/18:    1. No CT evidence of acute pulmonary embolism. 2. Bilateral pulmonary emphysema changes. 3. Benign-appearing bilateral bronchial wall thickening, basilar predominant.       CXR 7/14/18:         Recent Labs      07/14/18   1145   WBC  11.7*   HGB  17.0   HCT  47.5   PLT  196     Recent Labs      07/15/18   0606  07/14/18   1145   NA  144  144   K  4.0 4.0   CL  107  107   GLU  118*  124*   CO2  31  27   BUN  7*  7*   CREA  0.60*  0.62*   CA  8.3  8.6   ALB   --   3.7   TBILI   --   0.3   ALT   --   31   SGOT   --   18     Recent Labs      07/14/18   1153   PH  7.39   PCO2  43   PO2  123*   HCO3  25     No results for input(s): LCAD, LAC in the last 72 hours. Assessment:  (Medical Decision Making)     Hospital Problems  Date Reviewed: 9/5/2017          Codes Class Noted POA    Acute respiratory failure with hypoxia [facility]) ICD-10-CM: J96.01  ICD-9-CM: 518.81  7/15/2018 Unknown    Sat acceptable on NC    * (Principal)Acute respiratory failure with hypoxemia (HCC) ICD-10-CM: J96.01  ICD-9-CM: 518.81  7/14/2018 Yes    Sat 92%    COPD exacerbation (HCC) ICD-10-CM: J44.1  ICD-9-CM: 491.21  7/14/2018 Yes    No wheezing    Essential hypertension ICD-10-CM: I10  ICD-9-CM: 401.9  7/14/2018 Yes    Chronic--SBP 160s    Hyperlipidemia ICD-10-CM: E78.5  ICD-9-CM: 272.4  7/14/2018 Yes    chronic    Left hemiplegia ([facility] [address] ICD-10-CM: G81.94  ICD-9-CM: 342.90  7/14/2018 Yes    chronic    CVA (cerebral vascular accident) [facility]) ICD-10-CM: I63.9  ICD-9-CM: 434.91  7/14/2018 Yes     Multiple with left hemiplegia.          chronic    Depression ICD-10-CM: F32.9  ICD-9-CM: 401  7/14/2018 Yes    chronic    Seizure ([facility] [address] ICD-10-CM: R56.9  ICD-9-CM: 780.39  7/14/2018 Yes    Chronic--on Dilantin    Dependence on nicotine from cigarettes ICD-10-CM: F17.210  ICD-9-CM: 305.1  7/14/2018 Yes    Strongly encouraged smoking cessation--long discussion with wife and patient    BPH (benign prostatic hyperplasia) ICD-10-CM: N40.0  ICD-9-CM: 600.00  7/14/2018 Yes    chronic    GERD (gastroesophageal reflux disease) ICD-10-CM: K21.9  ICD-9-CM: 530.81  7/14/2018 Yes    Chronic--no complaints          Plan:  (Medical Decision Making)     --Duoneb, Pulmicort, Claritin  --Rocephin day 3  --Solu Medrol 40mg m5w--wpdo, no wheezing  --Check RA sats--is not on home O2  --Smoking cessation strongly stressed    More than 50% of the time documented was spent in face-to-face contact with the patient and in the care of the patient on the floor/unit where the patient is located. Thank you very much for this referral.  We appreciate the opportunity to participate in this patient's care. Will follow along with above stated plan. Sandy Gonzalez NP        Lungs:  Decreased, no wheeze  Heart:  RRR with no Murmur/Rubs/Gallops    Additional Comments:    Patient with ongoing acute hypoxic respiratory failure. Requiring 3L at rest and 4L with exertion. Cont current meds. Would likely benefit from LABA/ICS at discharge such as breo, advair, or symbicort as he is only on albuterol nebs at present. Family interested in home PT and home health. Will consult PT and case management. F/u with [facility] in 1-2 weeks after discharge if able. I have spoken with and examined the patient. I agree with the above assessment and plan as documented.     Princess Mchugh MD

## 2018-07-16 NOTE — CONSULTS
All ECGs reviewed and show SR and occasional PACs. There is no atrial fibrillation seen on monitor or ECGs.       Chely Whitlock MD

## 2018-07-16 NOTE — PROGRESS NOTES
Oxygen Qualifier       Room air: SpO2 with O2 and liter flow   Resting SpO2  88%  96% on 3L   Ambulating SpO2  85% 94% on 3l     Patient is very unstable on his feet. He could only walk from the chair to his bed sat was 94% on 3l.   Completed by:    Mariana Montoya, RT

## 2018-07-16 NOTE — PHYSICIAN ADVISORY
Letter of Determination: Upgrade from Observation to Inpatient Status    This patient was originally hospitalized as Outpatient Status with Observation Services on 7/14/2018 for hypoxic respiratory failure. This patient now meets for Inpatient Admission based on Medical necessity. The patient's stay was medically necessary based on vital signs significant for oxygen saturation of 88% on 2 liters oxygen by nasal canula. It is our recommendation that this patient's hospitalization status should be upgraded from OBSERVATION to INPATIENT status.      The final decision regarding the patient's hospitalization status depends on the attending physician's judgement.     Lorriane Hodgkin MD, GIL,   Physician [place].

## 2018-07-16 NOTE — PROGRESS NOTES
Problem: Mobility Impaired (Adult and Pediatric)  Goal: *Acute Goals and Plan of Care (Insert Text)  LTG:  (1.)Mr. Clemente Allred will move from supine to sit and sit to supine, scoot up and down and roll side to side INDEPENDENTLY with bed flat within 7 treatment day(s). (2.)Mr. Clemente Allred will transfer from bed to chair and chair to bed with MODIFIED INDEPENDENCE using the least restrictive device within 7 treatment day(s). (3.)Mr. Clemente Allred will ambulate with SUPERVISION for 50 feet with the least restrictive device within 7 treatment day(s). ________________________________________________________________________________________________    PHYSICAL THERAPY: Initial Assessment, PM 7/16/2018  INPATIENT: Hospital Day: 3  Payor: [facility] [place] MED[facility]ARE / Plan: [place] / Product Type: Managed Care Medicare /      NAME/AGE/GENDER: Joe Ortiz is a 78 y.o. male   PRIMARY DIAGNOSIS: Acute respiratory failure with hypoxemia (HCC)  Acute respiratory failure with hypoxia (HCC)  Acute respiratory failure with hypoxia (HCC) Acute respiratory failure with hypoxemia (HCC) Acute respiratory failure with hypoxemia (HCC)        ICD-10: Treatment Diagnosis:    · Generalized Muscle Weakness (M62.81)  · Difficulty in walking, Not elsewhere classified (R26.2)  · Other abnormalities of gait and mobility (R26.89)   Precaution/Allergies:  Latex; Adhesive; and Sulfa (sulfonamide antibiotics)      ASSESSMENT:     Mr. Clemente Allred is a 78year old male admitted from home for acute respiratory failure with history of COPD and left hemiplegia from previous CVAs. He lives with wife and is mod I at baseline with either davis walker or power wheelchair. Presents sitting up in chair with family present. On 3L O2 which he needs to maintain sats in 90's per RT note. He does not have his left AFO present today however reports he is ambulatory for short distances at home without AFO. Performs sit-stand transfer with CGA-SBA. Ambulates 20' in room with davis walker and SBA. Has left hip hike and circumduction due to lack of AFO, however he appears to be safe and has no loss of balance or assist needed. Feels he is functioning slightly below baseline with overall strength due to 3 day hospital stay and decreased activity. We will plan to follow during admission to maximize strength and independence with mobility. Would benefit from home health PT at discharge. Discussed with SW. This section established at most recent assessment   PROBLEM LIST (Impairments causing functional limitations):  1. Decreased Strength  2. Decreased Transfer Abilities  3. Decreased Ambulation Ability/Technique  4. Decreased Balance  5. Decreased Activity Tolerance   INTERVENTIONS PLANNED: (Benefits and precautions of physical therapy have been discussed with the patient.)  1. Balance Exercise  2. Bed Mobility  3. Gait Training  4. Therapeutic Activites  5. Therapeutic Exercise/Strengthening  6. Transfer Training  7. Group Therapy     TREATMENT PLAN: Frequency/Duration: 3 times a week for duration of hospital stay  Rehabilitation Potential For Stated Goals: Good     RECOMMENDED REHABILITATION/EQUIPMENT: (at time of discharge pending progress): Due to the probability of continued deficits (see above) this patient will likely need continued skilled physical therapy after discharge.   Equipment:    None at this time              HISTORY:   History of Present Injury/Illness (Reason for Referral):  Per H&P, \"80 yr old male pt with multiple cva- left hemiplegia- mostly in wheel chair ,can walk a short distance with davis walker, gerd,htn,chronic nicotine dependence,depression,hyperlipidemia,seizure disorder,[place] [facility], ----  Pt says he has chronic cough, noticed that this am he was more sob(says from time time gets sob- uses nebulization at home),not improved on breathing treatments(says never was given a diagnosis of copd- had 4 cigarettes)--- EMS was called- as per er note pt was tachypnea,hypoxic , in respiratory distress- pt was put on non-rebreather, In er was initially started in bipap and changed to nasal cannula on 2 lit/min.     cxr- normal  Labs normal  ekg- though computer read it as afib-- ekg looks sinus with pacs,spoke with - agreed with sinus with pacs. Repeat EKG ordered.     Pt is being admitted for hypoxic resp failure sec to copd exa\"    Past Medical History/Comorbidities:   Mr. Skylar Escalona  has a past medical history of Chronic obstructive pulmonary disease ([facility] [address] Dermatophytosis of nail; History of CVA (cerebrovascular accident); History of paraplegia; and Hypertension. Mr. Skylar Escalona  has no past surgical history on file. Social History/Living Environment:   Home Environment: Apartment  # Steps to Enter: 0  One/Two Story Residence: One story  Living Alone: No  Support Systems: Spouse/Significant Other/Partner  Patient Expects to be Discharged to[de-identified] Apartment  Current DME Used/Available at Home: Wheelchair, power, Walker, Shower chair (AFO- left)  Tub or Shower Type: Tub/Shower combination  Prior Level of Function/Work/Activity:  Kenzie Monique lives with wife in single story home with 0 steps. Mod I with mobility using davis walker and power chair as needed due to history of CVA with residual left sided deficits. Denies previous home O2 use. Number of Personal Factors/Comorbidities that affect the Plan of Care: 3+: HIGH COMPLEXITY   EXAMINATION:   Most Recent Physical Functioning:   Gross Assessment:  AROM: Generally decreased, functional  Strength: Generally decreased, functional  Coordination: Generally decreased, functional               Posture:  Posture (WDL): Exceptions to WDL  Posture Assessment:  Forward head, Rounded shoulders  Balance:  Sitting: Intact  Standing: Impaired  Standing - Static: Good  Standing - Dynamic : Fair (+) Bed Mobility:     Wheelchair Mobility:     Transfers:  Sit to Stand: Stand-by assistance  Stand to Sit: Stand-by assistance  Bed to Chair: Stand-by assistance  Gait:     Base of Support: Widened;Center of gravity altered  Speed/Jessica: Pace decreased (<100 feet/min)  Step Length: Left shortened;Right lengthened  Gait Abnormalities: Hip Hike;Circumduction; Step to gait  Distance (ft): 20 Feet (ft)  Assistive Device: Walker davis  Ambulation - Level of Assistance: Stand-by assistance  Interventions: Verbal cues; Safety awareness training; Tactile cues      Body Structures Involved:  1. Muscles Body Functions Affected:  1. Respiratory  2. Movement Related Activities and Participation Affected:  1. General Tasks and Demands  2. Mobility  3. Self Care  4. Domestic Life  5. Community, Social and [place]   Number of elements that affect the Plan of Care: 4+: HIGH COMPLEXITY   CLINICAL PRESENTATION:   Presentation: Stable and uncomplicated: LOW COMPLEXITY   CLINICAL DECISION MAKIN [facility] Inpatient Short Form  How much difficulty does the patient currently have. .. Unable A Lot A Little None   1. Turning over in bed (including adjusting bedclothes, sheets and blankets)? [] 1   [] 2   [] 3   [x] 4   2. Sitting down on and standing up from a chair with arms ( e.g., wheelchair, bedside commode, etc.)   [] 1   [] 2   [x] 3   [] 4   3. Moving from lying on back to sitting on the side of the bed? [] 1   [] 2   [] 3   [x] 4   How much help from another person does the patient currently need. .. Total A Lot A Little None   4. Moving to and from a bed to a chair (including a wheelchair)? [] 1   [] 2   [x] 3   [] 4   5. Need to walk in hospital room? [] 1   [] 2   [x] 3   [] 4   6. Climbing 3-5 steps with a railing? [] 1   [x] 2   [] 3   [] 4   © , Trustees of [address] [place] [address] [address] [address], under license to [email or web address].  All rights reserved      Score:  Initial: 19 Most Recent: X (Date: -- )    Interpretation of Tool:  Represents activities that are increasingly more difficult (i.e. Bed mobility, Transfers, Gait). Score 24 23 22-20 19-15 14-10 9-7 6     Modifier CH CI CJ CK CL CM CN      ? Mobility - Walking and Moving Around:     - CURRENT STATUS: CK - 40%-59% impaired, limited or restricted    - GOAL STATUS: CI - 1%-19% impaired, limited or restricted    - D/C STATUS:  ---------------To be determined---------------  Payor: [facility]ARE / [place] [employer] [place] / Product Type: Managed Care Medicare /      Medical Necessity:     · Patient demonstrates good rehab potential due to higher previous functional level. Reason for Services/Other Comments:  · Patient continues to demonstrate capacity to improve strength, mobility, balance, activity tolerance which will increase independence and increase safety. Use of outcome tool(s) and clinical judgement create a POC that gives a: Clear prediction of patient's progress: LOW COMPLEXITY            TREATMENT:   (In addition to Assessment/Re-Assessment sessions the following treatments were rendered)   Pre-treatment Symptoms/Complaints:  \"thank you\"  Pain: Initial:   Pain Intensity 1: 0  Post Session:  0/10     Assessment/Reassessment only, no treatment provided today    Braces/Orthotics/Lines/Etc:   · O2 Device: Nasal cannula  Treatment/Session Assessment:    · Response to Treatment:  Pt performs mobility with SBA in room and hallway  · Interdisciplinary Collaboration:   o Physical Therapist  o Registered Nurse  · After treatment position/precautions:   o Up in chair  o Bed/Chair-wheels locked  o Bed in low position  o Call light within reach  o Family at bedside  o  at bedside   · Compliance with Program/Exercises: compliant all of the time. · Recommendations/Intent for next treatment session: \"Next visit will focus on advancements to more challenging activities and reduction in assistance provided\".   Total Treatment Duration:  PT Patient Time In/Time Out  Time In: 1314  Time Out: [address] Darleen Beer, DPT

## 2018-07-16 NOTE — PROGRESS NOTES
Oxygen Qualifier       Room air: SpO2 with O2 and liter flow     Completed by:    Mariana Montoya, RT  Oxygen Qualifier       Room air: SpO2 with O2 and liter flow   Resting SpO2  89%  93% on3L   Ambulating SpO2  85% 92% on 4l     Patient very unstable on his feet he could only  Walk from his chair to the bed.  Sat  Ambulating on 4l was 92%  Completed by:    Mariana Montoya, RT

## 2018-07-16 NOTE — PROGRESS NOTES
[facility] - INPATIENT  Face to Face Encounter    Patients Name: Yosvany Verdin    YOB: 1950    Ordering Physician: Dr. Ivanna García    Primary Diagnosis: Acute respiratory failure with hypoxemia ([facility]  Acute respiratory failure with hypoxia ([facility]  Acute respiratory failure with hypoxia [facility])    Date of Face to Face:   7/16/2018                                  Face to Face Encounter findings are related to primary reason for home care:   yes. 1. I certify that the patient needs intermittent care as follows: skilled nursing care:  skilled observation/assessment, patient education  physical therapy: stretching/ROM  occupational therapy:  ROM    2. I certify that this patient is homebound, that is: 1) patient requires the use of a walker device, special transportation, or assistance of another to leave the home; or 2) patient's condition makes leaving the home medically contraindicated; and 3) patient has a normal inability to leave the home and leaving the home requires considerable and taxing effort. Patient may leave the home for infrequent and short duration for medical reasons, and occasional absences for non-medical reasons. Homebound status is due to the following functional limitations: Patient with strength deficits limiting the performance of all ADL's without caregiver assistance or the use of an assistive device. 3. I certify that this patient is under my care and that I, or a nurse practitioner or  [id number], or clinical nurse specialist, or certified nurse midwife, working with me, had a Face-to-Face Encounter that meets the physician Face-to-Face Encounter requirements.   The following are the clinical findings from the [address] [place] [address] encounter that support the need for skilled services and is a summary of the encounter: [facility] Records    See discharge summary      Kiet Trey  7/16/2018      THE FOLLOWING TO BE COMPLETED BY THE COMMUNITY PHYSICIAN:    I concur with the findings described above from the F2F encounter that this patient is homebound and in need of a skilled service.     Certifying Physician: _____________________________________      Printed Certifying Physician Name: _____________________________________    Date: _________________

## 2018-07-16 NOTE — PROGRESS NOTES
Initial visit to assess pt's spiritual needs. Ministry of presence & prayer to demonstrate caring & concern, convey emotional & spiritual support.     quiana Falcon MDiv,ThM,PhD

## 2018-07-16 NOTE — DISCHARGE SUMMARY
Hospitalist Discharge Summary     Admit Date:  2018 11:38 AM   Name:  Hellen Hand   Age:  78 y.o.  :  1950   MRN:  [id number]   PCP:  Alison Harris MD  Treatment Team: Attending Provider: Trace Lacey MD; Transitional Nurse Navigator: Danilo Henderson RN; Consulting Provider: Rabia Morales MD; Consulting Provider: Jerson Block MD    Problem List for this Hospitalization:  Hospital Problems as of 2018  Date Reviewed: 2017          Codes Class Noted - Resolved POA    Emphysema lung ([facility] ICD-10-CM: J43.9  ICD-9-CM: 492.8  2018 - Present Yes        Acute respiratory failure with hypoxia ([facility] ICD-10-CM: J96.01  ICD-9-CM: 518.81  7/15/2018 - Present Yes        * (Principal)Acute respiratory failure with hypoxemia ([facility] ICD-10-CM: J96.01  ICD-9-CM: 518.81  2018 - Present Yes        COPD exacerbation ([facility] ICD-10-CM: J44.1  ICD-9-CM: 491.21  2018 - Present Yes        Essential hypertension ICD-10-CM: I10  ICD-9-CM: 401.9  2018 - Present Yes        Hyperlipidemia ICD-10-CM: E78.5  ICD-9-CM: 272.4  2018 - Present Yes        Left hemiplegia ([facility] ICD-10-CM: G81.94  ICD-9-CM: 342.90  2018 - Present Yes        CVA (cerebral vascular accident) [facility]) ICD-10-CM: I63.9  ICD-9-CM: 434.91  2018 - Present Yes    Overview Signed 2018  3:17 PM by Trace Lacye MD     Multiple with left hemiplegia.              Depression ICD-10-CM: F32.9  ICD-9-CM: 056  2018 - Present Yes        Seizure ([facility] ICD-10-CM: R56.9  ICD-9-CM: 780.39  2018 - Present Yes        Dependence on nicotine from cigarettes ICD-10-CM: F17.210  ICD-9-CM: 305.1  2018 - Present Yes        BPH (benign prostatic hyperplasia) ICD-10-CM: N40.0  ICD-9-CM: 600.00  2018 - Present Yes        GERD (gastroesophageal reflux disease) ICD-10-CM: K21.9  ICD-9-CM: 530.81  2018 - Present Yes                Admission HPI from 2018:    78 yr old male pt with multiple cva- left hemiplegia- mostly in wheel chair ,can walk a short distance with davis walker, gerd,htn,chronic nicotine dependence,depression,hyperlipidemia,seizure disorder,[facility] ----  Pt says he has chronic cough, noticed that this am he was more sob(says from time time gets sob- uses nebulization at home),not improved on breathing treatments(says never was given a diagnosis of copd- had 4 cigarettes)--- EMS was called- as per er note pt was tachypnea,hypoxic , in respiratory distress- pt was put on non-rebreather, In er was initially started in bipap and changed to nasal cannula on 2 lit/min.     cxr- normal  Labs normal  ekg- though computer read it as afib-- ekg looks sinus with pacs,spoke with - agreed with sinus with pacs. Repeat EKG ordered. Hospital Course:  Continued on rocephin, duonebs and steroids. Ordered ct chest with contrast- no PE,bilateral emphysema. Pulmonary was consulted. pt wheezing improved. On at rest requiring 3 lit/min and at exertion 4 lit/min. Pt stable for discharge. Spoke to cardiology -said that the report of the ekg showing as afib was an error and it will changed. Follow up instructions below. Plan was discussed with patient and wife at bedside. All questions answered. Patient was stable at time of discharge and was instructed to call or return if there are any concerns or recurrence of symptoms. Diagnostic Imaging/Tests:   Ct Chest W Cont    Result Date: 7/15/2018  CT CHEST WITH IV CONTRAST FOR PULMONARY EMBOLISM HISTORY:   Hypoxia and COPD. Pulmonary embolism versus pulmonary fibrosis COMPARISON:  There are no recent previous comparable exams on this PACs system. TECHNIQUE:  Helical scans performed through the chest during IV bolus administration of Isovue 370, 100 cc , given to opacify the pulmonary arteries and allow detection of acute pulmonary embolism. Radiation dose reduction techniques were used for this study:   All CT scans performed at this facility use one or all of the following: Automated exposure control, adjustment of the mA and/or kVp according to patient's size, iterative reconstruction. FINDINGS:  There is good opacification of the pulmonary arteries. No definite focal filling defects identified to indicate acute pulmonary embolism. Aortic arch is negative for focal aneurysm or dissection. There are emphysema changes in both lungs. There is basilar predominant appearing bronchial wall thickening. There is minimal dependent lung atelectasis and minimal subpleural reticular nodular lung scarring. No evidence of diffuse interstitial lung disease. Negative for pleural or pericardial effusion. No lymphadenopathy in the chest. Coronary artery calcifications noted. Incidental cholelithiasis in the partially imaged upper abdomen. IMPRESSION:  1. No CT evidence of acute pulmonary embolism. 2. Bilateral pulmonary emphysema changes. 3. Benign-appearing bilateral bronchial wall thickening, basilar predominant. Xr Chest Port    Result Date: 7/14/2018  Chest portable CLINICAL INDICATION: Acute dyspnea with coughing, moderate to severe. History of COPD, hypertension, stroke. Long-term smoker. COMPARISON: None TECHNIQUE: single AP portable view chest at 11:50 AM upright FINDINGS:  There is no evidence of consolidation, pneumothorax, pleural effusion or pulmonary edema. There is no discrete mass. The mediastinal and hilar contours are normal given technique. Patient is slightly rotated. Lung volumes are well-inflated. IMPRESSION: No acute disease. Echocardiogram results:  No results found for this visit on 07/14/18.       All Micro Results     None          Labs: Results:       BMP, Mg, Phos Recent Labs      07/15/18   0606  07/14/18   1145   NA  144  144   K  4.0  4.0   CL  107  107   CO2  31  27   AGAP  6*  10   BUN  7*  7*   CREA  0.60*  0.62*   CA  8.3  8.6   GLU  118*  124*      CBC Recent Labs      07/14/18   1145   WBC  11.7*   RBC  5.21   HGB 17.0   HCT  47.5   PLT  196   GRANS  68   LYMPH  19   EOS  8*   MONOS  5   BASOS  0   IG  0   ANEU  8.0   ABL  2.3   HECTOR  0.9*   ABM  0.5   ABB  0.0   AIG  0.0      LFT Recent Labs      07/14/18   1145   SGOT  18   ALT  31   AP  131   TP  7.1   ALB  3.7   GLOB  3.4   AGRAT  1.1*      Cardiac Testing Lab Results   Component Value Date/Time    BNP 11 07/14/2018 11:45 AM      Coagulation Tests No results found for: PTP, INR, APTT   A1c No results found for: HBA1C, HGBE8, UQF8ANPP   Lipid Panel No results found for: CHOL, CHOLPOCT, CHOLX, CHLST, CHOLV, 136644, HDL, LDL, LDLC, DLDLP, 832010, VLDLC, VLDL, TGLX, TRIGL, TRIGP, TGLPOCT, CHHD, CHHDX   Thyroid Panel No results found for: T4, T3U, TSH, TSHEXT     Most Recent UA No results found for: COLOR, APPRN, REFSG, GLORIA, PROTU, GLUCU, KETU, BILU, BLDU, UROU, SAMAN, LEUKU     Allergies   Allergen Reactions    Latex Rash    Adhesive Unknown (comments)    Sulfa (Sulfonamide Antibiotics) Other (comments)       There is no immunization history on file for this patient. All Labs from Last 24 Hrs:  No results found for this or any previous visit (from the past 24 hour(s)).     Discharge Exam:  Patient Vitals for the past 24 hrs:   Temp Pulse Resp BP SpO2   07/16/18 1044 - - - - 94 %   07/16/18 0900 - - - - (!) 88 %   07/16/18 0848 - - - - 92 %   07/16/18 0744 98.1 °F (36.7 °C) 86 20 (!) 166/93 95 %   07/16/18 0407 98 °F (36.7 °C) 85 18 164/80 95 %   07/15/18 2259 98.5 °F (36.9 °C) 71 18 117/65 95 %   07/15/18 2009 98.6 °F (37 °C) 72 18 134/65 92 %   07/15/18 1527 97.4 °F (36.3 °C) 76 19 127/76 92 %     Oxygen Therapy  O2 Sat (%): 94 % (07/16/18 1044)  Pulse via Oximetry: 87 beats per minute (07/16/18 1044)  O2 Device: Nasal cannula (07/16/18 1334)  O2 Flow Rate (L/min): 3 l/min (07/16/18 1044)    Intake/Output Summary (Last 24 hours) at 07/16/18 1438  Last data filed at 07/16/18 0735   Gross per 24 hour   Intake              240 ml   Output                0 ml   Net 240 ml       General:    Well nourished. Alert. No distress. on oxygen  Eyes:   Normal sclera. Extraocular movements intact. ENT:  Normocephalic, atraumatic. Moist mucous membranes  CV:   Regular rate and rhythm. No murmur, rub, or gallop. Lungs:  Clear to auscultation bilaterally. No wheezing, rhonchi, or rales. Abdomen: Soft, nontender, nondistended. Bowel sounds normal.   Extremities: Warm and dry. No cyanosis or edema. Neurologic: CN II-XII grossly intact. Sensation intact. left hemiplegia  Skin:     No rashes or jaundice. Psych:  Normal mood and affect. Discharge Info:   Current Discharge Medication List      START taking these medications    Details   methylPREDNISolone (MEDROL DOSEPACK) 4 mg tablet Take as directed. Qty: 1 Dose Pack, Refills: 0      albuterol (PROVENTIL HFA, VENTOLIN HFA, PROAIR HFA) 90 mcg/actuation inhaler Take 1 Puff by inhalation every four (4) hours as needed for Wheezing. Qty: 1 Inhaler, Refills: 12      cefpodoxime (VANTIN) 200 mg tablet Take 1 Tab by mouth two (2) times a day. Qty: 10 Tab, Refills: 0      symbicort  2 puff bid. CONTINUE these medications which have NOT CHANGED    Details   acetaminophen (TYLENOL) 500 mg tablet Take  by mouth every six (6) hours as needed for Pain.      guaiFENesin (ORGANIDIN) 400 mg tablet Take  by mouth every four (4) hours. lisinopril (PRINIVIL, ZESTRIL) 40 mg tablet Take 40 mg by mouth daily. loratadine (CLARITIN) 10 mg tablet Take 10 mg by mouth.      b complex-vitamin c-folic acid 0.8 mg (NEPHRO-ALICIA) 0.8 mg tab tablet Take 1 Tab by mouth daily. Omeprazole delayed release (PRILOSEC D/R) 20 mg tablet Take 20 mg by mouth daily. phenytoin ER (DILANTIN ER) 100 mg ER capsule Take  by mouth. rosuvastatin (CRESTOR) 40 mg tablet Take 40 mg by mouth nightly. senna (SENOKOT) 8.6 mg tablet Take 1 Tab by mouth daily. sertraline (ZOLOFT) 100 mg tablet Take  by mouth daily.       tamsulosin (FLOMAX) 0.4 mg capsule Take 0.4 mg by mouth daily. ALOE VERA/COLLAGEN (ALOE VESTA 2-N-1 CLEANSER EX) by Apply Externally route. aspirin delayed-release 81 mg tablet Take 81 mg by mouth. MINERAL OIL/PETROLATUM,WHITE (CLIVE MOISTURE BARRIER EX) by Apply Externally route. carvedilol (COREG) 12.5 mg tablet Take 12.5 mg by mouth. cholecalciferol (VITAMIN D3) 1,000 unit cap Take 1,000 Units by mouth. clopidogrel (PLAVIX) 75 mg tab Take 75 mg by mouth. docusate sodium 100 mg/5 mL enem Insert  into rectum. fluticasone (FLOVENT DISKUS) 50 mcg/actuation inhaler by Nasal route. folic acid (FOLVITE) 1 mg tablet Take 1 mg by mouth. Disposition: home with home health  Activity: as per home health  Diet: DIET CARDIAC Regular    Follow-up Appointments   Procedures    FOLLOW UP VISIT Appointment in: One Week F/u with pcp in a week with cbc and bmp. F/u with pulmonary in 2 to 3 weeks. Advised smoking cessation. Pt is going home on oxygen- presently on 4 lit/min. F/u with pcp in a week with cbc and bmp. F/u with pulmonary in 1 to 2 weeks. Advised smoking cessation. Pt is going home on oxygen- presently on 4 lit/min. Standing Status:   Standing     Number of Occurrences:   1     Order Specific Question:   Appointment in     Answer: One Week         Follow-up Information     Follow up With Details Comments Contact Info    Raymundo Mesa MD In 1 week Office will call you with appt date and time. -[place] [id number]  [phone]      DANIEL Peck On 7/31/2018 12:30 PM [address]  [phone]            Time spent in patient discharge planning and coordination 25 minutes.     Signed:  Kathleen Jimenez MD

## 2018-07-16 NOTE — PROGRESS NOTES
Received report from [facility], [facility]. Patient awake in chair. RR present and chest expansion symmetrical.  S1 and S2 noted. Radial and pedal pulses palpable bilaterally. NSR HR 80 per monitor room. Bowel sounds active. Patient states last BM was two days ago, 07/13/18. Patient states he tries to have a BM everyday but he does not feel constipated. No complaints at this time. Family present in room. Chair wheels locked. Call light within reach. Will continue to monitor.

## 2018-07-16 NOTE — PROGRESS NOTES
CM met with patient/spouse [facility]) to discuss d/c plan. Patient alert and oriented x4. Patient / spouse [facility]) at bedside states they live in an apartment with no steps to enter into the home. States he needs assistance with ADL's and have several DME's in the home. Patient/spouse [facility]) interested in [facility]. Spouse stated that she's been working with the [place] to get assistance in the home. Stated they had assistance until last November. Stated that the program that [place] was contracted with ws a martha program and they lost funding for the program.  Spouse states she's working on trying to get patient all the assistance that he qualifies for. Patient will be d/c home today with [facility]. Care Management Interventions  PCP Verified by CM: Yes (Dr. Cloe Acharya)  Mode of Transport at Discharge:  Other (see comment) (Spouse (Hope)  Transition of Care Consult (CM Consult): Discharge Planning, [address] (Patient interested in [facility])  [address]: Yes  Discharge Durable Medical Equipment: No (Patient currently have DME's in the home such as power chair, AFO, valentina walker,  rollator )  Physical Therapy Consult: Yes  Occupational Therapy Consult: No  Speech Therapy Consult: No  Current Support Network: Lives with Spouse, Own Home  Confirm Follow Up Transport: Family (Spouse (Hope)  Plan discussed with Pt/Family/Caregiver: Yes  Freedom of Choice Offered: Yes  [address] Provided?: Yes (Spouse Hope have [facility] to get assistance in the home to assist with patient .)  Discharge Location  Discharge Placement: Home with home health

## 2018-07-17 ENCOUNTER — PATIENT OUTREACH (OUTPATIENT)
Dept: CASE MANAGEMENT | Age: 68
End: 2018-07-17

## 2018-07-17 NOTE — Clinical Note
Patient discharged 7/16/18. [facility]. Dx is COPD/Emphysema/Respiratory Failure. Hx of multiple CVA'a with left hemiparesis mostly in wheel chair ,can walk a short distance with davis walker, gerd,htn,chronic nicotinedependence,depression,hyperlipidemia,seizure disorder . I spoke with the spouse who said she cannot afford his medications and she is still waiting a phone call from [place] to approve them. Also she is trying to get extra help from [place] for a CAN to help with ADL's. She is his caregiver and is having a difficult time. She could also use some education on medications. HH was ordered at discharge but they can't see him because he is [place] and the [place] doc has to order . Damon Arenas asked me to send this one to you.  Thanks

## 2018-07-17 NOTE — PROGRESS NOTES
Received a call from Jannette Wise that [place] needed to be contacted in order for [facility]. Will make contact with [place].

## 2018-07-17 NOTE — PROGRESS NOTES
This note will not be viewable in [address] Transition of Care Discharge Follow-up Questionnaire   Date/Time of Call:   7/17/18 12:56pm   What was the patient hospitalized for? COPD/Emphysema/Respiratory Failure   Does the patient understand his/her diagnosis and/or treatment and what happened during the hospitalization? Spoke with patients spouse, Chad Moe, who was agreeable to EMILEE HDEZ call. Hope verbalized understanding of diagnosis and treatment plan. Reports patient is doing ok. Denies any new concerns or questions. Did the patient receive discharge instructions? Yes, patient received discharge instructions. CM Assessed Risk for Readmission:       Patient stated Risk for Readmission:      High risk for readmission due to complications from COPD and multiple comorbidities. Also has been delayed in getting medications that were ordered at [place]. Per patients spouse, problems with his breathing. Review any discharge instructions (see discharge instructions/AVS in [facility]). Ask patient if they understand these. Do they have any questions? Discharge instructions reviewed with Chad Abhi, who verbalized understanding of instructions and denied any questions. Focused on education, follow up and medication compliance. Were home services ordered (nursing, PT, OT, ST, etc.)? Yes, but per Mellisa in [facility] intake, they will not be able to see patient due to [facility] is PCP and order must come through [facility]. If so, has the first visit occurred? If not, why? (Assist with coordination of services if necessary.)   N/A   Was any DME ordered? No   If so, has it been received? If not, why?  (Assist patient in obtaining DME orders &/or equipment if necessary.) N/A   Complete a review of all medications (new, continued and discontinued meds per the D/C instructions and medication tab in [facility]). Medication review completed with Wendy, who verbalized understanding. Were all new prescriptions filled?   If not, why? (Assist patient in obtaining medications if necessary  escalate for CCM &/or SW if ongoing issues are verbalized by pt or anticipated)   New medications  have not been filled. Hope has called [facility] to get approval for meds through [place], but has not had a return a call. Spouse reports they do not have money to get prescriptions without [facility]. Patient does have coverage with [facility] as well but cannot afford copays. Does the patient understand the purpose and dosing instructions for all medications? (If patient has questions, provide explanation and education.)   Spouse had questions about purpose for several medications, education provided as requested, with understanding verbalized. Does the patient have any problems in performing ADLs? (If patient is unable to perform ADLs  what is the limiting factor(s)? Do they have a support system that can assist? If no support system is present, discuss possible assistance that they may be able to obtain. Escalate for CCM/SW if ongoing issues are verbalized by pt or anticipated)   Patient requires assistance with all ADLs, uses a power chair for outside and uses a cane or wheelchair in home. Spouse is the primary caregiver and reports she has tried to get assistance through [place] but has not been successful. Does the patient have all follow-up appointments scheduled? 7 day f/up with PCP?   (f/up with PCP may be w/in 14 days if patient has a f/up with their specialist w/in 7 days)    7-14 day f/up with specialist?   (or per discharge instructions)    If f/up has not been made  what actions has the care coordinator made to accomplish this? Has transportation been arranged? Patient has follow up appointment scheduled with the [facility] on 7/31/18. Spouse has called PCP today to schedule follow up and is awaiting a return call. Care Coordinator called x 3 to PCP for assistance with scheduling a follow up, without success.     Spouse transports patient to visits but reports it is a burden. Any other questions or concerns expressed by the patient? Discussed referral to CCM for help with community resources and medications, Hope was agreeable with referral.   Schedule next appointment with EMILEE HDEZ Coordinator or refer to RN Case Manager/ per the workflow guidelines. When is care coordinators next follow-up call scheduled? If referred for CCM  what RN care manager was the referral assigned? Patient will be referred to Chandler Helms RN, CCM. Patients spouse has CC contact information and advised to call for new concerns.    STEPHAN Call Completed By: Julio Latham, [address] [place] [address] Coordinator

## 2018-07-18 ENCOUNTER — PATIENT OUTREACH (OUTPATIENT)
Dept: CASE MANAGEMENT | Age: 68
End: 2018-07-18

## 2018-07-23 ENCOUNTER — PATIENT OUTREACH (OUTPATIENT)
Dept: CASE MANAGEMENT | Age: 68
End: 2018-07-23

## 2018-07-23 NOTE — PROGRESS NOTES
This note will not be viewable in [address] RNCM received call from pt's wife stating she hasn't received home [facility] services. Wife provided RNCM w/ contact info for pt's [facility] NP, JOSSE Pierson 505 called [facility] who stated she visited pt last wk in home, and believes pt does not have skilled need at this time, she doesn't plan to order. RNCM noted last IP SW note regarding attempt to contact [address]. RNCM called and spoke w/ IP CM Baltazar Quigley who stated she has left several messages w/ \"Jolene\" at [place] and hasn't received call back. Baltazar Quigley is out of office today, but will be returning tomorrow. RNCM called Johnell Mohs and was informed pt will be evaluated for [facility] w/in 20days per [place]. RNCM was instructed to call \"Tap Line\" to inquire about [facility] services which is different from \"[facility]\" services. RNCM spoke w/ Germán Xavier who took pt information and stated she would forward request for RN janina to pt's Provider. RNCM then called pt home and left vm  Message for pt/wife w/ above information, and RNCM contact information. Next Steps: Radha Rodriguez will continue attempts to reach pt.

## 2018-08-03 ENCOUNTER — PATIENT OUTREACH (OUTPATIENT)
Dept: CASE MANAGEMENT | Age: 68
End: 2018-08-03

## 2018-08-03 NOTE — PROGRESS NOTES
This note will not be viewable in [address] RNCM spoke w/ pt's wife regarding [facility] services. Wife reports pt has [address] [facility] services w/ NP, PT visting pt at home. They are keeping close watch on recovery of resp illness. She reports pt still wakes during night w/ occasional SOB, pt on 3ltrs O2. Pt has f/u CXR at [address] [facility] next wk. RNCM discussed s/s to report to physicians. Wife verbalizes understanding, expresses appreciation for support. Net Steps: RNCM scheduled f/u in 1wk. Wife has RNCM contact information for questions or concerns.

## 2018-08-13 ENCOUNTER — PATIENT OUTREACH (OUTPATIENT)
Dept: CASE MANAGEMENT | Age: 68
End: 2018-08-13

## 2018-08-13 NOTE — PROGRESS NOTES
This note will not be viewable in [address] RNCM attempted f/u CCM services, no answer. RNCM left vm message w/ contact information. Will continue attempts to reach pt.

## 2018-08-20 ENCOUNTER — PATIENT OUTREACH (OUTPATIENT)
Dept: CASE MANAGEMENT | Age: 68
End: 2018-08-20

## 2018-08-20 NOTE — PROGRESS NOTES
This note will not be viewable in [address] RNCM attempted to reach pt regarding f/u CCM services, no answer. RNCM left vm message w/ contact information. Will continue attempts to reach pt.

## 2018-09-18 ENCOUNTER — PATIENT OUTREACH (OUTPATIENT)
Dept: CASE MANAGEMENT | Age: 68
End: 2018-09-18

## 2018-09-18 NOTE — PROGRESS NOTES
This note will not be viewable in [address] RNCM spoke w/ pt's wife regarding f/u CCM services. Wife reports pulm appt was cancelled since [facility]. He is currently on round of antibiotics and prednisone, though his previous chest xry per wife was clear. She reports he is compliant w/ nebulizer treatments, and has not smoked since dc from hosp 7/16/18. She reports no further needs at this time. RNCM will graduate pt w/ goals met. She has RNCM contact information for any future questions or concerns.

## 2018-12-27 ENCOUNTER — HOSPITAL ENCOUNTER (OUTPATIENT)
Dept: PHYSICAL THERAPY | Age: 68
End: 2018-12-27

## 2019-02-21 ENCOUNTER — HOSPITAL ENCOUNTER (INPATIENT)
Age: 69
LOS: 5 days | Discharge: HOME HEALTH | DRG: 871 | End: 2019-02-26
Attending: EMERGENCY MEDICINE | Admitting: INTERNAL MEDICINE
Payer: OTHER GOVERNMENT

## 2019-02-21 ENCOUNTER — APPOINTMENT (OUTPATIENT)
Dept: GENERAL RADIOLOGY | Age: 69
DRG: 871 | End: 2019-02-21
Attending: EMERGENCY MEDICINE
Payer: OTHER GOVERNMENT

## 2019-02-21 DIAGNOSIS — J44.1 ACUTE EXACERBATION OF CHRONIC OBSTRUCTIVE PULMONARY DISEASE (COPD) (HCC): ICD-10-CM

## 2019-02-21 DIAGNOSIS — A41.9 SEPSIS, DUE TO UNSPECIFIED ORGANISM: ICD-10-CM

## 2019-02-21 DIAGNOSIS — N40.0 BENIGN PROSTATIC HYPERPLASIA, UNSPECIFIED WHETHER LOWER URINARY TRACT SYMPTOMS PRESENT: ICD-10-CM

## 2019-02-21 DIAGNOSIS — I63.9 CEREBROVASCULAR ACCIDENT (CVA), UNSPECIFIED MECHANISM (HCC): ICD-10-CM

## 2019-02-21 DIAGNOSIS — L03.116 CELLULITIS OF LEFT LOWER EXTREMITY: ICD-10-CM

## 2019-02-21 DIAGNOSIS — J42 CHRONIC BRONCHITIS, UNSPECIFIED CHRONIC BRONCHITIS TYPE (HCC): ICD-10-CM

## 2019-02-21 DIAGNOSIS — J18.9 COMMUNITY ACQUIRED PNEUMONIA OF RIGHT LOWER LOBE OF LUNG: Primary | ICD-10-CM

## 2019-02-21 PROBLEM — L03.90 CELLULITIS: Status: ACTIVE | Noted: 2019-02-21

## 2019-02-21 LAB
ALBUMIN SERPL-MCNC: 3.8 G/DL (ref 3.2–4.6)
ALBUMIN/GLOB SERPL: 1.2 {RATIO} (ref 1.2–3.5)
ALP SERPL-CCNC: 126 U/L (ref 50–136)
ALT SERPL-CCNC: 50 U/L (ref 12–65)
ANION GAP SERPL CALC-SCNC: 8 MMOL/L (ref 7–16)
AST SERPL-CCNC: 31 U/L (ref 15–37)
BASOPHILS # BLD: 0 K/UL (ref 0–0.2)
BASOPHILS NFR BLD: 0 % (ref 0–2)
BILIRUB SERPL-MCNC: 0.3 MG/DL (ref 0.2–1.1)
BUN SERPL-MCNC: 17 MG/DL (ref 8–23)
CALCIUM SERPL-MCNC: 8.6 MG/DL (ref 8.3–10.4)
CHLORIDE SERPL-SCNC: 105 MMOL/L (ref 98–107)
CO2 SERPL-SCNC: 27 MMOL/L (ref 21–32)
CREAT SERPL-MCNC: 0.87 MG/DL (ref 0.8–1.5)
DIFFERENTIAL METHOD BLD: ABNORMAL
EOSINOPHIL # BLD: 0.2 K/UL (ref 0–0.8)
EOSINOPHIL NFR BLD: 1 % (ref 0.5–7.8)
ERYTHROCYTE [DISTWIDTH] IN BLOOD BY AUTOMATED COUNT: 12.1 % (ref 11.9–14.6)
GLOBULIN SER CALC-MCNC: 3.3 G/DL (ref 2.3–3.5)
GLUCOSE SERPL-MCNC: 141 MG/DL (ref 65–100)
HCT VFR BLD AUTO: 44.9 % (ref 41.1–50.3)
HGB BLD-MCNC: 15.3 G/DL (ref 13.6–17.2)
IMM GRANULOCYTES # BLD AUTO: 0.1 K/UL (ref 0–0.5)
IMM GRANULOCYTES NFR BLD AUTO: 1 % (ref 0–5)
LACTATE BLD-SCNC: 1.75 MMOL/L (ref 0.5–1.9)
LYMPHOCYTES # BLD: 1.2 K/UL (ref 0.5–4.6)
LYMPHOCYTES NFR BLD: 6 % (ref 13–44)
MCH RBC QN AUTO: 32.1 PG (ref 26.1–32.9)
MCHC RBC AUTO-ENTMCNC: 34.1 G/DL (ref 31.4–35)
MCV RBC AUTO: 94.3 FL (ref 79.6–97.8)
MONOCYTES # BLD: 0.8 K/UL (ref 0.1–1.3)
MONOCYTES NFR BLD: 4 % (ref 4–12)
NEUTS SEG # BLD: 17.3 K/UL (ref 1.7–8.2)
NEUTS SEG NFR BLD: 89 % (ref 43–78)
NRBC # BLD: 0 K/UL (ref 0–0.2)
PLATELET # BLD AUTO: 175 K/UL (ref 150–450)
PMV BLD AUTO: 10.1 FL (ref 9.4–12.3)
POTASSIUM SERPL-SCNC: 3.9 MMOL/L (ref 3.5–5.1)
PROT SERPL-MCNC: 7.1 G/DL (ref 6.3–8.2)
RBC # BLD AUTO: 4.76 M/UL (ref 4.23–5.6)
SODIUM SERPL-SCNC: 140 MMOL/L (ref 136–145)
WBC # BLD AUTO: 19.5 K/UL (ref 4.3–11.1)

## 2019-02-21 PROCEDURE — 96365 THER/PROPH/DIAG IV INF INIT: CPT | Performed by: EMERGENCY MEDICINE

## 2019-02-21 PROCEDURE — 85025 COMPLETE CBC W/AUTO DIFF WBC: CPT

## 2019-02-21 PROCEDURE — 74011250636 HC RX REV CODE- 250/636: Performed by: EMERGENCY MEDICINE

## 2019-02-21 PROCEDURE — 74011000250 HC RX REV CODE- 250: Performed by: INTERNAL MEDICINE

## 2019-02-21 PROCEDURE — 83605 ASSAY OF LACTIC ACID: CPT

## 2019-02-21 PROCEDURE — 87040 BLOOD CULTURE FOR BACTERIA: CPT

## 2019-02-21 PROCEDURE — 65270000029 HC RM PRIVATE

## 2019-02-21 PROCEDURE — 80053 COMPREHEN METABOLIC PANEL: CPT

## 2019-02-21 PROCEDURE — 96367 TX/PROPH/DG ADDL SEQ IV INF: CPT | Performed by: EMERGENCY MEDICINE

## 2019-02-21 PROCEDURE — 99285 EMERGENCY DEPT VISIT HI MDM: CPT | Performed by: EMERGENCY MEDICINE

## 2019-02-21 PROCEDURE — 77030027138 HC INCENT SPIROMETER -A

## 2019-02-21 PROCEDURE — 74011000258 HC RX REV CODE- 258: Performed by: EMERGENCY MEDICINE

## 2019-02-21 PROCEDURE — 71046 X-RAY EXAM CHEST 2 VIEWS: CPT

## 2019-02-21 RX ORDER — LORATADINE 10 MG/1
10 TABLET ORAL DAILY
Status: DISCONTINUED | OUTPATIENT
Start: 2019-02-22 | End: 2019-02-26 | Stop reason: HOSPADM

## 2019-02-21 RX ORDER — HYDROCODONE BITARTRATE AND ACETAMINOPHEN 5; 325 MG/1; MG/1
1 TABLET ORAL
Status: DISCONTINUED | OUTPATIENT
Start: 2019-02-21 | End: 2019-02-26 | Stop reason: HOSPADM

## 2019-02-21 RX ORDER — ACETAMINOPHEN 325 MG/1
650 TABLET ORAL
Status: DISCONTINUED | OUTPATIENT
Start: 2019-02-21 | End: 2019-02-26 | Stop reason: HOSPADM

## 2019-02-21 RX ORDER — FACIAL-BODY WIPES
10 EACH TOPICAL DAILY PRN
Status: DISCONTINUED | OUTPATIENT
Start: 2019-02-21 | End: 2019-02-26 | Stop reason: HOSPADM

## 2019-02-21 RX ORDER — ROSUVASTATIN CALCIUM 20 MG/1
40 TABLET, COATED ORAL
Status: DISCONTINUED | OUTPATIENT
Start: 2019-02-21 | End: 2019-02-26 | Stop reason: HOSPADM

## 2019-02-21 RX ORDER — LISINOPRIL 20 MG/1
40 TABLET ORAL DAILY
Status: DISCONTINUED | OUTPATIENT
Start: 2019-02-22 | End: 2019-02-26 | Stop reason: HOSPADM

## 2019-02-21 RX ORDER — SODIUM CHLORIDE 9 MG/ML
100 INJECTION, SOLUTION INTRAVENOUS CONTINUOUS
Status: DISCONTINUED | OUTPATIENT
Start: 2019-02-21 | End: 2019-02-24

## 2019-02-21 RX ORDER — CLOPIDOGREL BISULFATE 75 MG/1
75 TABLET ORAL DAILY
Status: DISCONTINUED | OUTPATIENT
Start: 2019-02-22 | End: 2019-02-26 | Stop reason: HOSPADM

## 2019-02-21 RX ORDER — SODIUM CHLORIDE 0.9 % (FLUSH) 0.9 %
5-40 SYRINGE (ML) INJECTION AS NEEDED
Status: DISCONTINUED | OUTPATIENT
Start: 2019-02-21 | End: 2019-02-26 | Stop reason: HOSPADM

## 2019-02-21 RX ORDER — NALOXONE HYDROCHLORIDE 0.4 MG/ML
0.4 INJECTION, SOLUTION INTRAMUSCULAR; INTRAVENOUS; SUBCUTANEOUS AS NEEDED
Status: DISCONTINUED | OUTPATIENT
Start: 2019-02-21 | End: 2019-02-26 | Stop reason: HOSPADM

## 2019-02-21 RX ORDER — CARVEDILOL 12.5 MG/1
12.5 TABLET ORAL 2 TIMES DAILY WITH MEALS
Status: DISCONTINUED | OUTPATIENT
Start: 2019-02-22 | End: 2019-02-22

## 2019-02-21 RX ORDER — ASPIRIN 81 MG/1
81 TABLET ORAL DAILY
Status: DISCONTINUED | OUTPATIENT
Start: 2019-02-22 | End: 2019-02-26 | Stop reason: HOSPADM

## 2019-02-21 RX ORDER — BUDESONIDE 0.5 MG/2ML
500 INHALANT ORAL
Status: DISCONTINUED | OUTPATIENT
Start: 2019-02-21 | End: 2019-02-26 | Stop reason: HOSPADM

## 2019-02-21 RX ORDER — SODIUM CHLORIDE 9 MG/ML
100 INJECTION, SOLUTION INTRAVENOUS CONTINUOUS
Status: DISCONTINUED | OUTPATIENT
Start: 2019-02-21 | End: 2019-02-21

## 2019-02-21 RX ORDER — SODIUM CHLORIDE 9 MG/ML
250 INJECTION, SOLUTION INTRAVENOUS ONCE
Status: COMPLETED | OUTPATIENT
Start: 2019-02-21 | End: 2019-02-21

## 2019-02-21 RX ORDER — PANTOPRAZOLE SODIUM 40 MG/1
40 TABLET, DELAYED RELEASE ORAL
Status: DISCONTINUED | OUTPATIENT
Start: 2019-02-22 | End: 2019-02-26 | Stop reason: HOSPADM

## 2019-02-21 RX ORDER — INSULIN LISPRO 100 [IU]/ML
INJECTION, SOLUTION INTRAVENOUS; SUBCUTANEOUS
Status: DISCONTINUED | OUTPATIENT
Start: 2019-02-22 | End: 2019-02-26 | Stop reason: HOSPADM

## 2019-02-21 RX ORDER — SERTRALINE HYDROCHLORIDE 100 MG/1
100 TABLET, FILM COATED ORAL DAILY
Status: DISCONTINUED | OUTPATIENT
Start: 2019-02-22 | End: 2019-02-22

## 2019-02-21 RX ORDER — ONDANSETRON 2 MG/ML
4 INJECTION INTRAMUSCULAR; INTRAVENOUS
Status: DISCONTINUED | OUTPATIENT
Start: 2019-02-21 | End: 2019-02-26 | Stop reason: HOSPADM

## 2019-02-21 RX ORDER — GUAIFENESIN 600 MG/1
600 TABLET, EXTENDED RELEASE ORAL EVERY 12 HOURS
Status: DISCONTINUED | OUTPATIENT
Start: 2019-02-21 | End: 2019-02-26 | Stop reason: HOSPADM

## 2019-02-21 RX ORDER — LORAZEPAM 1 MG/1
1 TABLET ORAL
Status: DISCONTINUED | OUTPATIENT
Start: 2019-02-21 | End: 2019-02-26 | Stop reason: HOSPADM

## 2019-02-21 RX ORDER — ALBUTEROL SULFATE 0.83 MG/ML
2.5 SOLUTION RESPIRATORY (INHALATION)
Status: DISCONTINUED | OUTPATIENT
Start: 2019-02-22 | End: 2019-02-26 | Stop reason: HOSPADM

## 2019-02-21 RX ORDER — HEPARIN SODIUM 5000 [USP'U]/ML
5000 INJECTION, SOLUTION INTRAVENOUS; SUBCUTANEOUS EVERY 8 HOURS
Status: DISCONTINUED | OUTPATIENT
Start: 2019-02-21 | End: 2019-02-26 | Stop reason: HOSPADM

## 2019-02-21 RX ORDER — PHENYTOIN SODIUM 100 MG/1
100 CAPSULE, EXTENDED RELEASE ORAL 2 TIMES DAILY
Status: DISCONTINUED | OUTPATIENT
Start: 2019-02-22 | End: 2019-02-22

## 2019-02-21 RX ORDER — SODIUM CHLORIDE 0.9 % (FLUSH) 0.9 %
5-40 SYRINGE (ML) INJECTION EVERY 8 HOURS
Status: DISCONTINUED | OUTPATIENT
Start: 2019-02-21 | End: 2019-02-26 | Stop reason: HOSPADM

## 2019-02-21 RX ORDER — TAMSULOSIN HYDROCHLORIDE 0.4 MG/1
0.4 CAPSULE ORAL DAILY
Status: DISCONTINUED | OUTPATIENT
Start: 2019-02-22 | End: 2019-02-22

## 2019-02-21 RX ADMIN — CEFTRIAXONE SODIUM 2 G: 2 INJECTION, POWDER, FOR SOLUTION INTRAMUSCULAR; INTRAVENOUS at 21:47

## 2019-02-21 RX ADMIN — SODIUM CHLORIDE 250 ML: 900 INJECTION, SOLUTION INTRAVENOUS at 21:47

## 2019-02-21 RX ADMIN — BUDESONIDE 500 MCG: 0.5 INHALANT RESPIRATORY (INHALATION) at 23:35

## 2019-02-21 RX ADMIN — AZITHROMYCIN MONOHYDRATE 500 MG: 500 INJECTION, POWDER, LYOPHILIZED, FOR SOLUTION INTRAVENOUS at 22:43

## 2019-02-22 ENCOUNTER — APPOINTMENT (OUTPATIENT)
Dept: GENERAL RADIOLOGY | Age: 69
DRG: 871 | End: 2019-02-22
Attending: INTERNAL MEDICINE
Payer: OTHER GOVERNMENT

## 2019-02-22 LAB
ALBUMIN SERPL-MCNC: 2.6 G/DL (ref 3.2–4.6)
ALBUMIN/GLOB SERPL: 1.2 {RATIO} (ref 1.2–3.5)
ALP SERPL-CCNC: 88 U/L (ref 50–136)
ALT SERPL-CCNC: 42 U/L (ref 12–65)
ANION GAP SERPL CALC-SCNC: 8 MMOL/L (ref 7–16)
AST SERPL-CCNC: 28 U/L (ref 15–37)
BASOPHILS # BLD: 0 K/UL (ref 0–0.2)
BASOPHILS NFR BLD: 0 % (ref 0–2)
BILIRUB SERPL-MCNC: 1 MG/DL (ref 0.2–1.1)
BNP SERPL-MCNC: 34 PG/ML
BUN SERPL-MCNC: 13 MG/DL (ref 8–23)
CALCIUM SERPL-MCNC: 6.7 MG/DL (ref 8.3–10.4)
CHLORIDE SERPL-SCNC: 111 MMOL/L (ref 98–107)
CO2 SERPL-SCNC: 25 MMOL/L (ref 21–32)
CREAT SERPL-MCNC: 0.63 MG/DL (ref 0.8–1.5)
DIFFERENTIAL METHOD BLD: ABNORMAL
EOSINOPHIL # BLD: 0 K/UL (ref 0–0.8)
EOSINOPHIL NFR BLD: 0 % (ref 0.5–7.8)
ERYTHROCYTE [DISTWIDTH] IN BLOOD BY AUTOMATED COUNT: 12.1 % (ref 11.9–14.6)
GLOBULIN SER CALC-MCNC: 2.2 G/DL (ref 2.3–3.5)
GLUCOSE BLD STRIP.AUTO-MCNC: 126 MG/DL (ref 65–100)
GLUCOSE BLD STRIP.AUTO-MCNC: 130 MG/DL (ref 65–100)
GLUCOSE BLD STRIP.AUTO-MCNC: 154 MG/DL (ref 65–100)
GLUCOSE BLD STRIP.AUTO-MCNC: 160 MG/DL (ref 65–100)
GLUCOSE BLD STRIP.AUTO-MCNC: 186 MG/DL (ref 65–100)
GLUCOSE SERPL-MCNC: 151 MG/DL (ref 65–100)
HCT VFR BLD AUTO: 42 % (ref 41.1–50.3)
HGB BLD-MCNC: 14 G/DL (ref 13.6–17.2)
IMM GRANULOCYTES # BLD AUTO: 0.2 K/UL (ref 0–0.5)
IMM GRANULOCYTES NFR BLD AUTO: 1 % (ref 0–5)
LYMPHOCYTES # BLD: 1.8 K/UL (ref 0.5–4.6)
LYMPHOCYTES NFR BLD: 9 % (ref 13–44)
MCH RBC QN AUTO: 32.4 PG (ref 26.1–32.9)
MCHC RBC AUTO-ENTMCNC: 33.3 G/DL (ref 31.4–35)
MCV RBC AUTO: 97.2 FL (ref 79.6–97.8)
MONOCYTES # BLD: 0.7 K/UL (ref 0.1–1.3)
MONOCYTES NFR BLD: 4 % (ref 4–12)
NEUTS SEG # BLD: 17 K/UL (ref 1.7–8.2)
NEUTS SEG NFR BLD: 86 % (ref 43–78)
NRBC # BLD: 0 K/UL (ref 0–0.2)
PHENYTOIN SERPL-MCNC: 17 UG/ML (ref 10–20)
PLATELET # BLD AUTO: 173 K/UL (ref 150–450)
PMV BLD AUTO: 10.6 FL (ref 9.4–12.3)
POTASSIUM SERPL-SCNC: 3.2 MMOL/L (ref 3.5–5.1)
PROT SERPL-MCNC: 4.8 G/DL (ref 6.3–8.2)
RBC # BLD AUTO: 4.32 M/UL (ref 4.23–5.6)
SODIUM SERPL-SCNC: 144 MMOL/L (ref 136–145)
WBC # BLD AUTO: 19.7 K/UL (ref 4.3–11.1)

## 2019-02-22 PROCEDURE — 94640 AIRWAY INHALATION TREATMENT: CPT

## 2019-02-22 PROCEDURE — 82962 GLUCOSE BLOOD TEST: CPT

## 2019-02-22 PROCEDURE — 85025 COMPLETE CBC W/AUTO DIFF WBC: CPT

## 2019-02-22 PROCEDURE — 77030020263 HC SOL INJ SOD CL0.9% LFCR 1000ML

## 2019-02-22 PROCEDURE — 74011250636 HC RX REV CODE- 250/636: Performed by: EMERGENCY MEDICINE

## 2019-02-22 PROCEDURE — 77010033678 HC OXYGEN DAILY

## 2019-02-22 PROCEDURE — 77030020253 HC SOL INJ D545NS .05 DEX .45 SAL

## 2019-02-22 PROCEDURE — 77030019605

## 2019-02-22 PROCEDURE — 80185 ASSAY OF PHENYTOIN TOTAL: CPT

## 2019-02-22 PROCEDURE — C8929 TTE W OR WO FOL WCON,DOPPLER: HCPCS

## 2019-02-22 PROCEDURE — 74011000250 HC RX REV CODE- 250: Performed by: INTERNAL MEDICINE

## 2019-02-22 PROCEDURE — 99218 HC RM OBSERVATION: CPT

## 2019-02-22 PROCEDURE — 74011250637 HC RX REV CODE- 250/637: Performed by: INTERNAL MEDICINE

## 2019-02-22 PROCEDURE — 74011636637 HC RX REV CODE- 636/637: Performed by: INTERNAL MEDICINE

## 2019-02-22 PROCEDURE — 74011250636 HC RX REV CODE- 250/636: Performed by: INTERNAL MEDICINE

## 2019-02-22 PROCEDURE — 83880 ASSAY OF NATRIURETIC PEPTIDE: CPT

## 2019-02-22 PROCEDURE — 74011000258 HC RX REV CODE- 258: Performed by: EMERGENCY MEDICINE

## 2019-02-22 PROCEDURE — 94760 N-INVAS EAR/PLS OXIMETRY 1: CPT

## 2019-02-22 PROCEDURE — 73630 X-RAY EXAM OF FOOT: CPT

## 2019-02-22 PROCEDURE — 36415 COLL VENOUS BLD VENIPUNCTURE: CPT

## 2019-02-22 PROCEDURE — 97162 PT EVAL MOD COMPLEX 30 MIN: CPT

## 2019-02-22 PROCEDURE — 65270000029 HC RM PRIVATE

## 2019-02-22 PROCEDURE — 80053 COMPREHEN METABOLIC PANEL: CPT

## 2019-02-22 PROCEDURE — 92610 EVALUATE SWALLOWING FUNCTION: CPT

## 2019-02-22 RX ORDER — VANCOMYCIN/0.9 % SOD CHLORIDE 1.5G/250ML
1500 PLASTIC BAG, INJECTION (ML) INTRAVENOUS EVERY 12 HOURS
Status: DISCONTINUED | OUTPATIENT
Start: 2019-02-22 | End: 2019-02-22 | Stop reason: SDUPTHER

## 2019-02-22 RX ORDER — PHENYTOIN SODIUM 100 MG/1
100 CAPSULE, EXTENDED RELEASE ORAL 3 TIMES DAILY
Status: DISCONTINUED | OUTPATIENT
Start: 2019-02-22 | End: 2019-02-26 | Stop reason: HOSPADM

## 2019-02-22 RX ORDER — TAMSULOSIN HYDROCHLORIDE 0.4 MG/1
0.4 CAPSULE ORAL
Status: DISCONTINUED | OUTPATIENT
Start: 2019-02-22 | End: 2019-02-26 | Stop reason: HOSPADM

## 2019-02-22 RX ORDER — RANITIDINE 150 MG/1
150 TABLET, FILM COATED ORAL
COMMUNITY

## 2019-02-22 RX ORDER — POTASSIUM CHLORIDE 20 MEQ/1
20 TABLET, EXTENDED RELEASE ORAL
Status: COMPLETED | OUTPATIENT
Start: 2019-02-22 | End: 2019-02-22

## 2019-02-22 RX ORDER — POTASSIUM CHLORIDE 20 MEQ/1
20 TABLET, EXTENDED RELEASE ORAL DAILY
COMMUNITY

## 2019-02-22 RX ORDER — CARVEDILOL 6.25 MG/1
6.25 TABLET ORAL 2 TIMES DAILY WITH MEALS
Status: DISCONTINUED | OUTPATIENT
Start: 2019-02-22 | End: 2019-02-26 | Stop reason: HOSPADM

## 2019-02-22 RX ORDER — POLYETHYLENE GLYCOL 3350 17 G/17G
17 POWDER, FOR SOLUTION ORAL
COMMUNITY

## 2019-02-22 RX ORDER — LORAZEPAM 0.5 MG/1
0.5 TABLET ORAL
COMMUNITY

## 2019-02-22 RX ORDER — VANCOMYCIN/0.9 % SOD CHLORIDE 1.5G/250ML
1500 PLASTIC BAG, INJECTION (ML) INTRAVENOUS EVERY 12 HOURS
Status: DISCONTINUED | OUTPATIENT
Start: 2019-02-22 | End: 2019-02-23

## 2019-02-22 RX ORDER — SERTRALINE HYDROCHLORIDE 100 MG/1
100 TABLET, FILM COATED ORAL
Status: DISCONTINUED | OUTPATIENT
Start: 2019-02-22 | End: 2019-02-26 | Stop reason: HOSPADM

## 2019-02-22 RX ORDER — LANOLIN ALCOHOL/MO/W.PET/CERES
3 CREAM (GRAM) TOPICAL
COMMUNITY

## 2019-02-22 RX ADMIN — PERFLUTREN 1 ML: 6.52 INJECTION, SUSPENSION INTRAVENOUS at 08:00

## 2019-02-22 RX ADMIN — HEPARIN SODIUM 5000 UNITS: 5000 INJECTION INTRAVENOUS; SUBCUTANEOUS at 00:21

## 2019-02-22 RX ADMIN — CLOPIDOGREL BISULFATE 75 MG: 75 TABLET, FILM COATED ORAL at 08:56

## 2019-02-22 RX ADMIN — HEPARIN SODIUM 5000 UNITS: 5000 INJECTION INTRAVENOUS; SUBCUTANEOUS at 16:39

## 2019-02-22 RX ADMIN — PANTOPRAZOLE SODIUM 40 MG: 40 TABLET, DELAYED RELEASE ORAL at 06:49

## 2019-02-22 RX ADMIN — METHYLPREDNISOLONE SODIUM SUCCINATE 60 MG: 40 INJECTION, POWDER, FOR SOLUTION INTRAMUSCULAR; INTRAVENOUS at 08:57

## 2019-02-22 RX ADMIN — CEFTRIAXONE SODIUM 2 G: 2 INJECTION, POWDER, FOR SOLUTION INTRAMUSCULAR; INTRAVENOUS at 21:29

## 2019-02-22 RX ADMIN — ASPIRIN 81 MG: 81 TABLET, COATED ORAL at 08:57

## 2019-02-22 RX ADMIN — PHENYTOIN SODIUM 100 MG: 100 CAPSULE ORAL at 21:28

## 2019-02-22 RX ADMIN — VANCOMYCIN HYDROCHLORIDE 2500 MG: 10 INJECTION, POWDER, LYOPHILIZED, FOR SOLUTION INTRAVENOUS at 01:00

## 2019-02-22 RX ADMIN — CARVEDILOL 6.25 MG: 6.25 TABLET, FILM COATED ORAL at 16:39

## 2019-02-22 RX ADMIN — SODIUM CHLORIDE 100 ML/HR: 900 INJECTION, SOLUTION INTRAVENOUS at 01:00

## 2019-02-22 RX ADMIN — Medication 10 ML: at 05:55

## 2019-02-22 RX ADMIN — PHENYTOIN SODIUM 100 MG: 100 CAPSULE ORAL at 08:56

## 2019-02-22 RX ADMIN — HEPARIN SODIUM 5000 UNITS: 5000 INJECTION INTRAVENOUS; SUBCUTANEOUS at 00:56

## 2019-02-22 RX ADMIN — SERTRALINE 100 MG: 100 TABLET, FILM COATED ORAL at 02:25

## 2019-02-22 RX ADMIN — LISINOPRIL 40 MG: 20 TABLET ORAL at 08:56

## 2019-02-22 RX ADMIN — SERTRALINE 100 MG: 100 TABLET, FILM COATED ORAL at 21:28

## 2019-02-22 RX ADMIN — CARVEDILOL 6.25 MG: 6.25 TABLET, FILM COATED ORAL at 08:56

## 2019-02-22 RX ADMIN — Medication 10 ML: at 21:29

## 2019-02-22 RX ADMIN — PHENYTOIN SODIUM 100 MG: 100 CAPSULE ORAL at 16:39

## 2019-02-22 RX ADMIN — AZITHROMYCIN MONOHYDRATE 500 MG: 500 INJECTION, POWDER, LYOPHILIZED, FOR SOLUTION INTRAVENOUS at 21:28

## 2019-02-22 RX ADMIN — LORATADINE 10 MG: 10 TABLET ORAL at 08:57

## 2019-02-22 RX ADMIN — ALBUTEROL SULFATE 2.5 MG: 2.5 SOLUTION RESPIRATORY (INHALATION) at 09:00

## 2019-02-22 RX ADMIN — INSULIN LISPRO 2 UNITS: 100 INJECTION, SOLUTION INTRAVENOUS; SUBCUTANEOUS at 22:40

## 2019-02-22 RX ADMIN — ACETAMINOPHEN 650 MG: 325 TABLET, FILM COATED ORAL at 01:09

## 2019-02-22 RX ADMIN — ROSUVASTATIN CALCIUM 40 MG: 20 TABLET, COATED ORAL at 00:56

## 2019-02-22 RX ADMIN — BUDESONIDE 500 MCG: 0.5 INHALANT RESPIRATORY (INHALATION) at 09:00

## 2019-02-22 RX ADMIN — GUAIFENESIN 600 MG: 600 TABLET, EXTENDED RELEASE ORAL at 21:28

## 2019-02-22 RX ADMIN — GUAIFENESIN 600 MG: 600 TABLET, EXTENDED RELEASE ORAL at 08:57

## 2019-02-22 RX ADMIN — SODIUM CHLORIDE 100 ML/HR: 900 INJECTION, SOLUTION INTRAVENOUS at 21:42

## 2019-02-22 RX ADMIN — BUDESONIDE 500 MCG: 0.5 INHALANT RESPIRATORY (INHALATION) at 19:16

## 2019-02-22 RX ADMIN — ALBUTEROL SULFATE 2.5 MG: 2.5 SOLUTION RESPIRATORY (INHALATION) at 19:16

## 2019-02-22 RX ADMIN — VANCOMYCIN HYDROCHLORIDE 1500 MG: 10 INJECTION, POWDER, LYOPHILIZED, FOR SOLUTION INTRAVENOUS at 14:42

## 2019-02-22 RX ADMIN — ROSUVASTATIN CALCIUM 40 MG: 20 TABLET, COATED ORAL at 21:28

## 2019-02-22 RX ADMIN — Medication 10 ML: at 14:42

## 2019-02-22 RX ADMIN — SODIUM CHLORIDE 100 ML/HR: 900 INJECTION, SOLUTION INTRAVENOUS at 16:22

## 2019-02-22 RX ADMIN — Medication 10 ML: at 01:07

## 2019-02-22 RX ADMIN — TAMSULOSIN HYDROCHLORIDE 0.4 MG: 0.4 CAPSULE ORAL at 21:28

## 2019-02-22 RX ADMIN — POTASSIUM CHLORIDE 20 MEQ: 20 TABLET, EXTENDED RELEASE ORAL at 14:42

## 2019-02-22 RX ADMIN — ALBUTEROL SULFATE 2.5 MG: 2.5 SOLUTION RESPIRATORY (INHALATION) at 01:53

## 2019-02-22 RX ADMIN — TAMSULOSIN HYDROCHLORIDE 0.4 MG: 0.4 CAPSULE ORAL at 02:25

## 2019-02-22 RX ADMIN — METHYLPREDNISOLONE SODIUM SUCCINATE 60 MG: 40 INJECTION, POWDER, FOR SOLUTION INTRAMUSCULAR; INTRAVENOUS at 01:07

## 2019-02-22 RX ADMIN — HEPARIN SODIUM 5000 UNITS: 5000 INJECTION INTRAVENOUS; SUBCUTANEOUS at 08:57

## 2019-02-22 RX ADMIN — GUAIFENESIN 600 MG: 600 TABLET, EXTENDED RELEASE ORAL at 00:56

## 2019-02-22 NOTE — PROGRESS NOTES
Spoke to Mr. Marissa Leija and his wife in room 211 about Case Management and discharge planning. They live a a handicap accessible apartment in [facility]. He has an electric wheelchair, and is able to drive it safely. Mr. Marissa Leija is a , and a patient at the local [place] clinic (Dr. Isidro Jade). [facility] ([facility]ide and Attendance program) five days a week for a couple of hours each of those days. Interim home health ([phone]) PT just signed off last week (February 13, 2019); however, Mr. Marissa Leija would like it re-started at discharge. He is also seeing SLP while here. Only discharge need identified is home health PT, and possibly home health SLP, too. Will follow. Care Management Interventions PCP Verified by CM: Yes Transition of Care Consult (CM Consult): Home Health [facility] - INPATIENT: No 
Reason Outside [place]: Patient already serviced by other home care/hospice agency Current Support Network: Lives with Spouse, Own Home Confirm Follow Up Transport: Family Plan discussed with Pt/Family/Caregiver:  Yes

## 2019-02-22 NOTE — PROGRESS NOTES
Hospitalist Progress Note Admit Date:  2019  8:36 PM  
Name:  Juan Ibarra Age:  80 y.o. 
:  1950 MRN:  [id number] PCP:  Claudia Marquez MD 
Treatment Team: Attending Provider: Abhinav Pendleton DO; Care Manager: Karen Heredia RN Subjective:  
 
Pt is a 81 yo male with pmh CVA with left hemiparesis, COPD on 2 L NC, chronic left leg cellulitis who presented to ER  with worsening left leg redness/pain, productive cough and progressive weakness. Pt found to meet sepsis criteria with WBC 19, HR 100s, TMax 100.7. Chest x ray without acute findings. He had significant wheezing in ER. Pt started on abx and fluids for sepsis r/t cellulitis and poss COPD exac. Today pt is sitting up in the chair. He reports left foot pain, tender to pressure. He has no wheezing at this time. States breathing well. On home regimen 2 L NC. Objective:  
 
Patient Vitals for the past 24 hrs: 
 Temp Pulse Resp BP SpO2  
19 1133 98.8 °F (37.1 °C) 87 16 106/66 91 % 19 0901     93 % 19 0701 98.9 °F (37.2 °C) (!) 101 17 134/78 93 % 19 0409 99.7 °F (37.6 °C) (!) 107 16 130/83 96 % 19 0246 98.5 °F (36.9 °C) 96 18 107/65 94 % 19 0153     94 % 19 0032 97.5 °F (36.4 °C) (!) 101 18 124/71 95 % 19 2308 99.7 °F (37.6 °C) (!) 102 18 134/61 95 % 19 2229 99.6 °F (37.6 °C) (!) 105 20 112/60 97 % 195     95 % 19 2142    133/58 98 % 19    138/73 97 % 19 (!) 100.7 °F (38.2 °C) (!) 103 24 123/74 92 % Oxygen Therapy O2 Sat (%): 91 %(RA) (19 1133) Pulse via Oximetry: 99 beats per minute (19) O2 Device: Nasal cannula (19 0153) O2 Flow Rate (L/min): 2 l/min (19) Intake/Output Summary (Last 24 hours) at 2019 1247 Last data filed at 2019 6277 Gross per 24 hour Intake 572 ml Output  Net 572 ml General:    Well nourished. Alert. CV:   RRR. No murmur, rub, or gallop. Lungs:   CTAB. No wheezing, rhonchi, or rales. Abdomen:   Soft, nontender, nondistended. Extremities: Warm and dry. No cyanosis or edema. Skin:     No rashes or jaundice. Neuro:  No gross focal deficits Data Review: 
I have reviewed all labs, meds, telemetry events, and studies from the last 24 hours: 
 
Recent Results (from the past 24 hour(s)) CULTURE, BLOOD Collection Time: 02/21/19  8:35 PM  
Result Value Ref Range Special Requests: RIGHT 
HAND Culture result: NO GROWTH AFTER 11 HOURS    
CBC WITH AUTOMATED DIFF Collection Time: 02/21/19  8:36 PM  
Result Value Ref Range WBC 19.5 (H) 4.3 - 11.1 K/uL  
 RBC 4.76 4.23 - 5.6 M/uL  
 HGB 15.3 13.6 - 17.2 g/dL HCT 44.9 41.1 - 50.3 % MCV 94.3 79.6 - 97.8 FL  
 MCH 32.1 26.1 - 32.9 PG  
 MCHC 34.1 31.4 - 35.0 g/dL  
 RDW 12.1 11.9 - 14.6 % PLATELET 506 117 - 704 K/uL MPV 10.1 9.4 - 12.3 FL ABSOLUTE NRBC 0.00 0.0 - 0.2 K/uL  
 DF AUTOMATED NEUTROPHILS 89 (H) 43 - 78 % LYMPHOCYTES 6 (L) 13 - 44 % MONOCYTES 4 4.0 - 12.0 % EOSINOPHILS 1 0.5 - 7.8 % BASOPHILS 0 0.0 - 2.0 % IMMATURE GRANULOCYTES 1 0.0 - 5.0 %  
 ABS. NEUTROPHILS 17.3 (H) 1.7 - 8.2 K/UL  
 ABS. LYMPHOCYTES 1.2 0.5 - 4.6 K/UL  
 ABS. MONOCYTES 0.8 0.1 - 1.3 K/UL  
 ABS. EOSINOPHILS 0.2 0.0 - 0.8 K/UL  
 ABS. BASOPHILS 0.0 0.0 - 0.2 K/UL  
 ABS. IMM. GRANS. 0.1 0.0 - 0.5 K/UL METABOLIC PANEL, COMPREHENSIVE Collection Time: 02/21/19  8:36 PM  
Result Value Ref Range Sodium 140 136 - 145 mmol/L Potassium 3.9 3.5 - 5.1 mmol/L Chloride 105 98 - 107 mmol/L  
 CO2 27 21 - 32 mmol/L Anion gap 8 7 - 16 mmol/L Glucose 141 (H) 65 - 100 mg/dL BUN 17 8 - 23 MG/DL Creatinine 0.87 0.8 - 1.5 MG/DL  
 GFR est AA >60 >60 ml/min/1.73m2 GFR est non-AA >60 >60 ml/min/1.73m2 Calcium 8.6 8.3 - 10.4 MG/DL  Bilirubin, total 0.3 0.2 - 1.1 MG/DL  
 ALT (SGPT) 50 12 - 65 U/L  
 AST (SGOT) 31 15 - 37 U/L Alk. phosphatase 126 50 - 136 U/L Protein, total 7.1 6.3 - 8.2 g/dL Albumin 3.8 3.2 - 4.6 g/dL Globulin 3.3 2.3 - 3.5 g/dL A-G Ratio 1.2 1.2 - 3.5 POC LACTIC ACID Collection Time: 02/21/19  8:38 PM  
Result Value Ref Range Lactic Acid (POC) 1.75 0.5 - 1.9 mmol/L  
CULTURE, BLOOD Collection Time: 02/21/19  9:40 PM  
Result Value Ref Range Special Requests: NO SPECIAL REQUESTS 
RIGHT 
HAND Culture result: NO GROWTH AFTER 9 HOURS    
GLUCOSE, POC Collection Time: 02/22/19 12:59 AM  
Result Value Ref Range Glucose (POC) 186 (H) 65 - 100 mg/dL METABOLIC PANEL, COMPREHENSIVE Collection Time: 02/22/19  3:41 AM  
Result Value Ref Range Sodium 144 136 - 145 mmol/L Potassium 3.2 (L) 3.5 - 5.1 mmol/L Chloride 111 (H) 98 - 107 mmol/L  
 CO2 25 21 - 32 mmol/L Anion gap 8 7 - 16 mmol/L Glucose 151 (H) 65 - 100 mg/dL BUN 13 8 - 23 MG/DL Creatinine 0.63 (L) 0.8 - 1.5 MG/DL  
 GFR est AA >60 >60 ml/min/1.73m2 GFR est non-AA >60 >60 ml/min/1.73m2 Calcium 6.7 (L) 8.3 - 10.4 MG/DL Bilirubin, total 1.0 0.2 - 1.1 MG/DL  
 ALT (SGPT) 42 12 - 65 U/L  
 AST (SGOT) 28 15 - 37 U/L Alk. phosphatase 88 50 - 136 U/L Protein, total 4.8 (L) 6.3 - 8.2 g/dL Albumin 2.6 (L) 3.2 - 4.6 g/dL Globulin 2.2 (L) 2.3 - 3.5 g/dL A-G Ratio 1.2 1.2 - 3.5 BNP Collection Time: 02/22/19  3:41 AM  
Result Value Ref Range BNP 34 (H) 0 pg/mL PHENYTOIN Collection Time: 02/22/19  3:41 AM  
Result Value Ref Range Phenytoin 17.0 10 - 20 ug/mL CBC WITH AUTOMATED DIFF Collection Time: 02/22/19  7:21 AM  
Result Value Ref Range WBC 19.7 (H) 4.3 - 11.1 K/uL  
 RBC 4.32 4.23 - 5.6 M/uL  
 HGB 14.0 13.6 - 17.2 g/dL HCT 42.0 41.1 - 50.3 % MCV 97.2 79.6 - 97.8 FL  
 MCH 32.4 26.1 - 32.9 PG  
 MCHC 33.3 31.4 - 35.0 g/dL  
 RDW 12.1 11.9 - 14.6 % PLATELET 422 768 - 247 K/uL MPV 10.6 9.4 - 12.3 FL ABSOLUTE NRBC 0.00 0.0 - 0.2 K/uL  
 DF AUTOMATED NEUTROPHILS 86 (H) 43 - 78 % LYMPHOCYTES 9 (L) 13 - 44 % MONOCYTES 4 4.0 - 12.0 % EOSINOPHILS 0 (L) 0.5 - 7.8 % BASOPHILS 0 0.0 - 2.0 % IMMATURE GRANULOCYTES 1 0.0 - 5.0 %  
 ABS. NEUTROPHILS 17.0 (H) 1.7 - 8.2 K/UL  
 ABS. LYMPHOCYTES 1.8 0.5 - 4.6 K/UL  
 ABS. MONOCYTES 0.7 0.1 - 1.3 K/UL  
 ABS. EOSINOPHILS 0.0 0.0 - 0.8 K/UL  
 ABS. BASOPHILS 0.0 0.0 - 0.2 K/UL  
 ABS. IMM. GRANS. 0.2 0.0 - 0.5 K/UL GLUCOSE, POC Collection Time: 19  8:12 AM  
Result Value Ref Range Glucose (POC) 126 (H) 65 - 100 mg/dL GLUCOSE, POC Collection Time: 19 12:20 PM  
Result Value Ref Range Glucose (POC) 154 (H) 65 - 100 mg/dL All Micro Results Procedure Component Value Units Date/Time CULTURE, BLOOD [[id number]] Collected:  19 Order Status:  Completed Specimen:  Blood Updated:  19 8447 Special Requests: --     
  RIGHT 
HAND Culture result: NO GROWTH AFTER 11 HOURS     
 CULTURE, BLOOD [[id number]] Collected:  19 Order Status:  Completed Specimen:  Blood Updated:  19 6092 Special Requests: --     
  NO SPECIAL REQUESTS 
RIGHT 
HAND Culture result: NO GROWTH AFTER 9 HOURS     
 CULTURE, RESPIRATORY/SPUTUM/BRONCH Beauty Mink STAIN [[id number]] Order Status:  Sent Specimen:  Sputum Results for orders placed or performed during the hospital encounter of 19  
2D ECHO COMPLETE ADULT (TTE) W OR WO CONTR Narrative Lisa [address] [place], [address] [place] [address] 
[phone] Transthoracic Echocardiogram 
2D, M-mode, Doppler, and Color Doppler PatientEvaristo Roads 
MR #: [id number] : 1950 Age: 80 years Gender: Male Study date: 2019 Account #: [de-identified] Height: 71 in 
Weight: 224.6 lb 
BSA: 2.22 mï¾² Status:Routine Location: 211 BP: 130/ 83 Allergies: LATEX, ADHESIVE, SULFA (SULFONAMIDE ANTIBIOTICS) Sonographer:  Pedro Maria [facility] Group:  [facility] Referring Physician:  Marilee Hernandez DO Reading Physician:  Elvia Tariq MD 
 
INDICATIONS: Pulm HTN, lower ext edema, dyspnea. *Patient had low windows and images were respiratory dependent. * HISTORY: PRIOR HISTORY: COPD. PROCEDURE: This was a routine study. A transthoracic echocardiogram was 
performed. The study included complete 2D imaging, M-mode, complete spectral 
Doppler, and color Doppler. Intravenous contrast (Definity) was administered. Image quality was adequate. LEFT VENTRICLE: Size was normal. Systolic function was normal. Ejection 
fraction was estimated in the range of 60 % to 65 %. There were no regional 
wall motion abnormalities. Wall thickness was normal. Avg. E/e'= 14.1. Left 
ventricular diastolic function parameters were indeterminate. RIGHT VENTRICLE: The size was normal. Systolic function was normal. The 
tricuspid jet envelope definition was inadequate for estimation of RV  
systolic 
pressure. LEFT ATRIUM: Size was normal. 
 
RIGHT ATRIUM: Size was normal. 
 
SYSTEMIC VEINS: IVC: The inferior vena cava was normal in size and course. AORTIC VALVE: The valve was not well visualized. There was no evidence for 
stenosis. There was no insufficiency. MITRAL VALVE: Valve structure was normal. There was no evidence for stenosis. There was trivial regurgitation. TRICUSPID VALVE: The valve structure was normal. There was no evidence for 
stenosis. There was trace regurgitation. PULMONIC VALVE: Not well visualized. There was no evidence for stenosis. There 
was no insufficiency. PERICARDIUM: There was no pericardial effusion. AORTA: The root exhibited normal size. SUMMARY: 
 
-  Left ventricle: Systolic function was normal. Ejection fraction was 
estimated in the range of 60 % to 65 %. There were no regional wall motion abnormalities. SYSTEM MEASUREMENT TABLES 
 
2D mode AoR Diam (2D): 3.5 cm Left Atrium Systolic Volume Index; Method of Disks, Biplane; 2D mode;: 23.2 
ml/m2 IVS/LVPW (2D): 1 IVSd (2D): 1 cm LVIDd (2D): 5.1 cm 
LVIDs (2D): 3.2 cm 
LVOT Area (2D): 3.5 cm2 LVPWd (2D): 1 cm Tissue Doppler Imaging LV Peak Early Lr Tissue Navin; Lateral MA (TDI): 7.3 cm/s LV Peak Early Lr Tissue Navin; Medial MA (TDI): 7.3 cm/s Unspecified Scan Mode Peak Grad; Mean; Antegrade Flow: 9 mm[Hg] Vmax; Antegrade Flow: 148 cm/s LVOT Diam: 2.1 cm 
MV Peak Navin/LV Peak Tissue Navin E-Wave; Lateral MA: 14.1 MV Peak Navin/LV Peak Tissue Navin E-Wave; Medial MA: 14.1 Prepared and signed by Tuned Araiza MD 
Signed 22-Feb-2019 11:44:48 Current Meds: 
Current Facility-Administered Medications Medication Dose Route Frequency  vancomycin (VANCOCIN) 1500 mg in  ml infusion  1,500 mg IntraVENous Q12H  tamsulosin (FLOMAX) capsule 0.4 mg  0.4 mg Oral QHS  sertraline (ZOLOFT) tablet 100 mg  100 mg Oral QHS  phenytoin ER (DILANTIN ER) ER capsule 100 mg  100 mg Oral TID  carvedilol (COREG) tablet 6.25 mg  6.25 mg Oral BID WITH MEALS  
 [START ON 2/23/2019] Vancomycin trough reminder   Other ONCE  
 [START ON 2/23/2019] predniSONE (DELTASONE) tablet 60 mg  60 mg Oral DAILY WITH BREAKFAST  cefTRIAXone (ROCEPHIN) 2 g in 0.9% sodium chloride (MBP/ADV) 50 mL  2 g IntraVENous Q24H  
 azithromycin (ZITHROMAX) 500 mg in 0.9% sodium chloride (MBP/ADV) 250 mL  500 mg IntraVENous Q24H  
 sodium chloride (NS) flush 5-40 mL  5-40 mL IntraVENous Q8H  
 sodium chloride (NS) flush 5-40 mL  5-40 mL IntraVENous PRN  
 0.9% sodium chloride infusion  100 mL/hr IntraVENous CONTINUOUS  
 acetaminophen (TYLENOL) tablet 650 mg  650 mg Oral Q4H PRN  
 HYDROcodone-acetaminophen (NORCO) 5-325 mg per tablet 1 Tab  1 Tab Oral Q4H PRN  
 naloxone (NARCAN) injection 0.4 mg  0.4 mg IntraVENous PRN  
  ondansetron (ZOFRAN) injection 4 mg  4 mg IntraVENous Q4H PRN  
 bisacodyl (DULCOLAX) suppository 10 mg  10 mg Rectal DAILY PRN  
 LORazepam (ATIVAN) tablet 1 mg  1 mg Oral Q6H PRN  
 heparin (porcine) injection 5,000 Units  5,000 Units SubCUTAneous Q8H  
 albuterol (PROVENTIL VENTOLIN) nebulizer solution 2.5 mg  2.5 mg Nebulization Q6H RT  
 budesonide (PULMICORT) 500 mcg/2 ml nebulizer suspension  500 mcg Nebulization BID RT  
 insulin lispro (HUMALOG) injection   SubCUTAneous AC&HS  aspirin delayed-release tablet 81 mg  81 mg Oral DAILY  clopidogrel (PLAVIX) tablet 75 mg  75 mg Oral DAILY  guaiFENesin ER (MUCINEX) tablet 600 mg  600 mg Oral Q12H  
 lisinopril (PRINIVIL, ZESTRIL) tablet 40 mg  40 mg Oral DAILY  loratadine (CLARITIN) tablet 10 mg  10 mg Oral DAILY  pantoprazole (PROTONIX) tablet 40 mg  40 mg Oral ACB  rosuvastatin (CRESTOR) tablet 40 mg  40 mg Oral QHS Other Studies (last 24 hours): Xr Chest Pa Lat Result Date: 2/21/2019 Chest 2 view dated 2/21/2019 Prior exam 7/14/2018 CLINICAL INFORMATION: Cough Heart is normal in size. Mediastinum is unremarkable. Lungs clear and pulmonary vascularity normal. No pleural effusion. IMPRESSION: No active disease Assessment and Plan:  
 
Hospital Problems as of 2/22/2019 Date Reviewed: 9/5/2017 Codes Class Noted - Resolved POA Cellulitis ICD-10-CM: L03.90 ICD-9-CM: 682.9  2/21/2019 - Present Unknown Pneumonia ICD-10-CM: J18.9 ICD-9-CM: 160  2/21/2019 - Present Acute exacerbation of chronic obstructive pulmonary disease (COPD) ([facility] ICD-10-CM: J44.1 ICD-9-CM: 491.21  2/21/2019 - Present Unknown * (Principal) Sepsis ([facility] ICD-10-CM: A41.9 ICD-9-CM: 038.9, 995.91  2/21/2019 - Present Unknown COPD exacerbation ([facility] ICD-10-CM: J44.1 ICD-9-CM: 491.21  7/14/2018 - Present Yes Left hemiplegia ([facility] ICD-10-CM: G81.94 
ICD-9-CM: 342.90  7/14/2018 - Present Yes CVA (cerebral vascular accident) [facility]) ICD-10-CM: I63.9 ICD-9-CM: 434.91  7/14/2018 - Present Yes Overview Signed 7/14/2018  3:17 PM by Lou Woo MD  
  Multiple with left hemiplegia. Depression ICD-10-CM: F32.9 ICD-9-CM: 634  7/14/2018 - Present Yes Seizure ([address] ICD-10-CM: R56.9 ICD-9-CM: 780.39  7/14/2018 - Present Yes  
   
 BPH (benign prostatic hyperplasia) ICD-10-CM: N40.0 ICD-9-CM: 600.00  7/14/2018 - Present Yes Plan: LUE cellulitis Sepsis resolved X Ray due to left foot tenderness (no recollection of trauma) Cont abx Consult wound care COPD exacerbation, concern for aspiration Decrease Solumedrol to Prednisone Cont breathing txs ST following, recommends no straws and meds in pureed food Hx CVA, Right hemiparesis Chronic Consult PT/OT Cont home regimen ASA, Plavix Hypokalemia Replace with PO meq KCL 
 
DC planning SW following, plan for home with home health Diet:  DIET DIABETIC CONSISTENT CARB 
DVT ppx:  Heparin Signed: 
Lucrecia Nair NP

## 2019-02-22 NOTE — PROGRESS NOTES
Pharmacokinetic Consult to Pharmacist 
 
Akifilirashaun Whitaker is a 80 y.o. male being treated for SSTI with Vancomycin. Height: 5' 11\" (180.3 cm)  Weight: 102.1 kg (225 lb) Lab Results Component Value Date/Time BUN 17 02/21/2019 08:36 PM  
 Creatinine 0.87 02/21/2019 08:36 PM  
 WBC 19.5 (H) 02/21/2019 08:36 PM  
 Lactic Acid (POC) 1.75 02/21/2019 08:38 PM  
  
Estimated Creatinine Clearance: 100.2 mL/min (based on SCr of 0.87 mg/dL). CULTURES: 
All Micro Results Procedure Component Value Units Date/Time CULTURE, RESPIRATORY/SPUTUM/BRONCH Regi Mura STAIN [[id number]] Order Status:  Sent Specimen:  Sputum CULTURE, BLOOD [[id number]] Collected:  02/21/19 2140 Order Status:  Completed Specimen:  Blood Updated:  02/21/19 2252 CULTURE, BLOOD [[id number]] Collected:  02/21/19 2035 Order Status:  Completed Specimen:  Blood Updated:  02/21/19 2044 Day 1 of vancomycin. Goal trough is 10-20. Vancomycin dose initiated at 2500 mg x 1 dose; followed by Vanc 1500 mg IV q12h. Will continue to follow patient. Thank you, Yris Hutchinson, Pharm D.

## 2019-02-22 NOTE — H&P
Hospitalist H&P Note Admit Date:  2019  8:36 PM  
Name:  Puneet Ojeda Age:  80 y.o. 
:  1950 MRN:  [id number] PCP:  Meagan Walker MD 
Treatment Team: Attending Provider: Arely Baker MD; Primary Nurse: Lynnette Scruggs RN 
 
HPI:  
Patient history was obtained from the ER provider prior to seeing the patient. From triage- no current attnd er documentation 19: 
CC: dyspnea,cough , left leg looks angry per wife 80 male o2 dep copd(2 L), with pulm htn , bph, old cva with chronic left hemiplegia with regular diet. Presents with several day hx progressive dsypnea, cough became productive of white phlegm. Coughs sometimes after meals per spouse. No relief dyspnea with inhalers, and left leg more edematous red and painful. He has wbc 19k with low grade temp elevation, significant bronchospasm and abn airway sounds right base. He is admitted for sepsis cellulitis source, exac copd and suspect occult pneumonia vs aspiration pneumonitis. Spouse is present at bedside 10 systems reviewed and negative except as noted in HPI. Past Medical History:  
Diagnosis Date  Chronic obstructive pulmonary disease ([facility] [address]  Dermatophytosis of nail  History of CVA (cerebrovascular accident)  History of paraplegia  Hypertension No past surgical history on file. Allergies Allergen Reactions  Latex Rash  Adhesive Unknown (comments)  Sulfa (Sulfonamide Antibiotics) Other (comments) Social History Tobacco Use  Smoking status: Never Smoker  Smokeless tobacco: Never Used Substance Use Topics  Alcohol use: No  
  
No family history on file. There is no immunization history on file for this patient. PTA Medications: 
Prior to Admission Medications Prescriptions Last Dose Informant Patient Reported? Taking? ALOE VERA/COLLAGEN (ALOE VESTA 2-N-1 CLEANSER EX)   Yes No  
Sig: by Apply Externally route. MINERAL OIL/PETROLATUM,WHITE (CLIVE MOISTURE BARRIER EX)   Yes No  
Sig: by Apply Externally route. Omeprazole delayed release (PRILOSEC D/R) 20 mg tablet   Yes No  
Sig: Take 20 mg by mouth daily. acetaminophen (TYLENOL) 500 mg tablet   Yes No  
Sig: Take  by mouth every six (6) hours as needed for Pain. albuterol (PROVENTIL HFA, VENTOLIN HFA, PROAIR HFA) 90 mcg/actuation inhaler   No No  
Sig: Take 1 Puff by inhalation every four (4) hours as needed for Wheezing. aspirin delayed-release 81 mg tablet   Yes No  
Sig: Take 81 mg by mouth.  
b complex-vitamin c-folic acid 0.8 mg (NEPHRO-ALICIA) 0.8 mg tab tablet   Yes No  
Sig: Take 1 Tab by mouth daily. carvedilol (COREG) 12.5 mg tablet   Yes No  
Sig: Take 12.5 mg by mouth. cefpodoxime (VANTIN) 200 mg tablet   No No  
Sig: Take 1 Tab by mouth two (2) times a day. cholecalciferol (VITAMIN D3) 1,000 unit cap   Yes No  
Sig: Take 1,000 Units by mouth. clopidogrel (PLAVIX) 75 mg tab   Yes No  
Sig: Take 75 mg by mouth. docusate sodium 100 mg/5 mL enem   Yes No  
Sig: Insert  into rectum. fluticasone (FLOVENT DISKUS) 50 mcg/actuation inhaler   Yes No  
Sig: by Nasal route. folic acid (FOLVITE) 1 mg tablet   Yes No  
Sig: Take 1 mg by mouth.  
guaiFENesin (ORGANIDIN) 400 mg tablet   Yes No  
Sig: Take  by mouth every four (4) hours. lisinopril (PRINIVIL, ZESTRIL) 40 mg tablet   Yes No  
Sig: Take 40 mg by mouth daily. loratadine (CLARITIN) 10 mg tablet   Yes No  
Sig: Take 10 mg by mouth.  
methylPREDNISolone (MEDROL DOSEPACK) 4 mg tablet   No No  
Sig: Take as directed. phenytoin ER (DILANTIN ER) 100 mg ER capsule   Yes No  
Sig: Take  by mouth. rosuvastatin (CRESTOR) 40 mg tablet   Yes No  
Sig: Take 40 mg by mouth nightly. senna (SENOKOT) 8.6 mg tablet   Yes No  
Sig: Take 1 Tab by mouth daily. sertraline (ZOLOFT) 100 mg tablet   Yes No  
Sig: Take  by mouth daily.   
tamsulosin (FLOMAX) 0.4 mg capsule   Yes No  
 Sig: Take 0.4 mg by mouth daily. Facility-Administered Medications: None Objective:  
 
Patient Vitals for the past 24 hrs: 
 Temp Pulse Resp BP SpO2  
02/21/19 2308 99.7 °F (37.6 °C) (!) 102 18 134/61 95 % 02/21/19 2229 99.6 °F (37.6 °C) (!) 105 20 112/60 97 % 02/21/19 2215     95 % 02/21/19 2142    133/58 98 % 02/21/19 2059    138/73 97 % 02/21/19 2025 (!) 100.7 °F (38.2 °C) (!) 103 24 123/74 92 % Oxygen Therapy O2 Sat (%): 95 % (02/21/19 2308) Pulse via Oximetry: 102 beats per minute (02/21/19 2308) O2 Device: Nasal cannula (02/21/19 2308) O2 Flow Rate (L/min): 3 l/min (02/21/19 2308) Intake/Output Summary (Last 24 hours) at 2/21/2019 2352 Last data filed at 2/21/2019 2253 Gross per 24 hour Intake 300 ml Output  Net 300 ml Physical Exam: 
Physical Exam  
Constitutional: He is oriented to person, place, and time. HENT:  
Right Ear: External ear normal.  
Left Ear: External ear normal.  
Left facial droop Eyes: Pupils are equal, round, and reactive to light. Right eye exhibits no discharge. Left eye exhibits no discharge. No scleral icterus. Neck: No JVD present. No tracheal deviation present. No thyromegaly present. Cardiovascular: Regular rhythm. Exam reveals no gallop and no friction rub. Pulmonary/Chest: He has wheezes. He exhibits no tenderness. Possible consolidation right base Abdominal: There is no tenderness. There is no rebound and no guarding. Musculoskeletal: He exhibits edema. He exhibits no deformity. Neurological: He is oriented to person, place, and time. GCS score is 15. Skin: Skin is dry. He is not diaphoretic. Cellulitic margin left leg just distal to knee Psychiatric: Affect normal.  
 
 
I reviewed the labs, imaging, EKGs, telemetry, and other studies done this admission. Data Review:  
Recent Results (from the past 24 hour(s)) CBC WITH AUTOMATED DIFF  Collection Time: 02/21/19  8:36 PM  
 Result Value Ref Range WBC 19.5 (H) 4.3 - 11.1 K/uL  
 RBC 4.76 4.23 - 5.6 M/uL  
 HGB 15.3 13.6 - 17.2 g/dL HCT 44.9 41.1 - 50.3 % MCV 94.3 79.6 - 97.8 FL  
 MCH 32.1 26.1 - 32.9 PG  
 MCHC 34.1 31.4 - 35.0 g/dL  
 RDW 12.1 11.9 - 14.6 % PLATELET 371 303 - 613 K/uL MPV 10.1 9.4 - 12.3 FL ABSOLUTE NRBC 0.00 0.0 - 0.2 K/uL  
 DF AUTOMATED NEUTROPHILS 89 (H) 43 - 78 % LYMPHOCYTES 6 (L) 13 - 44 % MONOCYTES 4 4.0 - 12.0 % EOSINOPHILS 1 0.5 - 7.8 % BASOPHILS 0 0.0 - 2.0 % IMMATURE GRANULOCYTES 1 0.0 - 5.0 %  
 ABS. NEUTROPHILS 17.3 (H) 1.7 - 8.2 K/UL  
 ABS. LYMPHOCYTES 1.2 0.5 - 4.6 K/UL  
 ABS. MONOCYTES 0.8 0.1 - 1.3 K/UL  
 ABS. EOSINOPHILS 0.2 0.0 - 0.8 K/UL  
 ABS. BASOPHILS 0.0 0.0 - 0.2 K/UL  
 ABS. IMM. GRANS. 0.1 0.0 - 0.5 K/UL METABOLIC PANEL, COMPREHENSIVE Collection Time: 02/21/19  8:36 PM  
Result Value Ref Range Sodium 140 136 - 145 mmol/L Potassium 3.9 3.5 - 5.1 mmol/L Chloride 105 98 - 107 mmol/L  
 CO2 27 21 - 32 mmol/L Anion gap 8 7 - 16 mmol/L Glucose 141 (H) 65 - 100 mg/dL BUN 17 8 - 23 MG/DL Creatinine 0.87 0.8 - 1.5 MG/DL  
 GFR est AA >60 >60 ml/min/1.73m2 GFR est non-AA >60 >60 ml/min/1.73m2 Calcium 8.6 8.3 - 10.4 MG/DL Bilirubin, total 0.3 0.2 - 1.1 MG/DL  
 ALT (SGPT) 50 12 - 65 U/L  
 AST (SGOT) 31 15 - 37 U/L Alk. phosphatase 126 50 - 136 U/L Protein, total 7.1 6.3 - 8.2 g/dL Albumin 3.8 3.2 - 4.6 g/dL Globulin 3.3 2.3 - 3.5 g/dL A-G Ratio 1.2 1.2 - 3.5 POC LACTIC ACID Collection Time: 02/21/19  8:38 PM  
Result Value Ref Range Lactic Acid (POC) 1.75 0.5 - 1.9 mmol/L All Micro Results Procedure Component Value Units Date/Time CULTURE, RESPIRATORY/SPUTUM/BRONCH Fredick Anger STAIN [[id number]] Order Status:  Sent Specimen:  Sputum CULTURE, BLOOD [[id number]] Collected:  02/21/19 2140 Order Status:  Completed Specimen:  Blood Updated:  02/21/19 9615 CULTURE, BLOOD [[id number]] Collected:  02/21/19 2035 Order Status:  Completed Specimen:  Blood Updated:  02/21/19 2044 Other Studies: Xr Chest Pa Lat Result Date: 2/21/2019 Chest 2 view dated 2/21/2019 Prior exam 7/14/2018 CLINICAL INFORMATION: Cough Heart is normal in size. Mediastinum is unremarkable. Lungs clear and pulmonary vascularity normal. No pleural effusion. IMPRESSION: No active disease Assessment and Plan:  
 
Hospital Problems as of 2/21/2019 Date Reviewed: 9/5/2017 Codes Class Noted - Resolved POA Acute exacerbation of chronic obstructive pulmonary disease (COPD) ([facility] ICD-10-CM: J44.1 ICD-9-CM: 491.21  2/21/2019 - Present Unknown Cellulitis ICD-10-CM: L03.90 ICD-9-CM: 682.9  2/21/2019 - Present Unknown Pneumonia ICD-10-CM: J18.9 ICD-9-CM: 567  2/21/2019 - Present * (Principal) Sepsis ([facility] ICD-10-CM: A41.9 ICD-9-CM: 038.9, 995.91  2/21/2019 - Present Unknown COPD exacerbation ([facility] ICD-10-CM: J44.1 ICD-9-CM: 491.21  7/14/2018 - Present Yes Left hemiplegia ([facility] ICD-10-CM: G81.94 
ICD-9-CM: 342.90  7/14/2018 - Present Yes  
   
 CVA (cerebral vascular accident) ([facility] ICD-10-CM: I63.9 ICD-9-CM: 434.91  7/14/2018 - Present Yes Overview Signed 7/14/2018  3:17 PM by Cammie Kay MD  
  Multiple with left hemiplegia. Depression ICD-10-CM: F32.9 ICD-9-CM: 328  7/14/2018 - Present Yes Seizure ([facility] ICD-10-CM: R56.9 ICD-9-CM: 780.39  7/14/2018 - Present Yes  
   
 BPH (benign prostatic hyperplasia) ICD-10-CM: N40.0 ICD-9-CM: 600.00  7/14/2018 - Present Yes PLAN: 
Sepsis secondary to left leg cellulitis Vancomycin, also to receive rocephin azithrom re: pulm process. Iv hydration. BNP and echocard. Re: assess pulm htn 
 
exac of copd Solumedrol , fsbs cover prn with ss insulin. duoneb and budesonide Suspect at least aspiration  Pneumonitis possible early r basilar pneumonia Follow up cxr, swallow eval 
 
Hx bph (check bs for pvr), old cva's with chronic left heimparesis, muscle spasticity. Depression, gerd and prior seizures. Home meds, dosing needs clarified. Discharge planning:  home with spouse DVT ppx: sq heparin Code status:  full Decision Maker:self but spouse present [facility] [phone] Admit status:obs Estimated LOS:   Less than 2 midnights Risk:  high Signed: 
Porfirio Marcos DO

## 2019-02-22 NOTE — PROGRESS NOTES
END OF SHIFT NOTE: 
 
INTAKE/OUTPUT 
02/21 0701 - 02/22 0700 In: 572 [I.V.:572] Out: -  
Voiding: NO 
Catheter: NO 
Drain:   
 
 
 
 
 
Flatus: Patient does have flatus present. Stool:  0 occurrences. Characteristics: 
  
Emesis: 0 occurrences. Characteristics: VITAL SIGNS Patient Vitals for the past 12 hrs: 
 Temp Pulse Resp BP SpO2  
02/22/19 0409 99.7 °F (37.6 °C) (!) 107 16 130/83 96 % 02/22/19 0246 98.5 °F (36.9 °C) 96 18 107/65 94 % 02/22/19 0153     94 % 02/22/19 0032 97.5 °F (36.4 °C) (!) 101 18 124/71 95 % 02/21/19 2308 99.7 °F (37.6 °C) (!) 102 18 134/61 95 % 02/21/19 2229 99.6 °F (37.6 °C) (!) 105 20 112/60 97 % 02/21/19 2215     95 % 02/21/19 2142    133/58 98 % 02/21/19 2059    138/73 97 % 02/21/19 2025 (!) 100.7 °F (38.2 °C) (!) 103 24 123/74 92 % Pain Assessment Pain Intensity 1: 0 (02/21/19 2308) Patient Stated Pain Goal: 0 Ambulating No 
 
Shift report given to oncoming nurse at the bedside.  
 
Shazia Dumont RN

## 2019-02-22 NOTE — PROGRESS NOTES
02/22/19 0030 Dual Skin Pressure Injury Assessment Dual Skin Pressure Injury Assessment X No pressure injuries noted. Sacrum red but blanchable. Red area on rt upper thigh. Gentle border allevyn applied. BLE red swollen and warm to touch. Bilateral heels red.

## 2019-02-22 NOTE — PROGRESS NOTES
END OF SHIFT NOTE: 
 
INTAKE/OUTPUT 
02/21 0701 - 02/22 0700 In: 572 [I.V.:572] Out: -  
Voiding: YES, incontinent briefs Catheter: NO 
Drain:   
 
 
 
 
 
Flatus: Patient does have flatus present. Stool:  0 occurrences. Characteristics: 
  
Emesis: 0 occurrences. Characteristics: VITAL SIGNS Patient Vitals for the past 12 hrs: 
 Temp Pulse Resp BP SpO2  
02/22/19 1547 97.4 °F (36.3 °C) 93 16 111/72 94 % 02/22/19 1133 98.8 °F (37.1 °C) 87 16 106/66 91 % 02/22/19 0901     93 % 02/22/19 0701 98.9 °F (37.2 °C) (!) 101 17 134/78 93 % Pain Assessment Pain Intensity 1: 0 (02/22/19 1356) Patient Stated Pain Goal: 0 Ambulating No. L daivs. PT following Shift report given to oncoming nurse at the bedside.  
 
Melissa Luna RN

## 2019-02-22 NOTE — PROGRESS NOTES
LTG: Patient will tolerate least restrictive diet without signs/symptoms of aspiration at discharge for safe swallow function. STG: Patient will tolerate regular diet/thin liquids without overt s/sx of aspiration/penetration STG: Patient will participate in modified barium swallow study to objectively assess swallow function OBSERVATION STATUS Speech language pathology: bedside swallow note: Initial Assessment NAME/AGE/GENDER: Jacob Odonnell is a 80 y.o. male DATE: 2/22/2019 PRIMARY DIAGNOSIS: Cellulitis [L03.90] Pneumonia [J18.9] Cellulitis [L03.90] Acute [facility] [J44.1] ICD-10: Treatment Diagnosis: oropharyngeal dysphagia R13.12 INTERDISCIPLINARY COLLABORATION:  PRECAUTIONS/ALLERGIES: Latex; Adhesive; and Sulfa (sulfonamide antibiotics) ASSESSMENT:Based on the objective data described below, Mr. Max Delaney presents with oropharyngeal dysphagia. Per patient, he has been utilizing chin tuck with all consistencies since previous stroke. Wife reports since \"August 2017\". Per wife, patient continues to get frequently choked up with intake. He utilized chin tuck with all po presentations today. Overt s/sx of aspiration with thin liquid via straw with chin tuck, immediate strong coughing. No overt s/sx of aspiration/penetration with thin liquid via cup, puree, mixed, or solid consistencies with use of chin tuck. Left sided droop residual, however adequate oral prep and clearance. Recommend continue regular diet/thin liquids via single cup sip. NO straws. Medications with puree. Educated patient on small bites/sips, upright positioning, slow rate of intake. He will benefit from repeat modified barium swallow study to objectively assess current swallow function and determine need for continued use of chin tuck. Educated patient and patient's wife on recommendations. Discussed plans for modified barium swallow study if here still Monday. If discharged prior, encouraged following up to schedule modified swallow study as outpatient given hx of dysphagia and ongoing concerns. Patient will benefit from skilled intervention to address the below impairments. Will continue to follow. ?????? ? ? This section established at most recent assessment?????????? 
PROBLEM LIST (Impairments causing functional limitations): 1. Oropharyngeal dysphagia REHABILITATION POTENTIAL FOR STATED GOALS: Good PLAN OF CARE:  
Patient will benefit from skilled intervention to address the following impairments. RECOMMENDATIONS AND PLANNED INTERVENTIONS (Benefits and precautions of therapy have been discussed with the patient.): 
· continue prescribed diet MEDICATIONS: 
· Whole in puree ASPIRATION PRECAUTIONS: 
· Slow rate of intake · Small bites/sips · Upright at 90 degrees during meal 
COMPENSATORY STRATEGIES/MODIFICATIONS INCLUDING: 
· Chin tuck · Cup/sip · No straws OTHER RECOMMENDATIONS (including follow up treatment recommendations): · Family training/education · Patient education RECOMMENDED DIET MODIFICATIONS DISCUSSED WITH: 
· Family ·  · Patient FREQUENCY/DURATION: Continue to follow patient 2 times a week for duration of hospital stay to address above goals. RECOMMENDED REHABILITATION/EQUIPMENT: (at time of discharge pending progress): Due to the probability of continued deficits (see above) this patient will likely need continued skilled speech therapy after discharge. SUBJECTIVE:  
Oriented x4. Wife present History of Present Injury/Illness: Mr. Ji Terry  has a past medical history of Chronic obstructive pulmonary disease ([facility] [address] Dermatophytosis of nail, History of CVA (cerebrovascular accident), History of paraplegia, and Hypertension. Tomeka Irvin He also  has no past surgical history on file. Present Symptoms:   
Pain Scale 1: Numeric (0 - 10) Pain Intensity 1: 0 
 Current Medications: No current facility-administered medications on file prior to encounter. Current Outpatient Medications on File Prior to Encounter Medication Sig Dispense Refill  methylPREDNISolone (MEDROL DOSEPACK) 4 mg tablet Take as directed. 1 Dose Pack 0  
 albuterol (PROVENTIL HFA, VENTOLIN HFA, PROAIR HFA) 90 mcg/actuation inhaler Take 1 Puff by inhalation every four (4) hours as needed for Wheezing. 1 Inhaler 12  
 cefpodoxime (VANTIN) 200 mg tablet Take 1 Tab by mouth two (2) times a day. 10 Tab 0  
 acetaminophen (TYLENOL) 500 mg tablet Take  by mouth every six (6) hours as needed for Pain.  guaiFENesin (ORGANIDIN) 400 mg tablet Take  by mouth every four (4) hours.  lisinopril (PRINIVIL, ZESTRIL) 40 mg tablet Take 40 mg by mouth daily.  loratadine (CLARITIN) 10 mg tablet Take 10 mg by mouth.  b complex-vitamin c-folic acid 0.8 mg (NEPHRO-ALICIA) 0.8 mg tab tablet Take 1 Tab by mouth daily.  Omeprazole delayed release (PRILOSEC D/R) 20 mg tablet Take 20 mg by mouth daily.  phenytoin ER (DILANTIN ER) 100 mg ER capsule Take 300 mg by mouth two (2) times a day.  rosuvastatin (CRESTOR) 40 mg tablet Take 40 mg by mouth nightly.  senna (SENOKOT) 8.6 mg tablet Take 1 Tab by mouth daily.  sertraline (ZOLOFT) 100 mg tablet Take  by mouth daily.  tamsulosin (FLOMAX) 0.4 mg capsule Take 0.4 mg by mouth daily.  ALOE VERA/COLLAGEN (ALOE VESTA 2-N-1 CLEANSER EX) by Apply Externally route.  aspirin delayed-release 81 mg tablet Take 81 mg by mouth.  MINERAL OIL/PETROLATUM,WHITE (CLIVE MOISTURE BARRIER EX) by Apply Externally route.  carvedilol (COREG) 12.5 mg tablet Take 6.25 mg by mouth two (2) times a day.  cholecalciferol (VITAMIN D3) 1,000 unit cap Take 1,000 Units by mouth.  clopidogrel (PLAVIX) 75 mg tab Take 75 mg by mouth.  docusate sodium 100 mg/5 mL enem Insert  into rectum.  fluticasone (FLOVENT DISKUS) 50 mcg/actuation inhaler by Nasal route.  folic acid (FOLVITE) 1 mg tablet Take 1 mg by mouth. Current Dietary Status:   
 Regular/Thin 
 
Social History/Home Situation:   
Home Environment: Apartment # Steps to Enter: 0 One/Two Story Residence: One story Living Alone: No 
Support Systems: Spouse/Significant Other/Partner, Child(ankita), Family member(s) Patient Expects to be Discharged to[de-identified] [facility] Current DME Used/Available at Home: Adaptive bathing aides, Grab bars, Raised toilet seat, Shower chair, Transfer bench, Wheelchair, power, Walker, Other (comment)(jose HENNESSY) OBJECTIVE:  
Respiratory Status:  Nasal cannula  2 l/min Oral Motor Structure/Speech:  Oral-Motor Structure/Motor Speech Labial: Left droop(residual) Dentition: Intact Oral Hygiene: adequate Lingual: Decreased rate Cognitive and Communication Status: 
Neurologic State: Alert Orientation Level: Oriented X4 Perception: Appears intact BEDSIDE SWALLOW EVALUATIONOral Assessment: 
Oral Assessment Labial: Left droop(residual) Dentition: Intact Oral Hygiene: adequate Lingual: Decreased rate P.O. Trials: 
Patient Position: upright in chair The patient was given  the following:  
Consistency Presented: Mixed consistency; Solid; Thin liquid;Puree How Presented: Self-fed/presented;Straw;Cup/gulp; Spoon ORAL PHASE: 
Bolus Acceptance: No impairment Bolus Formation/Control: No impairment Propulsion: No impairment Oral Residue: None PHARYNGEAL PHASE: 
Initiation of Swallow: No impairment Laryngeal Elevation: Functional 
Aspiration Signs/Symptoms: Strong cough Pharyngeal Phase Characteristics: Double swallow OTHER OBSERVATIONS: 
Rate/bite size: WNL Endurance: WNL Comments: Tool Used: Dysphagia Outcome and Severity Scale (EVERT) Score Comments Normal Diet  [] 7 With no strategies or extra time needed Functional Swallow  [] 6 May have mild oral or pharyngeal delay Mild Dysphagia [x] 5 Which may require one diet consistency restricted (those who demonstrate penetration which is entirely cleared on MBS would be included) Mild-Moderate Dysphagia  [] 4 With 1-2 diet consistencies restricted Moderate Dysphagia  [] 3 With 2 or more diet consistencies restricted Moderately Severe Dysphagia  [] 2 With partial PO strategies (trials with ST only) Severe Dysphagia  [] 1 With inability to tolerate any PO safely Score:  Initial: 5 Most Recent: X (Date: --) Interpretation of Tool: The Dysphagia Outcome and Severity Scale (EVERT) is a simple, easy-to-use, 7-point scale developed to systematically rate the functional severity of dysphagia based on objective assessment and make recommendations for diet level, independence level, and type of nutrition. Payor: Valerie Agustin / Plan: [place] [employer] / Product Type: Federal Funded Programs /  
 
TREATMENT:  
 (In addition to Assessment/Re-Assessment sessions the following treatments were rendered) Assessment/Reassessment only, no treatment provided today MODALITIES:  
  
  
  
  
  
  
  
  
  
  
    
  
  
  
  
  
  
  
  
   
 
ORAL MOTOR  EXERCISES: 
  
  
  
  
  
  
  
  
  
  
  
  
  
  
  
  
  
  
  
  
  
  
  
  
  
  
    
  
  
  
  
  
  
  
  
  
  
  
  
  
  
  
  
  
  
  
  
  
  
  
  
  
   
 
LARYNGEAL / PHARYNGEAL EXERCISES: 
  
  
  
  
  
  
  
  
  
  
  
  
  
  
  
  
  
  
  
  
  
    
  
  
  
  
  
  
  
  
  
  
  
  
  
  
  
  
  
  
  
   
 
__________________________________________________________________________________________________ Safety: After treatment position/precautions: · Up in chair · Call light within reach · Family at bedside Treatment Assessment:  Participated in dysphagia evaluation without complications. Progression/Medical Necessity: · Patient demonstrates good rehab potential due to higher previous functional level. Compliance with Program/Exercises: Will assess as treatment progresses Reason for Continuation of Services/Other Comments: 
· Patient continues to require skilled intervention due to medical complications. Recommendations/Intent for next treatment session: \"Treatment next visit will focus on modified barium swallow study\". Total Treatment Duration: 
Time In: 2260 Time Out: 1105 Bhavya Calvo, IN[facility] MEDICO DEL [facility], [facility] LOY LEUNG, CF-SLP

## 2019-02-22 NOTE — PROGRESS NOTES
SPEECH PATHOLOGY NOTE: 
 
Order received and chart reviewed. Attempted to see patient for dysphagia evaluation, however respiratory therapy entering room to work with patient. Will re-attempt at later time/date as schedule permits.   
 
 
 
Aleja Vines, [facility] DEL [facility], [facility] LAWRENCE LEUNG, CF-SLP

## 2019-02-22 NOTE — PROGRESS NOTES
Problem: Falls - Risk of 
Goal: *Absence of Falls Document Nori Wood Fall Risk and appropriate interventions in the flowsheet. Outcome: Progressing Towards Goal 
Fall Risk Interventions: 
  
 
  
 
Medication Interventions: Evaluate medications/consider consulting pharmacy, Patient to call before getting OOB Elimination Interventions: Call light in reach, Patient to call for help with toileting needs History of Falls Interventions: Consult care management for discharge planning, Door open when patient unattended Problem: Pressure Injury - Risk of 
Goal: *Prevention of pressure injury Document Joshua Scale and appropriate interventions in the flowsheet. Outcome: Progressing Towards Goal 
Pressure Injury Interventions: 
Sensory Interventions: Float heels, Keep linens dry and wrinkle-free Moisture Interventions: Absorbent underpads Activity Interventions: Increase time out of bed Mobility Interventions: Pressure redistribution bed/mattress (bed type) Nutrition Interventions: Document food/fluid/supplement intake Friction and Shear Interventions: Foam dressings/transparent film/skin sealants

## 2019-02-22 NOTE — PROGRESS NOTES
Problem: Mobility Impaired (Adult and Pediatric) Goal: *Acute Goals and Plan of Care (Insert Text) STG: 
(1.)Mr. Christine Wallis will move from supine to sit and sit to supine , scoot up and down and roll side to side with MINIMAL ASSIST within 3 treatment day(s). (2.)Mr. Christine Wallis will transfer from bed to chair and chair to bed with MINIMAL ASSIST using the least restrictive device within 3 treatment day(s). (3.)Mr. Christine Wallis will ambulate with MINIMAL ASSIST for 5 feet with the least restrictive device within 3 treatment day(s). (4.)Mr. Christine Wallis will perform standing static and dynamic balance activities x 15 minutes with MINIMAL ASSIST to improve safety within 3 day(s). LTG: 
(1.)Mr. Christine Wallis will move from supine to sit and sit to supine , scoot up and down and roll side to side in bed with CONTACT GUARD ASSIST within 7 treatment day(s). (2.)Mr. Christine Wallis will transfer from bed to chair and chair to bed with CONTACT GUARD ASSIST using the least restrictive device within 7 treatment day(s). (3.)Mr. Christine Wallis will ambulate with CONTACT GUARD ASSIST for 30 feet with the least restrictive device within 7 treatment day(s). (4.)Mr. Christine Wallis will perform standing static and dynamic balance activities x 15 minutes with CONTACT GUARD ASSIST to improve safety within 7 day(s). ________________________________________________________________________________________________ PHYSICAL THERAPY: Initial Assessment and PM 2/22/2019OBSERVATION:   
Payor: Sandi Benavides / Plan: [place] [employer] / Product Type: Federal Funded Programs /   
  
NAME/AGE/GENDER: Chester Munoz is a 80 y.o. male PRIMARY DIAGNOSIS: Cellulitis [L03.90] Pneumonia [J18.9] Cellulitis [L03.90] Acute exacerbation of chronic obstructive pulmonary disease (COPD) ([facility] [J44.1] Sepsis ([facility] Sepsis ([facility] ICD-10: Treatment Diagnosis:  
 · Generalized Muscle Weakness (M62.81) · Difficulty in walking, Not elsewhere classified (R26.2) · Repeated Falls (R29.6) · History of falling (Z91.81) · Hemiplegia and hemiparesis following cerebral infarction affecting left non-dominant side (J55.081) Precaution/Allergies: 
Latex; Adhesive; and Sulfa (sulfonamide antibiotics) ASSESSMENT:  
Mr. Elvin Najjar is a 80 y.o. male admitted for cellulitis, pneumonia and acute exacerbation of COPD. He lives with spouse in a single story home and has history of 2 CVAs (one in 2005 and another in 2017) resulting in severe L UE and LE weakness. LUE is flaccid and LLE is grossly 3/5. He typically ambulates short distances with a davis walker or uses his electric wheelchair, to which he can typically transfer without assist. Wife helps with ADLs and patient has experienced at least 3 falls in the past 6 months. He is sitting in recliner and agreeable to physical therapy evaluation. Mod assist x2 to stand and maximal assist x2 to take steps to bed this date, maximal assist x2 to return to supine. He is currently functioning well below his baseline. Thu Shepard is currently functioning below his baseline and would benefit from skilled PT during acute care stay to maximize safety and independence with functional mobility. This section established at most recent assessment PROBLEM LIST (Impairments causing functional limitations): 1. Decreased Strength 2. Decreased ADL/Functional Activities 3. Decreased Transfer Abilities 4. Decreased Ambulation Ability/Technique 5. Decreased Balance 6. Decreased Activity Tolerance 7. Decreased Pacing Skills 8. Increased Shortness of Breath 9. Decreased Knowledge of Precautions 10. Decreased [place] with Home Exercise Program 
 INTERVENTIONS PLANNED: (Benefits and precautions of physical therapy have been discussed with the patient.) 1. Balance Exercise 2. Bed Mobility 3. Family Education 4. Gait Training 5. Group Therapy 6. Home Exercise Program (HEP) 7. Therapeutic Activites 8. Therapeutic Exercise/Strengthening 9. Transfer Training 10. Patient Education TREATMENT PLAN: Frequency/Duration: 3 times a week for duration of hospital stay Rehabilitation Potential For Stated Goals: Good RECOMMENDED REHABILITATION/EQUIPMENT: (at time of discharge pending progress): Due to the probability of continued deficits (see above) this patient will likely need continued skilled physical therapy after discharge. Equipment:  
? None at this time HISTORY:  
History of Present Injury/Illness (Reason for Referral): 
80 male o2 dep copd(2 L), with pulm htn , bph, old cva with chronic left hemiplegia with regular diet. Presents with several day hx progressive dsypnea, cough became productive of white phlegm. Coughs sometimes after meals per spouse. No relief dyspnea with inhalers, and left leg more edematous red and painful. He has wbc 19k with low grade temp elevation, significant bronchospasm and abn airway sounds right base. He is admitted for sepsis cellulitis source, exac copd and suspect occult pneumonia vs aspiration pneumonitis. Spouse is present at bedside Past Medical History/Comorbidities:  
Mr. Wilfredo Persaud  has a past medical history of Chronic obstructive pulmonary disease ([facility] Dermatophytosis of nail, History of CVA (cerebrovascular accident), History of paraplegia, and Hypertension. Mr. Wilfredo Persaud  has no past surgical history on file. Social History/Living Environment:  
Home Environment: Apartment # Steps to Enter: 0 One/Two Story Residence: One story Living Alone: No 
Support Systems: Spouse/Significant Other/Partner Patient Expects to be Discharged to[de-identified] Unknown Current DME Used/Available at Home: Wheelchair, power, Shower chair, Grab bars(davis walker) Tub or Shower Type: Tub/Shower combination Prior Level of Function/Work/Activity: 
 He lives with spouse in a single story home and has history of 2 CVAs (one in 2005 and another in 2017) resulting in severe L UE and LE weakness. LUE is flaccid and LLE is grossly 3/5. He typically ambulates short distances with a davis walker or uses his electric wheelchair, to which he can typically transfer without assist. Wife helps with ADLs and patient has experienced at least 3 falls in the past 6 months. Number of Personal Factors/Comorbidities that affect the Plan of Care: 3+: HIGH COMPLEXITY EXAMINATION:  
Most Recent Physical Functioning:  
Gross Assessment: 
AROM: Generally decreased, functional(LLE and UE grossly decreased) PROM: Generally decreased, functional(LLE and UE grossly decreased) Strength: (LLE and UE grossly decreased) Coordination: (LLE and UE grossly decreased) Posture: 
Posture (WDL): Exceptions to [facility] Posture Assessment: Forward head, Rounded shoulders Balance: 
Sitting: Impaired Sitting - Static: Fair (occasional) Sitting - Dynamic: Fair (occasional) Standing: Impaired Standing - Static: Poor Standing - Dynamic : Poor Bed Mobility: 
Sit to Supine: Maximum assistance;Assist x2 Scooting: Maximum assistance Wheelchair Mobility: 
  
Transfers: 
Sit to Stand: Moderate assistance;Assist x2 Stand to Sit: Moderate assistance;Assist x2 Gait: 
  
Base of Support: Center of gravity altered; Widened Speed/Jessica: Slow;Shuffled;Pace decreased (<100 feet/min) Gait Abnormalities: Decreased step clearance; Path deviations; Hemiplegic; Shuffling gait;Trunk sway increased Distance (ft): 2 Feet (ft) Assistive Device: Gait belt Ambulation - Level of Assistance: Maximum assistance; Moderate assistance;Assist x2 Interventions: Safety awareness training;Manual cues; Verbal cues; Visual/Demos Body Structures Involved: 1. Nerves 2. Lungs 3. Muscles Body Functions Affected: 1. Sensory/Pain 2. Respiratory 3. Neuromusculoskeletal 
4. Movement Related Activities and Participation Affected: 1. Mobility 2. Self Care 3. Domestic Life 4. Interpersonal Interactions and Relationships 5. Community, Social and [place] Number of elements that affect the Plan of Care: 4+: HIGH COMPLEXITY CLINICAL PRESENTATION:  
Presentation: Evolving clinical presentation with changing clinical characteristics: MODERATE COMPLEXITY CLINICAL DECISION [facility] [address] [address] AM-PAC 6 Clicks Basic Mobility Inpatient Short Form How much difficulty does the patient currently have. .. Unable A Lot A Little None 1. Turning over in bed (including adjusting bedclothes, sheets and blankets)? [] 1   [] 2   [x] 3   [] 4  
2. Sitting down on and standing up from a chair with arms ( e.g., wheelchair, bedside commode, etc.)   [] 1   [x] 2   [] 3   [] 4  
3. Moving from lying on back to sitting on the side of the bed? [] 1   [x] 2   [] 3   [] 4 How much help from another person does the patient currently need. .. Total A Lot A Little None 4. Moving to and from a bed to a chair (including a wheelchair)? [] 1   [x] 2   [] 3   [] 4  
5. Need to walk in hospital room? [x] 1   [] 2   [] 3   [] 4  
6. Climbing 3-5 steps with a railing? [x] 1   [] 2   [] 3   [] 4  
© , Trustees of [address] [place] [address] [address] [address], under license to [employer]. All rights reserved Score:  Initial: 11 Most Recent: X (Date: -- ) Interpretation of Tool:  Represents activities that are increasingly more difficult (i.e. Bed mobility, Transfers, Gait). Medical Necessity:    
· Patient demonstrates good rehab potential due to higher previous functional level. Reason for Services/Other Comments: 
· Patient continues to require modification of therapeutic interventions to increase complexity of exercises. Use of outcome tool(s) and clinical judgement create a POC that gives a: Questionable prediction of patient's progress: MODERATE COMPLEXITY  
  
 
 
 
TREATMENT:  
(In addition to Assessment/Re-Assessment sessions the following treatments were rendered) Pre-treatment Symptoms/Complaints:  none Pain: Initial:  
Pain Intensity 1: 0  Post Session:  0/10 Assessment/Reassessment only, no treatment provided today Braces/Orthotics/Lines/Etc:  
· IV 
· O2 Room Air Treatment/Session Assessment:   
· Response to Treatment:  Patient required max assist x2 to transfer from chair to bed. · Interdisciplinary Collaboration:  
o Physical Therapist 
o Registered Nurse · After treatment position/precautions:  
o Supine in bed 
o Bed/Chair-wheels locked 
o Bed in low position 
o Call light within reach 
o RN notified 
o Family at bedside · Compliance with Program/Exercises: Will assess as treatment progresses · Recommendations/Intent for next treatment session: \"Next visit will focus on advancements to more challenging activities and reduction in assistance provided\". Total Treatment Duration: PT Patient Time In/Time Out Time In: 1325 Time Out: 7100 LATISHA WatsonT

## 2019-02-22 NOTE — PROGRESS NOTES
Spoke to Mr. Elvin Najjar and his wife in room 211. They said that they would like to bill [facility] this time for his hospital stay (not Medicare), and that [place] gave them approval to come here to [address] [place] [address]. Updated Ms. Melody Negrete RN, [facility] or [place] via e-mail.

## 2019-02-22 NOTE — PHYSICIAN ADVISORY
Letter of Determination: Upgrade from Observation to Inpatient Status This patient was originally hospitalized as Outpatient Status with Observation Services on 2/21/2019 for sepsis. This patient now meets for Inpatient Admission in accordance with CMS regulation Section 43 .3. Specifically, patient's stay is now over Two Midnights and was medically necessary. The patient's stay was medically necessary based on history of chronic obstructive pulmonary disease with acute exacerbation and physician concern for aspiration, history of cerebral vascular accident with right hemiparesis, and white blood cell count of 19,500. Consistent with CMS guidelines, patient meets for inpatient status. It is our recommendation that this patient's hospitalization status should be upgraded from OBSERVATION to INPATIENT status.  
  
The final decision regarding the patient's hospitalization status depends on the attending physician's judgement. Beatriz Simon MD, GIL, Physician Advisor [address] [place] [address].

## 2019-02-22 NOTE — PROGRESS NOTES
TRANSFER - IN REPORT: 
 
Verbal report received from Zaid Contreras (name) on Hayes Mccartney  being received from ER(unit) for routine progression of care Report consisted of patients Situation, Background, Assessment and  
Recommendations(SBAR). Information from the following report(s) SBAR and ED Summary was reviewed with the receiving nurse. Opportunity for questions and clarification was provided. Assessment completed upon patients arrival to unit and care assumed.

## 2019-02-22 NOTE — ED NOTES
TRANSFER - OUT REPORT: 
 
Verbal report given to RN Devon Lyman (name) on Rodolfo Carpenter  being transferred to [facility](unit) for routine progression of care Report consisted of patients Situation, Background, Assessment and  
Recommendations(SBAR). Information from the following report(s) SBAR, Kardex, ED Summary, Procedure Summary, Intake/Output, MAR and Recent Results was reviewed with the receiving nurse. Lines:  
Peripheral IV 02/21/19 Right Hand (Active) Site Assessment Clean, dry, & intact 2/21/2019  9:39 PM  
Phlebitis Assessment 0 2/21/2019  9:39 PM  
Infiltration Assessment 0 2/21/2019  9:39 PM  
Dressing Status Clean, dry, & intact 2/21/2019  9:39 PM  
Hub Color/Line Status Blue 2/21/2019  9:39 PM  
   
Peripheral IV 02/21/19 Right Hand (Active) Site Assessment Clean, dry, & intact 2/21/2019  9:39 PM  
Phlebitis Assessment 0 2/21/2019  9:39 PM  
Infiltration Assessment 0 2/21/2019  9:39 PM  
Dressing Status Clean, dry, & intact 2/21/2019  9:39 PM  
Hub Color/Line Status Blue 2/21/2019  9:39 PM  
  
 
Opportunity for questions and clarification was provided. Patient transported with: 
 O2 @ 3 liters Tech

## 2019-02-22 NOTE — WOUND CARE
Pt seen with wound care nurse KAUSHAL, for chronic area on plantar aspect of left foot. Noted on plantar aspect of left 2nd toe small protruding area measuring 1.5 X 1.5 cm with purple irregular shaped discoloration to center. Most consistent with mortons neuroma? Wound nurseKAUSHAL informed pt and family that it would be beneficial to follow up out patient with either a podiatrist or ortho MD. No open wounds noted. Wound team will sign off, re-consult if needed.

## 2019-02-22 NOTE — ED NOTES
IV ABX x 2 infused/complete. Attempted to straight cath, difficulty with passing prostate. Non-latex set used. Urine collection bag applied to patient- will attempt to gather that way. Patient with stable VS.

## 2019-02-23 LAB
ANION GAP SERPL CALC-SCNC: 7 MMOL/L (ref 7–16)
BUN SERPL-MCNC: 12 MG/DL (ref 8–23)
CALCIUM SERPL-MCNC: 7.7 MG/DL (ref 8.3–10.4)
CHLORIDE SERPL-SCNC: 111 MMOL/L (ref 98–107)
CO2 SERPL-SCNC: 24 MMOL/L (ref 21–32)
CREAT SERPL-MCNC: 0.58 MG/DL (ref 0.8–1.5)
ERYTHROCYTE [DISTWIDTH] IN BLOOD BY AUTOMATED COUNT: 12.1 % (ref 11.9–14.6)
GLUCOSE BLD STRIP.AUTO-MCNC: 123 MG/DL (ref 65–100)
GLUCOSE BLD STRIP.AUTO-MCNC: 132 MG/DL (ref 65–100)
GLUCOSE BLD STRIP.AUTO-MCNC: 135 MG/DL (ref 65–100)
GLUCOSE BLD STRIP.AUTO-MCNC: 168 MG/DL (ref 65–100)
GLUCOSE SERPL-MCNC: 126 MG/DL (ref 65–100)
HCT VFR BLD AUTO: 33.7 % (ref 41.1–50.3)
HGB BLD-MCNC: 11.3 G/DL (ref 13.6–17.2)
MCH RBC QN AUTO: 32.4 PG (ref 26.1–32.9)
MCHC RBC AUTO-ENTMCNC: 33.5 G/DL (ref 31.4–35)
MCV RBC AUTO: 96.6 FL (ref 79.6–97.8)
NRBC # BLD: 0 K/UL (ref 0–0.2)
PLATELET # BLD AUTO: 122 K/UL (ref 150–450)
PMV BLD AUTO: 10.6 FL (ref 9.4–12.3)
POTASSIUM SERPL-SCNC: 3.4 MMOL/L (ref 3.5–5.1)
RBC # BLD AUTO: 3.49 M/UL (ref 4.23–5.6)
SODIUM SERPL-SCNC: 142 MMOL/L (ref 136–145)
WBC # BLD AUTO: 11.1 K/UL (ref 4.3–11.1)

## 2019-02-23 PROCEDURE — 77010033678 HC OXYGEN DAILY

## 2019-02-23 PROCEDURE — 36415 COLL VENOUS BLD VENIPUNCTURE: CPT

## 2019-02-23 PROCEDURE — 94760 N-INVAS EAR/PLS OXIMETRY 1: CPT

## 2019-02-23 PROCEDURE — 74011250637 HC RX REV CODE- 250/637: Performed by: INTERNAL MEDICINE

## 2019-02-23 PROCEDURE — 74011636637 HC RX REV CODE- 636/637: Performed by: INTERNAL MEDICINE

## 2019-02-23 PROCEDURE — 77030011943

## 2019-02-23 PROCEDURE — 94640 AIRWAY INHALATION TREATMENT: CPT

## 2019-02-23 PROCEDURE — 82962 GLUCOSE BLOOD TEST: CPT

## 2019-02-23 PROCEDURE — 80048 BASIC METABOLIC PNL TOTAL CA: CPT

## 2019-02-23 PROCEDURE — 74011250636 HC RX REV CODE- 250/636: Performed by: INTERNAL MEDICINE

## 2019-02-23 PROCEDURE — 65270000029 HC RM PRIVATE

## 2019-02-23 PROCEDURE — 74011000250 HC RX REV CODE- 250: Performed by: INTERNAL MEDICINE

## 2019-02-23 PROCEDURE — 85027 COMPLETE CBC AUTOMATED: CPT

## 2019-02-23 RX ORDER — SAME BUTANEDISULFONATE/BETAINE 400-600 MG
250 POWDER IN PACKET (EA) ORAL 2 TIMES DAILY
Status: DISCONTINUED | OUTPATIENT
Start: 2019-02-23 | End: 2019-02-26 | Stop reason: HOSPADM

## 2019-02-23 RX ORDER — CLINDAMYCIN HYDROCHLORIDE 150 MG/1
300 CAPSULE ORAL EVERY 8 HOURS
Status: DISCONTINUED | OUTPATIENT
Start: 2019-02-23 | End: 2019-02-26 | Stop reason: HOSPADM

## 2019-02-23 RX ORDER — POTASSIUM CHLORIDE 20 MEQ/1
20 TABLET, EXTENDED RELEASE ORAL
Status: COMPLETED | OUTPATIENT
Start: 2019-02-23 | End: 2019-02-23

## 2019-02-23 RX ADMIN — ROSUVASTATIN CALCIUM 40 MG: 20 TABLET, COATED ORAL at 21:48

## 2019-02-23 RX ADMIN — TAMSULOSIN HYDROCHLORIDE 0.4 MG: 0.4 CAPSULE ORAL at 21:48

## 2019-02-23 RX ADMIN — SERTRALINE 100 MG: 100 TABLET, FILM COATED ORAL at 21:48

## 2019-02-23 RX ADMIN — INSULIN LISPRO 2 UNITS: 100 INJECTION, SOLUTION INTRAVENOUS; SUBCUTANEOUS at 18:03

## 2019-02-23 RX ADMIN — Medication 250 MG: at 18:03

## 2019-02-23 RX ADMIN — Medication 5 ML: at 13:28

## 2019-02-23 RX ADMIN — LISINOPRIL 40 MG: 20 TABLET ORAL at 09:45

## 2019-02-23 RX ADMIN — CLINDAMYCIN HYDROCHLORIDE 300 MG: 150 CAPSULE ORAL at 14:21

## 2019-02-23 RX ADMIN — ALBUTEROL SULFATE 2.5 MG: 2.5 SOLUTION RESPIRATORY (INHALATION) at 14:09

## 2019-02-23 RX ADMIN — ALBUTEROL SULFATE 2.5 MG: 2.5 SOLUTION RESPIRATORY (INHALATION) at 19:27

## 2019-02-23 RX ADMIN — Medication 10 ML: at 21:48

## 2019-02-23 RX ADMIN — CLOPIDOGREL BISULFATE 75 MG: 75 TABLET, FILM COATED ORAL at 09:44

## 2019-02-23 RX ADMIN — Medication 250 MG: at 14:21

## 2019-02-23 RX ADMIN — PHENYTOIN SODIUM 100 MG: 100 CAPSULE ORAL at 21:48

## 2019-02-23 RX ADMIN — CLINDAMYCIN HYDROCHLORIDE 300 MG: 150 CAPSULE ORAL at 21:48

## 2019-02-23 RX ADMIN — BUDESONIDE 500 MCG: 0.5 INHALANT RESPIRATORY (INHALATION) at 07:15

## 2019-02-23 RX ADMIN — CARVEDILOL 6.25 MG: 6.25 TABLET, FILM COATED ORAL at 09:45

## 2019-02-23 RX ADMIN — ASPIRIN 81 MG: 81 TABLET, COATED ORAL at 09:45

## 2019-02-23 RX ADMIN — ALBUTEROL SULFATE 2.5 MG: 2.5 SOLUTION RESPIRATORY (INHALATION) at 07:15

## 2019-02-23 RX ADMIN — Medication 10 ML: at 05:32

## 2019-02-23 RX ADMIN — BUDESONIDE 500 MCG: 0.5 INHALANT RESPIRATORY (INHALATION) at 19:26

## 2019-02-23 RX ADMIN — POTASSIUM CHLORIDE 20 MEQ: 20 TABLET, EXTENDED RELEASE ORAL at 09:44

## 2019-02-23 RX ADMIN — PREDNISONE 60 MG: 10 TABLET ORAL at 09:45

## 2019-02-23 RX ADMIN — ACETAMINOPHEN 650 MG: 325 TABLET, FILM COATED ORAL at 10:38

## 2019-02-23 RX ADMIN — HEPARIN SODIUM 5000 UNITS: 5000 INJECTION INTRAVENOUS; SUBCUTANEOUS at 23:19

## 2019-02-23 RX ADMIN — PHENYTOIN SODIUM 100 MG: 100 CAPSULE ORAL at 18:03

## 2019-02-23 RX ADMIN — HEPARIN SODIUM 5000 UNITS: 5000 INJECTION INTRAVENOUS; SUBCUTANEOUS at 15:32

## 2019-02-23 RX ADMIN — VANCOMYCIN HYDROCHLORIDE 1500 MG: 10 INJECTION, POWDER, LYOPHILIZED, FOR SOLUTION INTRAVENOUS at 00:48

## 2019-02-23 RX ADMIN — PANTOPRAZOLE SODIUM 40 MG: 40 TABLET, DELAYED RELEASE ORAL at 05:35

## 2019-02-23 RX ADMIN — GUAIFENESIN 600 MG: 600 TABLET, EXTENDED RELEASE ORAL at 21:48

## 2019-02-23 RX ADMIN — CARVEDILOL 6.25 MG: 6.25 TABLET, FILM COATED ORAL at 18:03

## 2019-02-23 RX ADMIN — ACETAMINOPHEN 650 MG: 325 TABLET, FILM COATED ORAL at 23:20

## 2019-02-23 RX ADMIN — PHENYTOIN SODIUM 100 MG: 100 CAPSULE ORAL at 09:45

## 2019-02-23 RX ADMIN — LORATADINE 10 MG: 10 TABLET ORAL at 09:44

## 2019-02-23 RX ADMIN — GUAIFENESIN 600 MG: 600 TABLET, EXTENDED RELEASE ORAL at 09:45

## 2019-02-23 RX ADMIN — SODIUM CHLORIDE 100 ML/HR: 900 INJECTION, SOLUTION INTRAVENOUS at 23:37

## 2019-02-23 RX ADMIN — HEPARIN SODIUM 5000 UNITS: 5000 INJECTION INTRAVENOUS; SUBCUTANEOUS at 09:48

## 2019-02-23 NOTE — PROGRESS NOTES
Hospitalist Progress Note Admit Date:  2019  8:36 PM  
Name:  Chuckie Salvador Age:  80 y.o. 
:  1950 MRN:  [id number] PCP:  Ronen Riggs MD 
Treatment Team: Attending Provider: Barry Millard DO; Care Manager: Nargis Oh RN Subjective:  
 
Pt is a 81 yo male with pmh CVA with left hemiparesis, COPD on 2 L NC, chronic left leg cellulitis who presented to ER  with worsening left leg redness/pain, productive cough and progressive weakness. Pt found to meet sepsis criteria with WBC 19, HR 100s, TMax 100.7. Chest x ray without acute findings. He had wheezing in ERbut now resolved. Pt started on abx and fluids for sepsis r/t cellulitis and poss COPD exac. Today pt is up in the chair. He states breathing seems to be baseline, on RA. No c/o cough. LLE cellulitis some improved. He is hopeful for home soon. Objective:  
 
Patient Vitals for the past 24 hrs: 
 Temp Pulse Resp BP SpO2  
19 0715     92 % 19 0702 98.6 °F (37 °C) (!) 101 18 153/77 92 % 19 0319 98.2 °F (36.8 °C) 98 16 116/62 92 % 19 2326 97.8 °F (36.6 °C) 89 16 115/67 94 % 19 1943 97.9 °F (36.6 °C) 95 16 118/75 94 % 19 1919     95 % 19 1547 97.4 °F (36.3 °C) 93 16 111/72 94 % 19 1133 98.8 °F (37.1 °C) 87 16 106/66 91 % Oxygen Therapy O2 Sat (%): 92 % (19) Pulse via Oximetry: 86 beats per minute (19) O2 Device: Nasal cannula (19) O2 Flow Rate (L/min): 2 l/min (19) Intake/Output Summary (Last 24 hours) at 2019 1033 Last data filed at 2019 5449 Gross per 24 hour Intake 3655 ml Output 100 ml Net 3555 ml General:    Well nourished. Alert. CV:   RRR. No murmur, rub, or gallop. Lungs:   CTAB. No wheezing, rhonchi, or rales. Abdomen:   Soft, nontender, nondistended. Extremities: Warm and dry. No cyanosis or edema. Skin:     No rashes or jaundice. Neuro:  No gross focal deficits Data Review: 
I have reviewed all labs, meds, telemetry events, and studies from the last 24 hours: 
 
Recent Results (from the past 24 hour(s)) GLUCOSE, POC Collection Time: 02/22/19 12:20 PM  
Result Value Ref Range Glucose (POC) 154 (H) 65 - 100 mg/dL GLUCOSE, POC Collection Time: 02/22/19  4:35 PM  
Result Value Ref Range Glucose (POC) 130 (H) 65 - 100 mg/dL GLUCOSE, POC Collection Time: 02/22/19  9:18 PM  
Result Value Ref Range Glucose (POC) 160 (H) 65 - 100 mg/dL METABOLIC PANEL, BASIC Collection Time: 02/23/19  4:19 AM  
Result Value Ref Range Sodium 142 136 - 145 mmol/L Potassium 3.4 (L) 3.5 - 5.1 mmol/L Chloride 111 (H) 98 - 107 mmol/L  
 CO2 24 21 - 32 mmol/L Anion gap 7 7 - 16 mmol/L Glucose 126 (H) 65 - 100 mg/dL BUN 12 8 - 23 MG/DL Creatinine 0.58 (L) 0.8 - 1.5 MG/DL  
 GFR est AA >60 >60 ml/min/1.73m2 GFR est non-AA >60 >60 ml/min/1.73m2 Calcium 7.7 (L) 8.3 - 10.4 MG/DL  
CBC W/O DIFF Collection Time: 02/23/19  4:19 AM  
Result Value Ref Range WBC 11.1 4.3 - 11.1 K/uL  
 RBC 3.49 (L) 4.23 - 5.6 M/uL  
 HGB 11.3 (L) 13.6 - 17.2 g/dL HCT 33.7 (L) 41.1 - 50.3 % MCV 96.6 79.6 - 97.8 FL  
 MCH 32.4 26.1 - 32.9 PG  
 MCHC 33.5 31.4 - 35.0 g/dL  
 RDW 12.1 11.9 - 14.6 % PLATELET 965 (L) 589 - 450 K/uL MPV 10.6 9.4 - 12.3 FL ABSOLUTE NRBC 0.00 0.0 - 0.2 K/uL GLUCOSE, POC Collection Time: 02/23/19  7:55 AM  
Result Value Ref Range Glucose (POC) 123 (H) 65 - 100 mg/dL All Micro Results Procedure Component Value Units Date/Time CULTURE, BLOOD [[id number]] Collected:  02/21/19 2035 Order Status:  Completed Specimen:  Blood Updated:  02/23/19 9809 Special Requests: --     
  RIGHT 
HAND Culture result: NO GROWTH 2 DAYS     
 CULTURE, BLOOD [[id number]] Collected:  02/21/19 2140 Order Status:  Completed Specimen:  Blood Updated:  02/23/19 9633 Special Requests: --     
  NO SPECIAL REQUESTS 
RIGHT 
HAND Culture result: NO GROWTH 2 DAYS     
 CULTURE, RESPIRATORY/SPUTUM/BRONCH Ariana Shaper STAIN [[id number]] Order Status:  Sent Specimen:  Sputum Results for orders placed or performed during the hospital encounter of 19  
2D ECHO COMPLETE ADULT (TTE) W OR WO CONTR Narrative Carol[facility] One [address] [place], [address] [facility] 
[phone] Transthoracic Echocardiogram 
2D, M-mode, Doppler, and Color Doppler Chaparrita Yen 
MR #: [id number] : 1950 Age: 80 years Gender: Male Study date: 2019 Account #: [de-identified] Height: 71 in 
Weight: 224.6 lb 
BSA: 2.22 mï¾² Status:Routine Location: 211 BP: 130/ 83 Allergies: LATEX, ADHESIVE, SULFA (SULFONAMIDE ANTIBIOTICS) Sonographer:  Bailey Interiano RDCS Group:  [facility] Referring Physician:  Corinna Rodriguez DO Reading Physician:  Camila Soria MD 
 
INDICATIONS: Pulm HTN, lower ext edema, dyspnea. *Patient had low windows and images were respiratory dependent. * HISTORY: PRIOR HISTORY: COPD. PROCEDURE: This was a routine study. A transthoracic echocardiogram was 
performed. The study included complete 2D imaging, M-mode, complete spectral 
Doppler, and color Doppler. Intravenous contrast (Definity) was administered. Image quality was adequate. LEFT VENTRICLE: Size was normal. Systolic function was normal. Ejection 
fraction was estimated in the range of 60 % to 65 %. There were no regional 
wall motion abnormalities. Wall thickness was normal. Avg. E/e'= 14.1. Left 
ventricular diastolic function parameters were indeterminate. RIGHT VENTRICLE: The size was normal. Systolic function was normal. The 
tricuspid jet envelope definition was inadequate for estimation of RV  
systolic 
pressure.  
 
LEFT ATRIUM: Size was normal. 
 
RIGHT ATRIUM: Size was normal. 
 
 SYSTEMIC VEINS: IVC: The inferior vena cava was normal in size and course. AORTIC VALVE: The valve was not well visualized. There was no evidence for 
stenosis. There was no insufficiency. MITRAL VALVE: Valve structure was normal. There was no evidence for stenosis. There was trivial regurgitation. TRICUSPID VALVE: The valve structure was normal. There was no evidence for 
stenosis. There was trace regurgitation. PULMONIC VALVE: Not well visualized. There was no evidence for stenosis. There 
was no insufficiency. PERICARDIUM: There was no pericardial effusion. AORTA: The root exhibited normal size. SUMMARY: 
 
-  Left ventricle: Systolic function was normal. Ejection fraction was 
estimated in the range of 60 % to 65 %. There were no regional wall motion 
abnormalities. SYSTEM MEASUREMENT TABLES 
 
2D mode AoR Diam (2D): 3.5 cm Left Atrium Systolic Volume Index; Method of Disks, Biplane; 2D mode;: 23.2 
ml/m2 IVS/LVPW (2D): 1 IVSd (2D): 1 cm LVIDd (2D): 5.1 cm 
LVIDs (2D): 3.2 cm 
LVOT Area (2D): 3.5 cm2 LVPWd (2D): 1 cm Tissue Doppler Imaging LV Peak Early Lr Tissue Navin; Lateral MA (TDI): 7.3 cm/s LV Peak Early Lr Tissue Navin; Medial MA (TDI): 7.3 cm/s Unspecified Scan Mode Peak Grad; Mean; Antegrade Flow: 9 mm[Hg] Vmax; Antegrade Flow: 148 cm/s LVOT Diam: 2.1 cm 
MV Peak Navin/LV Peak Tissue Navin E-Wave; Lateral MA: 14.1 MV Peak Navin/LV Peak Tissue Navin E-Wave; Medial MA: 14.1 Prepared and signed by Temi Kent MD 
Signed 22-Feb-2019 11:44:48 Current Meds: 
Current Facility-Administered Medications Medication Dose Route Frequency  vancomycin (VANCOCIN) 1500 mg in  ml infusion  1,500 mg IntraVENous Q12H  tamsulosin (FLOMAX) capsule 0.4 mg  0.4 mg Oral QHS  sertraline (ZOLOFT) tablet 100 mg  100 mg Oral QHS  phenytoin ER (DILANTIN ER) ER capsule 100 mg  100 mg Oral TID  carvedilol (COREG) tablet 6.25 mg  6.25 mg Oral BID WITH MEALS  Vancomycin trough reminder   Other ONCE  predniSONE (DELTASONE) tablet 60 mg  60 mg Oral DAILY WITH BREAKFAST  cefTRIAXone (ROCEPHIN) 2 g in 0.9% sodium chloride (MBP/ADV) 50 mL  2 g IntraVENous Q24H  
 azithromycin (ZITHROMAX) 500 mg in 0.9% sodium chloride (MBP/ADV) 250 mL  500 mg IntraVENous Q24H  
 sodium chloride (NS) flush 5-40 mL  5-40 mL IntraVENous Q8H  
 sodium chloride (NS) flush 5-40 mL  5-40 mL IntraVENous PRN  
 0.9% sodium chloride infusion  100 mL/hr IntraVENous CONTINUOUS  
 acetaminophen (TYLENOL) tablet 650 mg  650 mg Oral Q4H PRN  
 HYDROcodone-acetaminophen (NORCO) 5-325 mg per tablet 1 Tab  1 Tab Oral Q4H PRN  
 naloxone (NARCAN) injection 0.4 mg  0.4 mg IntraVENous PRN  
 ondansetron (ZOFRAN) injection 4 mg  4 mg IntraVENous Q4H PRN  
 bisacodyl (DULCOLAX) suppository 10 mg  10 mg Rectal DAILY PRN  
 LORazepam (ATIVAN) tablet 1 mg  1 mg Oral Q6H PRN  
 heparin (porcine) injection 5,000 Units  5,000 Units SubCUTAneous Q8H  
 albuterol (PROVENTIL VENTOLIN) nebulizer solution 2.5 mg  2.5 mg Nebulization Q6H RT  
 budesonide (PULMICORT) 500 mcg/2 ml nebulizer suspension  500 mcg Nebulization BID RT  
 insulin lispro (HUMALOG) injection   SubCUTAneous AC&HS  aspirin delayed-release tablet 81 mg  81 mg Oral DAILY  clopidogrel (PLAVIX) tablet 75 mg  75 mg Oral DAILY  guaiFENesin ER (MUCINEX) tablet 600 mg  600 mg Oral Q12H  
 lisinopril (PRINIVIL, ZESTRIL) tablet 40 mg  40 mg Oral DAILY  loratadine (CLARITIN) tablet 10 mg  10 mg Oral DAILY  pantoprazole (PROTONIX) tablet 40 mg  40 mg Oral ACB  rosuvastatin (CRESTOR) tablet 40 mg  40 mg Oral QHS Other Studies (last 24 hours): Xr Foot Lt Min 3 V Result Date: 2/22/2019 LEFT FOOT 3 view(s). HISTORY: Left foot pain second digit with chronic cellulitis. TECHNIQUE: AP and lateral and oblique views. COMPARISON: None. FINDINGS: Bone mineral density unremarkable. No periostitis or erosions. Joint alignment unremarkable. Mild osteoarthritis of the first metatarsal phalangeal joint. No fractures. Osteoarthritis talonavicular joint. IMPRESSION: Negative for osteomyelitis. Mild osteoarthritis. Farida Peaks Assessment and Plan:  
 
Hospital Problems as of 2/23/2019 Date Reviewed: 9/5/2017 Codes Class Noted - Resolved POA Cellulitis ICD-10-CM: L03.90 ICD-9-CM: 682.9  2/21/2019 - Present Unknown Pneumonia ICD-10-CM: J18.9 ICD-9-CM: 008  2/21/2019 - Present Acute exacerbation of chronic obstructive pulmonary disease (COPD) ([facility] ICD-10-CM: J44.1 ICD-9-CM: 491.21  2/21/2019 - Present Unknown * (Principal) Sepsis ([facility] ICD-10-CM: A41.9 ICD-9-CM: 038.9, 995.91  2/21/2019 - Present Unknown COPD exacerbation ([facility] ICD-10-CM: J44.1 ICD-9-CM: 491.21  7/14/2018 - Present Yes Left hemiplegia ([facility] ICD-10-CM: G81.94 
ICD-9-CM: 342.90  7/14/2018 - Present Yes  
   
 CVA (cerebral vascular accident) ([facility] ICD-10-CM: I63.9 ICD-9-CM: 434.91  7/14/2018 - Present Yes Overview Signed 7/14/2018  3:17 PM by Keisha Cruz MD  
  Multiple with left hemiplegia. Depression ICD-10-CM: F32.9 ICD-9-CM: 301  7/14/2018 - Present Yes Seizure ([facility] ICD-10-CM: R56.9 ICD-9-CM: 780.39  7/14/2018 - Present Yes  
   
 BPH (benign prostatic hyperplasia) ICD-10-CM: N40.0 ICD-9-CM: 600.00  7/14/2018 - Present Yes Plan: LLE cellulitis Sepsis resolved. WBC resolving. X Ray due to left foot tenderness without osteo or abscess Wound care consulted, signed off Nursing to outline area of redness Descelate abx to PO Clindamycin COPD exacerbation, concern for aspiration Stop prednisone Cont breathing txs ST following, recommends no straws and meds in pureed food Hx CVA, Right hemiparesis Chronic Consult PT/OT Cont home regimen ASA, Plavix Hypokalemia Replace with PO meq KCL 
 
DC planning SW following, plan for home with home health. Potentntially 02/24 on PO abx Diet:  DIET DIABETIC CONSISTENT CARB 
FOOD SVCS COMMENTS 
DVT ppx:  Heparin Signed: 
Ysabel Bahena NP

## 2019-02-24 LAB
GLUCOSE BLD STRIP.AUTO-MCNC: 101 MG/DL (ref 65–100)
GLUCOSE BLD STRIP.AUTO-MCNC: 105 MG/DL (ref 65–100)
GLUCOSE BLD STRIP.AUTO-MCNC: 133 MG/DL (ref 65–100)
GLUCOSE BLD STRIP.AUTO-MCNC: 154 MG/DL (ref 65–100)

## 2019-02-24 PROCEDURE — 65270000029 HC RM PRIVATE

## 2019-02-24 PROCEDURE — 74011000250 HC RX REV CODE- 250: Performed by: INTERNAL MEDICINE

## 2019-02-24 PROCEDURE — 82962 GLUCOSE BLOOD TEST: CPT

## 2019-02-24 PROCEDURE — 77030020263 HC SOL INJ SOD CL0.9% LFCR 1000ML

## 2019-02-24 PROCEDURE — 94760 N-INVAS EAR/PLS OXIMETRY 1: CPT

## 2019-02-24 PROCEDURE — 74011250637 HC RX REV CODE- 250/637: Performed by: INTERNAL MEDICINE

## 2019-02-24 PROCEDURE — 77010033678 HC OXYGEN DAILY

## 2019-02-24 PROCEDURE — 94640 AIRWAY INHALATION TREATMENT: CPT

## 2019-02-24 PROCEDURE — 74011250636 HC RX REV CODE- 250/636: Performed by: INTERNAL MEDICINE

## 2019-02-24 PROCEDURE — 77030020253 HC SOL INJ D545NS .05 DEX .45 SAL

## 2019-02-24 PROCEDURE — 74011636637 HC RX REV CODE- 636/637: Performed by: INTERNAL MEDICINE

## 2019-02-24 RX ADMIN — Medication 250 MG: at 18:12

## 2019-02-24 RX ADMIN — CARVEDILOL 6.25 MG: 6.25 TABLET, FILM COATED ORAL at 10:19

## 2019-02-24 RX ADMIN — PHENYTOIN SODIUM 100 MG: 100 CAPSULE ORAL at 21:22

## 2019-02-24 RX ADMIN — Medication 250 MG: at 10:19

## 2019-02-24 RX ADMIN — PHENYTOIN SODIUM 100 MG: 100 CAPSULE ORAL at 10:19

## 2019-02-24 RX ADMIN — ALBUTEROL SULFATE 2.5 MG: 2.5 SOLUTION RESPIRATORY (INHALATION) at 07:20

## 2019-02-24 RX ADMIN — LORAZEPAM 1 MG: 1 TABLET ORAL at 16:02

## 2019-02-24 RX ADMIN — HYDROCODONE BITARTRATE AND ACETAMINOPHEN 1 TABLET: 5; 325 TABLET ORAL at 19:19

## 2019-02-24 RX ADMIN — PANTOPRAZOLE SODIUM 40 MG: 40 TABLET, DELAYED RELEASE ORAL at 05:33

## 2019-02-24 RX ADMIN — BUDESONIDE 500 MCG: 0.5 INHALANT RESPIRATORY (INHALATION) at 19:55

## 2019-02-24 RX ADMIN — BUDESONIDE 500 MCG: 0.5 INHALANT RESPIRATORY (INHALATION) at 07:20

## 2019-02-24 RX ADMIN — HEPARIN SODIUM 5000 UNITS: 5000 INJECTION INTRAVENOUS; SUBCUTANEOUS at 23:22

## 2019-02-24 RX ADMIN — CARVEDILOL 6.25 MG: 6.25 TABLET, FILM COATED ORAL at 18:12

## 2019-02-24 RX ADMIN — GUAIFENESIN 600 MG: 600 TABLET, EXTENDED RELEASE ORAL at 21:22

## 2019-02-24 RX ADMIN — HYDROCODONE BITARTRATE AND ACETAMINOPHEN 1 TABLET: 5; 325 TABLET ORAL at 23:22

## 2019-02-24 RX ADMIN — CLINDAMYCIN HYDROCHLORIDE 300 MG: 150 CAPSULE ORAL at 16:02

## 2019-02-24 RX ADMIN — LORAZEPAM 1 MG: 1 TABLET ORAL at 00:44

## 2019-02-24 RX ADMIN — CLOPIDOGREL BISULFATE 75 MG: 75 TABLET, FILM COATED ORAL at 10:19

## 2019-02-24 RX ADMIN — CLINDAMYCIN HYDROCHLORIDE 300 MG: 150 CAPSULE ORAL at 21:22

## 2019-02-24 RX ADMIN — INSULIN LISPRO 2 UNITS: 100 INJECTION, SOLUTION INTRAVENOUS; SUBCUTANEOUS at 21:23

## 2019-02-24 RX ADMIN — SERTRALINE 100 MG: 100 TABLET, FILM COATED ORAL at 21:22

## 2019-02-24 RX ADMIN — CLINDAMYCIN HYDROCHLORIDE 300 MG: 150 CAPSULE ORAL at 05:32

## 2019-02-24 RX ADMIN — ALBUTEROL SULFATE 2.5 MG: 2.5 SOLUTION RESPIRATORY (INHALATION) at 19:55

## 2019-02-24 RX ADMIN — LISINOPRIL 40 MG: 20 TABLET ORAL at 10:19

## 2019-02-24 RX ADMIN — LORATADINE 10 MG: 10 TABLET ORAL at 10:19

## 2019-02-24 RX ADMIN — Medication 10 ML: at 05:33

## 2019-02-24 RX ADMIN — Medication 10 ML: at 21:22

## 2019-02-24 RX ADMIN — ALBUTEROL SULFATE 2.5 MG: 2.5 SOLUTION RESPIRATORY (INHALATION) at 13:28

## 2019-02-24 RX ADMIN — HEPARIN SODIUM 5000 UNITS: 5000 INJECTION INTRAVENOUS; SUBCUTANEOUS at 16:01

## 2019-02-24 RX ADMIN — ROSUVASTATIN CALCIUM 40 MG: 20 TABLET, COATED ORAL at 21:22

## 2019-02-24 RX ADMIN — Medication 10 ML: at 15:54

## 2019-02-24 RX ADMIN — TAMSULOSIN HYDROCHLORIDE 0.4 MG: 0.4 CAPSULE ORAL at 21:22

## 2019-02-24 RX ADMIN — ASPIRIN 81 MG: 81 TABLET, COATED ORAL at 10:19

## 2019-02-24 RX ADMIN — PHENYTOIN SODIUM 100 MG: 100 CAPSULE ORAL at 16:02

## 2019-02-24 RX ADMIN — HEPARIN SODIUM 5000 UNITS: 5000 INJECTION INTRAVENOUS; SUBCUTANEOUS at 10:19

## 2019-02-24 RX ADMIN — GUAIFENESIN 600 MG: 600 TABLET, EXTENDED RELEASE ORAL at 10:19

## 2019-02-24 NOTE — PROGRESS NOTES
Problem: Falls - Risk of 
Goal: *Absence of Falls Document Nori Wood Fall Risk and appropriate interventions in the flowsheet. Outcome: Progressing Towards Goal 
Fall Risk Interventions: 
Mobility Interventions: Bed/chair exit alarm Medication Interventions: Bed/chair exit alarm Elimination Interventions: Call light in reach History of Falls Interventions: Bed/chair exit alarm Problem: Pressure Injury - Risk of 
Goal: *Prevention of pressure injury Document Joshua Scale and appropriate interventions in the flowsheet. Outcome: Progressing Towards Goal 
Pressure Injury Interventions: 
Sensory Interventions: Assess changes in LOC Moisture Interventions: Absorbent underpads Activity Interventions: Increase time out of bed, Pressure redistribution bed/mattress(bed type), PT/OT evaluation Mobility Interventions: HOB 30 degrees or less, Pressure redistribution bed/mattress (bed type), PT/OT evaluation Nutrition Interventions: Document food/fluid/supplement intake Friction and Shear Interventions: HOB 30 degrees or less

## 2019-02-24 NOTE — PROGRESS NOTES
Pt c/o SOB, oxygen level checked with 93% on RA. RR paged for breathing treatment. Will continue to monitor.

## 2019-02-24 NOTE — PROGRESS NOTES
END OF SHIFT NOTE: 
 
INTAKE/OUTPUT 
02/22 0701 - 02/23 0700 In: -  
Out: 100 [Urine:100] Voiding: YES, several incontinent briefs Catheter: NO 
Drain:   
 
 
 
 
 
Flatus: Patient does have flatus present. Stool:  1 occurrences. Smear. Characteristics: 
Stool Assessment Stool Color: Neita Nissen Stool Appearance: Loose Stool Amount: Smear Stool Source/Status: Rectum Emesis: 0 occurrences. Characteristics: VITAL SIGNS Patient Vitals for the past 12 hrs: 
 Temp Pulse Resp BP SpO2  
02/23/19 1927     93 % 02/23/19 1512 98.3 °F (36.8 °C) 87 18 115/74 93 % 02/23/19 1127 97.8 °F (36.6 °C) 86 18 115/69 93 % Pain Assessment Pain Intensity 1: 5 (02/23/19 1035) Pain Location 1: Generalized Pain Intervention(s) 1: Medication (see MAR) Patient Stated Pain Goal: 0 Ambulating No, Shift report given to oncoming nurse at the bedside.  
 
Joselyn Kussmaul, RN

## 2019-02-24 NOTE — PROGRESS NOTES
Hospitalist Progress Note Admit Date:  2019  8:36 PM  
Name:  Puneet Ojeda Age:  80 y.o. 
:  1950 MRN:  [id number] PCP:  Meagan Walker MD 
Treatment Team: Attending Provider: Jemma Garcia DO; Care Manager: Fatoumata Virk RN Subjective:  
 
Pt is a 79 yo male with pmh CVA with left hemiparesis, COPD on 2 L NC who presented to ER  with worsening left leg redness/pain, productive cough and progressive weakness. Pt found to meet sepsis criteria with WBC 19, HR 100s, T Max 100.7. Chest x ray without acute findings. He had wheezing in ER but now resolved. Pt started on abx and fluids for sepsis r/t cellulitis and poss COPD exac.   Rocephin, Zithromax, Vanc switched to PO Clindamycin. Today LLE looks more edematous with some weeping. He reports pain at the ankle. Respiratory status is at baseline, he denies SOB. Discussed home today vs tomorrow and will keep pt to ensure PO Clinda sufficient. Objective:  
 
Patient Vitals for the past 24 hrs: 
 Temp Pulse Resp BP SpO2  
19 97.4 °F (36.3 °C) 84 19 149/80 92 % 19     94 % 19 0354 98.6 °F (37 °C) 72 20 122/70 94 % 19 2334 98.2 °F (36.8 °C) 65 18 121/75 93 % 19 97.6 °F (36.4 °C) 84 18 114/74 93 % 19 1512 98.3 °F (36.8 °C) 87 18 115/74 93 % 19 1127 97.8 °F (36.6 °C) 86 18 115/69 93 % Oxygen Therapy O2 Sat (%): 92 % (19) Pulse via Oximetry: 86 beats per minute (19) O2 Device: Nasal cannula (19) O2 Flow Rate (L/min): 3 l/min (19) Intake/Output Summary (Last 24 hours) at 2019 1617 Last data filed at 2019 4343 Gross per 24 hour Intake 911 ml Output  Net 911 ml General:    Well nourished. Alert. CV:   RRR. No murmur, rub, or gallop. Lungs:   CTAB. No wheezing, rhonchi, or rales. Abdomen:   Soft, nontender, nondistended. Extremities: Warm and dry. No cyanosis. LLE 1-2+ pitting edema. Skin:     No rashes or jaundice. Neuro:  Left hemiparesis (chronic) Data Review: 
I have reviewed all labs, meds, telemetry events, and studies from the last 24 hours: 
 
Recent Results (from the past 24 hour(s)) GLUCOSE, POC Collection Time: 19 12:21 PM  
Result Value Ref Range Glucose (POC) 132 (H) 65 - 100 mg/dL GLUCOSE, POC Collection Time: 19  4:17 PM  
Result Value Ref Range Glucose (POC) 168 (H) 65 - 100 mg/dL GLUCOSE, POC Collection Time: 19  9:25 PM  
Result Value Ref Range Glucose (POC) 135 (H) 65 - 100 mg/dL GLUCOSE, POC Collection Time: 19  8:00 AM  
Result Value Ref Range Glucose (POC) 105 (H) 65 - 100 mg/dL All Micro Results Procedure Component Value Units Date/Time CULTURE, BLOOD [[id number]] Collected:  19 Order Status:  Completed Specimen:  Blood Updated:  19 7123 Special Requests: --     
  RIGHT 
HAND Culture result: NO GROWTH 3 DAYS     
 CULTURE, BLOOD [[id number]] Collected:  190 Order Status:  Completed Specimen:  Blood Updated:  19 8351 Special Requests: --     
  NO SPECIAL REQUESTS 
RIGHT 
HAND Culture result: NO GROWTH 3 DAYS     
 CULTURE, RESPIRATORY/SPUTUM/BRONCH Mik Ferris [[id number]] Collected:  19 2345 Order Status:  Canceled Specimen:  Sputum Results for orders placed or performed during the hospital encounter of 19  
2D ECHO COMPLETE ADULT (TTE) W OR WO CONTR Narrative Lisa One [address] [place], [address] [facility] 
[phone] Transthoracic Echocardiogram 
2D, M-mode, Doppler, and Color Doppler [facility] 
MR #: [id number] : 1950 Age: 80 years Gender: Male Study date: 2019 Account #: [de-identified] Height: 71 in 
Weight: 224.6 lb 
BSA: 2.22 mï¾² Status:Routine Location: 211 BP: 130/ 83 Allergies: LATEX, ADHESIVE, SULFA (SULFONAMIDE ANTIBIOTICS) Sonographer:  Krissy Guerra [facility] Group:  [address] Cardiology Referring Physician:  Nicolas Mcnamara DO Reading Physician:  Oscar Albarran MD 
 
INDICATIONS: Pulm HTN, lower ext edema, dyspnea. *Patient had low windows and images were respiratory dependent. * HISTORY: PRIOR HISTORY: COPD. PROCEDURE: This was a routine study. A transthoracic echocardiogram was 
performed. The study included complete 2D imaging, M-mode, complete spectral 
Doppler, and color Doppler. Intravenous contrast (Definity) was administered. Image quality was adequate. LEFT VENTRICLE: Size was normal. Systolic function was normal. Ejection 
fraction was estimated in the range of 60 % to 65 %. There were no regional 
wall motion abnormalities. Wall thickness was normal. Avg. E/e'= 14.1. Left 
ventricular diastolic function parameters were indeterminate. RIGHT VENTRICLE: The size was normal. Systolic function was normal. The 
tricuspid jet envelope definition was inadequate for estimation of RV  
systolic 
pressure. LEFT ATRIUM: Size was normal. 
 
RIGHT ATRIUM: Size was normal. 
 
SYSTEMIC VEINS: IVC: The inferior vena cava was normal in size and course. AORTIC VALVE: The valve was not well visualized. There was no evidence for 
stenosis. There was no insufficiency. MITRAL VALVE: Valve structure was normal. There was no evidence for stenosis. There was trivial regurgitation. TRICUSPID VALVE: The valve structure was normal. There was no evidence for 
stenosis. There was trace regurgitation. PULMONIC VALVE: Not well visualized. There was no evidence for stenosis. There 
was no insufficiency. PERICARDIUM: There was no pericardial effusion. AORTA: The root exhibited normal size. SUMMARY: 
 
-  Left ventricle: Systolic function was normal. Ejection fraction was estimated in the range of 60 % to 65 %. There were no regional wall motion 
abnormalities. SYSTEM MEASUREMENT TABLES 
 
2D mode AoR Diam (2D): 3.5 cm Left Atrium Systolic Volume Index; Method of Disks, Biplane; 2D mode;: 23.2 
ml/m2 IVS/LVPW (2D): 1 IVSd (2D): 1 cm LVIDd (2D): 5.1 cm 
LVIDs (2D): 3.2 cm 
LVOT Area (2D): 3.5 cm2 LVPWd (2D): 1 cm Tissue Doppler Imaging LV Peak Early Lr Tissue Navin; Lateral MA (TDI): 7.3 cm/s LV Peak Early Lr Tissue Navin; Medial MA (TDI): 7.3 cm/s Unspecified Scan Mode Peak Grad; Mean; Antegrade Flow: 9 mm[Hg] Vmax; Antegrade Flow: 148 cm/s LVOT Diam: 2.1 cm 
MV Peak Navin/LV Peak Tissue Navin E-Wave; Lateral MA: 14.1 MV Peak Navin/LV Peak Tissue Navin E-Wave; Medial MA: 14.1 Prepared and signed by Valentina Yepez MD 
Signed 22-Feb-2019 11:44:48 Current Meds: 
Current Facility-Administered Medications Medication Dose Route Frequency  clindamycin (CLEOCIN) capsule 300 mg  300 mg Oral Q8H  
 Saccharomyces boulardii (FLORASTOR) capsule 250 mg  250 mg Oral BID  tamsulosin (FLOMAX) capsule 0.4 mg  0.4 mg Oral QHS  sertraline (ZOLOFT) tablet 100 mg  100 mg Oral QHS  phenytoin ER (DILANTIN ER) ER capsule 100 mg  100 mg Oral TID  carvedilol (COREG) tablet 6.25 mg  6.25 mg Oral BID WITH MEALS  sodium chloride (NS) flush 5-40 mL  5-40 mL IntraVENous Q8H  
 sodium chloride (NS) flush 5-40 mL  5-40 mL IntraVENous PRN  
 0.9% sodium chloride infusion  100 mL/hr IntraVENous CONTINUOUS  
 acetaminophen (TYLENOL) tablet 650 mg  650 mg Oral Q4H PRN  
 HYDROcodone-acetaminophen (NORCO) 5-325 mg per tablet 1 Tab  1 Tab Oral Q4H PRN  
 naloxone (NARCAN) injection 0.4 mg  0.4 mg IntraVENous PRN  
 ondansetron (ZOFRAN) injection 4 mg  4 mg IntraVENous Q4H PRN  
 bisacodyl (DULCOLAX) suppository 10 mg  10 mg Rectal DAILY PRN  
 LORazepam (ATIVAN) tablet 1 mg  1 mg Oral Q6H PRN  
  heparin (porcine) injection 5,000 Units  5,000 Units SubCUTAneous Q8H  
 albuterol (PROVENTIL VENTOLIN) nebulizer solution 2.5 mg  2.5 mg Nebulization Q6H RT  
 budesonide (PULMICORT) 500 mcg/2 ml nebulizer suspension  500 mcg Nebulization BID RT  
 insulin lispro (HUMALOG) injection   SubCUTAneous AC&HS  aspirin delayed-release tablet 81 mg  81 mg Oral DAILY  clopidogrel (PLAVIX) tablet 75 mg  75 mg Oral DAILY  guaiFENesin ER (MUCINEX) tablet 600 mg  600 mg Oral Q12H  
 lisinopril (PRINIVIL, ZESTRIL) tablet 40 mg  40 mg Oral DAILY  loratadine (CLARITIN) tablet 10 mg  10 mg Oral DAILY  pantoprazole (PROTONIX) tablet 40 mg  40 mg Oral ACB  rosuvastatin (CRESTOR) tablet 40 mg  40 mg Oral QHS Other Studies (last 24 hours): No results found. Assessment and Plan:  
 
Hospital Problems as of 2/24/2019 Date Reviewed: 9/5/2017 Codes Class Noted - Resolved POA Cellulitis ICD-10-CM: L03.90 ICD-9-CM: 682.9  2/21/2019 - Present Unknown Pneumonia ICD-10-CM: J18.9 ICD-9-CM: 444  2/21/2019 - Present Acute exacerbation of chronic obstructive pulmonary disease (COPD) ([facility] ICD-10-CM: J44.1 ICD-9-CM: 491.21  2/21/2019 - Present Unknown * (Principal) Sepsis ([facility] ICD-10-CM: A41.9 ICD-9-CM: 038.9, 995.91  2/21/2019 - Present Unknown COPD exacerbation ([facility] ICD-10-CM: J44.1 ICD-9-CM: 491.21  7/14/2018 - Present Yes Left hemiplegia ([facility] ICD-10-CM: G81.94 
ICD-9-CM: 342.90  7/14/2018 - Present Yes  
   
 CVA (cerebral vascular accident) ([facility] ICD-10-CM: I63.9 ICD-9-CM: 434.91  7/14/2018 - Present Yes Overview Signed 7/14/2018  3:17 PM by Lelia Velasquez MD  
  Multiple with left hemiplegia. Depression ICD-10-CM: F32.9 ICD-9-CM: 927  7/14/2018 - Present Yes Seizure ([address] ICD-10-CM: R56.9 ICD-9-CM: 780.39  7/14/2018 - Present Yes  
   
 BPH (benign prostatic hyperplasia) ICD-10-CM: N40.0 ICD-9-CM: 600.00  7/14/2018 - Present Yes Plan: LLE cellulitis Sepsis resolved. WBC resolving. X Ray due to left foot tenderness without osteo or abscess Wound care consulted, signed off Nursing to outline area of redness, monitor 02/23 Descelated abx to PO Clindamycin. Monitor inpatient x 24 hours and possible home tomorrow. 02/24 increased leg edema, Stop IV fluids COPD exacerbation, concern for aspiration Stop prednisone Cont breathing txs ST following, recommends no straws and meds in pureed food Hx CVA, Right hemiparesis Chronic Cont PT/OT Cont home regimen ASA, Plavix DC planning SW following, plan for home with home health. Likely home 02/25 on PO Clinda. Diet:  DIET DIABETIC CONSISTENT CARB 
FOOD SVCS COMMENTS 
DVT ppx:  Heparin Signed: 
Tabitha Pfeiffer NP

## 2019-02-25 ENCOUNTER — APPOINTMENT (OUTPATIENT)
Dept: MRI IMAGING | Age: 69
DRG: 871 | End: 2019-02-25
Attending: NURSE PRACTITIONER
Payer: OTHER GOVERNMENT

## 2019-02-25 LAB
ANION GAP SERPL CALC-SCNC: 6 MMOL/L (ref 7–16)
BASOPHILS # BLD: 0 K/UL (ref 0–0.2)
BASOPHILS NFR BLD: 0 % (ref 0–2)
BUN SERPL-MCNC: 11 MG/DL (ref 8–23)
CALCIUM SERPL-MCNC: 8.5 MG/DL (ref 8.3–10.4)
CHLORIDE SERPL-SCNC: 106 MMOL/L (ref 98–107)
CO2 SERPL-SCNC: 27 MMOL/L (ref 21–32)
CREAT SERPL-MCNC: 0.69 MG/DL (ref 0.8–1.5)
DIFFERENTIAL METHOD BLD: ABNORMAL
EOSINOPHIL # BLD: 0.2 K/UL (ref 0–0.8)
EOSINOPHIL NFR BLD: 3 % (ref 0.5–7.8)
ERYTHROCYTE [DISTWIDTH] IN BLOOD BY AUTOMATED COUNT: 11.9 % (ref 11.9–14.6)
GLUCOSE BLD STRIP.AUTO-MCNC: 112 MG/DL (ref 65–100)
GLUCOSE BLD STRIP.AUTO-MCNC: 131 MG/DL (ref 65–100)
GLUCOSE BLD STRIP.AUTO-MCNC: 135 MG/DL (ref 65–100)
GLUCOSE BLD STRIP.AUTO-MCNC: 176 MG/DL (ref 65–100)
GLUCOSE SERPL-MCNC: 169 MG/DL (ref 65–100)
HCT VFR BLD AUTO: 38.2 % (ref 41.1–50.3)
HGB BLD-MCNC: 12.7 G/DL (ref 13.6–17.2)
IMM GRANULOCYTES # BLD AUTO: 0 K/UL (ref 0–0.5)
IMM GRANULOCYTES NFR BLD AUTO: 0 % (ref 0–5)
LYMPHOCYTES # BLD: 1.7 K/UL (ref 0.5–4.6)
LYMPHOCYTES NFR BLD: 29 % (ref 13–44)
MAGNESIUM SERPL-MCNC: 2.1 MG/DL (ref 1.8–2.4)
MCH RBC QN AUTO: 31.7 PG (ref 26.1–32.9)
MCHC RBC AUTO-ENTMCNC: 33.2 G/DL (ref 31.4–35)
MCV RBC AUTO: 95.3 FL (ref 79.6–97.8)
MONOCYTES # BLD: 0.4 K/UL (ref 0.1–1.3)
MONOCYTES NFR BLD: 6 % (ref 4–12)
NEUTS SEG # BLD: 3.7 K/UL (ref 1.7–8.2)
NEUTS SEG NFR BLD: 62 % (ref 43–78)
NRBC # BLD: 0 K/UL (ref 0–0.2)
PLATELET # BLD AUTO: 190 K/UL (ref 150–450)
PMV BLD AUTO: 10.1 FL (ref 9.4–12.3)
POTASSIUM SERPL-SCNC: 3.3 MMOL/L (ref 3.5–5.1)
RBC # BLD AUTO: 4.01 M/UL (ref 4.23–5.6)
SODIUM SERPL-SCNC: 139 MMOL/L (ref 136–145)
WBC # BLD AUTO: 6 K/UL (ref 4.3–11.1)

## 2019-02-25 PROCEDURE — 80048 BASIC METABOLIC PNL TOTAL CA: CPT

## 2019-02-25 PROCEDURE — 74011636637 HC RX REV CODE- 636/637: Performed by: INTERNAL MEDICINE

## 2019-02-25 PROCEDURE — 97530 THERAPEUTIC ACTIVITIES: CPT

## 2019-02-25 PROCEDURE — 65270000029 HC RM PRIVATE

## 2019-02-25 PROCEDURE — 94640 AIRWAY INHALATION TREATMENT: CPT

## 2019-02-25 PROCEDURE — 83735 ASSAY OF MAGNESIUM: CPT

## 2019-02-25 PROCEDURE — 74011000250 HC RX REV CODE- 250: Performed by: INTERNAL MEDICINE

## 2019-02-25 PROCEDURE — 86580 TB INTRADERMAL TEST: CPT | Performed by: NURSE PRACTITIONER

## 2019-02-25 PROCEDURE — 74011000302 HC RX REV CODE- 302: Performed by: NURSE PRACTITIONER

## 2019-02-25 PROCEDURE — 99221 1ST HOSP IP/OBS SF/LOW 40: CPT | Performed by: PHYSICAL MEDICINE & REHABILITATION

## 2019-02-25 PROCEDURE — 77010033678 HC OXYGEN DAILY

## 2019-02-25 PROCEDURE — 36415 COLL VENOUS BLD VENIPUNCTURE: CPT

## 2019-02-25 PROCEDURE — 85025 COMPLETE CBC W/AUTO DIFF WBC: CPT

## 2019-02-25 PROCEDURE — 74011250636 HC RX REV CODE- 250/636: Performed by: INTERNAL MEDICINE

## 2019-02-25 PROCEDURE — 94760 N-INVAS EAR/PLS OXIMETRY 1: CPT

## 2019-02-25 PROCEDURE — 74011250637 HC RX REV CODE- 250/637: Performed by: INTERNAL MEDICINE

## 2019-02-25 PROCEDURE — 74011250637 HC RX REV CODE- 250/637: Performed by: NURSE PRACTITIONER

## 2019-02-25 PROCEDURE — 82962 GLUCOSE BLOOD TEST: CPT

## 2019-02-25 RX ORDER — POTASSIUM CHLORIDE 20 MEQ/1
40 TABLET, EXTENDED RELEASE ORAL 2 TIMES DAILY
Status: COMPLETED | OUTPATIENT
Start: 2019-02-25 | End: 2019-02-25

## 2019-02-25 RX ADMIN — PHENYTOIN SODIUM 100 MG: 100 CAPSULE ORAL at 15:07

## 2019-02-25 RX ADMIN — ALBUTEROL SULFATE 2.5 MG: 2.5 SOLUTION RESPIRATORY (INHALATION) at 02:03

## 2019-02-25 RX ADMIN — CLINDAMYCIN HYDROCHLORIDE 300 MG: 150 CAPSULE ORAL at 14:52

## 2019-02-25 RX ADMIN — BUDESONIDE 500 MCG: 0.5 INHALANT RESPIRATORY (INHALATION) at 21:19

## 2019-02-25 RX ADMIN — LORAZEPAM 1 MG: 1 TABLET ORAL at 03:33

## 2019-02-25 RX ADMIN — INSULIN LISPRO 2 UNITS: 100 INJECTION, SOLUTION INTRAVENOUS; SUBCUTANEOUS at 12:10

## 2019-02-25 RX ADMIN — PANTOPRAZOLE SODIUM 40 MG: 40 TABLET, DELAYED RELEASE ORAL at 06:11

## 2019-02-25 RX ADMIN — POTASSIUM CHLORIDE 40 MEQ: 20 TABLET, EXTENDED RELEASE ORAL at 17:44

## 2019-02-25 RX ADMIN — HEPARIN SODIUM 5000 UNITS: 5000 INJECTION INTRAVENOUS; SUBCUTANEOUS at 14:53

## 2019-02-25 RX ADMIN — ALBUTEROL SULFATE 2.5 MG: 2.5 SOLUTION RESPIRATORY (INHALATION) at 21:19

## 2019-02-25 RX ADMIN — Medication 10 ML: at 14:52

## 2019-02-25 RX ADMIN — PHENYTOIN SODIUM 100 MG: 100 CAPSULE ORAL at 22:36

## 2019-02-25 RX ADMIN — CLINDAMYCIN HYDROCHLORIDE 300 MG: 150 CAPSULE ORAL at 06:11

## 2019-02-25 RX ADMIN — TUBERCULIN PURIFIED PROTEIN DERIVATIVE 5 UNITS: 5 INJECTION, SOLUTION INTRADERMAL at 14:53

## 2019-02-25 RX ADMIN — CARVEDILOL 6.25 MG: 6.25 TABLET, FILM COATED ORAL at 16:49

## 2019-02-25 RX ADMIN — LISINOPRIL 40 MG: 20 TABLET ORAL at 09:30

## 2019-02-25 RX ADMIN — Medication 10 ML: at 06:11

## 2019-02-25 RX ADMIN — POTASSIUM CHLORIDE 40 MEQ: 20 TABLET, EXTENDED RELEASE ORAL at 14:52

## 2019-02-25 RX ADMIN — LORATADINE 10 MG: 10 TABLET ORAL at 09:30

## 2019-02-25 RX ADMIN — Medication 250 MG: at 17:44

## 2019-02-25 RX ADMIN — ALBUTEROL SULFATE 2.5 MG: 2.5 SOLUTION RESPIRATORY (INHALATION) at 07:31

## 2019-02-25 RX ADMIN — ASPIRIN 81 MG: 81 TABLET, COATED ORAL at 09:31

## 2019-02-25 RX ADMIN — HYDROCODONE BITARTRATE AND ACETAMINOPHEN 1 TABLET: 5; 325 TABLET ORAL at 11:25

## 2019-02-25 RX ADMIN — HEPARIN SODIUM 5000 UNITS: 5000 INJECTION INTRAVENOUS; SUBCUTANEOUS at 09:31

## 2019-02-25 RX ADMIN — HYDROCODONE BITARTRATE AND ACETAMINOPHEN 1 TABLET: 5; 325 TABLET ORAL at 22:35

## 2019-02-25 RX ADMIN — ALBUTEROL SULFATE 2.5 MG: 2.5 SOLUTION RESPIRATORY (INHALATION) at 15:10

## 2019-02-25 RX ADMIN — SERTRALINE 100 MG: 100 TABLET, FILM COATED ORAL at 22:36

## 2019-02-25 RX ADMIN — Medication 10 ML: at 22:00

## 2019-02-25 RX ADMIN — CLINDAMYCIN HYDROCHLORIDE 300 MG: 150 CAPSULE ORAL at 22:36

## 2019-02-25 RX ADMIN — GUAIFENESIN 600 MG: 600 TABLET, EXTENDED RELEASE ORAL at 09:31

## 2019-02-25 RX ADMIN — PHENYTOIN SODIUM 100 MG: 100 CAPSULE ORAL at 09:30

## 2019-02-25 RX ADMIN — GUAIFENESIN 600 MG: 600 TABLET, EXTENDED RELEASE ORAL at 22:36

## 2019-02-25 RX ADMIN — CLOPIDOGREL BISULFATE 75 MG: 75 TABLET, FILM COATED ORAL at 09:31

## 2019-02-25 RX ADMIN — CARVEDILOL 6.25 MG: 6.25 TABLET, FILM COATED ORAL at 09:30

## 2019-02-25 RX ADMIN — ROSUVASTATIN CALCIUM 40 MG: 20 TABLET, COATED ORAL at 22:36

## 2019-02-25 RX ADMIN — TAMSULOSIN HYDROCHLORIDE 0.4 MG: 0.4 CAPSULE ORAL at 22:36

## 2019-02-25 RX ADMIN — HEPARIN SODIUM 5000 UNITS: 5000 INJECTION INTRAVENOUS; SUBCUTANEOUS at 23:01

## 2019-02-25 RX ADMIN — BUDESONIDE 500 MCG: 0.5 INHALANT RESPIRATORY (INHALATION) at 07:31

## 2019-02-25 RX ADMIN — Medication 250 MG: at 09:30

## 2019-02-25 RX ADMIN — HYDROCODONE BITARTRATE AND ACETAMINOPHEN 1 TABLET: 5; 325 TABLET ORAL at 06:10

## 2019-02-25 NOTE — PROGRESS NOTES
Hospitalist Progress Note Admit Date:  2019  8:36 PM  
Name:  Jonathan Garcia Age:  80 y.o. 
:  1950 MRN:  [id number] PCP:  Melody Raman MD 
Treatment Team: Attending Provider: Scar Ramirez DO; Care Manager: Elizabeth Wells RN; Tech: Giovany Posada Speech Language Pathologist: Linda Agarwal Subjective:  
 
Patient is a 81 yo male with PMH of CVA with left hemiparesis, COPD on 2 L NC who presented to ER on   with worsening LLE  redness/pain, productive cough, and progressive weakness. Patient found to meet sepsis criteria with WBC 19, HR 100s, T Max 100.7. Chest x ray without acute findings. Patient was started on abx and fluids for sepsis r/t cellulitis and poss COPD exac.   Rocephin, Zithromax, Vanc switched to PO Clindamycin. Today LLE edematous no improvement compared to markings. Second toe with black eschar on back and edematous, wife states this is new. Patient  reports pain in  the ankle. Afebrile. No leukocytosis.  xray negative. Patient on room air. Denies CP, SOB. Speech therapy reported patient choking on fluids and would like to do barium swallow tomorrow. Wife expressed concerns for patient safety when he returns home. Patient uses electric wheel chair at baseline. Objective:  
 
Patient Vitals for the past 24 hrs: 
 Temp Pulse Resp BP SpO2  
19 1129 98.1 °F (36.7 °C) 82 18 135/79 91 % 19 1022  86  109/68 95 % 19 0817 97.6 °F (36.4 °C)      
19 0738  81 16 (!) 119/38 92 % 19 0733 98.5 °F (36.9 °C) 88 20 116/75 94 % 19 0354 98 °F (36.7 °C) 83 18 136/88 93 % 19 0203     91 % 19 2234 97.4 °F (36.3 °C) 80 18 135/81 90 % 19 1955     91 % 19 1932 97.6 °F (36.4 °C) 88 19 153/88 96 % 19 1618 98 °F (36.7 °C) 81 18 130/80 93 % 19 1328     93 % Oxygen Therapy O2 Sat (%): 91 % (19 1129) Pulse via Oximetry: 85 beats per minute (02/25/19 0733) O2 Device: Nasal cannula (02/25/19 0733) O2 Flow Rate (L/min): 2 l/min (02/25/19 0733) No intake or output data in the 24 hours ending 02/25/19 1207 General:    Well nourished. Alert. CV:   RRR. No murmur, rub, or gallop. Lungs:   CTAB. No wheezing, rhonchi, or rales. Abdomen:   Soft, nontender, nondistended. Extremities: Warm and dry. No cyanosis. LLE 1-2+ pitting edema. Skin:     No rashes or jaundice. Neuro:  Left hemiparesis (chronic) Data Review: 
I have reviewed all labs, meds, telemetry events, and studies from the last 24 hours: 
 
Recent Results (from the past 24 hour(s)) GLUCOSE, POC Collection Time: 02/24/19  4:21 PM  
Result Value Ref Range Glucose (POC) 101 (H) 65 - 100 mg/dL GLUCOSE, POC Collection Time: 02/24/19  8:31 PM  
Result Value Ref Range Glucose (POC) 154 (H) 65 - 100 mg/dL GLUCOSE, POC Collection Time: 02/25/19  7:10 AM  
Result Value Ref Range Glucose (POC) 112 (H) 65 - 100 mg/dL CBC WITH AUTOMATED DIFF Collection Time: 02/25/19 10:03 AM  
Result Value Ref Range WBC 6.0 4.3 - 11.1 K/uL  
 RBC 4.01 (L) 4.23 - 5.6 M/uL  
 HGB 12.7 (L) 13.6 - 17.2 g/dL HCT 38.2 (L) 41.1 - 50.3 % MCV 95.3 79.6 - 97.8 FL  
 MCH 31.7 26.1 - 32.9 PG  
 MCHC 33.2 31.4 - 35.0 g/dL  
 RDW 11.9 11.9 - 14.6 % PLATELET 200 130 - 454 K/uL MPV 10.1 9.4 - 12.3 FL ABSOLUTE NRBC 0.00 0.0 - 0.2 K/uL  
 DF AUTOMATED NEUTROPHILS 62 43 - 78 % LYMPHOCYTES 29 13 - 44 % MONOCYTES 6 4.0 - 12.0 % EOSINOPHILS 3 0.5 - 7.8 % BASOPHILS 0 0.0 - 2.0 % IMMATURE GRANULOCYTES 0 0.0 - 5.0 %  
 ABS. NEUTROPHILS 3.7 1.7 - 8.2 K/UL  
 ABS. LYMPHOCYTES 1.7 0.5 - 4.6 K/UL  
 ABS. MONOCYTES 0.4 0.1 - 1.3 K/UL  
 ABS. EOSINOPHILS 0.2 0.0 - 0.8 K/UL  
 ABS. BASOPHILS 0.0 0.0 - 0.2 K/UL  
 ABS. IMM. GRANS. 0.0 0.0 - 0.5 K/UL METABOLIC PANEL, BASIC  Collection Time: 02/25/19 10:03 AM  
 Result Value Ref Range Sodium 139 136 - 145 mmol/L Potassium 3.3 (L) 3.5 - 5.1 mmol/L Chloride 106 98 - 107 mmol/L  
 CO2 27 21 - 32 mmol/L Anion gap 6 (L) 7 - 16 mmol/L Glucose 169 (H) 65 - 100 mg/dL BUN 11 8 - 23 MG/DL Creatinine 0.69 (L) 0.8 - 1.5 MG/DL  
 GFR est AA >60 >60 ml/min/1.73m2 GFR est non-AA >60 >60 ml/min/1.73m2 Calcium 8.5 8.3 - 10.4 MG/DL MAGNESIUM Collection Time: 19 10:03 AM  
Result Value Ref Range Magnesium 2.1 1.8 - 2.4 mg/dL GLUCOSE, POC Collection Time: 19 11:01 AM  
Result Value Ref Range Glucose (POC) 176 (H) 65 - 100 mg/dL All Micro Results Procedure Component Value Units Date/Time CULTURE, BLOOD [[id number]] Collected:  19 Order Status:  Completed Specimen:  Blood Updated:  19 Special Requests: --     
  RIGHT 
HAND Culture result: NO GROWTH 4 DAYS     
 CULTURE, BLOOD [[id number]] Collected:  19 Order Status:  Completed Specimen:  Blood Updated:  19 Special Requests: --     
  NO SPECIAL REQUESTS 
RIGHT 
HAND Culture result: NO GROWTH 4 DAYS     
 CULTURE, RESPIRATORY/SPUTUM/BRONCH Mik Ferris [[id number]] Collected:  19 2345 Order Status:  Canceled Specimen:  Sputum Results for orders placed or performed during the hospital encounter of 19  
2D ECHO COMPLETE ADULT (TTE) W OR WO CONTR N[facility]ative [facility] [address] [place], [address] [facility] 
[phone] Transthoracic Echocardiogram 
2D, M-mode, Doppler, and Color Doppler [facility] 
MR #: [id number] : 1950 Age: 80 years Gender: Male Study date: 2019 Account #: [de-identified] Height: 71 in 
Weight: 224.6 lb 
BSA: 2.22 mï¾² Status:Routine Location: 211 BP: 130/ 83 Allergies: LATEX, ADHESIVE, SULFA (SULFONAMIDE ANTIBIOTICS) Sonographer:  Sangeetha Overall, RDCS Group:  [facility] Referring Physician:  Andrew Saini DO Reading Physician:  Temi Kent MD 
 
INDICATIONS: Pulm HTN, lower ext edema, dyspnea. *Patient had low windows and images were respiratory dependent. * HISTORY: PRIOR HISTORY: COPD. PROCEDURE: This was a routine study. A transthoracic echocardiogram was 
performed. The study included complete 2D imaging, M-mode, complete spectral 
Doppler, and color Doppler. Intravenous contrast (Definity) was administered. Image quality was adequate. LEFT VENTRICLE: Size was normal. Systolic function was normal. Ejection 
fraction was estimated in the range of 60 % to 65 %. There were no regional 
wall motion abnormalities. Wall thickness was normal. Avg. E/e'= 14.1. Left 
ventricular diastolic function parameters were indeterminate. RIGHT VENTRICLE: The size was normal. Systolic function was normal. The 
tricuspid jet envelope definition was inadequate for estimation of RV  
systolic 
pressure. LEFT ATRIUM: Size was normal. 
 
RIGHT ATRIUM: Size was normal. 
 
SYSTEMIC VEINS: IVC: The inferior vena cava was normal in size and course. AORTIC VALVE: The valve was not well visualized. There was no evidence for 
stenosis. There was no insufficiency. MITRAL VALVE: Valve structure was normal. There was no evidence for stenosis. There was trivial regurgitation. TRICUSPID VALVE: The valve structure was normal. There was no evidence for 
stenosis. There was trace regurgitation. PULMONIC VALVE: Not well visualized. There was no evidence for stenosis. There 
was no insufficiency. PERICARDIUM: There was no pericardial effusion. AORTA: The root exhibited normal size. SUMMARY: 
 
-  Left ventricle: Systolic function was normal. Ejection fraction was 
estimated in the range of 60 % to 65 %. There were no regional wall motion 
abnormalities. SYSTEM MEASUREMENT TABLES 
 
2D mode AoR Diam (2D): 3.5 cm Left Atrium Systolic Volume Index; Method of Disks, Biplane; 2D mode;: 23.2 
ml/m2 IVS/LVPW (2D): 1 IVSd (2D): 1 cm LVIDd (2D): 5.1 cm 
LVIDs (2D): 3.2 cm 
LVOT Area (2D): 3.5 cm2 LVPWd (2D): 1 cm Tissue Doppler Imaging LV Peak Early Lr Tissue Navin; Lateral MA (TDI): 7.3 cm/s LV Peak Early Lr Tissue Navin; Medial MA (TDI): 7.3 cm/s Unspecified Scan Mode Peak Grad; Mean; Antegrade Flow: 9 mm[Hg] Vmax; Antegrade Flow: 148 cm/s LVOT Diam: 2.1 cm 
MV Peak Navin/LV Peak Tissue Navin E-Wave; Lateral MA: 14.1 MV Peak Navin/LV Peak Tissue Navin E-Wave; Medial MA: 14.1 Prepared and signed by Hansa Gomes MD 
Signed 22-Feb-2019 11:44:48 Current Meds: 
Current Facility-Administered Medications Medication Dose Route Frequency  potassium chloride (K-DUR, KLOR-CON) SR tablet 40 mEq  40 mEq Oral BID  clindamycin (CLEOCIN) capsule 300 mg  300 mg Oral Q8H  
 Saccharomyces boulardii (FLORASTOR) capsule 250 mg  250 mg Oral BID  tamsulosin (FLOMAX) capsule 0.4 mg  0.4 mg Oral QHS  sertraline (ZOLOFT) tablet 100 mg  100 mg Oral QHS  phenytoin ER (DILANTIN ER) ER capsule 100 mg  100 mg Oral TID  carvedilol (COREG) tablet 6.25 mg  6.25 mg Oral BID WITH MEALS  sodium chloride (NS) flush 5-40 mL  5-40 mL IntraVENous Q8H  
 sodium chloride (NS) flush 5-40 mL  5-40 mL IntraVENous PRN  
 acetaminophen (TYLENOL) tablet 650 mg  650 mg Oral Q4H PRN  
 HYDROcodone-acetaminophen (NORCO) 5-325 mg per tablet 1 Tab  1 Tab Oral Q4H PRN  
 naloxone (NARCAN) injection 0.4 mg  0.4 mg IntraVENous PRN  
 ondansetron (ZOFRAN) injection 4 mg  4 mg IntraVENous Q4H PRN  
 bisacodyl (DULCOLAX) suppository 10 mg  10 mg Rectal DAILY PRN  
 LORazepam (ATIVAN) tablet 1 mg  1 mg Oral Q6H PRN  
 heparin (porcine) injection 5,000 Units  5,000 Units SubCUTAneous Q8H  
 albuterol (PROVENTIL VENTOLIN) nebulizer solution 2.5 mg  2.5 mg Nebulization Q6H RT  
  budesonide (PULMICORT) 500 mcg/2 ml nebulizer suspension  500 mcg Nebulization BID RT  
 insulin lispro (HUMALOG) injection   SubCUTAneous AC&HS  aspirin delayed-release tablet 81 mg  81 mg Oral DAILY  clopidogrel (PLAVIX) tablet 75 mg  75 mg Oral DAILY  guaiFENesin ER (MUCINEX) tablet 600 mg  600 mg Oral Q12H  
 lisinopril (PRINIVIL, ZESTRIL) tablet 40 mg  40 mg Oral DAILY  loratadine (CLARITIN) tablet 10 mg  10 mg Oral DAILY  pantoprazole (PROTONIX) tablet 40 mg  40 mg Oral ACB  rosuvastatin (CRESTOR) tablet 40 mg  40 mg Oral QHS Other Studies (last 24 hours): No results found. Assessment and Plan:  
 
Hospital Problems as of 2/25/2019 Date Reviewed: 9/5/2017 Codes Class Noted - Resolved POA Cellulitis ICD-10-CM: L03.90 ICD-9-CM: 682.9  2/21/2019 - Present Unknown Pneumonia ICD-10-CM: J18.9 ICD-9-CM: 601  2/21/2019 - Present Acute exacerbation of chronic obstructive pulmonary disease (COPD) ([facility] ICD-10-CM: J44.1 ICD-9-CM: 491.21  2/21/2019 - Present Unknown * (Principal) Sepsis ([facility] ICD-10-CM: A41.9 ICD-9-CM: 038.9, 995.91  2/21/2019 - Present Unknown COPD exacerbation ([facility] ICD-10-CM: J44.1 ICD-9-CM: 491.21  7/14/2018 - Present Yes Left hemiplegia ([facility] ICD-10-CM: G81.94 
ICD-9-CM: 342.90  7/14/2018 - Present Yes  
   
 CVA (cerebral vascular accident) ([facility] ICD-10-CM: I63.9 ICD-9-CM: 434.91  7/14/2018 - Present Yes Overview Signed 7/14/2018  3:17 PM by Lou Woo MD  
  Multiple with left hemiplegia. Depression ICD-10-CM: F32.9 ICD-9-CM: 930  7/14/2018 - Present Yes Seizure ([facility] ICD-10-CM: R56.9 ICD-9-CM: 780.39  7/14/2018 - Present Yes  
   
 BPH (benign prostatic hyperplasia) ICD-10-CM: N40.0 ICD-9-CM: 600.00  7/14/2018 - Present Yes Plan: LLE cellulitis Sepsis resolved. X Ray due to left foot tenderness without osteo or abscess on 2/22 Wound care consulted, signed off Nursing to outline area of redness, no improvement 2/25 02/23 Descelated abx to PO Clindamycin. Monitor inpatient x 24 hours 02/25 left second toe with eschar and edema, MRI  
 
COPD exacerbation, concern for aspiration Stop prednisone Cont breathing txs ST following, recommends no straws and meds in pureed food. Planning barium swallow 2/26 Hx CVA, Right hemiparesis Chronic Cont PT/OT Cont home regimen ASA, Plavix  
-CM  
 
DC planning SW following, plan for home with home health vs STR Diet:  DIET DIABETIC CONSISTENT CARB 
FOOD SVCS COMMENTS 
DVT ppx:  Heparin Plan of care discussed with Dr. Sg Christianson,  
Signed: 
VERONICA Caceres

## 2019-02-25 NOTE — PROGRESS NOTES
Problem: Mobility Impaired (Adult and Pediatric) Goal: *Acute Goals and Plan of Care (Insert Text) STG: 
(1.)Mr. Wilfredo Persaud will move from supine to sit and sit to supine , scoot up and down and roll side to side with MINIMAL ASSIST within 3 treatment day(s). (2.)Mr. Wilfredo Persaud will transfer from bed to chair and chair to bed with MINIMAL ASSIST using the least restrictive device within 3 treatment day(s). (3.)Mr. Wilfredo Persaud will ambulate with MINIMAL ASSIST for 5 feet with the least restrictive device within 3 treatment day(s). MET 2/25/19 
(4.)Mr. Wilfredo Persaud will perform standing static and dynamic balance activities x 15 minutes with MINIMAL ASSIST to improve safety within 3 day(s). LTG: 
(1.)Mr. Wilfredo Persaud will move from supine to sit and sit to supine , scoot up and down and roll side to side in bed with CONTACT GUARD ASSIST within 7 treatment day(s). (2.)Mr. Wilfredo Persaud will transfer from bed to chair and chair to bed with CONTACT GUARD ASSIST using the least restrictive device within 7 treatment day(s). (3.)Mr. Wilfredo Persaud will ambulate with CONTACT GUARD ASSIST for 30 feet with the least restrictive device within 7 treatment day(s). (4.)Mr. Wilfredo Persaud will perform standing static and dynamic balance activities x 15 minutes with CONTACT GUARD ASSIST to improve safety within 7 day(s). ________________________________________________________________________________________________ PHYSICAL THERAPY: Daily Note and AM 2/25/2019INPATIENT: PT Visit Days : 1 Payor: Maricel Flores / Plan: [place] [employer] / Product Type: Federal Funded Programs /   
  
NAME/AGE/GENDER: Puneet Ojeda is a 80 y.o. male PRIMARY DIAGNOSIS: Cellulitis [L03.90] Pneumonia [J18.9] Cellulitis [L03.90] Acute exacerbation of chronic obstructive pulmonary disease (COPD) ([facility] [J44.1] Cellulitis [L03.90] Acute exacerbation of chronic obstructive pulmonary disease (COPD) ([facility] [J44.1] Sepsis ([facility] Sepsis ([facility] ICD-10: Treatment Diagnosis:  
 · Generalized Muscle Weakness (M62.81) · Difficulty in walking, Not elsewhere classified (R26.2) · Repeated Falls (R29.6) · History of falling (Z91.81) · Hemiplegia and hemiparesis following cerebral infarction affecting left non-dominant side (P66.636) Precaution/Allergies: 
Latex; Adhesive; and Sulfa (sulfonamide antibiotics) ASSESSMENT:  
Mr. Jake Harman is a 80 y.o. male admitted for cellulitis, pneumonia and acute exacerbation of COPD. He lives with spouse in a single story home and has history of 2 CVAs (one in 2005 and another in 2017) resulting in severe L UE and LE weakness. LUE is flaccid and LLE is grossly 3/5. He typically ambulates short distances with a davis walker or uses his electric wheelchair, to which he can typically transfer without assist. Wife helps with ADLs and patient has experienced at least 3 falls in the past 6 months. He is sitting in recliner and agreeable to physical therapy evaluation. Mod assist x2 to stand and maximal assist x2 to take steps to bed this date, maximal assist x2 to return to supine. He is currently functioning well below his baseline. Merissa Gambino is currently functioning below his baseline and would benefit from skilled PT during acute care stay to maximize safety and independence with functional mobility. 2/25/19:  Pt supine in bed and requesting to walk to restroom. Bed mobility performed with mod assist.  Once sitting EOB, pt was able to maintain sitting balance. Sit to stand with mod assist from bed. Once standing, he abruptly sat back down on the bed. After sitting for a few minutes, pt able to stand again with min/mod assist.  He was able to walk about 15 feet with davis walker with very unsteady gait with min assist of 2 people. He was able to perform all self care once finished using the restroom. Pt required mod assist for sit to stand from low toilet.   He walked 10 feet to chair. He required min/mod assist for balance. Pt left sitting up in chair with lunch tray in front of him. Pt is unsteady and not safe to return home with only assist from his wife. Talked with patient and wife about short term rehab. He has fallen a few times at home over the last few months. Talked with  about recommendations. Pt on room air through out treatment with sats remaining in the low to mid 90s. This section established at most recent assessment PROBLEM LIST (Impairments causing functional limitations): 1. Decreased Strength 2. Decreased ADL/Functional Activities 3. Decreased Transfer Abilities 4. Decreased Ambulation Ability/Technique 5. Decreased Balance 6. Decreased Activity Tolerance 7. Decreased Pacing Skills 8. Increased Shortness of Breath 9. Decreased Knowledge of Precautions 10. Decreased [place] with Home Exercise Program 
 INTERVENTIONS PLANNED: (Benefits and precautions of physical therapy have been discussed with the patient.) 1. Balance Exercise 2. Bed Mobility 3. Family Education 4. Gait Training 5. Group Therapy 6. Home Exercise Program (HEP) 7. Therapeutic Activites 8. Therapeutic Exercise/Strengthening 9. Transfer Training 10. Patient Education TREATMENT PLAN: Frequency/Duration: 3 times a week for duration of hospital stay Rehabilitation Potential For Stated Goals: Good RECOMMENDED REHABILITATION/EQUIPMENT: (at time of discharge pending progress): Due to the probability of continued deficits (see above) this patient will likely need continued skilled physical therapy after discharge. Equipment:  
? None at this time HISTORY:  
History of Present Injury/Illness (Reason for Referral): 
80 male o2 dep copd(2 L), with pulm htn , bph, old cva with chronic left hemiplegia with regular diet. Presents with several day hx progressive dsypnea, cough became productive of white phlegm.  Coughs sometimes after meals per spouse. No relief dyspnea with inhalers, and left leg more edematous red and painful. He has wbc 19k with low grade temp elevation, significant bronchospasm and abn airway sounds right base. He is admitted for sepsis cellulitis source, exac copd and suspect occult pneumonia vs aspiration pneumonitis. Spouse is present at bedside Past Medical History/Comorbidities:  
Mr. Jaky Prado  has a past medical history of Chronic obstructive pulmonary disease ([address] Dermatophytosis of nail, History of CVA (cerebrovascular accident), History of paraplegia, and Hypertension. Mr. Jaky Prado  has no past surgical history on file. Social History/Living Environment:  
Home Environment: Apartment # Steps to Enter: 0 One/Two Story Residence: One story Living Alone: No 
Support Systems: Spouse/Significant Other/Partner Patient Expects to be Discharged to[de-identified] Unknown Current DME Used/Available at Home: Wheelchair, power, Shower chair, Grab bars(davis walker) Tub or Shower Type: Tub/Shower combination Prior Level of Function/Work/Activity: 
 He lives with spouse in a single story home and has history of 2 CVAs (one in 2005 and another in 2017) resulting in severe L UE and LE weakness. LUE is flaccid and LLE is grossly 3/5. He typically ambulates short distances with a davis walker or uses his electric wheelchair, to which he can typically transfer without assist. Wife helps with ADLs and patient has experienced at least 3 falls in the past 6 months. Number of Personal Factors/Comorbidities that affect the Plan of Care: 3+: HIGH COMPLEXITY EXAMINATION:  
Most Recent Physical Functioning:  
Gross Assessment: 
  
         
  
Posture: 
  
Balance: 
Sitting - Static: Good (unsupported) Sitting - Dynamic: Fair (occasional) Standing - Static: Constant support; Fair 
Standing - Dynamic : Fair Bed Mobility: 
Sit to Supine: Moderate assistance Wheelchair Mobility: 
  
Transfers: 
Sit to Stand: Minimum assistance Stand to Sit: Moderate assistance Gait: 
  
Base of Support: Center of gravity altered Speed/Jessica: Pace decreased (<100 feet/min); Shuffled Gait Abnormalities: Decreased step clearance Distance (ft): 15 Feet (ft)(additional 10 feet) Assistive Device: Walker davis Ambulation - Level of Assistance: Minimal assistance Interventions: Safety awareness training;Verbal cues Body Structures Involved: 1. Nerves 2. Lungs 3. Muscles Body Functions Affected: 1. Sensory/Pain 2. Respiratory 3. Neuromusculoskeletal 
4. Movement Related Activities and Participation Affected: 1. Mobility 2. Self Care 3. Domestic Life 4. Interpersonal Interactions and Relationships 5. Community, Social and [place] Number of elements that affect the Plan of Care: 4+: HIGH COMPLEXITY CLINICAL PRESENTATION:  
Presentation: Evolving clinical presentation with changing clinical characteristics: MODERATE COMPLEXITY CLINICAL DECISION MAKIN[address] [place] [id number] AM-PAC 6 Clicks Basic Mobility Inpatient Short Form How much difficulty does the patient currently have. .. Unable A Lot A Little None 1. Turning over in bed (including adjusting bedclothes, sheets and blankets)? [] 1   [] 2   [x] 3   [] 4  
2. Sitting down on and standing up from a chair with arms ( e.g., wheelchair, bedside commode, etc.)   [] 1   [x] 2   [] 3   [] 4  
3. Moving from lying on back to sitting on the side of the bed? [] 1   [x] 2   [] 3   [] 4 How much help from another person does the patient currently need. .. Total A Lot A Little None 4. Moving to and from a bed to a chair (including a wheelchair)? [] 1   [x] 2   [] 3   [] 4  
5. Need to walk in hospital room? [x] 1   [] 2   [] 3   [] 4  
6. Climbing 3-5 steps with a railing? [x] 1   [] 2   [] 3   [] 4  
© , Trustees of [address] [place] [id number], under license to [employer]. All rights reserved Score:  Initial: 11 Most Recent: X (Date: -- ) Interpretation of Tool:  Represents activities that are increasingly more difficult (i.e. Bed mobility, Transfers, Gait). Medical Necessity:    
· Patient demonstrates good rehab potential due to higher previous functional level. Reason for Services/Other Comments: 
· Patient continues to require modification of therapeutic interventions to increase complexity of exercises. Use of outcome tool(s) and clinical judgement create a POC that gives a: Questionable prediction of patient's progress: MODERATE COMPLEXITY  
  
 
 
 
TREATMENT:  
(In addition to Assessment/Re-Assessment sessions the following treatments were rendered) Pre-treatment Symptoms/Complaints:  none Pain: Initial:  
   Post Session:  none Therapeutic Activity: (    45 minutes): Therapeutic activities including Bed transfers, Chair transfers and Ambulation on level ground to improve mobility, strength and balance. Required minimal Safety awareness training;Verbal cues to promote dynamic balance in standing. Braces/Orthotics/Lines/Etc:  
· O2 Room Air Treatment/Session Assessment:   
· Response to Treatment:  Patient required max assist x2 to transfer from chair to bed. · Interdisciplinary Collaboration:  
o Physical Therapy Assistant 
o Registered Nurse 
o Certified Nursing Assistant/Patient Care Technician · After treatment position/precautions:  
o Up in chair 
o Bed/Chair-wheels locked 
o Bed in low position 
o Caregiver at bedside 
o Call light within reach 
o RN notified · Compliance with Program/Exercises: Will assess as treatment progresses · Recommendations/Intent for next treatment session: \"Next visit will focus on advancements to more challenging activities and reduction in assistance provided\". Total Treatment Duration: PT Patient Time In/Time Out Time In: 1210 Time Out: 0137 Tanja Bhagat, PTA

## 2019-02-25 NOTE — PROGRESS NOTES
Dispo update:  Spoke to Mr. Joelle Bauer in room 211 again about discharge planning. Plan last week was home with [facility] PT and SLP. However, based on his physical therapy session this morning, and then discussion between him and his wife, he now agrees to either 9th floor IRU (his first choice), or to short-term rehab at a skilled nursing facility. Received and wrote order for 9th floor IRU consult. Also, provided Mr. Joelle Bauer the SNF list for his review for possible STR in case he is not accepted to 9th floor IRU. Wait and see what 9th floor IRU says.

## 2019-02-25 NOTE — PROGRESS NOTES
Problem: Falls - Risk of 
Goal: *Absence of Falls Document Kathryn End Fall Risk and appropriate interventions in the flowsheet. Fall Risk Interventions: 
Mobility Interventions: Bed/chair exit alarm Medication Interventions: Bed/chair exit alarm Elimination Interventions: Bed/chair exit alarm History of Falls Interventions: Bed/chair exit alarm Problem: Pressure Injury - Risk of 
Goal: *Prevention of pressure injury Document Joshua Scale and appropriate interventions in the flowsheet. Outcome: Progressing Towards Goal 
Pressure Injury Interventions: 
Sensory Interventions: Assess changes in LOC Moisture Interventions: Absorbent underpads Activity Interventions: Increase time out of bed, Pressure redistribution bed/mattress(bed type), PT/OT evaluation Mobility Interventions: HOB 30 degrees or less, Pressure redistribution bed/mattress (bed type), PT/OT evaluation Nutrition Interventions: Document food/fluid/supplement intake Friction and Shear Interventions: Apply protective barrier, creams and emollients

## 2019-02-25 NOTE — PROGRESS NOTES
SPEECH PATHOLOGY NOTE: 
 
 
Attempted to see patient for diet tolerance, however patient requesting ST return later- \"pain pill just kicked in\". Spoke with Elyssa Leal NP regarding anticipation of modified barium swallow study to objectively assess swallow function given overt s/sx airway compromise during initial evaluation and patient's wife/patient complaints of \"getting strangled\". Will plan for modified swallow study Tuesday 2/26/19.   
  
 
 
Bhavya Calvo, INST MEDICO DEL [facility], [facility] LOY LEUNG, CF-SLP

## 2019-02-25 NOTE — PROGRESS NOTES
Problem: Falls - Risk of 
Goal: *Absence of Falls Document Kevin Dye Fall Risk and appropriate interventions in the flowsheet. Outcome: Progressing Towards Goal 
Fall Risk Interventions: 
Mobility Interventions: Bed/chair exit alarm Medication Interventions: Bed/chair exit alarm Elimination Interventions: Bed/chair exit alarm History of Falls Interventions: Bed/chair exit alarm Problem: Pressure Injury - Risk of 
Goal: *Prevention of pressure injury Document Joshua Scale and appropriate interventions in the flowsheet. Outcome: Progressing Towards Goal 
Pressure Injury Interventions: 
Sensory Interventions: Assess changes in LOC Moisture Interventions: Absorbent underpads Activity Interventions: Increase time out of bed, Pressure redistribution bed/mattress(bed type), PT/OT evaluation Mobility Interventions: HOB 30 degrees or less, Pressure redistribution bed/mattress (bed type), PT/OT evaluation Nutrition Interventions: Document food/fluid/supplement intake Friction and Shear Interventions: Apply protective barrier, creams and emollients

## 2019-02-25 NOTE — CONSULTS
PM&R Rehab Consult    Subjective:     Date of Consultation:  February 25, 2019    Referring Physician: Dr Creasie Boas    Patient is a 80 y.o. male who is being seen for a Physiatry eval. Secondary to hospitalization for sepsis in a gentleman with profound left hemiplegia from prior CVA    Principal Problem:    Sepsis ([facility] [address] (2/21/2019)    Active Problems:    COPD exacerbation ([address] (7/14/2018)      Left hemiplegia ([address] (7/14/2018)      CVA (cerebral vascular accident) ([address] (7/14/2018)      Overview: Multiple with left hemiplegia. Depression (7/14/2018)      Seizure ([address] (7/14/2018)      BPH (benign prostatic hyperplasia) (7/14/2018)      Cellulitis (2/21/2019)      Acute exacerbation of chronic obstructive pulmonary disease (COPD) ([facility] [address] (2/21/2019)    HPI; Mr. Mariya Culp is a pleasant 71yo WM with a PMH of CVA x 2 with left hemiplegia, HTN, Pulmonary HTN, BPH and COPD who presented to [facility] on 2/21 with weakness, progressive dyspnea, and cough. He is O2 dependent at [facility]. He also had cellulitis to the LLE . WBC was 19K with a noted low grade temp. He was admitted for COPD exacerbation and LE cellulitis with suspected aspiration pneumonitis vs CAP. CXR was neg but pt having significant bronchospasm on exam. Pts wife described symptoms of dysphagia with coughing during meals. By HD#3, pt felt that his breathing had improved to baseline. He was on Rocephin, Vanc and Zithromax which was switched to po Clindamycin on 2/23. His LLE continued to be edematous and erythematous. Some weeping from the leg was noted on 2/24. Sepsis w/u neg. WBC resolving. Speech assessed swallowing and recommended no straw with meds in pureed food. Pt now on room air. He is to have a MBS in a.m. Functionally, at baseline, requires assist with ADLs. Uses power chair for the most part but can walk short distances with a davis walker. Pt is adamantly stating that he is at his functional baseline. He has an aid 10hrs/week thru the [place]. OT pending. PT has been following. Went over PT report with pt and he feels that if he were allowed to do transfers the way he normally does it, he would have done fine. He was min/mod with mobility. He does admit to a few falls over the past 6mos. He ambulated 15ft with the hemiwalker with min assist of 2, unsteady. Past Medical History:   Diagnosis Date    Chronic obstructive pulmonary disease (HCC)     Dermatophytosis of nail     History of CVA (cerebrovascular accident)     History of paraplegia     Hypertension       No family history on file. Social History     Tobacco Use    Smoking status: Never Smoker    Smokeless tobacco: Never Used   Substance Use Topics    Alcohol use: No   Home Environment: Apartment  # Steps to Enter: 0  One/Two Story Residence: One story  Living Alone: No  Support Systems: Spouse/Significant Other/Partner  Patient Expects to be Discharged to[de-identified] Unknown  Current DME Used/Available at Home: Wheelchair, power, Shower chair, Grab bars(davis walker)  Tub or Shower Type: Tub/Shower combination  Prior Level of Function/Work/Activity:   He lives with spouse in a single story home and has history of 2 CVAs (one in 2005 and another in 2017) resulting in severe L UE and LE weakness. LUE is flaccid and LLE is grossly 3/5. He typically ambulates short distances with a davis walker or uses his electric wheelchair, to which he can typically transfer without assist. Wife helps with ADLs and patient has experienced at least 3 falls in the past 6 months. No past surgical history on file. Prior to Admission medications    Medication Sig Start Date End Date Taking? Authorizing Provider   LORazepam (ATIVAN) 0.5 mg tablet Take 0.5 mg by mouth three (3) times daily as needed for Anxiety. Yes Provider, Historical   melatonin 3 mg tablet Take 3 mg by mouth daily as needed (SLEEP). Yes Provider, Historical   potassium chloride (K-DUR, KLOR-CON) 20 mEq tablet Take 20 mEq by mouth two (2) times a day.    Yes Provider, Historical   polyethylene glycol (MIRALAX) 17 gram/dose powder Take 17 g by mouth daily as needed. Yes Provider, Historical   raNITIdine (ZANTAC) 150 mg tablet Take 150 mg by mouth two (2) times a day. Yes Provider, Historical   albuterol (PROVENTIL HFA, VENTOLIN HFA, PROAIR HFA) 90 mcg/actuation inhaler Take 1 Puff by inhalation every four (4) hours as needed for Wheezing. 7/16/18  Yes Terra Manuel MD   acetaminophen (TYLENOL) 500 mg tablet Take  by mouth every six (6) hours as needed for Pain. Yes Provider, Historical   guaiFENesin (ORGANIDIN) 400 mg tablet Take 200 mg by mouth three (3) times daily. Yes Provider, Historical   loratadine (CLARITIN) 10 mg tablet Take 5 mg by mouth nightly. Yes Provider, Historical   b complex-vitamin c-folic acid 0.8 mg (NEPHRO-ALICIA) 0.8 mg tab tablet Take 1 Tab by mouth daily. Yes Provider, Historical   phenytoin ER (DILANTIN ER) 100 mg ER capsule Take 300 mg by mouth two (2) times a day. Yes Provider, Historical   rosuvastatin (CRESTOR) 40 mg tablet Take 40 mg by mouth nightly. Yes Provider, Historical   senna (SENOKOT) 8.6 mg tablet Take 1 Tab by mouth daily. Yes Provider, Historical   sertraline (ZOLOFT) 100 mg tablet Take  by mouth nightly. Yes Provider, Historical   tamsulosin (FLOMAX) 0.4 mg capsule Take 0.4 mg by mouth daily. Yes Provider, Historical   ALOE VERA/COLLAGEN (ALOE VESTA 2-N-1 CLEANSER EX) by Apply Externally route. Yes Provider, Historical   aspirin delayed-release 81 mg tablet Take 81 mg by mouth daily. Yes Provider, Historical   MINERAL OIL/PETROLATUM,WHITE (CLIVE MOISTURE BARRIER EX) by Apply Externally route. Yes Provider, Historical   carvedilol (COREG) 12.5 mg tablet Take 6.25 mg by mouth two (2) times a day. Yes Provider, Historical   cholecalciferol (VITAMIN D3) 1,000 unit cap Take 2,000 Units by mouth. Yes Provider, Historical   clopidogrel (PLAVIX) 75 mg tab Take 75 mg by mouth.    Yes Provider, Historical   docusate sodium 100 mg/5 mL enem Insert  into rectum. Yes Provider, Historical   fluticasone (FLOVENT DISKUS) 50 mcg/actuation inhaler by Nasal route. Yes Provider, Historical   methylPREDNISolone (MEDROL DOSEPACK) 4 mg tablet Take as directed. 18   Adelaide Francis MD   cefpodoxime (VANTIN) 200 mg tablet Take 1 Tab by mouth two (2) times a day. 18   Adelaide Francis MD   lisinopril (PRINIVIL, ZESTRIL) 40 mg tablet Take 40 mg by mouth daily. Provider, Historical   Omeprazole delayed release (PRILOSEC D/R) 20 mg tablet Take 20 mg by mouth daily. Provider, Historical   folic acid (FOLVITE) 1 mg tablet Take 1 mg by mouth. Provider, Historical     Allergies   Allergen Reactions    Latex Rash    Adhesive Unknown (comments)    Sulfa (Sulfonamide Antibiotics) Other (comments)        Review of Systems:  Pertinent items are noted in HPI. Currently denies SOB, orthopnea or ROSE. Voice is hoarse . No sore throat, non prod intermittent cough  No cp, heart palpitations; + edema  Denies pain. Does admit to fatigue at times. Appetite fair  + hx of depression   Objective:     Vitals:  Blood pressure 125/86, pulse 75, temperature 97.7 °F (36.5 °C), resp. rate 16, height 5' 11\" (1.803 m), weight 225 lb (102.1 kg), SpO2 91 %. Temp (24hrs), Av.9 °F (36.6 °C), Min:97.4 °F (36.3 °C), Max:98.5 °F (36.9 °C)      Intake and Output:   1901 -  0700  In: 911 [I.V.:911]  Out: -     Physical Exam:  General:  Alert, oriented and mood affect appropriate. Lungs:   Clear to auscultation bilaterally. Heart:  Regular rate and rhythm, S1, S2 stable, no murmur, click, rub or gallop. Abdomen:   Soft, non-tender. Bowel sounds present. No masses,  No organomegaly. obese   Genitourinary: Defer, wearing briefs   Neuro Muscular: Dense left hemiplegia, increase tone LUE with elbow flexion and extenstion. Left hand contracted with finger flexion.  No active deltoid, biceps, triceps , WE or FE, FF, WFs on the left  LLE; prox hip flexion 2-, KE 3, DF 0, PF 2- EHL 0  LLE abd 3, adduction 4  Decreased sensation left davis body  Lower left facial weakness, speech a bit dysarthric  Cognition intact.  No inattn to hemiparetic side  Right davis body intact   Skin: LLE 1+ edema, black pen markings of cellulitic area; appears to be improved       Labs/Studies:  Recent Results (from the past 72 hour(s))   GLUCOSE, POC    Collection Time: 02/22/19  4:35 PM   Result Value Ref Range    Glucose (POC) 130 (H) 65 - 100 mg/dL   GLUCOSE, POC    Collection Time: 02/22/19  9:18 PM   Result Value Ref Range    Glucose (POC) 160 (H) 65 - 082 mg/dL   METABOLIC PANEL, BASIC    Collection Time: 02/23/19  4:19 AM   Result Value Ref Range    Sodium 142 136 - 145 mmol/L    Potassium 3.4 (L) 3.5 - 5.1 mmol/L    Chloride 111 (H) 98 - 107 mmol/L    CO2 24 21 - 32 mmol/L    Anion gap 7 7 - 16 mmol/L    Glucose 126 (H) 65 - 100 mg/dL    BUN 12 8 - 23 MG/DL    Creatinine 0.58 (L) 0.8 - 1.5 MG/DL    GFR est AA >60 >60 ml/min/1.73m2    GFR est non-AA >60 >60 ml/min/1.73m2    Calcium 7.7 (L) 8.3 - 10.4 MG/DL   CBC W/O DIFF    Collection Time: 02/23/19  4:19 AM   Result Value Ref Range    WBC 11.1 4.3 - 11.1 K/uL    RBC 3.49 (L) 4.23 - 5.6 M/uL    HGB 11.3 (L) 13.6 - 17.2 g/dL    HCT 33.7 (L) 41.1 - 50.3 %    MCV 96.6 79.6 - 97.8 FL    MCH 32.4 26.1 - 32.9 PG    MCHC 33.5 31.4 - 35.0 g/dL    RDW 12.1 11.9 - 14.6 %    PLATELET 114 (L) 983 - 450 K/uL    MPV 10.6 9.4 - 12.3 FL    ABSOLUTE NRBC 0.00 0.0 - 0.2 K/uL   GLUCOSE, POC    Collection Time: 02/23/19  7:55 AM   Result Value Ref Range    Glucose (POC) 123 (H) 65 - 100 mg/dL   GLUCOSE, POC    Collection Time: 02/23/19 12:21 PM   Result Value Ref Range    Glucose (POC) 132 (H) 65 - 100 mg/dL   GLUCOSE, POC    Collection Time: 02/23/19  4:17 PM   Result Value Ref Range    Glucose (POC) 168 (H) 65 - 100 mg/dL   GLUCOSE, POC    Collection Time: 02/23/19  9:25 PM   Result Value Ref Range    Glucose (POC) 135 (H) 65 - 100 mg/dL   GLUCOSE, POC    Collection Time: 02/24/19  8:00 AM   Result Value Ref Range    Glucose (POC) 105 (H) 65 - 100 mg/dL   GLUCOSE, POC    Collection Time: 02/24/19 11:46 AM   Result Value Ref Range    Glucose (POC) 133 (H) 65 - 100 mg/dL   GLUCOSE, POC    Collection Time: 02/24/19  4:21 PM   Result Value Ref Range    Glucose (POC) 101 (H) 65 - 100 mg/dL   GLUCOSE, POC    Collection Time: 02/24/19  8:31 PM   Result Value Ref Range    Glucose (POC) 154 (H) 65 - 100 mg/dL   GLUCOSE, POC    Collection Time: 02/25/19  7:10 AM   Result Value Ref Range    Glucose (POC) 112 (H) 65 - 100 mg/dL   CBC WITH AUTOMATED DIFF    Collection Time: 02/25/19 10:03 AM   Result Value Ref Range    WBC 6.0 4.3 - 11.1 K/uL    RBC 4.01 (L) 4.23 - 5.6 M/uL    HGB 12.7 (L) 13.6 - 17.2 g/dL    HCT 38.2 (L) 41.1 - 50.3 %    MCV 95.3 79.6 - 97.8 FL    MCH 31.7 26.1 - 32.9 PG    MCHC 33.2 31.4 - 35.0 g/dL    RDW 11.9 11.9 - 14.6 %    PLATELET 144 065 - 277 K/uL    MPV 10.1 9.4 - 12.3 FL    ABSOLUTE NRBC 0.00 0.0 - 0.2 K/uL    DF AUTOMATED      NEUTROPHILS 62 43 - 78 %    LYMPHOCYTES 29 13 - 44 %    MONOCYTES 6 4.0 - 12.0 %    EOSINOPHILS 3 0.5 - 7.8 %    BASOPHILS 0 0.0 - 2.0 %    IMMATURE GRANULOCYTES 0 0.0 - 5.0 %    ABS. NEUTROPHILS 3.7 1.7 - 8.2 K/UL    ABS. LYMPHOCYTES 1.7 0.5 - 4.6 K/UL    ABS. MONOCYTES 0.4 0.1 - 1.3 K/UL    ABS. EOSINOPHILS 0.2 0.0 - 0.8 K/UL    ABS. BASOPHILS 0.0 0.0 - 0.2 K/UL    ABS. IMM.  GRANS. 0.0 0.0 - 0.5 K/UL   METABOLIC PANEL, BASIC    Collection Time: 02/25/19 10:03 AM   Result Value Ref Range    Sodium 139 136 - 145 mmol/L    Potassium 3.3 (L) 3.5 - 5.1 mmol/L    Chloride 106 98 - 107 mmol/L    CO2 27 21 - 32 mmol/L    Anion gap 6 (L) 7 - 16 mmol/L    Glucose 169 (H) 65 - 100 mg/dL    BUN 11 8 - 23 MG/DL    Creatinine 0.69 (L) 0.8 - 1.5 MG/DL    GFR est AA >60 >60 ml/min/1.73m2    GFR est non-AA >60 >60 ml/min/1.73m2    Calcium 8.5 8.3 - 10.4 MG/DL   MAGNESIUM    Collection Time: 02/25/19 10:03 AM   Result Value Ref Range    Magnesium 2.1 1.8 - 2.4 mg/dL   GLUCOSE, POC    Collection Time: 02/25/19 11:01 AM   Result Value Ref Range    Glucose (POC) 176 (H) 65 - 100 mg/dL       Functional Assessment:  Functional Assessment  Fall in Past 12 Months: Yes  Fall With Injury: No  Number of Falls Within 12 Months: 3  Approximate Date of Fall: week of Feb 10 th  Decline in Gait/Transfer/Balance: No  Decline in Capacity to Feed/Dress/Bathe: No  Developmental Delay: No  Chewing/Swallowing Problems: Yes (comment)  Difficulty with Secretions: Yes (comment)  Speech Slurred/Thick/Garbled: No     [place] Score:        Speech Assessment:    Dysphagia Screening  Vocal Quality/Secretions: Normal  History of Dysphagia: No  O2 Saturation: Normal  Alertness: Normal  Pre-Swallow Assessment Score: 0  Purees: No difficulty noted  Aspiration Signs/Symptoms: Strong cough             Ambulation:  Activity and Safety  Activity Level: Up with Assistance  Ambulate: Ambulate in room  Activity: In bed, Family/Visitors present  Activity Assistance: Partial (two people)  Weight Bearing Status: WBAT (Weight Bearing as Tolerated)  PWB (Partial Weight Bearing %) extremities: LLE (Left Lower Extremity)  NWB (Non Weight Bearing extremities: LLE (Left Lower Extremity)  Mode of Transportation: Stretcher  Repositioned: Head of bed elevated (degrees)  Patient Turned: Turns self  Head of Bed Elevated: Self regulated  Activity Response: Tolerated well  Assistive Device: Fall prevention device  Safety Measures: Bed in low position, Bed/Chair-Wheels locked, Bed/Chair alarm on, Call light within reach, Family at bedside     Impression/Plan:     Principal Problem:    Sepsis ([address] (2/21/2019)    Active Problems:    COPD exacerbation ([address] (7/14/2018)      Left hemiplegia ([address] (7/14/2018)      CVA (cerebral vascular accident) ([address] (7/14/2018)      Overview: Multiple with left hemiplegia.       Depression (7/14/2018)      Seizure ([address] (7/14/2018) BPH (benign prostatic hyperplasia) (7/14/2018)      Cellulitis (2/21/2019)      Acute exacerbation of chronic obstructive pulmonary disease (COPD) ([facility] [address] (2/21/2019)    Physical Debility s/p resolved sepsis, previous CVA with dense L HP    Recommendations: Continue Acute Rehab Program  Coordination of rehab/medical care  Counseling of PM & R care issues management  Monitoring and management of medical conditions per plan of care/orders   -Pt with debility secondary to recent illness and 5d hospital stay. His Left hemiplegia and hemisensory loss is chronic. He feels close to functional baseline.  -medically ; sepsis resolved, O2 weaned, cellulitis now managed with oral abx. COPD exac improved; baseline. Pt has no medical necessity to require inpt rehab. He does not need 3hrs of therapies from an interdisciplinary team nor does he require a physician's oversight daily. He is against a SNF due to prior experience. It appears that his wife assists with dressing, bathing, grooming and toileting. He primarily uses a power chair for mobility. Currently ambulating 15ft with davis walker with min assist of 2. I suspect that his balance and tranfers have never been ideal or safe. He is very set in his ways and not open to learning new hemiparetic techniques. -recommend  PT/OT/ST/RN; has aide thru [place]. A short stay in a SNF would be reasonable if pt would agree but it doesn't appear that he would  -wife was not here during eval. If she has questions, I would be happy to revisit tomorrow.    Discussion with pt/Staff  Reviewed Therapies/Labs/Meds/Records  Thank you  Signed By:  Veronika Parnell MD     February 25, 2019

## 2019-02-26 ENCOUNTER — APPOINTMENT (OUTPATIENT)
Dept: GENERAL RADIOLOGY | Age: 69
DRG: 871 | End: 2019-02-26
Attending: NURSE PRACTITIONER
Payer: OTHER GOVERNMENT

## 2019-02-26 ENCOUNTER — APPOINTMENT (OUTPATIENT)
Dept: MRI IMAGING | Age: 69
DRG: 871 | End: 2019-02-26
Attending: NURSE PRACTITIONER
Payer: OTHER GOVERNMENT

## 2019-02-26 VITALS
SYSTOLIC BLOOD PRESSURE: 107 MMHG | WEIGHT: 225 LBS | HEIGHT: 71 IN | RESPIRATION RATE: 19 BRPM | HEART RATE: 81 BPM | BODY MASS INDEX: 31.5 KG/M2 | TEMPERATURE: 96.1 F | OXYGEN SATURATION: 95 % | DIASTOLIC BLOOD PRESSURE: 70 MMHG

## 2019-02-26 LAB
ANION GAP SERPL CALC-SCNC: 9 MMOL/L (ref 7–16)
BACTERIA SPEC CULT: NORMAL
BACTERIA SPEC CULT: NORMAL
BASOPHILS # BLD: 0 K/UL (ref 0–0.2)
BASOPHILS NFR BLD: 0 % (ref 0–2)
BUN SERPL-MCNC: 11 MG/DL (ref 8–23)
CALCIUM SERPL-MCNC: 8.9 MG/DL (ref 8.3–10.4)
CHLORIDE SERPL-SCNC: 108 MMOL/L (ref 98–107)
CO2 SERPL-SCNC: 24 MMOL/L (ref 21–32)
CREAT SERPL-MCNC: 0.51 MG/DL (ref 0.8–1.5)
DIFFERENTIAL METHOD BLD: ABNORMAL
EOSINOPHIL # BLD: 0.3 K/UL (ref 0–0.8)
EOSINOPHIL NFR BLD: 5 % (ref 0.5–7.8)
ERYTHROCYTE [DISTWIDTH] IN BLOOD BY AUTOMATED COUNT: 11.9 % (ref 11.9–14.6)
GLUCOSE BLD STRIP.AUTO-MCNC: 129 MG/DL (ref 65–100)
GLUCOSE BLD STRIP.AUTO-MCNC: 130 MG/DL (ref 65–100)
GLUCOSE SERPL-MCNC: 109 MG/DL (ref 65–100)
HCT VFR BLD AUTO: 38.7 % (ref 41.1–50.3)
HGB BLD-MCNC: 13.2 G/DL (ref 13.6–17.2)
IMM GRANULOCYTES # BLD AUTO: 0 K/UL (ref 0–0.5)
IMM GRANULOCYTES NFR BLD AUTO: 0 % (ref 0–5)
LYMPHOCYTES # BLD: 2.6 K/UL (ref 0.5–4.6)
LYMPHOCYTES NFR BLD: 39 % (ref 13–44)
MCH RBC QN AUTO: 31.9 PG (ref 26.1–32.9)
MCHC RBC AUTO-ENTMCNC: 34.1 G/DL (ref 31.4–35)
MCV RBC AUTO: 93.5 FL (ref 79.6–97.8)
MONOCYTES # BLD: 0.4 K/UL (ref 0.1–1.3)
MONOCYTES NFR BLD: 6 % (ref 4–12)
NEUTS SEG # BLD: 3.3 K/UL (ref 1.7–8.2)
NEUTS SEG NFR BLD: 50 % (ref 43–78)
NRBC # BLD: 0 K/UL (ref 0–0.2)
PLATELET # BLD AUTO: 221 K/UL (ref 150–450)
PMV BLD AUTO: 10.3 FL (ref 9.4–12.3)
POTASSIUM SERPL-SCNC: 3.8 MMOL/L (ref 3.5–5.1)
RBC # BLD AUTO: 4.14 M/UL (ref 4.23–5.6)
SERVICE CMNT-IMP: NORMAL
SERVICE CMNT-IMP: NORMAL
SODIUM SERPL-SCNC: 141 MMOL/L (ref 136–145)
WBC # BLD AUTO: 6.6 K/UL (ref 4.3–11.1)

## 2019-02-26 PROCEDURE — 82962 GLUCOSE BLOOD TEST: CPT

## 2019-02-26 PROCEDURE — 74011250636 HC RX REV CODE- 250/636: Performed by: INTERNAL MEDICINE

## 2019-02-26 PROCEDURE — 77030019605

## 2019-02-26 PROCEDURE — 74011000250 HC RX REV CODE- 250: Performed by: INTERNAL MEDICINE

## 2019-02-26 PROCEDURE — 85025 COMPLETE CBC W/AUTO DIFF WBC: CPT

## 2019-02-26 PROCEDURE — 80048 BASIC METABOLIC PNL TOTAL CA: CPT

## 2019-02-26 PROCEDURE — 74011250637 HC RX REV CODE- 250/637: Performed by: INTERNAL MEDICINE

## 2019-02-26 PROCEDURE — 36415 COLL VENOUS BLD VENIPUNCTURE: CPT

## 2019-02-26 PROCEDURE — 94640 AIRWAY INHALATION TREATMENT: CPT

## 2019-02-26 PROCEDURE — 97530 THERAPEUTIC ACTIVITIES: CPT

## 2019-02-26 PROCEDURE — 97165 OT EVAL LOW COMPLEX 30 MIN: CPT

## 2019-02-26 PROCEDURE — 73718 MRI LOWER EXTREMITY W/O DYE: CPT

## 2019-02-26 PROCEDURE — 94760 N-INVAS EAR/PLS OXIMETRY 1: CPT

## 2019-02-26 PROCEDURE — 77030012341 HC CHMB SPCR OPTC MDI VYRM -A

## 2019-02-26 RX ORDER — CLINDAMYCIN HYDROCHLORIDE 300 MG/1
300 CAPSULE ORAL EVERY 8 HOURS
Qty: 12 CAP | Refills: 0 | Status: SHIPPED | OUTPATIENT
Start: 2019-02-26 | End: 2019-03-02

## 2019-02-26 RX ADMIN — HEPARIN SODIUM 5000 UNITS: 5000 INJECTION INTRAVENOUS; SUBCUTANEOUS at 14:59

## 2019-02-26 RX ADMIN — LISINOPRIL 40 MG: 20 TABLET ORAL at 08:42

## 2019-02-26 RX ADMIN — Medication 10 ML: at 06:23

## 2019-02-26 RX ADMIN — Medication 250 MG: at 08:42

## 2019-02-26 RX ADMIN — HEPARIN SODIUM 5000 UNITS: 5000 INJECTION INTRAVENOUS; SUBCUTANEOUS at 06:23

## 2019-02-26 RX ADMIN — ALBUTEROL SULFATE 2.5 MG: 2.5 SOLUTION RESPIRATORY (INHALATION) at 01:56

## 2019-02-26 RX ADMIN — Medication 5 ML: at 15:00

## 2019-02-26 RX ADMIN — CLINDAMYCIN HYDROCHLORIDE 300 MG: 150 CAPSULE ORAL at 06:23

## 2019-02-26 RX ADMIN — PHENYTOIN SODIUM 100 MG: 100 CAPSULE ORAL at 08:42

## 2019-02-26 RX ADMIN — GUAIFENESIN 600 MG: 600 TABLET, EXTENDED RELEASE ORAL at 08:42

## 2019-02-26 RX ADMIN — PANTOPRAZOLE SODIUM 40 MG: 40 TABLET, DELAYED RELEASE ORAL at 06:22

## 2019-02-26 RX ADMIN — CLOPIDOGREL BISULFATE 75 MG: 75 TABLET, FILM COATED ORAL at 08:42

## 2019-02-26 RX ADMIN — LORATADINE 10 MG: 10 TABLET ORAL at 08:42

## 2019-02-26 RX ADMIN — CLINDAMYCIN HYDROCHLORIDE 300 MG: 150 CAPSULE ORAL at 14:59

## 2019-02-26 RX ADMIN — CARVEDILOL 6.25 MG: 6.25 TABLET, FILM COATED ORAL at 08:42

## 2019-02-26 RX ADMIN — ASPIRIN 81 MG: 81 TABLET, COATED ORAL at 08:42

## 2019-02-26 NOTE — PROGRESS NOTES
[facility] ambulance (per patient request) arranged for 3:30 pm pickup for transport to his home, including 2 lpm supplemental oxygen via [place] (he has all supplies at home). Faxed order to [facility] (phone [phone]) fax [phone] for home health PT only (he declines OT, RN, and SLP.

## 2019-02-26 NOTE — PROGRESS NOTES
Problem: Mobility Impaired (Adult and Pediatric) Goal: *Acute Goals and Plan of Care (Insert Text) STG: 
(1.)Mr. William Pulido will move from supine to sit and sit to supine , scoot up and down and roll side to side with MINIMAL ASSIST within 3 treatment day(s). (2.)Mr. William Pulido will transfer from bed to chair and chair to bed with MINIMAL ASSIST using the least restrictive device within 3 treatment day(s). (3.)Mr. William Pulido will ambulate with MINIMAL ASSIST for 5 feet with the least restrictive device within 3 treatment day(s). MET 2/25/19 
(4.)Mr. William Pulido will perform standing static and dynamic balance activities x 15 minutes with MINIMAL ASSIST to improve safety within 3 day(s). LTG: 
(1.)Mr. William Pulido will move from supine to sit and sit to supine , scoot up and down and roll side to side in bed with CONTACT GUARD ASSIST within 7 treatment day(s). (2.)Mr. William Pulido will transfer from bed to chair and chair to bed with CONTACT GUARD ASSIST using the least restrictive device within 7 treatment day(s). (3.)Mr. William Pulido will ambulate with CONTACT GUARD ASSIST for 30 feet with the least restrictive device within 7 treatment day(s). (4.)Mr. William Pulido will perform standing static and dynamic balance activities x 15 minutes with CONTACT GUARD ASSIST to improve safety within 7 day(s). ________________________________________________________________________________________________ PHYSICAL THERAPY: Daily Note and PM 2/26/2019INPATIENT: PT Visit Days : 2 Payor: Bruce Chandler / Plan: [place] [employer] / Product Type: Federal Funded Programs /   
  
NAME/AGE/GENDER: Flor Smoker is a 80 y.o. male PRIMARY DIAGNOSIS: Cellulitis [L03.90] Pneumonia [J18.9] Cellulitis [L03.90] Acute exacerbation of chronic obstructive pulmonary disease (COPD) ([facility] [address] [J44.1] Cellulitis [L03.90] Acute exacerb[facility] of [facility] [J44.1] Sepsis [facility] Sepsis ([facility] ICD-10: Treatment Diagnosis:  
 · Generalized Muscle Weakness (M62.81) · Difficulty in walking, Not elsewhere classified (R26.2) · Repeated Falls (R29.6) · History of falling (Z91.81) · Hemiplegia and hemiparesis following cerebral infarction affecting left non-dominant side (G81.499) Precaution/Allergies: 
Latex; Adhesive; and Sulfa (sulfonamide antibiotics) ASSESSMENT:  
Mr. Cassandra Sorto is a 80 y.o. male admitted for cellulitis, pneumonia and acute exacerbation of COPD. He lives with spouse in a single story home and has history of 2 CVAs (one in 2005 and another in 2017) resulting in severe L UE and LE weakness. LUE is flaccid and LLE is grossly 3/5. He typically ambulates short distances with a davis walker or uses his electric wheelchair, to which he can typically transfer without assist.  
 
2-26-19: Patient presents sitting up on edge of bed with spouse present. He is agreeable to treatment today. He had just completed his OT evaluation. He stood from edge of bed with CGA and then ambulated for an increased distance with min assist and a davis-walker. He does need additional time with all mobility. He is close to baseline. He would benefit from additional therapy upon discharge to home. He is progressing well toward all goals. Will continue PT efforts. This section established at most recent assessment PROBLEM LIST (Impairments causing functional limitations): 1. Decreased Strength 2. Decreased ADL/Functional Activities 3. Decreased Transfer Abilities 4. Decreased Ambulation Ability/Technique 5. Decreased Balance 6. Decreased Activity Tolerance 7. Decreased Pacing Skills 8. Increased Shortness of Breath 9. Decreased Knowledge of Precautions 10. Decreased [place] with Home Exercise Program 
 INTERVENTIONS PLANNED: (Benefits and precautions of physical therapy have been discussed with the patient.) 1. Balance Exercise 2. Bed Mobility 3. Family Education 4. Gait Training 5. Group Therapy 6. Home Exercise Program (HEP) 7. Therapeutic Activites 8. Therapeutic Exercise/Strengthening 9. Transfer Training 10. Patient Education TREATMENT PLAN: Frequency/Duration: 3 times a week for duration of hospital stay Rehabilitation Potential For Stated Goals: Good RECOMMENDED REHABILITATION/EQUIPMENT: (at time of discharge pending progress): Due to the probability of continued deficits (see above) this patient will likely need continued skilled physical therapy after discharge. Equipment:  
? None at this time HISTORY:  
History of Present Injury/Illness (Reason for Referral): 
80 male o2 dep copd(2 L), with pulm htn , bph, old cva with chronic left hemiplegia with regular diet. Presents with several day hx progressive dsypnea, cough became productive of white phlegm. Coughs sometimes after meals per spouse. No relief dyspnea with inhalers, and left leg more edematous red and painful. He has wbc 19k with low grade temp elevation, significant bronchospasm and abn airway sounds right base. He is admitted for sepsis cellulitis source, exac copd and suspect occult pneumonia vs aspiration pneumonitis. Spouse is present at bedside Past Medical History/Comorbidities:  
Mr. Cody Anthony  has a past medical history of Chronic obstructive pulmonary disease ([address] Dermatophytosis of nail, History of CVA (cerebrovascular accident), History of paraplegia, and Hypertension. Mr. Cody Anthony  has no past surgical history on file. Social History/Living Environment:  
Home Environment: Apartment # Steps to Enter: 0 One/Two Story Residence: One story Living Alone: No 
Support Systems: Spouse/Significant Other/Partner Patient Expects to be Discharged to[de-identified] Unknown Current DME Used/Available at Home: Wheelchair, power, Shower chair, Grab bars(davis walker) Tub or Shower Type: Tub/Shower combination Prior Level of Function/Work/Activity: He lives with spouse in a single story home and has history of 2 CVAs (one in 2005 and another in 2017) resulting in severe L UE and LE weakness. LUE is flaccid and LLE is grossly 3/5. He typically ambulates short distances with a davis walker or uses his electric wheelchair, to which he can typically transfer without assist. Wife helps with ADLs and patient has experienced at least 3 falls in the past 6 months. Number of Personal Factors/Comorbidities that affect the Plan of Care: 3+: HIGH COMPLEXITY EXAMINATION:  
Most Recent Physical Functioning:  
Gross Assessment: 
  
         
  
Posture: 
Posture (WDL): Exceptions to [facility] Posture Assessment: Forward head, Rounded shoulders Balance: 
Sitting: Intact Sitting - Static: Good (unsupported) Sitting - Dynamic: Good (unsupported) Standing: Impaired Standing - Static: Constant support; Fair 
Standing - Dynamic : Fair Bed Mobility: 
  
Wheelchair Mobility: 
  
Transfers: 
Sit to Stand: Contact guard assistance Stand to Sit: Contact guard assistance Gait: 
  
Base of Support: Narrowed; Center of gravity altered Speed/Jessica: Slow;Shuffled Gait Abnormalities: Decreased step clearance; Hemiplegic; Shuffling gait Distance (ft): 25 Feet (ft) Assistive Device: Walker davis Ambulation - Level of Assistance: Minimal assistance Interventions: Safety awareness training;Verbal cues Body Structures Involved: 1. Nerves 2. Lungs 3. Muscles Body Functions Affected: 1. Sensory/Pain 2. Respiratory 3. Neuromusculoskeletal 
4. Movement Related Activities and Participation Affected: 1. Mobility 2. Self Care 3. Domestic Life 4. Interpersonal Interactions and Relationships 5. Community, Social and [place] Number of elements that affect the Plan of Care: 4+: HIGH COMPLEXITY CLINICAL PRESENTATION:  
Presentation: Evolving clinical presentation with changing clinical characteristics: MODERATE COMPLEXITY CLINICAL DECISION MAKING:  
 [facility] Basic Mobility Inpatient Short Form How much difficulty does the patient currently have. .. Unable A Lot A Little None 1. Turning over in bed (including adjusting bedclothes, sheets and blankets)? [] 1   [] 2   [x] 3   [] 4  
2. Sitting down on and standing up from a chair with arms ( e.g., wheelchair, bedside commode, etc.)   [] 1   [x] 2   [] 3   [] 4  
3. Moving from lying on back to sitting on the side of the bed? [] 1   [x] 2   [] 3   [] 4 How much help from another person does the patient currently need. .. Total A Lot A Little None 4. Moving to and from a bed to a chair (including a wheelchair)? [] 1   [x] 2   [] 3   [] 4  
5. Need to walk in hospital room? [x] 1   [] 2   [] 3   [] 4  
6. Climbing 3-5 steps with a railing? [x] 1   [] 2   [] 3   [] 4  
© 2007, Trustees of [address] [place] [address], under license to [employer]. All rights reserved Score:  Initial: 11 Most Recent: X (Date: -- ) Interpretation of Tool:  Represents activities that are increasingly more difficult (i.e. Bed mobility, Transfers, Gait). Medical Necessity:    
· Patient demonstrates good rehab potential due to higher previous functional level. Reason for Services/Other Comments: 
· Patient continues to require modification of therapeutic interventions to increase complexity of exercises. Use of outcome tool(s) and clinical judgement create a POC that gives a: Questionable prediction of patient's progress: MODERATE COMPLEXITY  
  
 
 
 
TREATMENT:  
(In addition to Assessment/Re-Assessment sessions the following treatments were rendered) Pre-treatment Symptoms/Complaints:  none Pain: Initial:  
Pain Intensity 1: 0  Post Session:  none Therapeutic Activity: (    31 minutes): Therapeutic activities including Sit to Stand transfers, and Ambulation on level ground to improve mobility, strength and balance.   Required minimal Safety awareness training;Verbal cues to promote dynamic balance in standing. Braces/Orthotics/Lines/Etc:  
· O2 Room Air Treatment/Session Assessment:   
· Response to Treatment:  Tolerated treatment well today. He is close to baseline. · Interdisciplinary Collaboration:  
o Physical Therapy Assistant 
o Registered Nurse · After treatment position/precautions:  
o Bed/Chair-wheels locked 
o Bed in low position 
o Call light within reach 
o RN notified 
o Family at bedside 
o Patient sitting up on edge of bed. · Compliance with Program/Exercises: Will assess as treatment progresses · Recommendations/Intent for next treatment session: \"Next visit will focus on advancements to more challenging activities and reduction in assistance provided\". Total Treatment Duration: PT Patient Time In/Time Out Time In: 3973 Time Out: 1408 Sepideh Posey, PTA

## 2019-02-26 NOTE — PROGRESS NOTES
1. Patient will complete lower body bathing and dressing with min A and adaptive equipment as needed. 2. Patient will complete toileting with min A.  
3. Patient will tolerate 23 minutes of OT treatment with 2-3 rest breaks to increase activity tolerance for ADLs. 4. Patient will complete functional transfers with SBA and adaptive equipment as needed. Timeframe: 7 visits OCCUPATIONAL THERAPY: Initial Assessment and PM 2/26/2019INPATIENT:   
Payor: Chuy Cody / Plan: [place] [employer] / Product Type: Federal Funded Programs /  
  
NAME/AGE/GENDER: Cami Phipps is a 80 y.o. male PRIMARY DIAGNOSIS:  Cellulitis [L03.90] Pneumonia [J18.9] Cellulitis [L03.90] Acute exacerbation of chronic obstructive pulmonary disease (COPD) ([facility] [address] [J44.1] Cellulitis [L03.90] Acute exacerbation of chronic obstructive pulmonary disease (COPD) ([facility] [address] [J44.1] Sepsis ([facility] [address] Sepsis ([facility] [address] ICD-10: Treatment Diagnosis:  
 · Generalized Muscle Weakness (M62.81) Precautions/Allergies: 
   Latex; Adhesive; and Sulfa (sulfonamide antibiotics) ASSESSMENT:  
Mr. Cody Anthony presents for the above. Upon arrival, pt sitting edge of bed with wife at bedside. Pt alert, oriented x 4, and agreeable to OT evaluation. Pt has hx of CVAs with L-sided hemiplegia. Pt lives with wife in a ground-level apartment with 0 steps to enter. At baseline, pt requires assistance for all ADLs and ambulates short distances with use of davis-walker; pt has power w/c for community mobility distances. Today, pt required CGA for functional sit to stand transfers. RUE AROM and strength are generally decreased but WFL; LUE is non-functional and currently wears compression garments d/t hx of lymphedema s/p stroke 14 years ago. Pt left working with PTA and wife in room. At this time, pt is functioning below baseline for ADls and functional mobility. Pt would benefit from skilled OT services to address OT goals and plan of care. This section established at most recent assessment PROBLEM LIST (Impairments causing functional limitations): 1. Decreased Strength 2. Decreased ADL/Functional Activities 3. Decreased Transfer Abilities 4. Decreased Ambulation Ability/Technique 5. Decreased Balance 6. Decreased Activity Tolerance 7. Decreased Flexibility/Joint Mobility 8. Decreased [place] with Home Exercise Program 
 INTERVENTIONS PLANNED: (Benefits and precautions of occupational therapy have been discussed with the patient.) 1. Activities of daily living training 2. Adaptive equipment training 3. Balance training 4. Clothing management 5. Community reintergration 6. Donning&doffing training 7. Neuromuscular re-eduation 8. Re-evaluation 9. Therapeutic activity 10. Therapeutic exercise TREATMENT PLAN: Frequency/Duration: Follow patient 3x/week to address above goals. Rehabilitation Potential For Stated Goals: Fair RECOMMENDED REHABILITATION/EQUIPMENT: (at time of discharge pending progress): Due to the probability of continued deficits (see above) this patient will likely need continued skilled occupational therapy after discharge. Equipment: ? TBD  
    
 
 
 
OCCUPATIONAL PROFILE AND HISTORY:  
History of Present Injury/Illness (Reason for Referral): 
See H&P Past Medical History/Comorbidities:  
Mr. Kyle Duarte  has a past medical history of Chronic obstructive pulmonary disease ([facility] [address] Dermatophytosis of nail, History of CVA (cerebrovascular accident), History of paraplegia, and Hypertension. Mr. Kyle Duarte  has no past surgical history on file. Social History/Living Environment:  
Home Environment: Apartment # Steps to Enter: 0 One/Two Story Residence: One story Living Alone: No 
Support Systems: Spouse/Significant Other/Partner Patient Expects to be Discharged to[de-identified] Unknown Current DME Used/Available at Home: Commode, bedside, Grab bars, Hospital bed, Shower chair, Walker, Wheelchair, power Tub or Shower Type: Tub/Shower combination Prior Level of Function/Work/Activity: 
Assistance required for ADLs; uses davis-walker for short distances and power w/c for community mobility. Personal Factors:   
      Sex:  male Age:  80 y.o. Other factors that influence how disability is experienced by the patient:  Multiple co-morbidities Number of Personal Factors/Comorbidities that affect the Plan of Care: Brief history (0):  LOW COMPLEXITY ASSESSMENT OF OCCUPATIONAL PERFORMANCE[de-identified]  
Activities of Daily Living:  
Basic ADLs (From Assessment) Complex ADLs (From Assessment) Feeding: Minimum assistance Oral Facial Hygiene/Grooming: Minimum assistance Bathing: Maximum assistance Upper Body Dressing: Minimum assistance Lower Body Dressing: Maximum assistance Toileting: Maximum assistance Instrumental ADL Meal Preparation: Total assistance Homemaking: Total assistance Grooming/Bathing/Dressing Activities of Daily Living Cognitive Retraining Safety/Judgement: Awareness of environment; Fall prevention Bed/Mat Mobility Sit to Stand: Contact guard assistance Most Recent Physical Functioning:  
Gross Assessment: 
AROM: Grossly decreased, non-functional(LUE non=functional; RUE WFL) PROM: Generally decreased, functional 
Strength: Grossly decreased, non-functional(LUE non=functional; RUE WFL) Coordination: Grossly decreased, non-functional(LUE non=functional; RUE WFL) Posture: 
Posture (WDL): Exceptions to [facility] Posture Assessment: Forward head, Rounded shoulders Balance: 
Sitting: Intact Sitting - Static: Good (unsupported) Sitting - Dynamic: Good (unsupported) Standing: Impaired Standing - Static: Fair Standing - Dynamic : Fair Bed Mobility: 
  
Wheelchair Mobility: 
  
Transfers: 
Sit to Stand: Contact guard assistance Stand to Sit: Contact guard assistance Patient Vitals for the past 6 hrs: 
 BP SpO2 Pulse 02/26/19 1140 107/70 95 % 81 Mental Status Neurologic State: Alert Orientation Level: Oriented X4 Cognition: Appropriate decision making, Follows commands Perception: Appears intact Perseveration: No perseveration noted Safety/Judgement: Awareness of environment, Fall prevention Physical Skills Involved: 1. Range of Motion 2. Balance 3. Strength 4. Activity Tolerance 5. Fine Motor Control 6. Gross Motor Control Cognitive Skills Affected (resulting in the inability to perform in a timely and safe manner): 1. none Psychosocial Skills Affected: 1. Habits/Routines 2. Environmental Adaptation Number of elements that affect the Plan of Care: 5+:  HIGH COMPLEXITY CLINICAL DECISION MAKING:  
[facility] AM-PAC 6 Clicks Daily Activity Inpatient Short Form How much help from another person does the patient currently need. .. Total A Lot A Little None 1. Putting on and taking off regular lower body clothing? [] 1   [x] 2   [] 3   [] 4  
2. Bathing (including washing, rinsing, drying)? [] 1   [x] 2   [] 3   [] 4  
3. Toileting, which includes using toilet, bedpan or urinal?   [] 1   [x] 2   [] 3   [] 4  
4. Putting on and taking off regular upper body clothing? [] 1   [] 2   [x] 3   [] 4  
5. Taking care of personal grooming such as brushing teeth? [] 1   [] 2   [x] 3   [] 4  
6. Eating meals? [] 1   [] 2   [x] 3   [] 4  
© 2007, Trustees of [facility], under license to [employer] All rights reserved Score:  Initial: 15 Most Recent: X (Date: -- ) Interpretation of Tool:  Represents activities that are increasingly more difficult (i.e. Bed mobility, Transfers, Gait). Medical Necessity:    
· Patient is expected to demonstrate progress in strength, range of motion, balance, coordination and functional technique to decrease assistance required with ADLs and functional mobility. Annamary Ripa Reason for Services/Other Comments: · Patient continues to require skilled intervention due to medical complications and patient unable to attend/participate in therapy as expected. Use of outcome tool(s) and clinical judgement create a POC that gives a: LOW COMPLEXITY  
 
 
 
TREATMENT:  
(In addition to Assessment/Re-Assessment sessions the following treatments were rendered) Pre-treatment Symptoms/Complaints:  \"I am just ready to go home. \" 
Pain: Initial:  
Pain Intensity 1: 0/10 Post Session:  same Assessment/Reassessment only, no treatment provided today Braces/Orthotics/Lines/Etc:  
· O2 Device: Nasal cannula Treatment/Session Assessment:   
· Response to Treatment:  eval only. · Interdisciplinary Collaboration:  
o Physical Therapist 
o Occupational Therapist 
o Registered Nurse · After treatment position/precautions:  
o working with PTA · Compliance with Program/Exercises: Will assess as treatment progresses. · Recommendations/Intent for next treatment session: \"Next visit will focus on advancements to more challenging activities and reduction in assistance provided\". Total Treatment Duration: OT Patient Time In/Time Out Time In: 4412 Time Out: 1336 Robert Hodges OT

## 2019-02-26 NOTE — PROGRESS NOTES
SPEECH PATHOLOGY NOTE: 
Patient currently in MRI and adamantly refusing MBS. Discussed with patient benefits of further objective assessment of swallow and he is still refusing, reports he \"had that test done before\" and he \"knows what to do\" for his swallowing. He denies any need for further ST intervention. Will discontinue ST services per patient request and cancel MBS.  
Ysabel Umana MA, CCC-SLP

## 2019-02-26 NOTE — DISCHARGE SUMMARY
Discharge Summary     Patient: Camila Martinez MRN: [id number]  SSN: [id number]    YOB: 1950  Age: 80 y.o. Sex: male       Admit Date: 2/21/2019    Discharge Date: 2/26/2019      Admission Diagnoses: Cellulitis [L03.90]  Pneumonia [J18.9]  Cellulitis [L03.90]  Acute exacerbation of chronic obstructive pulmonary disease (COPD) (HCC) [J44.1]  Cellulitis [L03.90]  Acute exacerbation of chronic obstructive pulmonary disease (COPD) ([facility] [J44.1]    Discharge Diagnoses:   Problem List as of 2/26/2019 Date Reviewed: 9/5/2017          Codes Class Noted - Resolved    Cellulitis ICD-10-CM: L03.90  ICD-9-CM: 682.9  2/21/2019 - Present        Pneumonia ICD-10-CM: J18.9  ICD-9-CM: 486  2/21/2019 - Present        Acute exacerbation of chronic obstructive pulmonary disease (COPD) ([facility] ICD-10-CM: J44.1  ICD-9-CM: 491.21  2/21/2019 - Present        * (Principal) Sepsis ([facility] ICD-10-CM: A41.9  ICD-9-CM: 038.9, 995.91  2/21/2019 - Present        Emphysema lung ([facility] ICD-10-CM: J43.9  ICD-9-CM: 492.8  7/16/2018 - Present        Acute respiratory failure with hypoxia (HCC) ICD-10-CM: J96.01  ICD-9-CM: 518.81  7/15/2018 - Present        Acute respiratory failure with hypoxemia (HCC) ICD-10-CM: J96.01  ICD-9-CM: 518.81  7/14/2018 - Present        COPD exacerbation ([facility] ICD-10-CM: J44.1  ICD-9-CM: 491.21  7/14/2018 - Present        Essential hypertension ICD-10-CM: I10  ICD-9-CM: 401.9  7/14/2018 - Present        Hyperlipidemia ICD-10-CM: E78.5  ICD-9-CM: 272.4  7/14/2018 - Present        Left hemiplegia ([address] ICD-10-CM: G81.94  ICD-9-CM: 342.90  7/14/2018 - Present        CVA (cerebral vascular accident) [facility]) ICD-10-CM: I63.9  ICD-9-CM: 434.91  7/14/2018 - Present    Overview Signed 7/14/2018  3:17 PM by Suly Burrows MD     Multiple with left hemiplegia.              Depression ICD-10-CM: F32.9  ICD-9-CM: 260  7/14/2018 - Present        Seizure ([facility] ICD-10-CM: R56.9  ICD-9-CM: 780.39  7/14/2018 - Present Dependence on nicotine from cigarettes ICD-10-CM: F17.210  ICD-9-CM: 305.1  7/14/2018 - Present        BPH (benign prostatic hyperplasia) ICD-10-CM: N40.0  ICD-9-CM: 600.00  7/14/2018 - Present        GERD (gastroesophageal reflux disease) ICD-10-CM: K21.9  ICD-9-CM: 530.81  7/14/2018 - Present               Discharge Condition: [place] [facility]:   Mr Kyle Duarte is a 81 yo male with PMH of CVA with left hemiparesis, COPD on 2 L NC who presented to ER on  2/21 with worsening LLE  redness/pain, productive cough, and progressive weakness. Patient found to meet sepsis criteria and started on abx ( rocephin and zithromax ) and fluids for sepsis related to left leg cellulitis and possible COPD exac. On 02/23 Rocephin, Zithromax, Vanc switched to PO Clindamycin. There is a blister over the base of his second left toe, not opened. An MRI of the foot did not show OM. His cellulitis improved on antibiotics. No signs of fever, leukocytosis. Speech therapy evaluated him in view of cough spells. He refused modified swallow test.   The patient worked with PT/OT. He has chronic impairment and would benefit from this services at home. Plan to discharge and continue po clindamycin. Wound care encouraged. Physical exam:  General:          Well nourished. Alert. CV:                  RRR. No murmur, rub, or gallop. Lungs:             CTAB. No wheezing, rhonchi, or rales. Abdomen:        Soft, nontender, nondistended. Extremities:     Warm and dry. No cyanosis. LLE 1-2+ pitting edema. Base of his second left toe with blister, not opened, no discharge. Skin:                No rashes or jaundice.    Neuro:             Left hemiparesis (chronic)     Consults: speech    Significant Diagnostic Studies: see note     Disposition: home    Discharge Medications:   Current Discharge Medication List      START taking these medications    Details   clindamycin (CLEOCIN) 300 mg capsule Take 1 Cap by mouth every eight (8) hours for 4 days. Qty: 12 Cap, Refills: 0         CONTINUE these medications which have NOT CHANGED    Details   LORazepam (ATIVAN) 0.5 mg tablet Take 0.5 mg by mouth three (3) times daily as needed for Anxiety. melatonin 3 mg tablet Take 3 mg by mouth daily as needed (SLEEP). potassium chloride (K-DUR, KLOR-CON) 20 mEq tablet Take 20 mEq by mouth two (2) times a day. polyethylene glycol (MIRALAX) 17 gram/dose powder Take 17 g by mouth daily as needed. raNITIdine (ZANTAC) 150 mg tablet Take 150 mg by mouth two (2) times a day. albuterol (PROVENTIL HFA, VENTOLIN HFA, PROAIR HFA) 90 mcg/actuation inhaler Take 1 Puff by inhalation every four (4) hours as needed for Wheezing. Qty: 1 Inhaler, Refills: 12      acetaminophen (TYLENOL) 500 mg tablet Take  by mouth every six (6) hours as needed for Pain.      guaiFENesin (ORGANIDIN) 400 mg tablet Take 200 mg by mouth three (3) times daily. loratadine (CLARITIN) 10 mg tablet Take 5 mg by mouth nightly. b complex-vitamin c-folic acid 0.8 mg (NEPHRO-ALICIA) 0.8 mg tab tablet Take 1 Tab by mouth daily. phenytoin ER (DILANTIN ER) 100 mg ER capsule Take 300 mg by mouth two (2) times a day. rosuvastatin (CRESTOR) 40 mg tablet Take 40 mg by mouth nightly. senna (SENOKOT) 8.6 mg tablet Take 1 Tab by mouth daily. sertraline (ZOLOFT) 100 mg tablet Take  by mouth nightly. tamsulosin (FLOMAX) 0.4 mg capsule Take 0.4 mg by mouth daily. ALOE VERA/COLLAGEN (ALOE VESTA 2-N-1 CLEANSER EX) by Apply Externally route. aspirin delayed-release 81 mg tablet Take 81 mg by mouth daily. MINERAL OIL/PETROLATUM,WHITE (CLIVE MOISTURE BARRIER EX) by Apply Externally route. carvedilol (COREG) 12.5 mg tablet Take 6.25 mg by mouth two (2) times a day. cholecalciferol (VITAMIN D3) 1,000 unit cap Take 2,000 Units by mouth. clopidogrel (PLAVIX) 75 mg tab Take 75 mg by mouth.       docusate sodium 100 mg/5 mL enem Insert into rectum. fluticasone (FLOVENT DISKUS) 50 mcg/actuation inhaler by Nasal route. lisinopril (PRINIVIL, ZESTRIL) 40 mg tablet Take 40 mg by mouth daily. Omeprazole delayed release (PRILOSEC D/R) 20 mg tablet Take 20 mg by mouth daily. folic acid (FOLVITE) 1 mg tablet Take 1 mg by mouth. STOP taking these medications       methylPREDNISolone (MEDROL DOSEPACK) 4 mg tablet Comments:   Reason for Stopping:         cefpodoxime (VANTIN) 200 mg tablet Comments:   Reason for Stopping:               Activity: Activity as tolerated  Diet: Cardiac Diet  Wound Care: As directed    Follow-up Appointments   Procedures    FOLLOW UP VISIT Appointment in: One Week pcp     pcp     Standing Status:   Standing     Number of Occurrences:   1     Order Specific Question:   Appointment in     Answer:    One Week       Signed By: Rosa Reyes MD     February 26, 2019

## 2019-02-26 NOTE — PROGRESS NOTES
Problem: Falls - Risk of 
Goal: *Absence of Falls Document Hue Fearing Fall Risk and appropriate interventions in the flowsheet. Outcome: Progressing Towards Goal 
Fall Risk Interventions: 
Mobility Interventions: Bed/chair exit alarm Medication Interventions: Bed/chair exit alarm Elimination Interventions: Bed/chair exit alarm History of Falls Interventions: Bed/chair exit alarm Problem: Pressure Injury - Risk of 
Goal: *Prevention of pressure injury Document Joshua Scale and appropriate interventions in the flowsheet. Outcome: Progressing Towards Goal 
Pressure Injury Interventions: 
Sensory Interventions: Assess changes in LOC Moisture Interventions: Absorbent underpads Activity Interventions: Increase time out of bed, Pressure redistribution bed/mattress(bed type), PT/OT evaluation Mobility Interventions: HOB 30 degrees or less, Pressure redistribution bed/mattress (bed type), PT/OT evaluation Nutrition Interventions: Document food/fluid/supplement intake Friction and Shear Interventions: Apply protective barrier, creams and emollients

## 2019-02-27 ENCOUNTER — PATIENT OUTREACH (OUTPATIENT)
Dept: CASE MANAGEMENT | Age: 69
End: 2019-02-27

## 2019-02-27 NOTE — ED PROVIDER NOTES
The history is provided by the patient, the EMS personnel and the spouse. Fever This is a new problem. The current episode started 12 to 24 hours ago. The problem occurs constantly. Associated symptoms include cough. He has tried nothing for the symptoms. The treatment provided no relief. Past Medical History:  
Diagnosis Date  Chronic obstructive pulmonary disease ([address]  Dermatophytosis of nail  History of CVA (cerebrovascular accident)  History of paraplegia  Hypertension No past surgical history on file. No family history on file. Social History Socioeconomic History  Marital status:  Spouse name: Not on file  Number of children: Not on file  Years of education: Not on file  Highest education level: Not on file Social Needs  Financial resource strain: Not on file  Food insecurity - worry: Not on file  Food insecurity - inability: Not on file  Transportation needs - medical: Not on file  Transportation needs - non-medical: Not on file Occupational History  Not on file Tobacco Use  Smoking status: Never Smoker  Smokeless tobacco: Never Used Substance and Sexual Activity  Alcohol use: No  
 Drug use: Not on file  Sexual activity: Not on file Other Topics Concern  Not on file Social History Narrative  Not on file ALLERGIES: Latex; Adhesive; and Sulfa (sulfonamide antibiotics) Review of Systems Unable to perform ROS: Other Constitutional: Positive for fever. Respiratory: Positive for cough. Vitals:  
 02/25/19 2300 02/26/19 0156 02/26/19 0657 02/26/19 1140 BP: 149/83  129/75 107/70 Pulse: 90  81 81 Resp: 19 19 19 Temp: 97.8 °F (36.6 °C)  97.7 °F (36.5 °C) 96.1 °F (35.6 °C) SpO2: 96% 96% 93% 95% Weight:      
Height:      
      
 
Physical Exam  
Constitutional: No distress.   
HENT:  
Mouth/Throat: Oropharynx is clear and moist.  
 Eyes: Conjunctivae are normal. Pupils are equal, round, and reactive to light. Cardiovascular: Normal rate, regular rhythm and normal heart sounds. Pulmonary/Chest: Effort normal. He has rales (rll). Abdominal: Soft. He exhibits no distension. There is no tenderness. Musculoskeletal: Normal range of motion. He exhibits no deformity. Neurological: He is alert. Skin: Skin is warm and dry. LLE lower leg with anterior erytghema, warmth and tenderness Nursing note and vitals reviewed. MDM Number of Diagnoses or Management Options Cellulitis of left lower extremity:  
Chronic bronchitis, unspecified chronic bronchitis type [facility]):  
Community acquired pneumonia of right lower lobe of lung ([facility]):  
Sepsis, due to unspecified organism [facility]):  
Diagnosis management comments: Sepsis present, cellulitis and pneumonia clinically with increased O2 requirements. Admit to hospitalist service. Amount and/or Complexity of Data Reviewed Clinical lab tests: ordered and reviewed Tests in the radiology section of CPT®: ordered and reviewed Procedures

## 2019-02-27 NOTE — PROGRESS NOTES
This note will not be viewable in [address] Transition of Care Discharge Follow-up Questionnaire Date/Time of Call: 
 2/27/19 10:45am  
What was the patient hospitalized for? Sepsis Does the patient understand his/her diagnosis and/or treatment and what happened during the hospitalization? Yes, spoke with patient & his wife, Cyndi Kauffman, she states understanding of diagnosis and treatment; and is agreeable to call. Hope states patient is doing well Did the patient receive discharge instructions? Yes   
CM Assessed Risk for Readmission:  
 
 
Patient stated Risk for Readmission: Low r/t diagnosis and/or comorbidities 
 
 
none stated Review any discharge instructions (see discharge instructions/AVS in [facility]). Ask patient if they understand these. Do they have any questions? Reviewed, understanding is stated, no questions at this time Were home services ordered (nursing, PT, OT, ST, etc.)? Yes, [facility] If so, has the first visit occurred? If not, why? (Assist with coordination of services if necessary.) Yes Was any DME ordered? No 
Has needed dme at home If so, has it been received? If not, why?  (Assist patient in obtaining DME orders &/or equipment if necessary.) N/A Complete a review of all medications (new, continued and discontinued meds per the D/C instructions and medication tab in [facility]). Completed START taking: 
clindamycin 300 mg capsule (CLEOCIN) STOP taking: 
cefpodoxime 200 mg tablet (VANTIN) methylPREDNISolone 4 mg tablet (MEDROL DOSEPACK) Were all new prescriptions filled? If not, why?  (Assist patient in obtaining medications if necessary  escalate for CCM &/or SW if ongoing issues are verbalized by pt or anticipated) Yes Does the patient understand the purpose and dosing instructions for all medications? (If patient has questions, provide explanation and education.) Yes Does the patient have any problems in performing ADLs? (If patient is unable to perform ADLs  what is the limiting factor(s)? Do they have a support system that can assist? If no support system is present, discuss possible assistance that they may be able to obtain. Escalate for CCM/SW if ongoing issues are verbalized by pt or anticipated) Requires assistance with ADLs, [facility] provides aide 5d/week, live in handicap accessible home Does the patient have all follow-up appointments scheduled? 7 day f/up with PCP?  
(f/up with PCP may be w/in 14 days if patient has a f/up with their specialist w/in 7 days) 7-14 day f/up with specialist?  
(or per discharge instructions) If f/up has not been made  what actions has the care coordinator made to accomplish this? Has transportation been arranged? Yes Dr. Ju Loyola, [facility] states she has called to schedule Yes, no transportation needs were identified at this time. Any other questions or concerns expressed by the patient? No other needs or concerns identified. Hope states her gratitude for follow up. Contact information for [facility] was given, instructed to call with new questions or concerns. Schedule next appointment with EMILEE HDEZ Coordinator or refer to RN Case Manager/ per the workflow guidelines. When is care coordinators next follow-up call scheduled? If referred for CCM  what RN care manager was the referral assigned? Community Care Coordinator will follow per workflow guidelines Within 30 days STEPHAN Call Completed By: Lisbet Pardo LPN Community Care Coordinator

## 2019-03-01 ENCOUNTER — PATIENT OUTREACH (OUTPATIENT)
Dept: CASE MANAGEMENT | Age: 69
End: 2019-03-01

## 2019-03-01 NOTE — Clinical Note
Pt dc 2/26/19 . Lyn Lei Phone call today from wife and Natasha [place] visiting nurse patient needing wound care. I attempted to help. You can call Natasha [phone]. [facility] said she can help you coordinate if need be.

## 2019-03-01 NOTE — PROGRESS NOTES
This note will not be viewable in [address] Yara's wife, Bernardino Shafer, phoned requesting [facility] nursing for wound care. She staed [place] RN was out today and \"she said he needs wound care\". Hope requested I call [facility] and request nursing be added. I explained that order would have to come from Dr. Rudy Harrell, patient's pcp at the [place], Bernardino Shafer states, they just saw his nurse. I did call [facility] as requested and verified with [facility] in admissions, patient needs order from pcp, sent to them to start SN for wound care. Returned call to Bernardino Shafer and re-explained they need to call Dr. Rudy Harrell to be seen and obtain order for [facility] SN for wound care, Hope states her understanding. Will follow up as scheduled.

## 2019-03-01 NOTE — PROGRESS NOTES
Revised home health orders faxed to [facility] fax [phone] per their request, to include PT and RN (and wound eval and tx), and updated [facility] PT Ms. Dawson Hightower at [phone].

## 2019-03-01 NOTE — PROGRESS NOTES
This note will not be viewable in [address] RNCM received notification from Care Coordinator that pt's [address] is requesting wound care orders to evaluate and treat wounds on left calf and underneath his toes. RNCM called Amalia Roy [place] nurse at [phone] and confirmed [place] provider insists on obtaining order from [address] [facility] to request assistance w/ obtaining order to evaluate and treat for wound care be sent to [facility]. Amrit Sinha agrees to do so.

## 2019-03-01 NOTE — PROGRESS NOTES
This note will not be viewable in [address] Received phone call from Natasha, visiting nurse with [place]. She is concerned about an area on patients left LE that she feels needs wound care. Discussed with JANAY Pineda RN/Director Care Coordination and patient will be referred to RN CCM for assistance with referral for wound care and/or HH SN. Information  For RN CCM referral sent to Raymundo Archer RN CCM.

## 2019-03-29 ENCOUNTER — PATIENT OUTREACH (OUTPATIENT)
Dept: CASE MANAGEMENT | Age: 69
End: 2019-03-29

## 2019-03-29 NOTE — PROGRESS NOTES
This note will not be viewable in [address] RNCM spoke w/ pt's wife regarding coordination of wound care services, pt with [facility] ins. Wife confirms pt continues to receive wound care from [facility]. Wound is continuing to heal without signs of infection. Wife denies respiratory issues. Discussed s/s to report to physicians. Pt scheduled to see cardiologist 4/9/19 for echo. Coordination of care services completed, no issues at this time. Will graduate from [place] [id number]. Wife aware she may outreach to this [facility] for future needs.

## 2019-04-10 ENCOUNTER — APPOINTMENT (OUTPATIENT)
Dept: GENERAL RADIOLOGY | Age: 69
DRG: 872 | End: 2019-04-10
Attending: EMERGENCY MEDICINE
Payer: MEDICARE

## 2019-04-10 ENCOUNTER — HOSPITAL ENCOUNTER (INPATIENT)
Age: 69
LOS: 10 days | Discharge: HOME HEALTH | DRG: 872 | End: 2019-04-20
Attending: EMERGENCY MEDICINE | Admitting: FAMILY MEDICINE
Payer: MEDICARE

## 2019-04-10 DIAGNOSIS — L03.116 CELLULITIS OF LEFT LOWER EXTREMITY: Primary | ICD-10-CM

## 2019-04-10 PROBLEM — J44.9 COPD (CHRONIC OBSTRUCTIVE PULMONARY DISEASE) (HCC): Status: ACTIVE | Noted: 2019-04-10

## 2019-04-10 LAB
ALBUMIN SERPL-MCNC: 3.4 G/DL (ref 3.2–4.6)
ALBUMIN/GLOB SERPL: 1 {RATIO} (ref 1.2–3.5)
ALP SERPL-CCNC: 112 U/L (ref 50–136)
ALT SERPL-CCNC: 34 U/L (ref 12–65)
ANION GAP SERPL CALC-SCNC: 7 MMOL/L (ref 7–16)
AST SERPL-CCNC: 22 U/L (ref 15–37)
BASOPHILS # BLD: 0 K/UL (ref 0–0.2)
BASOPHILS NFR BLD: 0 % (ref 0–2)
BILIRUB SERPL-MCNC: 0.5 MG/DL (ref 0.2–1.1)
BUN SERPL-MCNC: 13 MG/DL (ref 8–23)
CALCIUM SERPL-MCNC: 8.9 MG/DL (ref 8.3–10.4)
CHLORIDE SERPL-SCNC: 107 MMOL/L (ref 98–107)
CO2 SERPL-SCNC: 27 MMOL/L (ref 21–32)
CREAT SERPL-MCNC: 0.85 MG/DL (ref 0.8–1.5)
DIFFERENTIAL METHOD BLD: ABNORMAL
EOSINOPHIL # BLD: 0 K/UL (ref 0–0.8)
EOSINOPHIL NFR BLD: 0 % (ref 0.5–7.8)
ERYTHROCYTE [DISTWIDTH] IN BLOOD BY AUTOMATED COUNT: 12.1 % (ref 11.9–14.6)
GLOBULIN SER CALC-MCNC: 3.4 G/DL (ref 2.3–3.5)
GLUCOSE SERPL-MCNC: 139 MG/DL (ref 65–100)
HCT VFR BLD AUTO: 41.8 % (ref 41.1–50.3)
HGB BLD-MCNC: 14.3 G/DL (ref 13.6–17.2)
IMM GRANULOCYTES # BLD AUTO: 0.1 K/UL (ref 0–0.5)
IMM GRANULOCYTES NFR BLD AUTO: 1 % (ref 0–5)
LACTATE BLD-SCNC: 2.92 MMOL/L (ref 0.5–1.9)
LYMPHOCYTES # BLD: 1.2 K/UL (ref 0.5–4.6)
LYMPHOCYTES NFR BLD: 7 % (ref 13–44)
MCH RBC QN AUTO: 32.1 PG (ref 26.1–32.9)
MCHC RBC AUTO-ENTMCNC: 34.2 G/DL (ref 31.4–35)
MCV RBC AUTO: 93.9 FL (ref 79.6–97.8)
MONOCYTES # BLD: 0.6 K/UL (ref 0.1–1.3)
MONOCYTES NFR BLD: 3 % (ref 4–12)
NEUTS SEG # BLD: 16.9 K/UL (ref 1.7–8.2)
NEUTS SEG NFR BLD: 89 % (ref 43–78)
NRBC # BLD: 0 K/UL (ref 0–0.2)
PLATELET # BLD AUTO: 178 K/UL (ref 150–450)
PMV BLD AUTO: 10.5 FL (ref 9.4–12.3)
POTASSIUM SERPL-SCNC: 4.1 MMOL/L (ref 3.5–5.1)
PROT SERPL-MCNC: 6.8 G/DL (ref 6.3–8.2)
RBC # BLD AUTO: 4.45 M/UL (ref 4.23–5.6)
SODIUM SERPL-SCNC: 141 MMOL/L (ref 136–145)
WBC # BLD AUTO: 18.9 K/UL (ref 4.3–11.1)

## 2019-04-10 PROCEDURE — 99284 EMERGENCY DEPT VISIT MOD MDM: CPT | Performed by: EMERGENCY MEDICINE

## 2019-04-10 PROCEDURE — 74011250637 HC RX REV CODE- 250/637: Performed by: FAMILY MEDICINE

## 2019-04-10 PROCEDURE — 77030027138 HC INCENT SPIROMETER -A

## 2019-04-10 PROCEDURE — 71045 X-RAY EXAM CHEST 1 VIEW: CPT

## 2019-04-10 PROCEDURE — 74011000250 HC RX REV CODE- 250: Performed by: FAMILY MEDICINE

## 2019-04-10 PROCEDURE — 74011250636 HC RX REV CODE- 250/636: Performed by: FAMILY MEDICINE

## 2019-04-10 PROCEDURE — 65270000029 HC RM PRIVATE

## 2019-04-10 PROCEDURE — 74011000258 HC RX REV CODE- 258: Performed by: EMERGENCY MEDICINE

## 2019-04-10 PROCEDURE — 85025 COMPLETE CBC W/AUTO DIFF WBC: CPT

## 2019-04-10 PROCEDURE — 96365 THER/PROPH/DIAG IV INF INIT: CPT | Performed by: EMERGENCY MEDICINE

## 2019-04-10 PROCEDURE — 74011250636 HC RX REV CODE- 250/636: Performed by: EMERGENCY MEDICINE

## 2019-04-10 PROCEDURE — 36415 COLL VENOUS BLD VENIPUNCTURE: CPT

## 2019-04-10 PROCEDURE — 80053 COMPREHEN METABOLIC PANEL: CPT

## 2019-04-10 PROCEDURE — 83605 ASSAY OF LACTIC ACID: CPT

## 2019-04-10 PROCEDURE — 87040 BLOOD CULTURE FOR BACTERIA: CPT

## 2019-04-10 PROCEDURE — 94640 AIRWAY INHALATION TREATMENT: CPT

## 2019-04-10 RX ORDER — ACETAMINOPHEN 325 MG/1
650 TABLET ORAL
Status: DISCONTINUED | OUTPATIENT
Start: 2019-04-10 | End: 2019-04-20 | Stop reason: HOSPADM

## 2019-04-10 RX ORDER — HYDROCODONE BITARTRATE AND ACETAMINOPHEN 5; 325 MG/1; MG/1
1 TABLET ORAL
Status: DISCONTINUED | OUTPATIENT
Start: 2019-04-10 | End: 2019-04-11

## 2019-04-10 RX ORDER — LORATADINE 10 MG/1
2.5 TABLET ORAL
Status: DISCONTINUED | OUTPATIENT
Start: 2019-04-10 | End: 2019-04-20 | Stop reason: HOSPADM

## 2019-04-10 RX ORDER — GUAIFENESIN 200 MG/1
200 TABLET ORAL 3 TIMES DAILY
Status: DISCONTINUED | OUTPATIENT
Start: 2019-04-10 | End: 2019-04-20 | Stop reason: HOSPADM

## 2019-04-10 RX ORDER — ONDANSETRON 2 MG/ML
4 INJECTION INTRAMUSCULAR; INTRAVENOUS
Status: DISCONTINUED | OUTPATIENT
Start: 2019-04-10 | End: 2019-04-20 | Stop reason: HOSPADM

## 2019-04-10 RX ORDER — TAMSULOSIN HYDROCHLORIDE 0.4 MG/1
0.4 CAPSULE ORAL DAILY
Status: DISCONTINUED | OUTPATIENT
Start: 2019-04-11 | End: 2019-04-20 | Stop reason: HOSPADM

## 2019-04-10 RX ORDER — SODIUM CHLORIDE 0.9 % (FLUSH) 0.9 %
5-40 SYRINGE (ML) INJECTION EVERY 8 HOURS
Status: DISCONTINUED | OUTPATIENT
Start: 2019-04-10 | End: 2019-04-20 | Stop reason: HOSPADM

## 2019-04-10 RX ORDER — CARVEDILOL 6.25 MG/1
6.25 TABLET ORAL 2 TIMES DAILY
Status: DISCONTINUED | OUTPATIENT
Start: 2019-04-10 | End: 2019-04-20 | Stop reason: HOSPADM

## 2019-04-10 RX ORDER — ASPIRIN 81 MG/1
81 TABLET ORAL DAILY
Status: DISCONTINUED | OUTPATIENT
Start: 2019-04-11 | End: 2019-04-20 | Stop reason: HOSPADM

## 2019-04-10 RX ORDER — BUDESONIDE 0.5 MG/2ML
500 INHALANT ORAL
Status: DISCONTINUED | OUTPATIENT
Start: 2019-04-11 | End: 2019-04-20 | Stop reason: HOSPADM

## 2019-04-10 RX ORDER — SERTRALINE HYDROCHLORIDE 100 MG/1
100 TABLET, FILM COATED ORAL
Status: DISCONTINUED | OUTPATIENT
Start: 2019-04-10 | End: 2019-04-20 | Stop reason: HOSPADM

## 2019-04-10 RX ORDER — MORPHINE SULFATE 2 MG/ML
1 INJECTION, SOLUTION INTRAMUSCULAR; INTRAVENOUS
Status: DISCONTINUED | OUTPATIENT
Start: 2019-04-10 | End: 2019-04-11

## 2019-04-10 RX ORDER — LISINOPRIL 20 MG/1
40 TABLET ORAL DAILY
Status: DISCONTINUED | OUTPATIENT
Start: 2019-04-11 | End: 2019-04-20 | Stop reason: HOSPADM

## 2019-04-10 RX ORDER — FOLIC ACID 1 MG/1
1 TABLET ORAL DAILY
Status: DISCONTINUED | OUTPATIENT
Start: 2019-04-11 | End: 2019-04-20 | Stop reason: HOSPADM

## 2019-04-10 RX ORDER — IPRATROPIUM BROMIDE AND ALBUTEROL SULFATE 2.5; .5 MG/3ML; MG/3ML
3 SOLUTION RESPIRATORY (INHALATION)
Status: COMPLETED | OUTPATIENT
Start: 2019-04-10 | End: 2019-04-10

## 2019-04-10 RX ORDER — POLYETHYLENE GLYCOL 3350 17 G/17G
17 POWDER, FOR SOLUTION ORAL
Status: DISCONTINUED | OUTPATIENT
Start: 2019-04-10 | End: 2019-04-20 | Stop reason: HOSPADM

## 2019-04-10 RX ORDER — SENNOSIDES 8.6 MG/1
1 TABLET ORAL DAILY
Status: DISCONTINUED | OUTPATIENT
Start: 2019-04-11 | End: 2019-04-20 | Stop reason: HOSPADM

## 2019-04-10 RX ORDER — ENOXAPARIN SODIUM 100 MG/ML
40 INJECTION SUBCUTANEOUS EVERY 24 HOURS
Status: DISCONTINUED | OUTPATIENT
Start: 2019-04-10 | End: 2019-04-20 | Stop reason: HOSPADM

## 2019-04-10 RX ORDER — SODIUM CHLORIDE 0.9 % (FLUSH) 0.9 %
5-40 SYRINGE (ML) INJECTION AS NEEDED
Status: DISCONTINUED | OUTPATIENT
Start: 2019-04-10 | End: 2019-04-20 | Stop reason: HOSPADM

## 2019-04-10 RX ORDER — ALBUTEROL SULFATE 90 UG/1
1 AEROSOL, METERED RESPIRATORY (INHALATION)
Status: DISCONTINUED | OUTPATIENT
Start: 2019-04-10 | End: 2019-04-10 | Stop reason: CLARIF

## 2019-04-10 RX ORDER — LORAZEPAM 0.5 MG/1
0.5 TABLET ORAL
Status: DISCONTINUED | OUTPATIENT
Start: 2019-04-10 | End: 2019-04-20 | Stop reason: HOSPADM

## 2019-04-10 RX ORDER — POTASSIUM CHLORIDE 20 MEQ/1
20 TABLET, EXTENDED RELEASE ORAL DAILY
Status: DISCONTINUED | OUTPATIENT
Start: 2019-04-11 | End: 2019-04-20 | Stop reason: HOSPADM

## 2019-04-10 RX ORDER — PHENYTOIN SODIUM 100 MG/1
300 CAPSULE, EXTENDED RELEASE ORAL 2 TIMES DAILY
Status: DISCONTINUED | OUTPATIENT
Start: 2019-04-10 | End: 2019-04-20 | Stop reason: HOSPADM

## 2019-04-10 RX ORDER — CLOPIDOGREL BISULFATE 75 MG/1
75 TABLET ORAL DAILY
Status: DISCONTINUED | OUTPATIENT
Start: 2019-04-11 | End: 2019-04-20 | Stop reason: HOSPADM

## 2019-04-10 RX ORDER — ALBUTEROL SULFATE 0.83 MG/ML
2.5 SOLUTION RESPIRATORY (INHALATION)
Status: DISCONTINUED | OUTPATIENT
Start: 2019-04-10 | End: 2019-04-11 | Stop reason: SDUPTHER

## 2019-04-10 RX ORDER — ROSUVASTATIN CALCIUM 20 MG/1
40 TABLET, COATED ORAL
Status: DISCONTINUED | OUTPATIENT
Start: 2019-04-10 | End: 2019-04-20 | Stop reason: HOSPADM

## 2019-04-10 RX ADMIN — GUAIFENESIN 200 MG: 200 TABLET ORAL at 22:49

## 2019-04-10 RX ADMIN — IPRATROPIUM BROMIDE AND ALBUTEROL SULFATE 3 ML: .5; 3 SOLUTION RESPIRATORY (INHALATION) at 20:19

## 2019-04-10 RX ADMIN — SERTRALINE HYDROCHLORIDE 100 MG: 100 TABLET ORAL at 22:14

## 2019-04-10 RX ADMIN — Medication 10 ML: at 22:19

## 2019-04-10 RX ADMIN — PHENYTOIN SODIUM 300 MG: 100 CAPSULE ORAL at 22:49

## 2019-04-10 RX ADMIN — LORATADINE 5 MG: 10 TABLET ORAL at 22:14

## 2019-04-10 RX ADMIN — SODIUM CHLORIDE 3 G: 900 INJECTION, SOLUTION INTRAVENOUS at 17:56

## 2019-04-10 RX ADMIN — CARVEDILOL 6.25 MG: 6.25 TABLET, FILM COATED ORAL at 22:14

## 2019-04-10 RX ADMIN — ENOXAPARIN SODIUM 40 MG: 40 INJECTION SUBCUTANEOUS at 22:17

## 2019-04-10 RX ADMIN — ROSUVASTATIN CALCIUM 40 MG: 20 TABLET, FILM COATED ORAL at 22:14

## 2019-04-10 RX ADMIN — HYDROCODONE BITARTRATE AND ACETAMINOPHEN 1 TABLET: 5; 325 TABLET ORAL at 22:49

## 2019-04-10 NOTE — ED PROVIDER NOTES
72-year-old male presenting for worsening pain swelling in the left lower extremity. He's had ongoing issues with cellulitis in his foot for quite some time. He has home health coming out to address it and frequent evaluations. They've noted over the past couple of days that the redness has begun to spread again it's become more painful he's had shaking chills at home. This is very similar to an episode of months ago where he was diagnosed with cellulitis and started on antibiotics. In addition, the patient has had a worsening cough and some increasing shortness of breath. He has known COPD and is supposed to be on oxygen but has not been using it. The history is provided by the patient and the spouse. Skin Infection This is a recurrent problem. The current episode started more than 2 days ago. The problem occurs constantly. The problem has been gradually worsening. Associated symptoms include shortness of breath. Pertinent negatives include no chest pain, no abdominal pain and no headaches. The symptoms are aggravated by walking and standing. The symptoms are relieved by rest. He has tried nothing for the symptoms. The treatment provided no relief. Past Medical History:  
Diagnosis Date  Chronic obstructive pulmonary disease ([facility]  Dermatophytosis of nail  History of CVA (cerebrovascular accident)  History of paraplegia  Hypertension No past surgical history on file. No family history on file. Social History Socioeconomic History  Marital status:  Spouse name: Not on file  Number of children: Not on file  Years of education: Not on file  Highest education level: Not on file Occupational History  Not on file Social Needs  Financial resource strain: Not on file  Food insecurity:  
  Worry: Not on file Inability: Not on file  Transportation needs:  
  Medical: Not on file Non-medical: Not on file Tobacco Use  
  Smoking status: Former Smoker Last attempt to quit: 2018 Years since quittin.7  Smokeless tobacco: Never Used Substance and Sexual Activity  Alcohol use: No  
 Drug use: Not on file  Sexual activity: Not on file Lifestyle  Physical activity:  
  Days per week: Not on file Minutes per session: Not on file  Stress: Not on file Relationships  Social connections:  
  Talks on phone: Not on file Gets together: Not on file Attends Sabianist service: Not on file Active member of club or organization: Not on file Attends meetings of clubs or organizations: Not on file Relationship status: Not on file  Intimate partner violence:  
  Fear of current or ex partner: Not on file Emotionally abused: Not on file Physically abused: Not on file Forced sexual activity: Not on file Other Topics Concern  Not on file Social History Narrative  Not on file ALLERGIES: Latex; Adhesive; and Sulfa (sulfonamide antibiotics) Review of Systems Respiratory: Positive for cough and shortness of breath. Cardiovascular: Negative for chest pain. Gastrointestinal: Negative for abdominal pain. Skin: Positive for color change, rash and wound. Neurological: Negative for headaches. All other systems reviewed and are negative. Vitals:  
 04/10/19 1600 BP: 121/74 Pulse: 100 Resp: 18 Temp: 100 °F (37.8 °C) SpO2: 92% Weight: 102.1 kg (225 lb) Height: 5' 11\" (1.803 m) Physical Exam  
Constitutional: He is oriented to person, place, and time. He appears well-developed and well-nourished. HENT:  
Head: Normocephalic and atraumatic. Eyes: Pupils are equal, round, and reactive to light. Conjunctivae and EOM are normal.  
Neck: Normal range of motion. Neck supple. Cardiovascular: Normal rate, regular rhythm, normal heart sounds and intact distal pulses.   
Pulmonary/Chest:  
 tachypneic at 25, productive sounding cough, diffuse wheezing Abdominal: Soft. Bowel sounds are normal.  
Musculoskeletal: Normal range of motion. He exhibits edema and tenderness. He exhibits no deformity. Tenderness and erythema over the anterior shin of the left lower extremity, small area on the right and edema of the surrounding leg. Neurological: He is alert and oriented to person, place, and time. No cranial nerve deficit. Left-sided facial droop, left-sided upper and lower extremity weakness Skin: Skin is warm and dry. Psychiatric: He has a normal mood and affect. His behavior is normal.  
Nursing note and vitals reviewed. MDM Number of Diagnoses or Management Options Cellulitis of left lower extremity:  
Diagnosis management comments: 59-year-old male presenting for worsening pain and swelling and erythema of the left lower extremity consistent with cellulitis Amount and/or Complexity of Data Reviewed Clinical lab tests: ordered and reviewed Tests in the radiology section of CPT®: ordered and reviewed Tests in the medicine section of CPT®: ordered and reviewed Discuss the patient with other providers: yes (Discussed with the hospitalist will assume care) Independent visualization of images, tracings, or specimens: yes (Review the chest x-ray) Risk of Complications, Morbidity, and/or Mortality Presenting problems: moderate Diagnostic procedures: moderate Management options: moderate General comments: I personally reviewed the patient's vital signs, laboratory tests, and/or radiological findings. I discussed these findings with the patient and their significance. I answered all questions and explained that given these findings there is significant concern for increased morbidity and/or mortality without immediate intervention.   As a result, I recommended admission to the hospital, consulted the appropriate service, and transitioned care to that service in improved condition Patient Progress Patient progress: stable Procedures

## 2019-04-10 NOTE — ED TRIAGE NOTES
EMS called to home for cellulitis for lower legs. Same issue treated with abx end of February. Paralyzed on left side for previous stroke. Was on clindamycin until beginning of March.

## 2019-04-10 NOTE — H&P
HOSPITALIST H&P/CONSULTNAME:  Trace Perry Age:  80 y.o. 
:   1950 MRN:   [id number] PCP: Alonso Arroyo MD 
Consulting MD: Treatment Team: Attending Provider: Mark Vickers MD; Primary Nurse: Raya Cummins RN 
HPI:  
Patient is a 61RNB with PMH significant for prior CVA with residual L sided hemiparesis who presents with pain and swelling of LLE. Reports that he has had recurrent problems with his L leg for quite a while. He reports that he does have [facility] services with wound care who have been following his leg. He was diagnosed with LLE cellulitis at the end of February/early March and completed a course of Clindamycin. Over the past few days, he has noted worsening erythema and pain. He reports subjective fevers/chills at home. Denies nausea/vomiting. Patient verbalizes wish to go home as soon as possible, but he is understanding about need for hospitalization and initiation of IV abx at this time. Complete ROS done and is as stated in HPI or otherwise negative Past Medical History:  
Diagnosis Date  Chronic obstructive pulmonary disease ([facility]  Dermatophytosis of nail  History of CVA (cerebrovascular accident)  History of paraplegia  Hypertension No past surgical history on file. Prior to Admission Medications Prescriptions Last Dose Informant Patient Reported? Taking? ALOE VERA/COLLAGEN (ALOE VESTA 2-N-1 CLEANSER EX)   Yes No  
Sig: by Apply Externally route. LORazepam (ATIVAN) 0.5 mg tablet   Yes No  
Sig: Take 0.5 mg by mouth three (3) times daily as needed for Anxiety. MINERAL OIL/PETROLATUM,WHITE (CLIVE MOISTURE BARRIER EX)   Yes No  
Sig: by Apply Externally route. Omeprazole delayed release (PRILOSEC D/R) 20 mg tablet   Yes No  
Sig: Take 20 mg by mouth daily. acetaminophen (TYLENOL) 500 mg tablet   Yes No  
Sig: Take  by mouth every six (6) hours as needed for Pain. acetaminophen-codeine (TYLENOL-CODEINE #3) 300-30 mg per tablet   Yes No  
Sig: Take 1 Tab by mouth every four (4) hours as needed for Pain. albuterol (PROVENTIL HFA, VENTOLIN HFA, PROAIR HFA) 90 mcg/actuation inhaler   No No  
Sig: Take 1 Puff by inhalation every four (4) hours as needed for Wheezing. aspirin delayed-release 81 mg tablet   Yes No  
Sig: Take 81 mg by mouth daily. b complex-vitamin c-folic acid 0.8 mg (NEPHRO-ALICIA) 0.8 mg tab tablet   Yes No  
Sig: Take 1 Tab by mouth daily. carvedilol (COREG) 12.5 mg tablet   Yes No  
Sig: Take 6.25 mg by mouth two (2) times a day. cholecalciferol (VITAMIN D3) 1,000 unit cap   Yes No  
Sig: Take 2,000 Units by mouth. ciprofloxacin HCl (CIPRO) 500 mg tablet   No No  
Sig: Take 1 Tab by mouth two (2) times a day. clopidogrel (PLAVIX) 75 mg tab   Yes No  
Sig: Take 75 mg by mouth. docusate sodium 100 mg/5 mL enem   Yes No  
Sig: Insert  into rectum. fluticasone (FLOVENT DISKUS) 50 mcg/actuation inhaler   Yes No  
Sig: by Nasal route. folic acid (FOLVITE) 1 mg tablet   Yes No  
Sig: Take 1 mg by mouth.  
guaiFENesin (ORGANIDIN) 400 mg tablet   Yes No  
Sig: Take 200 mg by mouth three (3) times daily. lisinopril (PRINIVIL, ZESTRIL) 40 mg tablet   Yes No  
Sig: Take 40 mg by mouth daily. loratadine (CLARITIN) 10 mg tablet   Yes No  
Sig: Take 2.5 mg by mouth nightly. melatonin 3 mg tablet   Yes No  
Sig: Take 3 mg by mouth daily as needed (SLEEP). phenytoin ER (DILANTIN ER) 100 mg ER capsule   Yes No  
Sig: Take 300 mg by mouth two (2) times a day. polyethylene glycol (MIRALAX) 17 gram/dose powder   Yes No  
Sig: Take 17 g by mouth daily as needed. potassium chloride (K-DUR, KLOR-CON) 20 mEq tablet   Yes No  
Sig: Take 20 mEq by mouth daily. raNITIdine (ZANTAC) 150 mg tablet   Yes No  
Sig: Take 150 mg by mouth two (2) times a day. rosuvastatin (CRESTOR) 40 mg tablet   Yes No  
Sig: Take 40 mg by mouth nightly. senna (SENOKOT) 8.6 mg tablet   Yes No  
Sig: Take 1 Tab by mouth daily. sertraline (ZOLOFT) 100 mg tablet   Yes No  
Sig: Take  by mouth nightly. tamsulosin (FLOMAX) 0.4 mg capsule   Yes No  
Sig: Take 0.4 mg by mouth daily. Facility-Administered Medications: None Allergies Allergen Reactions  Latex Rash  Adhesive Unknown (comments)  Sulfa (Sulfonamide Antibiotics) Other (comments) Social History Tobacco Use  Smoking status: Former Smoker Last attempt to quit: 2018 Years since quittin.7  Smokeless tobacco: Never Used Substance Use Topics  Alcohol use: No  
  
No family history on file. Objective:  
 
Visit Vitals /74 (BP 1 Location: Right arm, BP Patient Position: At rest) Pulse 100 Temp 100 °F (37.8 °C) Resp 18 Ht 5' 11\" (1.803 m) Wt 102.1 kg (225 lb) SpO2 92% Comment: pt placed on 2 L NC  
BMI 31.38 kg/m² Temp (24hrs), Av °F (37.8 °C), Min:100 °F (37.8 °C), Max:100 °F (37.8 °C) Oxygen Therapy O2 Sat (%): 92 %(pt placed on 2 L NC) (04/10/19 1600) O2 Device: Room air (04/10/19 1600) Physical Exam: 
General:    Alert, cooperative, no distress, appears stated age. Head:   Normocephalic, without obvious abnormality, atraumatic. Nose:  Nares normal. No drainage or sinus tenderness. Lungs:   Diminished. No Wheezing or Rhonchi. No rales. Heart:   Regular rate and rhythm,  no murmur, rub or gallop. Abdomen:   Soft, non-tender. Not distended. Bowel sounds normal.  
Extremities: LLE is warm and erythematous with 2+ edema. RLE shows an anterior patch (3x4 inches) on shin that appears erythematous as well Skin:     Texture, turgor normal. No rashes or lesions. Not Jaundiced Neurologic: Alert and oriented x 3, chronic L sided hemiplegia, mild slurred speech (baseline) Data Review:  
Recent Results (from the past 24 hour(s)) POC LACTIC ACID Collection Time: 04/10/19  4:14 PM  
Result Value Ref Range Lactic Acid (POC) 2.92 (H) 0.5 - 1.9 mmol/L  
CBC WITH AUTOMATED DIFF Collection Time: 04/10/19  5:50 PM  
Result Value Ref Range WBC 18.9 (H) 4.3 - 11.1 K/uL  
 RBC 4.45 4.23 - 5.6 M/uL  
 HGB 14.3 13.6 - 17.2 g/dL HCT 41.8 41.1 - 50.3 % MCV 93.9 79.6 - 97.8 FL  
 MCH 32.1 26.1 - 32.9 PG  
 MCHC 34.2 31.4 - 35.0 g/dL  
 RDW 12.1 11.9 - 14.6 % PLATELET 478 671 - 443 K/uL MPV 10.5 9.4 - 12.3 FL ABSOLUTE NRBC 0.00 0.0 - 0.2 K/uL  
 DF AUTOMATED NEUTROPHILS 89 (H) 43 - 78 % LYMPHOCYTES 7 (L) 13 - 44 % MONOCYTES 3 (L) 4.0 - 12.0 % EOSINOPHILS 0 (L) 0.5 - 7.8 % BASOPHILS 0 0.0 - 2.0 % IMMATURE GRANULOCYTES 1 0.0 - 5.0 %  
 ABS. NEUTROPHILS 16.9 (H) 1.7 - 8.2 K/UL  
 ABS. LYMPHOCYTES 1.2 0.5 - 4.6 K/UL  
 ABS. MONOCYTES 0.6 0.1 - 1.3 K/UL  
 ABS. EOSINOPHILS 0.0 0.0 - 0.8 K/UL  
 ABS. BASOPHILS 0.0 0.0 - 0.2 K/UL  
 ABS. IMM. GRANS. 0.1 0.0 - 0.5 K/UL METABOLIC PANEL, COMPREHENSIVE Collection Time: 04/10/19  5:50 PM  
Result Value Ref Range Sodium 141 136 - 145 mmol/L Potassium 4.1 3.5 - 5.1 mmol/L Chloride 107 98 - 107 mmol/L  
 CO2 27 21 - 32 mmol/L Anion gap 7 7 - 16 mmol/L Glucose 139 (H) 65 - 100 mg/dL BUN 13 8 - 23 MG/DL Creatinine 0.85 0.8 - 1.5 MG/DL  
 GFR est AA >60 >60 ml/min/1.73m2 GFR est non-AA >60 >60 ml/min/1.73m2 Calcium 8.9 8.3 - 10.4 MG/DL Bilirubin, total 0.5 0.2 - 1.1 MG/DL  
 ALT (SGPT) 34 12 - 65 U/L  
 AST (SGOT) 22 15 - 37 U/L Alk. phosphatase 112 50 - 136 U/L Protein, total 6.8 6.3 - 8.2 g/dL Albumin 3.4 3.2 - 4.6 g/dL Globulin 3.4 2.3 - 3.5 g/dL A-G Ratio 1.0 (L) 1.2 - 3.5 Imaging [place] Eri Marques Precise Assessment and Plan: Active Hospital Problems Diagnosis Date Noted  COPD (chronic obstructive pulmonary disease) ([facility] 04/10/2019  Cellulitis 02/21/2019  Essential hypertension 07/14/2018  Left hemiplegia ([facility] 07/14/2018 PLAN 
LLE Cellulitis - Continue Unasyn as started in ER 
- Consult with wound care - Tylenol prn fevers HTN 
- Home meds H/O CVA with L hemiplegia 
- Stable COPD 
- Not currently in exacerbation, but is diminished - Duoneb treatment ordered by me at bedside in ER 
- Home O2, but pt reports not using it at times - Continue maintenance meds Anticipated discharge: 2-3 days, pending clinical course Signed By: Elodia Sevilla MD   
 April 10, 2019

## 2019-04-11 ENCOUNTER — APPOINTMENT (OUTPATIENT)
Dept: GENERAL RADIOLOGY | Age: 69
DRG: 872 | End: 2019-04-11
Attending: INTERNAL MEDICINE
Payer: MEDICARE

## 2019-04-11 ENCOUNTER — APPOINTMENT (OUTPATIENT)
Dept: ULTRASOUND IMAGING | Age: 69
DRG: 872 | End: 2019-04-11
Attending: PHYSICIAN ASSISTANT
Payer: MEDICARE

## 2019-04-11 LAB
ANION GAP SERPL CALC-SCNC: 8 MMOL/L (ref 7–16)
BASOPHILS # BLD: 0 K/UL (ref 0–0.2)
BASOPHILS NFR BLD: 0 % (ref 0–2)
BUN SERPL-MCNC: 13 MG/DL (ref 8–23)
CALCIUM SERPL-MCNC: 8.7 MG/DL (ref 8.3–10.4)
CHLORIDE SERPL-SCNC: 106 MMOL/L (ref 98–107)
CO2 SERPL-SCNC: 27 MMOL/L (ref 21–32)
CREAT SERPL-MCNC: 0.78 MG/DL (ref 0.8–1.5)
DIFFERENTIAL METHOD BLD: ABNORMAL
EOSINOPHIL # BLD: 0 K/UL (ref 0–0.8)
EOSINOPHIL NFR BLD: 0 % (ref 0.5–7.8)
ERYTHROCYTE [DISTWIDTH] IN BLOOD BY AUTOMATED COUNT: 12.1 % (ref 11.9–14.6)
GLUCOSE BLD STRIP.AUTO-MCNC: 144 MG/DL (ref 65–100)
GLUCOSE SERPL-MCNC: 126 MG/DL (ref 65–100)
HCT VFR BLD AUTO: 40 % (ref 41.1–50.3)
HGB BLD-MCNC: 13.4 G/DL (ref 13.6–17.2)
IMM GRANULOCYTES # BLD AUTO: 0.1 K/UL (ref 0–0.5)
IMM GRANULOCYTES NFR BLD AUTO: 1 % (ref 0–5)
LYMPHOCYTES # BLD: 1.5 K/UL (ref 0.5–4.6)
LYMPHOCYTES NFR BLD: 9 % (ref 13–44)
MCH RBC QN AUTO: 32.1 PG (ref 26.1–32.9)
MCHC RBC AUTO-ENTMCNC: 33.5 G/DL (ref 31.4–35)
MCV RBC AUTO: 95.7 FL (ref 79.6–97.8)
MONOCYTES # BLD: 0.7 K/UL (ref 0.1–1.3)
MONOCYTES NFR BLD: 4 % (ref 4–12)
NEUTS SEG # BLD: 14 K/UL (ref 1.7–8.2)
NEUTS SEG NFR BLD: 86 % (ref 43–78)
NRBC # BLD: 0 K/UL (ref 0–0.2)
PLATELET # BLD AUTO: 161 K/UL (ref 150–450)
PMV BLD AUTO: 10.7 FL (ref 9.4–12.3)
POTASSIUM SERPL-SCNC: 3.6 MMOL/L (ref 3.5–5.1)
RBC # BLD AUTO: 4.18 M/UL (ref 4.23–5.6)
SODIUM SERPL-SCNC: 141 MMOL/L (ref 136–145)
WBC # BLD AUTO: 16.3 K/UL (ref 4.3–11.1)

## 2019-04-11 PROCEDURE — 80048 BASIC METABOLIC PNL TOTAL CA: CPT

## 2019-04-11 PROCEDURE — 74011000250 HC RX REV CODE- 250: Performed by: FAMILY MEDICINE

## 2019-04-11 PROCEDURE — 77030019605

## 2019-04-11 PROCEDURE — 74011000258 HC RX REV CODE- 258: Performed by: INTERNAL MEDICINE

## 2019-04-11 PROCEDURE — 74011250636 HC RX REV CODE- 250/636: Performed by: INTERNAL MEDICINE

## 2019-04-11 PROCEDURE — 74011250636 HC RX REV CODE- 250/636: Performed by: FAMILY MEDICINE

## 2019-04-11 PROCEDURE — 94760 N-INVAS EAR/PLS OXIMETRY 1: CPT

## 2019-04-11 PROCEDURE — 74011250637 HC RX REV CODE- 250/637: Performed by: FAMILY MEDICINE

## 2019-04-11 PROCEDURE — 94640 AIRWAY INHALATION TREATMENT: CPT

## 2019-04-11 PROCEDURE — 73590 X-RAY EXAM OF LOWER LEG: CPT

## 2019-04-11 PROCEDURE — 93970 EXTREMITY STUDY: CPT

## 2019-04-11 PROCEDURE — 82962 GLUCOSE BLOOD TEST: CPT

## 2019-04-11 PROCEDURE — 74011250637 HC RX REV CODE- 250/637: Performed by: INTERNAL MEDICINE

## 2019-04-11 PROCEDURE — 93922 UPR/L XTREMITY ART 2 LEVELS: CPT

## 2019-04-11 PROCEDURE — 65270000029 HC RM PRIVATE

## 2019-04-11 PROCEDURE — 74011000258 HC RX REV CODE- 258: Performed by: FAMILY MEDICINE

## 2019-04-11 PROCEDURE — 73630 X-RAY EXAM OF FOOT: CPT

## 2019-04-11 PROCEDURE — 36415 COLL VENOUS BLD VENIPUNCTURE: CPT

## 2019-04-11 PROCEDURE — 77010033678 HC OXYGEN DAILY

## 2019-04-11 PROCEDURE — 85025 COMPLETE CBC W/AUTO DIFF WBC: CPT

## 2019-04-11 RX ORDER — ALBUTEROL SULFATE 0.83 MG/ML
2.5 SOLUTION RESPIRATORY (INHALATION)
Status: DISCONTINUED | OUTPATIENT
Start: 2019-04-11 | End: 2019-04-13 | Stop reason: SDUPTHER

## 2019-04-11 RX ORDER — HYDROCODONE BITARTRATE AND ACETAMINOPHEN 5; 325 MG/1; MG/1
2 TABLET ORAL
Status: DISCONTINUED | OUTPATIENT
Start: 2019-04-11 | End: 2019-04-20 | Stop reason: HOSPADM

## 2019-04-11 RX ORDER — ALBUTEROL SULFATE 0.83 MG/ML
2.5 SOLUTION RESPIRATORY (INHALATION)
Status: DISCONTINUED | OUTPATIENT
Start: 2019-04-11 | End: 2019-04-13

## 2019-04-11 RX ORDER — ALBUTEROL SULFATE 0.83 MG/ML
2.5 SOLUTION RESPIRATORY (INHALATION) 4 TIMES DAILY
Status: DISCONTINUED | OUTPATIENT
Start: 2019-04-11 | End: 2019-04-11 | Stop reason: SDUPTHER

## 2019-04-11 RX ORDER — VANCOMYCIN/0.9 % SOD CHLORIDE 1.5G/250ML
1500 PLASTIC BAG, INJECTION (ML) INTRAVENOUS EVERY 12 HOURS
Status: DISCONTINUED | OUTPATIENT
Start: 2019-04-12 | End: 2019-04-13

## 2019-04-11 RX ORDER — ALBUTEROL SULFATE 0.83 MG/ML
2.5 SOLUTION RESPIRATORY (INHALATION)
Status: DISCONTINUED | OUTPATIENT
Start: 2019-04-11 | End: 2019-04-11

## 2019-04-11 RX ORDER — PETROLATUM 42 G/100G
OINTMENT TOPICAL DAILY
Status: DISCONTINUED | OUTPATIENT
Start: 2019-04-11 | End: 2019-04-20 | Stop reason: HOSPADM

## 2019-04-11 RX ORDER — FUROSEMIDE 20 MG/1
20 TABLET ORAL
COMMUNITY

## 2019-04-11 RX ORDER — MORPHINE SULFATE 2 MG/ML
2 INJECTION, SOLUTION INTRAMUSCULAR; INTRAVENOUS
Status: DISCONTINUED | OUTPATIENT
Start: 2019-04-11 | End: 2019-04-17 | Stop reason: SDUPTHER

## 2019-04-11 RX ADMIN — ENOXAPARIN SODIUM 40 MG: 40 INJECTION SUBCUTANEOUS at 22:48

## 2019-04-11 RX ADMIN — ALBUTEROL SULFATE 2.5 MG: 2.5 SOLUTION RESPIRATORY (INHALATION) at 15:51

## 2019-04-11 RX ADMIN — ALBUTEROL SULFATE 2.5 MG: 2.5 SOLUTION RESPIRATORY (INHALATION) at 21:34

## 2019-04-11 RX ADMIN — Medication 10 ML: at 14:30

## 2019-04-11 RX ADMIN — SENNOSIDES 8.6 MG: 8.6 TABLET, FILM COATED ORAL at 08:51

## 2019-04-11 RX ADMIN — ALBUTEROL SULFATE 2.5 MG: 2.5 SOLUTION RESPIRATORY (INHALATION) at 04:42

## 2019-04-11 RX ADMIN — BUDESONIDE 500 MCG: 0.5 INHALANT RESPIRATORY (INHALATION) at 21:34

## 2019-04-11 RX ADMIN — MORPHINE SULFATE 1 MG: 2 INJECTION, SOLUTION INTRAMUSCULAR; INTRAVENOUS at 08:50

## 2019-04-11 RX ADMIN — GUAIFENESIN 200 MG: 200 TABLET ORAL at 08:55

## 2019-04-11 RX ADMIN — CLOPIDOGREL BISULFATE 75 MG: 75 TABLET, FILM COATED ORAL at 08:52

## 2019-04-11 RX ADMIN — PHENYTOIN SODIUM 300 MG: 100 CAPSULE ORAL at 18:30

## 2019-04-11 RX ADMIN — HYDROCODONE BITARTRATE AND ACETAMINOPHEN 1 TABLET: 5; 325 TABLET ORAL at 06:05

## 2019-04-11 RX ADMIN — HYDROCODONE BITARTRATE AND ACETAMINOPHEN 2 TABLET: 5; 325 TABLET ORAL at 10:45

## 2019-04-11 RX ADMIN — HYDROCODONE BITARTRATE AND ACETAMINOPHEN 2 TABLET: 5; 325 TABLET ORAL at 15:35

## 2019-04-11 RX ADMIN — SODIUM CHLORIDE 1.5 G: 900 INJECTION, SOLUTION INTRAVENOUS at 01:03

## 2019-04-11 RX ADMIN — Medication 10 ML: at 06:07

## 2019-04-11 RX ADMIN — GUAIFENESIN 200 MG: 200 TABLET ORAL at 15:34

## 2019-04-11 RX ADMIN — ASPIRIN 81 MG: 81 TABLET, COATED ORAL at 08:59

## 2019-04-11 RX ADMIN — POTASSIUM CHLORIDE 20 MEQ: 20 TABLET, EXTENDED RELEASE ORAL at 08:58

## 2019-04-11 RX ADMIN — ROSUVASTATIN CALCIUM 40 MG: 20 TABLET, FILM COATED ORAL at 22:48

## 2019-04-11 RX ADMIN — ALBUTEROL SULFATE 2.5 MG: 2.5 SOLUTION RESPIRATORY (INHALATION) at 11:19

## 2019-04-11 RX ADMIN — SERTRALINE HYDROCHLORIDE 100 MG: 100 TABLET ORAL at 22:48

## 2019-04-11 RX ADMIN — ALBUTEROL SULFATE 2.5 MG: 2.5 SOLUTION RESPIRATORY (INHALATION) at 00:32

## 2019-04-11 RX ADMIN — Medication 10 ML: at 22:49

## 2019-04-11 RX ADMIN — TAMSULOSIN HYDROCHLORIDE 0.4 MG: 0.4 CAPSULE ORAL at 08:59

## 2019-04-11 RX ADMIN — CARVEDILOL 6.25 MG: 6.25 TABLET, FILM COATED ORAL at 09:02

## 2019-04-11 RX ADMIN — SODIUM CHLORIDE 1.5 G: 900 INJECTION, SOLUTION INTRAVENOUS at 06:06

## 2019-04-11 RX ADMIN — LORAZEPAM 0.5 MG: 0.5 TABLET ORAL at 01:54

## 2019-04-11 RX ADMIN — BUDESONIDE 500 MCG: 0.5 INHALANT RESPIRATORY (INHALATION) at 08:31

## 2019-04-11 RX ADMIN — GUAIFENESIN 200 MG: 200 TABLET ORAL at 22:48

## 2019-04-11 RX ADMIN — PIPERACILLIN AND TAZOBACTAM 3.38 G: 3; .375 INJECTION, POWDER, FOR SOLUTION INTRAVENOUS at 16:07

## 2019-04-11 RX ADMIN — VANCOMYCIN HYDROCHLORIDE 2500 MG: 10 INJECTION, POWDER, LYOPHILIZED, FOR SOLUTION INTRAVENOUS at 13:26

## 2019-04-11 RX ADMIN — LORATADINE 5 MG: 10 TABLET ORAL at 22:48

## 2019-04-11 RX ADMIN — ALBUTEROL SULFATE 2.5 MG: 2.5 SOLUTION RESPIRATORY (INHALATION) at 08:31

## 2019-04-11 RX ADMIN — MORPHINE SULFATE 2 MG: 2 INJECTION, SOLUTION INTRAMUSCULAR; INTRAVENOUS at 18:26

## 2019-04-11 RX ADMIN — PHENYTOIN SODIUM 300 MG: 100 CAPSULE ORAL at 09:00

## 2019-04-11 RX ADMIN — PETROLATUM: 42 OINTMENT TOPICAL at 16:07

## 2019-04-11 NOTE — PROGRESS NOTES
04/10/19 2100 Dual Skin Pressure Injury Assessment Dual Skin Pressure Injury Assessment X Second Care Provider (Based on [address] [place] [address]) Will Palafox RN Skin Integumentary Skin Integumentary (WDL) X (Sacral area intact, reddened (allevyn applied). ) Skin Color Red 
(LLE. ) Skin Condition/Temp Dry;Flaky;Fragile;Hot; Warm 
(LLE hot. ) Skin Integrity Intact (Thick, discolored toenails. ) Turgor Non-tenting Hair Growth Present Varicosities Absent Pressure  Injury Documentation No Pressure Injury Noted-Pressure Ulcer Prevention Initiated Wound Prevention and Protection Methods Orientation of Wound Prevention Posterior Location of Wound Prevention Sacrum/Coccyx Wound Offloading (Prevention Methods) Bed, pressure reduction mattress;Pillows;Repositioning

## 2019-04-11 NOTE — PROGRESS NOTES
Pharmacokinetic Consult to Pharmacist 
 
Meggan Alvarez is a 80 y.o. male being treated for skin and soft tissue infection with vancomycin. Height: 5' 11\" (180.3 cm)  Weight: 102.1 kg (225 lb) Lab Results Component Value Date/Time BUN 13 04/11/2019 04:19 AM  
 Creatinine 0.78 (L) 04/11/2019 04:19 AM  
 WBC 16.3 (H) 04/11/2019 04:19 AM  
 Lactic Acid (POC) 2.92 (H) 04/10/2019 04:14 PM  
  
Estimated Creatinine Clearance: 111.8 mL/min (A) (based on SCr of 0.78 mg/dL (L)). CULTURES: 
All Micro Results Procedure Component Value Units Date/Time CULTURE, BLOOD [[id number]] Collected:  04/10/19 1750 Order Status:  Completed Specimen:  Blood Updated:  04/11/19 8278 Special Requests: --     
  LEFT 
JUGULAR VEIN Culture result: NO GROWTH AFTER 10 HOURS     
 CULTURE, BLOOD [[id number]] Collected:  04/10/19 2015 Order Status:  Completed Specimen:  Blood Updated:  04/11/19 8301 Special Requests: --     
  RIGHT Antecubital 
  
  Culture result: NO GROWTH AFTER 9 HOURS Day 1 of vancomycin. Goal trough is 10-20. Will order loading dose of 2500 mg x 1 to be given now and schedule 1500 mg to be given at midnight tonight. Will continue to follow patient. Thank you, 
Grant Murphy. Jude Fair, PharmD Pharmacist  
[phone]

## 2019-04-11 NOTE — PROGRESS NOTES
Hospitalist Progress Note 2019 Admit Date: 4/10/2019  4:41 PM  
NAME: Zaira Aflonso :  1950 DOS:              19 MRN:  [id number] Attending: Guicho Fischer MD 
PCP:  Nury Peralta MD 
Treatment Team: Attending Provider: Leroy Mcwilliams MD; Utilization Review: Laila Reyes RN; Care Manager: Domenica Gillis RN Full Code SUBJECTIVE: As previously documented: 73yoM with PMH significant for prior CVA with residual L sided hemiparesis, COPD on home O2 2L NC who presents with pain and swelling of LLE. Patient admitted for sepsis secondary to cellulitis of LLE and started on IV abxs. Patient was recently discharged on 2019 for cellulitis of LLE, MRI was neg for OM. 19    Zaira Alfonso stated still having pain on LLE with some improvement with morphine. Patient reported his breathing is around his normal. Denies SOB. 10+ ROS reviewed and negative except for positive in HPI. Allergies Allergen Reactions  Latex Rash  Adhesive Unknown (comments)  Sulfa (Sulfonamide Antibiotics) Other (comments) Current Facility-Administered Medications Medication Dose Route Frequency  albuterol (PROVENTIL VENTOLIN) nebulizer solution 2.5 mg  2.5 mg Nebulization QID  albuterol (PROVENTIL VENTOLIN) nebulizer solution 2.5 mg  2.5 mg Nebulization Q6H PRN  
 HYDROcodone-acetaminophen (NORCO) 5-325 mg per tablet 2 Tab  2 Tab Oral Q4H PRN  
 morphine injection 2 mg  2 mg IntraVENous Q4H PRN  mineral oil-hydrophil petrolat (AQUAPHOR) ointment   Topical DAILY  piperacillin-tazobactam (ZOSYN) 3.375 g in 0.9% sodium chloride (MBP/ADV) 100 mL  3.375 g IntraVENous Q8H  
 aspirin delayed-release tablet 81 mg  81 mg Oral DAILY  carvedilol (COREG) tablet 6.25 mg  6.25 mg Oral BID  clopidogrel (PLAVIX) tablet 75 mg  75 mg Oral DAILY  budesonide (PULMICORT) 500 mcg/2 ml nebulizer suspension  500 mcg Nebulization BID RT  
  folic acid (FOLVITE) tablet 1 mg  1 mg Oral DAILY  guaiFENesin (ORGANIDIN) tablet 200 mg  200 mg Oral TID  lisinopril (PRINIVIL, ZESTRIL) tablet 40 mg  40 mg Oral DAILY  loratadine (CLARITIN) tablet 5 mg  5 mg Oral QHS  B complex-vitamin C-folic acid (NEPHRO-ALICIA) 0.8 mg tab  1 Tab Oral DAILY  phenytoin ER (DILANTIN ER) ER capsule 300 mg  300 mg Oral BID  rosuvastatin (CRESTOR) tablet 40 mg  40 mg Oral QHS  senna (SENOKOT) tablet 8.6 mg  1 Tab Oral DAILY  sertraline (ZOLOFT) tablet 100 mg  100 mg Oral QHS  tamsulosin (FLOMAX) capsule 0.4 mg  0.4 mg Oral DAILY  LORazepam (ATIVAN) tablet 0.5 mg  0.5 mg Oral TID PRN  potassium chloride (K-DUR, KLOR-CON) SR tablet 20 mEq  20 mEq Oral DAILY  polyethylene glycol (MIRALAX) packet 17 g  17 g Oral DAILY PRN  
 sodium chloride (NS) flush 5-40 mL  5-40 mL IntraVENous Q8H  
 sodium chloride (NS) flush 5-40 mL  5-40 mL IntraVENous PRN  
 acetaminophen (TYLENOL) tablet 650 mg  650 mg Oral Q4H PRN  
 ondansetron (ZOFRAN) injection 4 mg  4 mg IntraVENous Q4H PRN  
 enoxaparin (LOVENOX) injection 40 mg  40 mg SubCUTAneous Q24H Immunization History Administered Date(s) Administered  TB Skin Test (PPD) Intradermal 2019 Objective:  
 
Patient Vitals for the past 24 hrs: 
 Temp Pulse Resp BP SpO2  
19 0834     94 % 19 0734 98.8 °F (37.1 °C) 99 18 138/73 94 % 19 0442     92 % 19 0324 99.3 °F (37.4 °C) (!) 107 18 120/75 90 % 19 0032     92 % 04/10/19 2104 (!) 100.7 °F (38.2 °C) (!) 102 18 129/70 91 % 04/10/19 2019     98 % 04/10/19 1600 100 °F (37.8 °C) 100 18 121/74 92 % Temp (24hrs), Av.7 °F (37.6 °C), Min:98.8 °F (37.1 °C), Max:100.7 °F (38.2 °C) Oxygen Therapy O2 Sat (%): 94 % (19) Pulse via Oximetry: 105 beats per minute (192) O2 Device: Nasal cannula (19) O2 Flow Rate (L/min): 5 l/min (19) FIO2 (%): 28 % (04/11/19 0032) Oxygen Therapy O2 Sat (%): 94 % (04/11/19 0834) Pulse via Oximetry: 105 beats per minute (04/11/19 0442) O2 Device: Nasal cannula (04/11/19 0834) O2 Flow Rate (L/min): 5 l/min (04/11/19 0834) FIO2 (%): 28 % (04/11/19 0032) Physical Exam: 
General:         Alert, cooperative, no distress HEENT:               NCAT. No obvious deformity. Nares normal.  
Lungs:                 Few expiratory wheezing b/l. Cardiovascular:   RRR. No m/r/g. No pedal edema b/l. Abdomen:       S/nt/nd. Bowel sounds normal. .  
Skin:         LLE with erythema from foor to mid leg. No lesion noted. No drainage. (+) pedal pulse. Neurologic:     No gross focal deficit. Psychiatric:         Good mood. Normal affect. DIAGNOSTIC STUDIES Data Review:  
Recent Results (from the past 24 hour(s)) POC LACTIC ACID Collection Time: 04/10/19  4:14 PM  
Result Value Ref Range Lactic Acid (POC) 2.92 (H) 0.5 - 1.9 mmol/L  
CBC WITH AUTOMATED DIFF Collection Time: 04/10/19  5:50 PM  
Result Value Ref Range WBC 18.9 (H) 4.3 - 11.1 K/uL  
 RBC 4.45 4.23 - 5.6 M/uL  
 HGB 14.3 13.6 - 17.2 g/dL HCT 41.8 41.1 - 50.3 % MCV 93.9 79.6 - 97.8 FL  
 MCH 32.1 26.1 - 32.9 PG  
 MCHC 34.2 31.4 - 35.0 g/dL  
 RDW 12.1 11.9 - 14.6 % PLATELET 229 337 - 987 K/uL MPV 10.5 9.4 - 12.3 FL ABSOLUTE NRBC 0.00 0.0 - 0.2 K/uL  
 DF AUTOMATED NEUTROPHILS 89 (H) 43 - 78 % LYMPHOCYTES 7 (L) 13 - 44 % MONOCYTES 3 (L) 4.0 - 12.0 % EOSINOPHILS 0 (L) 0.5 - 7.8 % BASOPHILS 0 0.0 - 2.0 % IMMATURE GRANULOCYTES 1 0.0 - 5.0 %  
 ABS. NEUTROPHILS 16.9 (H) 1.7 - 8.2 K/UL  
 ABS. LYMPHOCYTES 1.2 0.5 - 4.6 K/UL  
 ABS. MONOCYTES 0.6 0.1 - 1.3 K/UL  
 ABS. EOSINOPHILS 0.0 0.0 - 0.8 K/UL  
 ABS. BASOPHILS 0.0 0.0 - 0.2 K/UL  
 ABS. IMM. GRANS. 0.1 0.0 - 0.5 K/UL METABOLIC PANEL, COMPREHENSIVE Collection Time: 04/10/19  5:50 PM  
Result Value Ref Range Sodium 141 136 - 145 mmol/L Potassium 4.1 3.5 - 5.1 mmol/L Chloride 107 98 - 107 mmol/L  
 CO2 27 21 - 32 mmol/L Anion gap 7 7 - 16 mmol/L Glucose 139 (H) 65 - 100 mg/dL BUN 13 8 - 23 MG/DL Creatinine 0.85 0.8 - 1.5 MG/DL  
 GFR est AA >60 >60 ml/min/1.73m2 GFR est non-AA >60 >60 ml/min/1.73m2 Calcium 8.9 8.3 - 10.4 MG/DL Bilirubin, total 0.5 0.2 - 1.1 MG/DL  
 ALT (SGPT) 34 12 - 65 U/L  
 AST (SGOT) 22 15 - 37 U/L Alk. phosphatase 112 50 - 136 U/L Protein, total 6.8 6.3 - 8.2 g/dL Albumin 3.4 3.2 - 4.6 g/dL Globulin 3.4 2.3 - 3.5 g/dL A-G Ratio 1.0 (L) 1.2 - 3.5 CULTURE, BLOOD Collection Time: 04/10/19  5:50 PM  
Result Value Ref Range Special Requests: LEFT 
JUGULAR VEIN Culture result: NO GROWTH AFTER 10 HOURS    
CULTURE, BLOOD Collection Time: 04/10/19  8:15 PM  
Result Value Ref Range Special Requests: RIGHT Antecubital 
    
 Culture result: NO GROWTH AFTER 9 HOURS METABOLIC PANEL, BASIC Collection Time: 04/11/19  4:19 AM  
Result Value Ref Range Sodium 141 136 - 145 mmol/L Potassium 3.6 3.5 - 5.1 mmol/L Chloride 106 98 - 107 mmol/L  
 CO2 27 21 - 32 mmol/L Anion gap 8 7 - 16 mmol/L Glucose 126 (H) 65 - 100 mg/dL BUN 13 8 - 23 MG/DL Creatinine 0.78 (L) 0.8 - 1.5 MG/DL  
 GFR est AA >60 >60 ml/min/1.73m2 GFR est non-AA >60 >60 ml/min/1.73m2 Calcium 8.7 8.3 - 10.4 MG/DL  
CBC WITH AUTOMATED DIFF Collection Time: 04/11/19  4:19 AM  
Result Value Ref Range WBC 16.3 (H) 4.3 - 11.1 K/uL  
 RBC 4.18 (L) 4.23 - 5.6 M/uL  
 HGB 13.4 (L) 13.6 - 17.2 g/dL HCT 40.0 (L) 41.1 - 50.3 % MCV 95.7 79.6 - 97.8 FL  
 MCH 32.1 26.1 - 32.9 PG  
 MCHC 33.5 31.4 - 35.0 g/dL  
 RDW 12.1 11.9 - 14.6 % PLATELET 496 652 - 828 K/uL MPV 10.7 9.4 - 12.3 FL ABSOLUTE NRBC 0.00 0.0 - 0.2 K/uL  
 DF AUTOMATED NEUTROPHILS 86 (H) 43 - 78 % LYMPHOCYTES 9 (L) 13 - 44 %  MONOCYTES 4 4.0 - 12.0 %  
 EOSINOPHILS 0 (L) 0.5 - 7.8 % BASOPHILS 0 0.0 - 2.0 % IMMATURE GRANULOCYTES 1 0.0 - 5.0 %  
 ABS. NEUTROPHILS 14.0 (H) 1.7 - 8.2 K/UL  
 ABS. LYMPHOCYTES 1.5 0.5 - 4.6 K/UL  
 ABS. MONOCYTES 0.7 0.1 - 1.3 K/UL  
 ABS. EOSINOPHILS 0.0 0.0 - 0.8 K/UL  
 ABS. BASOPHILS 0.0 0.0 - 0.2 K/UL  
 ABS. IMM. GRANS. 0.1 0.0 - 0.5 K/UL All Micro Results Procedure Component Value Units Date/Time CULTURE, BLOOD [[id number]] Collected:  04/10/19 1750 Order Status:  Completed Specimen:  Blood Updated:  04/11/19 4211 Special Requests: --     
  LEFT 
JUGULAR VEIN Culture result: NO GROWTH AFTER 10 HOURS     
 CULTURE, BLOOD [[id number]] Collected:  04/10/19 2015 Order Status:  Completed Specimen:  Blood Updated:  04/11/19 8710 Special Requests: --     
  RIGHT Antecubital 
  
  Culture result: NO GROWTH AFTER 9 HOURS Imaging Migel Masters /Studies: CXR Results  (Last 48 hours) 04/10/19 1744  XR CHEST PORT Final result Impression:  IMPRESSION:  NO ACUTE CARDIOPULMONARY DISEASE IDENTIFIED. Narrative:  PORTABLE CHEST, April 10, 2019 at 1749 hours CLINICAL HISTORY: Productive cough. COMPARISON:  February 21, 2019. FINDINGS:  AP erect image demonstrates no confluent infiltrate or significant  
pleural fluid. The heart size is within normal limits without evidence of  
congestive heart failure or pneumothorax. The bony thorax appears intact on  
this view. CT Results  (Last 48 hours) None Xr Chest Tiera Bailey Result Date: 4/10/2019 IMPRESSION:  NO ACUTE CARDIOPULMONARY DISEASE IDENTIFIED. No results found for this visit on 04/10/19. Labs and Studies from previous 24 hours have been personally reviewed by myself   
ASSESSMENT Active Hospital Problems Diagnosis Date Noted  COPD (chronic obstructive pulmonary disease) ([facility] 04/10/2019  Cellulitis 02/21/2019  Essential hypertension 07/14/2018  Left hemiplegia ([facility] 07/14/2018 Hospital Problems as of 4/11/2019 Date Reviewed: 3/4/2019 Codes Class Noted - Resolved POA  
 COPD (chronic obstructive pulmonary disease) ([facility] ICD-10-CM: J44.9 ICD-9-CM: 532  4/10/2019 - Present Unknown * (Principal) Cellulitis ICD-10-CM: L03.90 ICD-9-CM: 682.9  2/21/2019 - Present Unknown Essential hypertension ICD-10-CM: I10 
ICD-9-CM: 401.9  7/14/2018 - Present Yes Left hemiplegia ([facility] ICD-10-CM: G81.94 
ICD-9-CM: 342.90  7/14/2018 - Present Yes A/P: 
 
-Sepsis secondary to cellulitis WBC slightly trended down today BC NGTD after short period MRI 02/2019 with no cellulitis reported Due to recent hospital admission and recurrent cellulitis will d/c ampicillin and start vanco and zosyn Get X-ray LLE/ foot to rule out OM Will ask vascular to evaluate for recurrent cellulitis, ?poor blood flow Pain medication adjusted 
 
-COPD On O2  at home Minimal wheezing. Not on acute exacerbation Cont  nebs and wean O2 as tolerated -HTN Controlled Cont home meds DVT Prophylaxis: lovenox CODE Status: Full Plan of Care Discussed with: patient. Care team. 
 
 
 
 
Nakul Jin MD 
04/11/19

## 2019-04-11 NOTE — PROGRESS NOTES
Patient c/o SOB. Respiratory @ bedside. O2@ 88%. Respiratory administering breathing treatments. Hospitalist paged. Dr. Naya Pelayo returned phone call. New orders per Dr. Lew Chakraborty. See eMAR. Patient 02 @ 93% on 5L 02 via NC. Will continue to monitor patient.

## 2019-04-11 NOTE — PROGRESS NOTES
TRANSFER - IN REPORT: 
 
Verbal report received from antonia prather on Kenzie Eneida  being received from ed for routine progression of care Report consisted of patients Situation, Background, Assessment and  
Recommendations(SBAR). Information from the following report(s) SBAR, Kardex, ED Summary, Procedure Summary, MAR and Recent Results was reviewed with the receiving nurse. Opportunity for questions and clarification was provided. Assessment completed upon patients arrival to unit and care assumed.

## 2019-04-11 NOTE — PROGRESS NOTES
Spoke to Mr. Abernathy (drowsy) and his wife in room 210 about Case Management and discharge planning. They are known from a previous admission in February 2019. Mr. Sina Alfonso is fairly independent with ADLs, with the use of supplemental oxygen at 2 lpm via NC (he already has that at home). Patient and wife said that [facility] OT, PT, and RN is currently coming out to their home. Called [facility] at [phone] to confirm:  Yes, he is current with OT, PT, and RN. Discharge plan is home and resume home health. Care Management Interventions PCP Verified by CM: Yes Transition of Care Consult (CM Consult): Home Health [address]: No 
Reason Outside [place]: Patient already serviced by other home care/hospice agency Current Support Network: Lives with Spouse, Own Home Confirm Follow Up Transport: Family Plan discussed with Pt/Family/Caregiver:  Yes

## 2019-04-11 NOTE — CONSULTS
[address] [facility]  [phone]    History and Physical / Surgical Consultation   Luba Redd    Admit date: 4/10/2019    MRN: [id number]     : 1950     Age: 80 y.o.          2019 3:00 PM    Subjective/HPI:   This patient is a 80 y.o. white male seen at the request of Agata Huffman MD and evaluated for recurrent cellulitis, concern for peripheral vascular disease. PMH with HTN, CVA x2 (, ) with residual left hemiparesis, COPD on 2L O2 NC at home, HL, GERD, PAD with occluded right ICA and s/p L CEA, seizure disorder and tobacco use disorder, he presented to our ED 4/10/2019 with worsening L LE pain and edema. He was admitted for cellulitis and states he never returned to asymptomatic baseline from previous admission  - 2019. Home health RN recommended ED evaluation due to worsening symptoms. Patient states L LE pain awakened him from sleep yesterday, and he has had associated fever and chills. Imaging today with L LE soft tissue edema, question of periosteal reaction at proximal left fibula; MRI and XR done in February noted soft tissue edema without osteomyelitis or abscess. Patient's past medical, surgical, family and social histories were reviewed as noted here and below. Patient admits to extensive smoking history. In his normal post-CVA state of health, he ambulates with use of davis-walker. Review of Systems  A comprehensive review of systems was negative except for that written in the HPI. Past Medical History:   Diagnosis Date    Chronic obstructive pulmonary disease (HCC)     Dermatophytosis of nail     History of CVA (cerebrovascular accident)     History of paraplegia     Hypertension       No past surgical history on file.    Allergies   Allergen Reactions    Latex Rash    Adhesive Unknown (comments)    Sulfa (Sulfonamide Antibiotics) Other (comments)      Social History     Tobacco Use  Smoking status: Former Smoker     Last attempt to quit: 2018     Years since quittin.7    Smokeless tobacco: Never Used   Substance Use Topics    Alcohol use: No      No family history on file. Prior to Admission Medications   Prescriptions Last Dose Informant Patient Reported? Taking? ALOE VERA/COLLAGEN (ALOE VESTA 2-N-1 CLEANSER EX)   Yes No   Sig: by Apply Externally route. LORazepam (ATIVAN) 0.5 mg tablet   Yes No   Sig: Take 0.5 mg by mouth three (3) times daily as needed for Anxiety. MINERAL OIL/PETROLATUM,WHITE (CLIVE MOISTURE BARRIER EX)   Yes No   Sig: by Apply Externally route. Omeprazole delayed release (PRILOSEC D/R) 20 mg tablet   Yes No   Sig: Take 20 mg by mouth daily. acetaminophen (TYLENOL) 500 mg tablet   Yes No   Sig: Take  by mouth every six (6) hours as needed for Pain. acetaminophen-codeine (TYLENOL-CODEINE #3) 300-30 mg per tablet   Yes No   Sig: Take 1 Tab by mouth every four (4) hours as needed for Pain. albuterol (PROVENTIL HFA, VENTOLIN HFA, PROAIR HFA) 90 mcg/actuation inhaler   No No   Sig: Take 1 Puff by inhalation every four (4) hours as needed for Wheezing. aspirin delayed-release 81 mg tablet   Yes No   Sig: Take 81 mg by mouth daily. b complex-vitamin c-folic acid 0.8 mg (NEPHRO-ALICIA) 0.8 mg tab tablet   Yes No   Sig: Take 1 Tab by mouth daily. carvedilol (COREG) 12.5 mg tablet   Yes No   Sig: Take 6.25 mg by mouth two (2) times a day. cholecalciferol (VITAMIN D3) 1,000 unit cap   Yes No   Sig: Take 2,000 Units by mouth. ciprofloxacin HCl (CIPRO) 500 mg tablet   No No   Sig: Take 1 Tab by mouth two (2) times a day. clopidogrel (PLAVIX) 75 mg tab   Yes No   Sig: Take 75 mg by mouth. docusate sodium 100 mg/5 mL enem   Yes No   Sig: Insert  into rectum. fluticasone (FLOVENT DISKUS) 50 mcg/actuation inhaler   Yes No   Sig: by Nasal route.    folic acid (FOLVITE) 1 mg tablet   Yes No   Sig: Take 1 mg by mouth.   guaiFENesin (ORGANIDIN) 400 mg tablet   Yes No   Sig: Take 200 mg by mouth three (3) times daily. lisinopril (PRINIVIL, ZESTRIL) 40 mg tablet   Yes No   Sig: Take 40 mg by mouth daily. loratadine (CLARITIN) 10 mg tablet   Yes No   Sig: Take 2.5 mg by mouth nightly. melatonin 3 mg tablet   Yes No   Sig: Take 3 mg by mouth daily as needed (SLEEP). phenytoin ER (DILANTIN ER) 100 mg ER capsule   Yes No   Sig: Take 300 mg by mouth two (2) times a day. polyethylene glycol (MIRALAX) 17 gram/dose powder   Yes No   Sig: Take 17 g by mouth daily as needed. potassium chloride (K-DUR, KLOR-CON) 20 mEq tablet   Yes No   Sig: Take 20 mEq by mouth daily. raNITIdine (ZANTAC) 150 mg tablet   Yes No   Sig: Take 150 mg by mouth two (2) times a day. rosuvastatin (CRESTOR) 40 mg tablet   Yes No   Sig: Take 40 mg by mouth nightly. senna (SENOKOT) 8.6 mg tablet   Yes No   Sig: Take 1 Tab by mouth daily. sertraline (ZOLOFT) 100 mg tablet   Yes No   Sig: Take  by mouth nightly. tamsulosin (FLOMAX) 0.4 mg capsule   Yes No   Sig: Take 0.4 mg by mouth daily.       Facility-Administered Medications: None     Current Facility-Administered Medications   Medication Dose Route Frequency    albuterol (PROVENTIL VENTOLIN) nebulizer solution 2.5 mg  2.5 mg Nebulization Q6H PRN    HYDROcodone-acetaminophen (NORCO) 5-325 mg per tablet 2 Tab  2 Tab Oral Q4H PRN    morphine injection 2 mg  2 mg IntraVENous Q4H PRN    mineral oil-hydrophil petrolat (AQUAPHOR) ointment   Topical DAILY    piperacillin-tazobactam (ZOSYN) 3.375 g in 0.9% sodium chloride (MBP/ADV) 100 mL  3.375 g IntraVENous Q8H    vancomycin (VANCOCIN) 2500 mg in  mL infusion  2,500 mg IntraVENous ONCE    [START ON 4/12/2019] vancomycin (VANCOCIN) 1500 mg in  ml infusion  1,500 mg IntraVENous Q12H    albuterol (PROVENTIL VENTOLIN) nebulizer solution 2.5 mg  2.5 mg Nebulization QID RT    aspirin delayed-release tablet 81 mg  81 mg Oral DAILY    carvedilol (COREG) tablet 6.25 mg  6.25 mg Oral BID    clopidogrel (PLAVIX) tablet 75 mg  75 mg Oral DAILY    budesonide (PULMICORT) 500 mcg/2 ml nebulizer suspension  500 mcg Nebulization BID RT    folic acid (FOLVITE) tablet 1 mg  1 mg Oral DAILY    guaiFENesin (ORGANIDIN) tablet 200 mg  200 mg Oral TID    lisinopril (PRINIVIL, ZESTRIL) tablet 40 mg  40 mg Oral DAILY    loratadine (CLARITIN) tablet 5 mg  5 mg Oral QHS    B complex-vitamin C-folic acid (NEPHRO-ALICIA) 0.8 mg tab  1 Tab Oral DAILY    phenytoin ER (DILANTIN ER) ER capsule 300 mg  300 mg Oral BID    rosuvastatin (CRESTOR) tablet 40 mg  40 mg Oral QHS    senna (SENOKOT) tablet 8.6 mg  1 Tab Oral DAILY    sertraline (ZOLOFT) tablet 100 mg  100 mg Oral QHS    tamsulosin (FLOMAX) capsule 0.4 mg  0.4 mg Oral DAILY    LORazepam (ATIVAN) tablet 0.5 mg  0.5 mg Oral TID PRN    potassium chloride (K-DUR, KLOR-CON) SR tablet 20 mEq  20 mEq Oral DAILY    polyethylene glycol (MIRALAX) packet 17 g  17 g Oral DAILY PRN    sodium chloride (NS) flush 5-40 mL  5-40 mL IntraVENous Q8H    sodium chloride (NS) flush 5-40 mL  5-40 mL IntraVENous PRN    acetaminophen (TYLENOL) tablet 650 mg  650 mg Oral Q4H PRN    ondansetron (ZOFRAN) injection 4 mg  4 mg IntraVENous Q4H PRN    enoxaparin (LOVENOX) injection 40 mg  40 mg SubCUTAneous Q24H     Objective:     Vitals:    04/11/19 0834 04/11/19 1119 04/11/19 1144 04/11/19 1331   BP:   110/68 106/62   Pulse:   81 90   Resp:   18 17   Temp:   98.1 °F (36.7 °C) 98.8 °F (37.1 °C)   SpO2: 94% 91% 91% 90%   Weight:       Height:           Physical Exam:   General well-developed, obese, thick-tongued dysarthria  Skin  (see below for LE) warm, moist with good texture, no jaundice or rashes  HEENT normocephalic, extraocular muscles intact, left facial droop, oral mucosa moist  Neck  supple without JVD or bruits; trachea midline, well-healed oblique left neck scar  Lungs  respirations unlabored, lungs clear to auscultation bilaterally  Heart  regular rate and rhythm, no appreciable murmurs  Abdomen soft, protuberant but non-distended, non-tender; bowel sounds normoactive  Extremities warm without cyanosis; L LE edematous with blanching erythema from medial thigh through leg and foot to toes that worsens distally, peeling skin is otherwise intact, no ulcerations / open wounds / lesions; R LE with hand-sized erythema at anterior leg, no ulcerations; L UE in compression sleeve  Pulses  2+ palpable at right radial, brachial, bilateral femoral; faintly palpable PT pulses, but +Doppler signals at [facility] and PT bilaterally  Neurological dozes but awakens easily to voice, oriented; sensorimotor deficits as noted above     Data Review   Recent Labs     04/11/19  0419 04/10/19  1750   WBC 16.3* 18.9*   HGB 13.4* 14.3   HCT 40.0* 41.8    178     Recent Labs     04/11/19  0419 04/10/19  1750    141   K 3.6 4.1    107   CO2 27 27   * 139*   BUN 13 13   CREA 0.78* 0.85       Imaging  XR Foot Lt 3V  Indication:80year-old male with limited history. Patient experiencing  cellulitis of left lower extremity  Comparison exams: 2/22/2019  Findings: 2 views of left foot demonstrate very minimal decreased bony  mineralization. No evidence of acute fracture or dislocation. Mild generalized  soft tissue swelling is noted. Joint spaces are preserved. Impression:    No acute fracture or dislocation       XR Tib/Fib Lt  Indication:80year-old male with cellulitis of left lower extremity. Comparison exams: None  Findings: 4 views of the left tibia and fibula which include frontal and lateral  views demonstrate normal bone density with no evidence of fracture or  dislocation. There is no evidence of erosive changes identified. There is linear  calcifications in the proximal interosseous region of the left tibia and fibula. These are suggestive of vascular calcifications. However a small amount of  linear calcification overlaps the medial proximal left fibula.  Cannot exclude  small amount of periosteal reaction. If there is any clinical concern of  osteomyelitis, three-phase bone scan versus MRI may be useful. Degenerative intrameniscal calcifications are noted within the left knee joint. Generalized soft tissue swelling of the left lower extremity. Impression:    1. Generalized soft tissue swelling left lower extremity  2. There are linear calcifications identified in the proximal portion left  inner osseous region of the left tibia and fibula as described above. 3.  If there still remains clinical suspicion of osteomyelitis, three-phase bone  scan versus MRI may be useful.       Assessment / Plan / Recommendations / Medical Decision Making:     Hospital Problems  Date Reviewed: 3/4/2019          Codes Class Noted POA    COPD (chronic obstructive pulmonary disease) ([facility] ICD-10-CM: J44.9  ICD-9-CM: 652  4/10/2019 Unknown        * (Principal) Cellulitis ICD-10-CM: L03.90  ICD-9-CM: 682.9  2/21/2019 Unknown        Essential hypertension ICD-10-CM: I10  ICD-9-CM: 401.9  7/14/2018 Yes        Left hemiplegia ([facility] ICD-10-CM: G81.94  ICD-9-CM: 342.90  7/14/2018 Yes              Patient Active Problem List    Diagnosis Date Noted    COPD (chronic obstructive pulmonary disease) ([facility] 04/10/2019    Cellulitis 02/21/2019    Pneumonia 02/21/2019    Acute exacerbation of chronic obstructive pulmonary disease (COPD) ([address] 02/21/2019    Sepsis ([facility] 02/21/2019    Emphysema lung ([address] 07/16/2018    Acute respiratory failure with hypoxia ([address] 07/15/2018    Acute respiratory failure with hypoxemia (HCC) 07/14/2018    COPD exacerbation ([address] 07/14/2018    Essential hypertension 07/14/2018    Hyperlipidemia 07/14/2018    Left hemiplegia ([address] 07/14/2018    CVA (cerebral vascular accident) ([facility] 07/14/2018    Depression 07/14/2018    Seizure ([address] 07/14/2018    Dependence on nicotine from cigarettes 07/14/2018    BPH (benign prostatic hyperplasia) 07/14/2018    GERD (gastroesophageal reflux disease) 07/14/2018         Joe Ortiz is a 80 y.o. male with recurrent L LE cellulitis on background of multiple medical issues and extensive smoking history  - arterial duplex study  - venous duplex study  - elevate B LE  Discussed with vascular surgeon on-call, Kae Luna MD who will review imaging and determine further plan. Thank you very much for this referral. We appreciate the opportunity to participate in this patient's care. We will follow along with above stated plan. Veena Veras PA-C  Physician Assistant with [facility] Vascular Surgery  Geeta Billy.  Brent Berumen MD / Cassandra Duarte MD

## 2019-04-11 NOTE — CDMP QUERY
Patient admitted with LLE cellulitis. Patient noted to have hx of COPD and required increase from 2L O2 (sats 88%) to 5L O2 (sats 93%) and nebulizer tx . If possible, please document in progress notes and d/c summary if you are evaluating and /or treating any of the following: 
 
? COPD exacerbation ? COPD, chronic ? Emphysema 
? Other (please specify) ? Unable to determine The medical record reflects the following: 
  Risk Factors: Hx COPD Clinical Indicators: O2 sats 88% on 2L O2=>sats 93% on 5L, patient complains of SOB Treatment: O2 increase from 2L to 5L NC, albuterol and pulmicort nebs Thanks, Carl Mooney RN, BSN, CDS Clinical Documentation Improvement 
[phone]

## 2019-04-11 NOTE — PROGRESS NOTES
Pt seen for LLE cellulitis and possible open area on RLE. Noted severe edema, pain and erythema to LLE, no open wounds present. Mortons neuroma present on plantar surface of LLE. RLE with discoloration on anterior aspect, also no open wounds present. Pedal pulses palpable. No wounds to treat, however given severe swelling on LLE will order aquaphor to prevent blistering with swelling. Wound team will continue to follow.

## 2019-04-12 LAB
ANION GAP SERPL CALC-SCNC: 8 MMOL/L (ref 7–16)
BASOPHILS # BLD: 0 K/UL (ref 0–0.2)
BASOPHILS NFR BLD: 0 % (ref 0–2)
BUN SERPL-MCNC: 14 MG/DL (ref 8–23)
CALCIUM SERPL-MCNC: 8.5 MG/DL (ref 8.3–10.4)
CHLORIDE SERPL-SCNC: 108 MMOL/L (ref 98–107)
CO2 SERPL-SCNC: 24 MMOL/L (ref 21–32)
CREAT SERPL-MCNC: 0.71 MG/DL (ref 0.8–1.5)
DIFFERENTIAL METHOD BLD: ABNORMAL
EOSINOPHIL # BLD: 0 K/UL (ref 0–0.8)
EOSINOPHIL NFR BLD: 0 % (ref 0.5–7.8)
ERYTHROCYTE [DISTWIDTH] IN BLOOD BY AUTOMATED COUNT: 12 % (ref 11.9–14.6)
GLUCOSE SERPL-MCNC: 126 MG/DL (ref 65–100)
HCT VFR BLD AUTO: 36.5 % (ref 41.1–50.3)
HGB BLD-MCNC: 12.1 G/DL (ref 13.6–17.2)
IMM GRANULOCYTES # BLD AUTO: 0.1 K/UL (ref 0–0.5)
IMM GRANULOCYTES NFR BLD AUTO: 1 % (ref 0–5)
LYMPHOCYTES # BLD: 1.5 K/UL (ref 0.5–4.6)
LYMPHOCYTES NFR BLD: 12 % (ref 13–44)
MCH RBC QN AUTO: 32.3 PG (ref 26.1–32.9)
MCHC RBC AUTO-ENTMCNC: 33.2 G/DL (ref 31.4–35)
MCV RBC AUTO: 97.3 FL (ref 79.6–97.8)
MONOCYTES # BLD: 0.6 K/UL (ref 0.1–1.3)
MONOCYTES NFR BLD: 5 % (ref 4–12)
NEUTS SEG # BLD: 10.1 K/UL (ref 1.7–8.2)
NEUTS SEG NFR BLD: 82 % (ref 43–78)
NRBC # BLD: 0 K/UL (ref 0–0.2)
PLATELET # BLD AUTO: 149 K/UL (ref 150–450)
PMV BLD AUTO: 10.7 FL (ref 9.4–12.3)
POTASSIUM SERPL-SCNC: 3.6 MMOL/L (ref 3.5–5.1)
RBC # BLD AUTO: 3.75 M/UL (ref 4.23–5.6)
SODIUM SERPL-SCNC: 140 MMOL/L (ref 136–145)
WBC # BLD AUTO: 12.3 K/UL (ref 4.3–11.1)

## 2019-04-12 PROCEDURE — 74011250636 HC RX REV CODE- 250/636: Performed by: INTERNAL MEDICINE

## 2019-04-12 PROCEDURE — 80048 BASIC METABOLIC PNL TOTAL CA: CPT

## 2019-04-12 PROCEDURE — 74011250637 HC RX REV CODE- 250/637: Performed by: FAMILY MEDICINE

## 2019-04-12 PROCEDURE — 94760 N-INVAS EAR/PLS OXIMETRY 1: CPT

## 2019-04-12 PROCEDURE — 74011000250 HC RX REV CODE- 250: Performed by: FAMILY MEDICINE

## 2019-04-12 PROCEDURE — 74011000258 HC RX REV CODE- 258: Performed by: INTERNAL MEDICINE

## 2019-04-12 PROCEDURE — 36415 COLL VENOUS BLD VENIPUNCTURE: CPT

## 2019-04-12 PROCEDURE — 94640 AIRWAY INHALATION TREATMENT: CPT

## 2019-04-12 PROCEDURE — 74011250636 HC RX REV CODE- 250/636: Performed by: FAMILY MEDICINE

## 2019-04-12 PROCEDURE — 65270000029 HC RM PRIVATE

## 2019-04-12 PROCEDURE — 77010033678 HC OXYGEN DAILY

## 2019-04-12 PROCEDURE — 85025 COMPLETE CBC W/AUTO DIFF WBC: CPT

## 2019-04-12 PROCEDURE — 74011250637 HC RX REV CODE- 250/637: Performed by: INTERNAL MEDICINE

## 2019-04-12 RX ADMIN — VANCOMYCIN HYDROCHLORIDE 1500 MG: 10 INJECTION, POWDER, LYOPHILIZED, FOR SOLUTION INTRAVENOUS at 11:22

## 2019-04-12 RX ADMIN — HYDROCODONE BITARTRATE AND ACETAMINOPHEN 2 TABLET: 5; 325 TABLET ORAL at 00:45

## 2019-04-12 RX ADMIN — CARVEDILOL 6.25 MG: 6.25 TABLET, FILM COATED ORAL at 08:50

## 2019-04-12 RX ADMIN — PIPERACILLIN AND TAZOBACTAM 3.38 G: 3; .375 INJECTION, POWDER, FOR SOLUTION INTRAVENOUS at 16:52

## 2019-04-12 RX ADMIN — MORPHINE SULFATE 2 MG: 2 INJECTION, SOLUTION INTRAMUSCULAR; INTRAVENOUS at 04:22

## 2019-04-12 RX ADMIN — ALBUTEROL SULFATE 2.5 MG: 2.5 SOLUTION RESPIRATORY (INHALATION) at 19:23

## 2019-04-12 RX ADMIN — ALBUTEROL SULFATE 2.5 MG: 2.5 SOLUTION RESPIRATORY (INHALATION) at 15:46

## 2019-04-12 RX ADMIN — HYDROCODONE BITARTRATE AND ACETAMINOPHEN 2 TABLET: 5; 325 TABLET ORAL at 18:38

## 2019-04-12 RX ADMIN — MORPHINE SULFATE 1 MG: 2 INJECTION, SOLUTION INTRAMUSCULAR; INTRAVENOUS at 16:52

## 2019-04-12 RX ADMIN — SENNOSIDES 8.6 MG: 8.6 TABLET, FILM COATED ORAL at 08:50

## 2019-04-12 RX ADMIN — ALBUTEROL SULFATE 2.5 MG: 2.5 SOLUTION RESPIRATORY (INHALATION) at 07:34

## 2019-04-12 RX ADMIN — TAMSULOSIN HYDROCHLORIDE 0.4 MG: 0.4 CAPSULE ORAL at 08:50

## 2019-04-12 RX ADMIN — CLOPIDOGREL BISULFATE 75 MG: 75 TABLET, FILM COATED ORAL at 08:50

## 2019-04-12 RX ADMIN — MORPHINE SULFATE 2 MG: 2 INJECTION, SOLUTION INTRAMUSCULAR; INTRAVENOUS at 20:45

## 2019-04-12 RX ADMIN — PHENYTOIN SODIUM 300 MG: 100 CAPSULE ORAL at 08:50

## 2019-04-12 RX ADMIN — VANCOMYCIN HYDROCHLORIDE 1500 MG: 10 INJECTION, POWDER, LYOPHILIZED, FOR SOLUTION INTRAVENOUS at 01:01

## 2019-04-12 RX ADMIN — Medication 10 ML: at 22:25

## 2019-04-12 RX ADMIN — ENOXAPARIN SODIUM 40 MG: 40 INJECTION SUBCUTANEOUS at 21:43

## 2019-04-12 RX ADMIN — FOLIC ACID 1 MG: 1 TABLET ORAL at 08:50

## 2019-04-12 RX ADMIN — GUAIFENESIN 200 MG: 200 TABLET ORAL at 16:56

## 2019-04-12 RX ADMIN — MORPHINE SULFATE 1 MG: 2 INJECTION, SOLUTION INTRAMUSCULAR; INTRAVENOUS at 11:21

## 2019-04-12 RX ADMIN — CARVEDILOL 6.25 MG: 6.25 TABLET, FILM COATED ORAL at 16:56

## 2019-04-12 RX ADMIN — ASPIRIN 81 MG: 81 TABLET, COATED ORAL at 08:50

## 2019-04-12 RX ADMIN — POTASSIUM CHLORIDE 20 MEQ: 20 TABLET, EXTENDED RELEASE ORAL at 08:50

## 2019-04-12 RX ADMIN — SERTRALINE HYDROCHLORIDE 100 MG: 100 TABLET ORAL at 20:45

## 2019-04-12 RX ADMIN — LORATADINE 5 MG: 10 TABLET ORAL at 21:43

## 2019-04-12 RX ADMIN — VANCOMYCIN HYDROCHLORIDE 1500 MG: 10 INJECTION, POWDER, LYOPHILIZED, FOR SOLUTION INTRAVENOUS at 23:05

## 2019-04-12 RX ADMIN — ROSUVASTATIN CALCIUM 40 MG: 20 TABLET, FILM COATED ORAL at 21:43

## 2019-04-12 RX ADMIN — PIPERACILLIN AND TAZOBACTAM 3.38 G: 3; .375 INJECTION, POWDER, FOR SOLUTION INTRAVENOUS at 08:51

## 2019-04-12 RX ADMIN — PHENYTOIN SODIUM 300 MG: 100 CAPSULE ORAL at 16:56

## 2019-04-12 RX ADMIN — PIPERACILLIN AND TAZOBACTAM 3.38 G: 3; .375 INJECTION, POWDER, FOR SOLUTION INTRAVENOUS at 00:45

## 2019-04-12 RX ADMIN — GUAIFENESIN 200 MG: 200 TABLET ORAL at 22:25

## 2019-04-12 RX ADMIN — PETROLATUM: 42 OINTMENT TOPICAL at 09:00

## 2019-04-12 RX ADMIN — BUDESONIDE 500 MCG: 0.5 INHALANT RESPIRATORY (INHALATION) at 19:23

## 2019-04-12 RX ADMIN — ALBUTEROL SULFATE 2.5 MG: 2.5 SOLUTION RESPIRATORY (INHALATION) at 11:35

## 2019-04-12 RX ADMIN — Medication 5 ML: at 05:40

## 2019-04-12 RX ADMIN — LISINOPRIL 40 MG: 20 TABLET ORAL at 08:50

## 2019-04-12 RX ADMIN — GUAIFENESIN 200 MG: 200 TABLET ORAL at 08:50

## 2019-04-12 RX ADMIN — PIPERACILLIN AND TAZOBACTAM 3.38 G: 3; .375 INJECTION, POWDER, FOR SOLUTION INTRAVENOUS at 23:04

## 2019-04-12 RX ADMIN — BUDESONIDE 500 MCG: 0.5 INHALANT RESPIRATORY (INHALATION) at 07:34

## 2019-04-12 RX ADMIN — Medication 1 TABLET: at 08:50

## 2019-04-12 NOTE — PROGRESS NOTES
END OF SHIFT NOTE: 
 
INTAKE/OUTPUT No intake/output data recorded. Voiding: YES Catheter: NO 
Drain:   
 
 
 
 
 
Flatus: Patient does have flatus present. Stool:  0 occurrences. Characteristics: 
  
Emesis: 0 occurrences. Characteristics: VITAL SIGNS Patient Vitals for the past 12 hrs: 
 Temp Pulse Resp BP SpO2  
04/12/19 0735     95 % 04/12/19 0400 98 °F (36.7 °C) 98 20 125/89 96 % 04/11/19 2345 98.6 °F (37 °C) 89 19 113/82 94 % 04/11/19 2134     96 % 04/11/19 2030 98.4 °F (36.9 °C) 95 18 115/64 91 % Pain Assessment Pain Intensity 1: 0 (04/12/19 0530) Pain Location 1: Leg 
Pain Intervention(s) 1: Medication (see MAR) Patient Stated Pain Goal: 0 Ambulating No 
 
Shift report given to oncoming nurse at the bedside. Clair Client

## 2019-04-12 NOTE — PROGRESS NOTES
Progress Note Patient: Christy Gatica MRN: [id number]  SSN: [id number] YOB: 1950  Age: 80 y.o. Sex: male Admission Date: 4/10/2019 LOS: 2 days Subjective:  
 
Patient dozing but alerts easily. Notes pain diminished in L LE. Lower extremities not elevated despite orders placed yesterday. WBC 12.3 (18.9 at admission, 4/10) Objective:  
 
Vitals:  
 04/12/19 0735 04/12/19 0806 04/12/19 1137 04/12/19 1226 BP:  120/74  98/63 Pulse:  90  82 Resp:  18  18 Temp:  97.4 °F (36.3 °C)  97.7 °F (36.5 °C) SpO2: 95% 92% 94% 91% Weight:      
Height:      
 
Physical Exam:  
GENERAL: dozing but alerts easily, cooperative, no distress LUNG: normal respiratory effort, no audible wheezes HEART: regular rate and rhythm ABDOMEN: soft, nontender, nondistended, bowel sounds normoactive EXTREMITIES: left LE erythema receded to below knee but skin deeper red and more edematous, skin hydrated with less prominent peeling, no lesions / ulcerations; right LE erythema stable Lab/Data Review: 
BMP:  
Lab Results Component Value Date/Time  04/12/2019 04:20 AM  
 K 3.6 04/12/2019 04:20 AM  
  (H) 04/12/2019 04:20 AM  
 CO2 24 04/12/2019 04:20 AM  
 AGAP 8 04/12/2019 04:20 AM  
  (H) 04/12/2019 04:20 AM  
 BUN 14 04/12/2019 04:20 AM  
 CREA 0.71 (L) 04/12/2019 04:20 AM  
 GFRAA >60 04/12/2019 04:20 AM  
 GFRNA >60 04/12/2019 04:20 AM  
 
CBC:  
Lab Results Component Value Date/Time WBC 12.3 (H) 04/12/2019 04:20 AM  
 HGB 12.1 (L) 04/12/2019 04:20 AM  
 HCT 36.5 (L) 04/12/2019 04:20 AM  
  (L) 04/12/2019 04:20 AM  
  
 
Imaging: 
TITLE: Lower extremity arterial ultrasound examination. INDICATION: Recurrent lower extremity cellulitis. Hypertension, cerebrovascular 
accident, hyperlipidemia. History of tobacco use. TECHNIQUE: Grayscale, color, and Doppler interrogation performed.  
COMPARISON: None. 
  
 SEGMENTAL BP:  Diminished segmental blood pressures bilaterally. LISA:  Right = 0.52 Left = 0.37 RIGHT LOWER EXTREMITY:  Peak systolic velocities-- CFA = 138, PFA = 117 cm/sec. Proximal and mid SFA are occluded. Distal SFA is reconstituted. Popliteal = 
39, peroneal = 14, PTA = 18, RHONDA = 19 cm/s. 
  
LEFT LOWER EXTREMITY:  Peak systolic velocities-- CFA = 118, PFA = 94, proximal 
SFA = 94, mid SFA = 216, distal SFA = 84, popliteal = 129, peroneal = 21, PTA = 
29, RHONDA = 33 cm/sec. Monophasic waveform in the SFA and below. 
  
ABDOMEN:  Abdominal pelvic vasculature could not be visualized due to overlying 
bowel gas. 
  
IMPRESSION: Abnormal right LISA consistent with moderate arterial disease. Abnormal left LISA consistent with severe arterial disease. 
  
Right superficial femoral artery occlusion. 
  
Significant left superficial femoral artery stenosis. Bilateral lower extremity venous ultrasound INDICATION:  Pain and swelling, Doppler ultrasound of both lower extremities was performed. FINDINGS:  There is normal flow in the common femoral, superficial femoral, and 
popliteal veins. Normal compression and augmentation is demonstrated. The 
proximal calf veins are also patent. IMPRESSION: No evidence of deep venous thrombosis in either lower extremity 
  
 
Assessment / Plan / Recommendations / Medical Decision Making:  
 
Principal Problem: 
  Cellulitis (2/21/2019) Active Problems: 
  Essential hypertension (7/14/2018) Left hemiplegia ([facility] (7/14/2018) COPD (chronic obstructive pulmonary disease) ([facility] (4/10/2019) Continue current care [facility] 
Dr. Lisy Bateman to review imaging and comment further Signed By: Rupal Prado PA-C April 12, 2019 Physician Assistant with [facility] Leonela Chatterjee. Pelon Faria MD / Valerie Laguerre MD 
 
 
Addendum: Attempted to call wife, [phone]; no answer at phone numbers listed in EMR.

## 2019-04-12 NOTE — PROGRESS NOTES
Hospitalist Progress Note 2019 Admit Date: 4/10/2019  4:41 PM  
NAME: Jocelin Kapoor :  1950 DOS:              19 MRN:  [id number] Attending: James Kim MD 
PCP:  Néstor Lepe MD 
Treatment Team: Attending Provider: Sherice Barrera MD; Utilization Review: Kristal Langford RN; Care Manager: Neo Barajas RN; Consulting Provider: Donna Cristobal MD 
 
Full Code SUBJECTIVE: As previously documented: 73yoM with PMH significant for prior CVA with residual L sided hemiparesis, COPD on home O2 2L NC who presents with pain and swelling of LLE. Patient admitted for sepsis secondary to cellulitis of LLE and started on IV abxs. Patient was recently discharged on 2019 for cellulitis of LLE, MRI was neg for OM. Vascular consulted for recurrent cellulitis with suspected PVD. 
 
 19    Jocelin Kapoor stated pain on LLE is better this morning. Patient denies SOB. 10+ ROS reviewed and negative except for positive in HPI. Allergies Allergen Reactions  Latex Rash  Adhesive Unknown (comments)  Sulfa (Sulfonamide Antibiotics) Other (comments) Current Facility-Administered Medications Medication Dose Route Frequency  [START ON 2019] Vancomycin trough reminder   Other ONCE  
 albuterol (PROVENTIL VENTOLIN) nebulizer solution 2.5 mg  2.5 mg Nebulization Q6H PRN  
 HYDROcodone-acetaminophen (NORCO) 5-325 mg per tablet 2 Tab  2 Tab Oral Q4H PRN  
 morphine injection 2 mg  2 mg IntraVENous Q4H PRN  mineral oil-hydrophil petrolat (AQUAPHOR) ointment   Topical DAILY  piperacillin-tazobactam (ZOSYN) 3.375 g in 0.9% sodium chloride (MBP/ADV) 100 mL  3.375 g IntraVENous Q8H  
 vancomycin (VANCOCIN) 1500 mg in  ml infusion  1,500 mg IntraVENous Q12H  
 albuterol (PROVENTIL VENTOLIN) nebulizer solution 2.5 mg  2.5 mg Nebulization QID RT  
 aspirin delayed-release tablet 81 mg  81 mg Oral DAILY  carvedilol (COREG) tablet 6.25 mg  6.25 mg Oral BID  clopidogrel (PLAVIX) tablet 75 mg  75 mg Oral DAILY  budesonide (PULMICORT) 500 mcg/2 ml nebulizer suspension  500 mcg Nebulization BID RT  
 folic acid (FOLVITE) tablet 1 mg  1 mg Oral DAILY  guaiFENesin (ORGANIDIN) tablet 200 mg  200 mg Oral TID  lisinopril (PRINIVIL, ZESTRIL) tablet 40 mg  40 mg Oral DAILY  loratadine (CLARITIN) tablet 5 mg  5 mg Oral QHS  B complex-vitamin C-folic acid (NEPHRO-ALICIA) 0.8 mg tab  1 Tab Oral DAILY  phenytoin ER (DILANTIN ER) ER capsule 300 mg  300 mg Oral BID  rosuvastatin (CRESTOR) tablet 40 mg  40 mg Oral QHS  senna (SENOKOT) tablet 8.6 mg  1 Tab Oral DAILY  sertraline (ZOLOFT) tablet 100 mg  100 mg Oral QHS  tamsulosin (FLOMAX) capsule 0.4 mg  0.4 mg Oral DAILY  LORazepam (ATIVAN) tablet 0.5 mg  0.5 mg Oral TID PRN  potassium chloride (K-DUR, KLOR-CON) SR tablet 20 mEq  20 mEq Oral DAILY  polyethylene glycol (MIRALAX) packet 17 g  17 g Oral DAILY PRN  
 sodium chloride (NS) flush 5-40 mL  5-40 mL IntraVENous Q8H  
 sodium chloride (NS) flush 5-40 mL  5-40 mL IntraVENous PRN  
 acetaminophen (TYLENOL) tablet 650 mg  650 mg Oral Q4H PRN  
 ondansetron (ZOFRAN) injection 4 mg  4 mg IntraVENous Q4H PRN  
 enoxaparin (LOVENOX) injection 40 mg  40 mg SubCUTAneous Q24H Immunization History Administered Date(s) Administered  TB Skin Test (PPD) Intradermal 02/25/2019 Objective:  
 
Patient Vitals for the past 24 hrs: 
 Temp Pulse Resp BP SpO2  
04/12/19 1226 97.7 °F (36.5 °C) 82 18 98/63 91 % 04/12/19 1137     94 % 04/12/19 0806 97.4 °F (36.3 °C) 90 18 120/74 92 % 04/12/19 0735     95 % 04/12/19 0400 98 °F (36.7 °C) 98 20 125/89 96 % 04/11/19 2345 98.6 °F (37 °C) 89 19 113/82 94 % 04/11/19 2134     96 % 04/11/19 2030 98.4 °F (36.9 °C) 95 18 115/64 91 % 04/11/19 1551     90 % 04/11/19 1509 98.1 °F (36.7 °C) 92 17 108/64 90 % Temp (24hrs), Av °F (36.7 °C), Min:97.4 °F (36.3 °C), Max:98.6 °F (37 °C) Oxygen Therapy O2 Sat (%): 91 % (19 1226) Pulse via Oximetry: 96 beats per minute (19 2134) O2 Device: Nasal cannula (19) O2 Flow Rate (L/min): 3 l/min (19 1137) FIO2 (%): 28 % (19 0032) Oxygen Therapy O2 Sat (%): 91 % (19 1226) Pulse via Oximetry: 96 beats per minute (19 2134) O2 Device: Nasal cannula (19) O2 Flow Rate (L/min): 3 l/min (19) FIO2 (%): 28 % (19 0032) Physical Exam: 
General:         Alert, cooperative, no distress HEENT:               NCAT. No obvious deformity. Lungs:                 Few expiratory wheezing b/l. Cardiovascular:   RRR. No m/r/g. No pedal edema b/l. Abdomen:       S/nt/nd. Bowel sounds normal. .  
Skin:         LLE with erythema from foot to mid leg. No lesion noted. No drainage. (+) pedal pulse. Neurologic:     No gross focal deficit. Psychiatric:         Good mood. Normal affect. DIAGNOSTIC STUDIES Data Review:  
Recent Results (from the past 24 hour(s)) METABOLIC PANEL, BASIC Collection Time: 19  4:20 AM  
Result Value Ref Range Sodium 140 136 - 145 mmol/L Potassium 3.6 3.5 - 5.1 mmol/L Chloride 108 (H) 98 - 107 mmol/L  
 CO2 24 21 - 32 mmol/L Anion gap 8 7 - 16 mmol/L Glucose 126 (H) 65 - 100 mg/dL BUN 14 8 - 23 MG/DL Creatinine 0.71 (L) 0.8 - 1.5 MG/DL  
 GFR est AA >60 >60 ml/min/1.73m2 GFR est non-AA >60 >60 ml/min/1.73m2 Calcium 8.5 8.3 - 10.4 MG/DL  
CBC WITH AUTOMATED DIFF Collection Time: 19  4:20 AM  
Result Value Ref Range WBC 12.3 (H) 4.3 - 11.1 K/uL  
 RBC 3.75 (L) 4.23 - 5.6 M/uL  
 HGB 12.1 (L) 13.6 - 17.2 g/dL HCT 36.5 (L) 41.1 - 50.3 % MCV 97.3 79.6 - 97.8 FL  
 MCH 32.3 26.1 - 32.9 PG  
 MCHC 33.2 31.4 - 35.0 g/dL  
 RDW 12.0 11.9 - 14.6 % PLATELET 814 (L) 986 - 450 K/uL  MPV 10.7 9.4 - 12.3 FL  
 ABSOLUTE NRBC 0.00 0.0 - 0.2 K/uL  
 DF AUTOMATED NEUTROPHILS 82 (H) 43 - 78 % LYMPHOCYTES 12 (L) 13 - 44 % MONOCYTES 5 4.0 - 12.0 % EOSINOPHILS 0 (L) 0.5 - 7.8 % BASOPHILS 0 0.0 - 2.0 % IMMATURE GRANULOCYTES 1 0.0 - 5.0 %  
 ABS. NEUTROPHILS 10.1 (H) 1.7 - 8.2 K/UL  
 ABS. LYMPHOCYTES 1.5 0.5 - 4.6 K/UL  
 ABS. MONOCYTES 0.6 0.1 - 1.3 K/UL  
 ABS. EOSINOPHILS 0.0 0.0 - 0.8 K/UL  
 ABS. BASOPHILS 0.0 0.0 - 0.2 K/UL  
 ABS. IMM. GRANS. 0.1 0.0 - 0.5 K/UL All Micro Results Procedure Component Value Units Date/Time CULTURE, BLOOD [[id number]] Collected:  04/10/19 1750 Order Status:  Completed Specimen:  Blood Updated:  04/12/19 0945 Special Requests: --     
  LEFT 
JUGULAR VEIN Culture result: NO GROWTH 2 DAYS     
 CULTURE, BLOOD [[id number]] Collected:  04/10/19 2015 Order Status:  Completed Specimen:  Blood Updated:  04/12/19 0945 Special Requests: --     
  RIGHT Antecubital 
  
  Culture result: NO GROWTH 2 DAYS Imaging Altagracia Ivory /Studies: CXR Results  (Last 48 hours) 04/10/19 1744  XR CHEST PORT Final result Impression:  IMPRESSION:  NO ACUTE CARDIOPULMONARY DISEASE IDENTIFIED. Narrative:  PORTABLE CHEST, April 10, 2019 at 1749 hours CLINICAL HISTORY: Productive cough. COMPARISON:  February 21, 2019. FINDINGS:  AP erect image demonstrates no confluent infiltrate or significant  
pleural fluid. The heart size is within normal limits without evidence of  
congestive heart failure or pneumothorax. The bony thorax appears intact on  
this view. CT Results  (Last 48 hours) None Duplex Low Ext Arteries With Melodye Slight Result Date: 4/12/2019 IMPRESSION: Abnormal right LISA consistent with moderate arterial disease. Abnormal left LISA consistent with severe arterial disease. Right superficial femoral artery occlusion. Significant left superficial femoral artery stenosis. Duplex Lower Ext Venous Bilat Result Date: 4/11/2019 IMPRESSION: No evidence of deep venous thrombosis in either lower extremity No results found for this visit on 04/10/19. Labs and Studies from previous 24 hours have been personally reviewed by myself   
ASSESSMENT Active Hospital Problems Diagnosis Date Noted  COPD (chronic obstructive pulmonary disease) ([facility] [address] 04/10/2019  Cellulitis 02/21/2019  Essential hypertension 07/14/2018  Left hemiplegia ([facility] [address] 07/14/2018 Hospital Problems as of 4/12/2019 Date Reviewed: 3/4/2019 Codes Class Noted - Resolved POA  
 COPD (chronic obstructive pulmonary disease) ([facility] [address] ICD-10-CM: J44.9 ICD-9-CM: 987  4/10/2019 - Present Unknown * (Principal) Cellulitis ICD-10-CM: L03.90 ICD-9-CM: 682.9  2/21/2019 - Present Unknown Essential hypertension ICD-10-CM: I10 
ICD-9-CM: 401.9  7/14/2018 - Present Yes [facility] ICD-10-CM: G81.94 
ICD-9-CM: 342.90  7/14/2018 - Present Yes A/P: 
 
-Sepsis secondary to cellulitis WBC trending down BC NGTD X-ray LLE/ foot with no acute findings Vascular evaluation appreciated, US with severe L superficial femoral artery stenosis Cont vanco and zosyn day #2 
 
-COPD On O2  at home Cont  nebs and wean O2 as tolerated -HTN Controlled Cont home meds DVT Prophylaxis: lovenox CODE Status: Full Plan of Care Discussed with: patient. Care team. 
 
 
 
 
Enrico Carrasco MD 
04/12/19

## 2019-04-12 NOTE — PROGRESS NOTES
Problem: Falls - Risk of 
Goal: *Absence of Falls Description Document Bishop Lucas Fall Risk and appropriate interventions in the flowsheet. Outcome: Progressing Towards Goal 
  
Problem: Patient Education: Go to Patient Education Activity Goal: Patient/Family Education Outcome: Progressing Towards Goal 
  
Problem: Pressure Injury - Risk of 
Goal: *Prevention of pressure injury Description Document Joshua Scale and appropriate interventions in the flowsheet. Outcome: Progressing Towards Goal 
  
Problem: Patient Education: Go to Patient Education Activity Goal: Patient/Family Education Outcome: Progressing Towards Goal 
  
Problem: Breathing Pattern - Ineffective Goal: *Absence of hypoxia Outcome: Progressing Towards Goal 
Goal: *Use of effective breathing techniques Outcome: Progressing Towards Goal 
  
Problem: Hypertension Goal: *Blood pressure within specified parameters Outcome: Progressing Towards Goal 
Goal: *Fluid volume balance Outcome: Progressing Towards Goal 
Goal: *Labs within defined limits Outcome: Progressing Towards Goal 
  
Problem: Patient Education: Go to Patient Education Activity Goal: Patient/Family Education Outcome: Progressing Towards Goal 
  
Problem: Pain Goal: *Control of Pain Outcome: Progressing Towards Goal 
  
Problem: Patient Education: Go to Patient Education Activity Goal: Patient/Family Education Outcome: Progressing Towards Goal 
  
Problem: Chronic Obstructive Pulmonary Disease (COPD) Goal: *Oxygen saturation during activity within specified parameters Outcome: Progressing Towards Goal 
Goal: *Able to remain out of bed as prescribed Outcome: Progressing Towards Goal 
Goal: *Absence of hypoxia Outcome: Progressing Towards Goal 
Goal: *Optimize nutritional status Outcome: Progressing Towards Goal 
  
Problem: Patient Education: Go to Patient Education Activity Goal: Patient/Family Education Outcome: Progressing Towards Goal 
  
 Problem: Cellulitis Care Plan (Adult) Goal: *Control of acute pain Outcome: Progressing Towards Goal 
Goal: *Skin integrity maintained Outcome: Progressing Towards Goal 
Goal: *Absence of infection signs and symptoms Outcome: Progressing Towards Goal 
  
Problem: Patient Education: Go to Patient Education Activity Goal: Patient/Family Education Outcome: Progressing Towards Goal

## 2019-04-13 LAB
ANION GAP SERPL CALC-SCNC: 9 MMOL/L (ref 7–16)
BASOPHILS # BLD: 0 K/UL (ref 0–0.2)
BASOPHILS NFR BLD: 0 % (ref 0–2)
BUN SERPL-MCNC: 12 MG/DL (ref 8–23)
CALCIUM SERPL-MCNC: 8.5 MG/DL (ref 8.3–10.4)
CHLORIDE SERPL-SCNC: 107 MMOL/L (ref 98–107)
CO2 SERPL-SCNC: 23 MMOL/L (ref 21–32)
CREAT SERPL-MCNC: 0.51 MG/DL (ref 0.8–1.5)
DIFFERENTIAL METHOD BLD: ABNORMAL
EOSINOPHIL # BLD: 0.1 K/UL (ref 0–0.8)
EOSINOPHIL NFR BLD: 1 % (ref 0.5–7.8)
ERYTHROCYTE [DISTWIDTH] IN BLOOD BY AUTOMATED COUNT: 11.9 % (ref 11.9–14.6)
GLUCOSE SERPL-MCNC: 117 MG/DL (ref 65–100)
HCT VFR BLD AUTO: 39.7 % (ref 41.1–50.3)
HGB BLD-MCNC: 13.1 G/DL (ref 13.6–17.2)
IMM GRANULOCYTES # BLD AUTO: 0.1 K/UL (ref 0–0.5)
IMM GRANULOCYTES NFR BLD AUTO: 0 % (ref 0–5)
LYMPHOCYTES # BLD: 1.3 K/UL (ref 0.5–4.6)
LYMPHOCYTES NFR BLD: 11 % (ref 13–44)
MCH RBC QN AUTO: 31.6 PG (ref 26.1–32.9)
MCHC RBC AUTO-ENTMCNC: 33 G/DL (ref 31.4–35)
MCV RBC AUTO: 95.9 FL (ref 79.6–97.8)
MONOCYTES # BLD: 0.7 K/UL (ref 0.1–1.3)
MONOCYTES NFR BLD: 6 % (ref 4–12)
NEUTS SEG # BLD: 9.5 K/UL (ref 1.7–8.2)
NEUTS SEG NFR BLD: 81 % (ref 43–78)
NRBC # BLD: 0 K/UL (ref 0–0.2)
PLATELET # BLD AUTO: 163 K/UL (ref 150–450)
PMV BLD AUTO: 10.8 FL (ref 9.4–12.3)
POTASSIUM SERPL-SCNC: 3.6 MMOL/L (ref 3.5–5.1)
RBC # BLD AUTO: 4.14 M/UL (ref 4.23–5.6)
SODIUM SERPL-SCNC: 139 MMOL/L (ref 136–145)
VANCOMYCIN TROUGH SERPL-MCNC: 10.3 UG/ML (ref 5–20)
WBC # BLD AUTO: 11.7 K/UL (ref 4.3–11.1)

## 2019-04-13 PROCEDURE — 74011000258 HC RX REV CODE- 258: Performed by: INTERNAL MEDICINE

## 2019-04-13 PROCEDURE — 74011000250 HC RX REV CODE- 250: Performed by: FAMILY MEDICINE

## 2019-04-13 PROCEDURE — 77010033678 HC OXYGEN DAILY

## 2019-04-13 PROCEDURE — 65270000029 HC RM PRIVATE

## 2019-04-13 PROCEDURE — 80048 BASIC METABOLIC PNL TOTAL CA: CPT

## 2019-04-13 PROCEDURE — 74011250636 HC RX REV CODE- 250/636: Performed by: FAMILY MEDICINE

## 2019-04-13 PROCEDURE — 94760 N-INVAS EAR/PLS OXIMETRY 1: CPT

## 2019-04-13 PROCEDURE — 94640 AIRWAY INHALATION TREATMENT: CPT

## 2019-04-13 PROCEDURE — 74011250637 HC RX REV CODE- 250/637: Performed by: FAMILY MEDICINE

## 2019-04-13 PROCEDURE — 74011250637 HC RX REV CODE- 250/637: Performed by: INTERNAL MEDICINE

## 2019-04-13 PROCEDURE — 85025 COMPLETE CBC W/AUTO DIFF WBC: CPT

## 2019-04-13 PROCEDURE — 74011250636 HC RX REV CODE- 250/636: Performed by: INTERNAL MEDICINE

## 2019-04-13 PROCEDURE — 80202 ASSAY OF VANCOMYCIN: CPT

## 2019-04-13 PROCEDURE — 36415 COLL VENOUS BLD VENIPUNCTURE: CPT

## 2019-04-13 RX ORDER — CEFAZOLIN SODIUM/WATER 2 G/20 ML
2 SYRINGE (ML) INTRAVENOUS EVERY 8 HOURS
Status: DISCONTINUED | OUTPATIENT
Start: 2019-04-13 | End: 2019-04-19

## 2019-04-13 RX ORDER — ALBUTEROL SULFATE 0.83 MG/ML
2.5 SOLUTION RESPIRATORY (INHALATION)
Status: DISCONTINUED | OUTPATIENT
Start: 2019-04-13 | End: 2019-04-20 | Stop reason: HOSPADM

## 2019-04-13 RX ADMIN — Medication 2 G: at 23:27

## 2019-04-13 RX ADMIN — GUAIFENESIN 200 MG: 200 TABLET ORAL at 08:26

## 2019-04-13 RX ADMIN — LORATADINE 5 MG: 10 TABLET ORAL at 21:01

## 2019-04-13 RX ADMIN — BUDESONIDE 500 MCG: 0.5 INHALANT RESPIRATORY (INHALATION) at 07:39

## 2019-04-13 RX ADMIN — Medication 1 TABLET: at 08:27

## 2019-04-13 RX ADMIN — CARVEDILOL 6.25 MG: 6.25 TABLET, FILM COATED ORAL at 08:27

## 2019-04-13 RX ADMIN — Medication 10 ML: at 05:17

## 2019-04-13 RX ADMIN — PIPERACILLIN AND TAZOBACTAM 3.38 G: 3; .375 INJECTION, POWDER, FOR SOLUTION INTRAVENOUS at 08:25

## 2019-04-13 RX ADMIN — GUAIFENESIN 200 MG: 200 TABLET ORAL at 21:01

## 2019-04-13 RX ADMIN — SENNOSIDES 8.6 MG: 8.6 TABLET, FILM COATED ORAL at 08:27

## 2019-04-13 RX ADMIN — CARVEDILOL 6.25 MG: 6.25 TABLET, FILM COATED ORAL at 17:30

## 2019-04-13 RX ADMIN — PHENYTOIN SODIUM 300 MG: 100 CAPSULE ORAL at 08:27

## 2019-04-13 RX ADMIN — Medication 2 G: at 16:10

## 2019-04-13 RX ADMIN — POTASSIUM CHLORIDE 20 MEQ: 20 TABLET, EXTENDED RELEASE ORAL at 08:27

## 2019-04-13 RX ADMIN — ROSUVASTATIN CALCIUM 40 MG: 20 TABLET, FILM COATED ORAL at 21:01

## 2019-04-13 RX ADMIN — MORPHINE SULFATE 2 MG: 2 INJECTION, SOLUTION INTRAMUSCULAR; INTRAVENOUS at 14:38

## 2019-04-13 RX ADMIN — ASPIRIN 81 MG: 81 TABLET, COATED ORAL at 08:27

## 2019-04-13 RX ADMIN — ALBUTEROL SULFATE 2.5 MG: 2.5 SOLUTION RESPIRATORY (INHALATION) at 07:39

## 2019-04-13 RX ADMIN — ACETAMINOPHEN 650 MG: 325 TABLET, FILM COATED ORAL at 00:23

## 2019-04-13 RX ADMIN — HYDROCODONE BITARTRATE AND ACETAMINOPHEN 2 TABLET: 5; 325 TABLET ORAL at 23:25

## 2019-04-13 RX ADMIN — ALBUTEROL SULFATE 2.5 MG: 2.5 SOLUTION RESPIRATORY (INHALATION) at 11:46

## 2019-04-13 RX ADMIN — MORPHINE SULFATE 2 MG: 2 INJECTION, SOLUTION INTRAMUSCULAR; INTRAVENOUS at 20:22

## 2019-04-13 RX ADMIN — TAMSULOSIN HYDROCHLORIDE 0.4 MG: 0.4 CAPSULE ORAL at 08:27

## 2019-04-13 RX ADMIN — SERTRALINE HYDROCHLORIDE 100 MG: 100 TABLET ORAL at 20:23

## 2019-04-13 RX ADMIN — HYDROCODONE BITARTRATE AND ACETAMINOPHEN 2 TABLET: 5; 325 TABLET ORAL at 17:30

## 2019-04-13 RX ADMIN — FOLIC ACID 1 MG: 1 TABLET ORAL at 08:27

## 2019-04-13 RX ADMIN — Medication 10 ML: at 21:02

## 2019-04-13 RX ADMIN — CLOPIDOGREL BISULFATE 75 MG: 75 TABLET, FILM COATED ORAL at 08:27

## 2019-04-13 RX ADMIN — MORPHINE SULFATE 2 MG: 2 INJECTION, SOLUTION INTRAMUSCULAR; INTRAVENOUS at 02:05

## 2019-04-13 RX ADMIN — BUDESONIDE 500 MCG: 0.5 INHALANT RESPIRATORY (INHALATION) at 19:51

## 2019-04-13 RX ADMIN — LISINOPRIL 40 MG: 20 TABLET ORAL at 08:27

## 2019-04-13 RX ADMIN — PHENYTOIN SODIUM 300 MG: 100 CAPSULE ORAL at 17:29

## 2019-04-13 RX ADMIN — VANCOMYCIN HYDROCHLORIDE 1500 MG: 10 INJECTION, POWDER, LYOPHILIZED, FOR SOLUTION INTRAVENOUS at 11:01

## 2019-04-13 RX ADMIN — PETROLATUM: 42 OINTMENT TOPICAL at 09:00

## 2019-04-13 RX ADMIN — HYDROCODONE BITARTRATE AND ACETAMINOPHEN 2 TABLET: 5; 325 TABLET ORAL at 11:39

## 2019-04-13 RX ADMIN — ENOXAPARIN SODIUM 40 MG: 40 INJECTION SUBCUTANEOUS at 21:01

## 2019-04-13 NOTE — PROGRESS NOTES
END OF SHIFT NOTE: 
 
INTAKE/OUTPUT 
04/12 0701 - 04/13 0700 In: 8785 [P.O.:240; I.V.:1178] Out: 2 [Urine:2] Voiding: YES. Candance Huger Candance Huger Incontinent Catheter: NO 
Drain:   
 
 
 
 
 
Flatus: Patient does have flatus present. Stool:  0 occurrences. Characteristics: 
  
Emesis: 0 occurrences. Characteristics: VITAL SIGNS Patient Vitals for the past 12 hrs: 
 Temp Pulse Resp BP SpO2  
04/13/19 0459 97.5 °F (36.4 °C) 95 17 155/77 90 % 04/13/19 0104 99.1 °F (37.3 °C) 91 18 156/78 95 % 04/12/19 2043 98.8 °F (37.1 °C) 91 18 133/80 91 % 04/12/19 1923     91 % Pain Assessment Pain Intensity 1: 0 (04/13/19 0310) Pain Location 1: Leg 
Pain Intervention(s) 1: Medication (see MAR) Patient Stated Pain Goal: 0 Ambulating No 
 
Shift report given to oncoming nurse at the bedside.  
 
Isa Presley RN

## 2019-04-13 NOTE — PROGRESS NOTES
Visit with patient to build rapport with . Patient is calm. Receptive to  presence. Encouraged and assured patient of our continued care. Jeffrey Borjas,  Staff  C: [phone]  /  [email or web address]

## 2019-04-13 NOTE — PROGRESS NOTES
Pharmacokinetic Consult to Pharmacist 
 
Marianela Lisa is a 80 y.o. male being treated for skin and soft tissue infection with vancomycin. Height: 5' 11\" (180.3 cm)  Weight: 102.1 kg (225 lb) Lab Results Component Value Date/Time BUN 12 04/13/2019 05:02 AM  
 Creatinine 0.51 (L) 04/13/2019 05:02 AM  
 WBC 11.7 (H) 04/13/2019 05:02 AM  
 Lactic Acid (POC) 2.92 (H) 04/10/2019 04:14 PM  
  
Estimated Creatinine Clearance: 171 mL/min (A) (based on SCr of 0.51 mg/dL (L)). CULTURES: 
All Micro Results Procedure Component Value Units Date/Time CULTURE, BLOOD [[id number]] Collected:  04/10/19 2015 Order Status:  Completed Specimen:  Blood Updated:  04/13/19 6907 Special Requests: --     
  RIGHT Antecubital 
  
  Culture result: NO GROWTH 3 DAYS     
 CULTURE, BLOOD [[id number]] Collected:  04/10/19 1750 Order Status:  Completed Specimen:  Blood Updated:  04/13/19 6778 Special Requests: --     
  LEFT 
JUGULAR VEIN Culture result: NO GROWTH 3 DAYS Lab Results Component Value Date/Time Vancomycin,trough 10.3 04/13/2019 11:23 AM  
 
 
 
Day 3 of vancomycin. Goal trough is 10-20. I will continue current dosing regimen. Trough within target range based on indication. Will continue to follow patient. Thank you, Myriam Billings, PharmD, [facility] Clinical Pharmacist 
[phone]

## 2019-04-13 NOTE — PROGRESS NOTES
Hospitalist Progress Note 2019 Admit Date: 4/10/2019  4:41 PM  
NAME: Luba Redd :  1950 DOS:              19 MRN:  [id number] Attending: Agata Huffman MD 
PCP:  Tobias Monreal MD 
Treatment Team: Attending Provider: Levar Cardoza MD; Utilization Review: Frederick Banks RN; Care Manager: Karol Ferrer RN; Consulting Provider: Angie Patrick MD 
 
Full Code SUBJECTIVE: As previously documented: 73yoM with PMH significant for prior CVA with residual L sided hemiparesis, COPD on home O2 2L NC who presents with pain and swelling of LLE. Patient admitted for sepsis secondary to cellulitis of LLE and started on IV abxs. Patient was recently discharged on 2019 for cellulitis of LLE, MRI was neg for OM. Vascular consulted for recurrent cellulitis with suspected PVD. 
 
 19    Luba Redd  Stated pain on RLE is controlled and redness is improving. 10+ ROS reviewed and negative except for positive in HPI. Allergies Allergen Reactions  Latex Rash  Adhesive Unknown (comments)  Sulfa (Sulfonamide Antibiotics) Other (comments) Current Facility-Administered Medications Medication Dose Route Frequency  albuterol (PROVENTIL VENTOLIN) nebulizer solution 2.5 mg  2.5 mg Nebulization Q6H PRN  
 HYDROcodone-acetaminophen (NORCO) 5-325 mg per tablet 2 Tab  2 Tab Oral Q4H PRN  
 morphine injection 2 mg  2 mg IntraVENous Q4H PRN  mineral oil-hydrophil petrolat (AQUAPHOR) ointment   Topical DAILY  piperacillin-tazobactam (ZOSYN) 3.375 g in 0.9% sodium chloride (MBP/ADV) 100 mL  3.375 g IntraVENous Q8H  
 vancomycin (VANCOCIN) 1500 mg in  ml infusion  1,500 mg IntraVENous Q12H  
 albuterol (PROVENTIL VENTOLIN) nebulizer solution 2.5 mg  2.5 mg Nebulization QID RT  
 aspirin delayed-release tablet 81 mg  81 mg Oral DAILY  carvedilol (COREG) tablet 6.25 mg  6.25 mg Oral BID  
  clopidogrel (PLAVIX) tablet 75 mg  75 mg Oral DAILY  budesonide (PULMICORT) 500 mcg/2 ml nebulizer suspension  500 mcg Nebulization BID RT  
 folic acid (FOLVITE) tablet 1 mg  1 mg Oral DAILY  guaiFENesin (ORGANIDIN) tablet 200 mg  200 mg Oral TID  lisinopril (PRINIVIL, ZESTRIL) tablet 40 mg  40 mg Oral DAILY  loratadine (CLARITIN) tablet 5 mg  5 mg Oral QHS  B complex-vitamin C-folic acid (NEPHRO-ALICIA) 0.8 mg tab  1 Tab Oral DAILY  phenytoin ER (DILANTIN ER) ER capsule 300 mg  300 mg Oral BID  rosuvastatin (CRESTOR) tablet 40 mg  40 mg Oral QHS  senna (SENOKOT) tablet 8.6 mg  1 Tab Oral DAILY  sertraline (ZOLOFT) tablet 100 mg  100 mg Oral QHS  tamsulosin (FLOMAX) capsule 0.4 mg  0.4 mg Oral DAILY  LORazepam (ATIVAN) tablet 0.5 mg  0.5 mg Oral TID PRN  potassium chloride (K-DUR, KLOR-CON) SR tablet 20 mEq  20 mEq Oral DAILY  polyethylene glycol (MIRALAX) packet 17 g  17 g Oral DAILY PRN  
 sodium chloride (NS) flush 5-40 mL  5-40 mL IntraVENous Q8H  
 sodium chloride (NS) flush 5-40 mL  5-40 mL IntraVENous PRN  
 acetaminophen (TYLENOL) tablet 650 mg  650 mg Oral Q4H PRN  
 ondansetron (ZOFRAN) injection 4 mg  4 mg IntraVENous Q4H PRN  
 enoxaparin (LOVENOX) injection 40 mg  40 mg SubCUTAneous Q24H Immunization History Administered Date(s) Administered  TB Skin Test (PPD) Intradermal 02/25/2019 Objective:  
 
Patient Vitals for the past 24 hrs: 
 Temp Pulse Resp BP SpO2  
04/13/19 1149     94 % 04/13/19 1136 98 °F (36.7 °C) 89 18 146/73 96 % 04/13/19 0804 97.9 °F (36.6 °C) 95 18 150/81 96 % 04/13/19 0741     92 % 04/13/19 0459 97.5 °F (36.4 °C) 95 17 155/77 90 % 04/13/19 0104 99.1 °F (37.3 °C) 91 18 156/78 95 % 04/12/19 2043 98.8 °F (37.1 °C) 91 18 133/80 91 % 04/12/19 1923     91 % 04/12/19 1608 97.3 °F (36.3 °C) 90 18 99/60 91 % 04/12/19 1550     93 % Temp (24hrs), Av.1 °F (36.7 °C), Min:97.3 °F (36.3 °C), Max:99.1 °F (37.3 °C) Oxygen Therapy O2 Sat (%): 94 % (19) Pulse via Oximetry: 89 beats per minute (19) O2 Device: Nasal cannula (19) O2 Flow Rate (L/min): 2 l/min (19) FIO2 (%): 32 % (19) Oxygen Therapy O2 Sat (%): 94 % (19) Pulse via Oximetry: 89 beats per minute (19) O2 Device: Nasal cannula (19) O2 Flow Rate (L/min): 2 l/min (19) FIO2 (%): 32 % (19) Physical Exam: 
General:         Alert, cooperative, no distress HEENT:               NCAT. No obvious deformity. Lungs:                 Few expiratory wheezing b/l. Cardiovascular:   RRR. No m/r/g. No pedal edema b/l. Abdomen:       S/nt/nd. Bowel sounds normal. .  
Skin:         LLE with erythema from foot to mid leg improving. No lesion noted. No drainage. Neurologic:     No gross focal deficit. Psychiatric:         Good mood. Normal affect. DIAGNOSTIC STUDIES Data Review:  
Recent Results (from the past 24 hour(s)) METABOLIC PANEL, BASIC Collection Time: 19  5:02 AM  
Result Value Ref Range Sodium 139 136 - 145 mmol/L Potassium 3.6 3.5 - 5.1 mmol/L Chloride 107 98 - 107 mmol/L  
 CO2 23 21 - 32 mmol/L Anion gap 9 7 - 16 mmol/L Glucose 117 (H) 65 - 100 mg/dL BUN 12 8 - 23 MG/DL Creatinine 0.51 (L) 0.8 - 1.5 MG/DL  
 GFR est AA >60 >60 ml/min/1.73m2 GFR est non-AA >60 >60 ml/min/1.73m2 Calcium 8.5 8.3 - 10.4 MG/DL  
CBC WITH AUTOMATED DIFF Collection Time: 19  5:02 AM  
Result Value Ref Range WBC 11.7 (H) 4.3 - 11.1 K/uL  
 RBC 4.14 (L) 4.23 - 5.6 M/uL  
 HGB 13.1 (L) 13.6 - 17.2 g/dL HCT 39.7 (L) 41.1 - 50.3 % MCV 95.9 79.6 - 97.8 FL  
 MCH 31.6 26.1 - 32.9 PG  
 MCHC 33.0 31.4 - 35.0 g/dL  
 RDW 11.9 11.9 - 14.6 % PLATELET 649 725 - 136 K/uL  MPV 10.8 9.4 - 12.3 FL  
 ABSOLUTE NRBC 0.00 0.0 - 0.2 K/uL  
 DF AUTOMATED NEUTROPHILS 81 (H) 43 - 78 % LYMPHOCYTES 11 (L) 13 - 44 % MONOCYTES 6 4.0 - 12.0 % EOSINOPHILS 1 0.5 - 7.8 % BASOPHILS 0 0.0 - 2.0 % IMMATURE GRANULOCYTES 0 0.0 - 5.0 %  
 ABS. NEUTROPHILS 9.5 (H) 1.7 - 8.2 K/UL  
 ABS. LYMPHOCYTES 1.3 0.5 - 4.6 K/UL  
 ABS. MONOCYTES 0.7 0.1 - 1.3 K/UL  
 ABS. EOSINOPHILS 0.1 0.0 - 0.8 K/UL  
 ABS. BASOPHILS 0.0 0.0 - 0.2 K/UL  
 ABS. IMM. GRANS. 0.1 0.0 - 0.5 K/UL  
VANCOMYCIN, TROUGH Collection Time: 04/13/19 11:23 AM  
Result Value Ref Range Vancomycin,trough 10.3 5 - 20 ug/mL All Micro Results Procedure Component Value Units Date/Time CULTURE, BLOOD [[id number]] Collected:  04/10/19 2015 Order Status:  Completed Specimen:  Blood Updated:  04/13/19 4148 Special Requests: --     
  RIGHT Antecubital 
  
  Culture result: NO GROWTH 3 DAYS     
 CULTURE, BLOOD [[id number]] Collected:  04/10/19 1750 Order Status:  Completed Specimen:  Blood Updated:  04/13/19 7525 Special Requests: --     
  LEFT 
JUGULAR VEIN Culture result: NO GROWTH 3 DAYS Imaging Reeda Eduardo /Studies: CXR Results  (Last 48 hours) None CT Results  (Last 48 hours) None No results found. No results found for this visit on 04/10/19. Labs and Studies from previous 24 hours have been personally reviewed by myself   
ASSESSMENT Active Hospital Problems Diagnosis Date Noted  COPD (chronic obstructive pulmonary disease) ([facility] [address] 04/10/2019  Cellulitis 02/21/2019  Essential hypertension 07/14/2018  Left hemiplegia ([facility] [address] 07/14/2018 Hospital Problems as of 4/13/2019 Date Reviewed: 3/4/2019 Codes Class Noted - Resolved POA  
 COPD (chronic obstructive pulmonary disease) ([facility] [address] ICD-10-CM: J44.9 ICD-9-CM: 191  4/10/2019 - Present Unknown * (Principal) Cellulitis ICD-10-CM: L03.90 ICD-9-CM: 682.9  2/21/2019 - Present Unknown Essential hypertension ICD-10-CM: I10 
ICD-9-CM: 401.9  7/14/2018 - Present Yes Left hemiplegia ([address] ICD-10-CM: G81.94 
ICD-9-CM: 342.90  7/14/2018 - Present Yes A/P: 
 
-Sepsis secondary to cellulitis Clinically improving BC NGTD X-ray LLE/ foot with no acute findings Vascular evaluation appreciated. US with severe L superficial femoral artery stenosis. Dr. Erwin Carney will review imaging On  vanco and zosyn day #3 Switch abxs to cefazolin IV 
 
-COPD On O2  at home Cont  Nebs PRN and wean O2 as tolerated -HTN Controlled Cont home meds DVT Prophylaxis: lovenox CODE Status: Full Plan of Care Discussed with: patient. Care team. 
 
 
 
 
Stefan Allen MD 
04/13/19

## 2019-04-14 LAB
ANION GAP SERPL CALC-SCNC: 7 MMOL/L (ref 7–16)
BASOPHILS # BLD: 0 K/UL (ref 0–0.2)
BASOPHILS NFR BLD: 0 % (ref 0–2)
BUN SERPL-MCNC: 10 MG/DL (ref 8–23)
CALCIUM SERPL-MCNC: 8.7 MG/DL (ref 8.3–10.4)
CHLORIDE SERPL-SCNC: 106 MMOL/L (ref 98–107)
CO2 SERPL-SCNC: 26 MMOL/L (ref 21–32)
CREAT SERPL-MCNC: 0.52 MG/DL (ref 0.8–1.5)
DIFFERENTIAL METHOD BLD: ABNORMAL
EOSINOPHIL # BLD: 0.2 K/UL (ref 0–0.8)
EOSINOPHIL NFR BLD: 2 % (ref 0.5–7.8)
ERYTHROCYTE [DISTWIDTH] IN BLOOD BY AUTOMATED COUNT: 11.7 % (ref 11.9–14.6)
GLUCOSE SERPL-MCNC: 105 MG/DL (ref 65–100)
HCT VFR BLD AUTO: 37.3 % (ref 41.1–50.3)
HGB BLD-MCNC: 12.5 G/DL (ref 13.6–17.2)
IMM GRANULOCYTES # BLD AUTO: 0.1 K/UL (ref 0–0.5)
IMM GRANULOCYTES NFR BLD AUTO: 1 % (ref 0–5)
LYMPHOCYTES # BLD: 2 K/UL (ref 0.5–4.6)
LYMPHOCYTES NFR BLD: 22 % (ref 13–44)
MCH RBC QN AUTO: 31.2 PG (ref 26.1–32.9)
MCHC RBC AUTO-ENTMCNC: 33.5 G/DL (ref 31.4–35)
MCV RBC AUTO: 93 FL (ref 79.6–97.8)
MONOCYTES # BLD: 0.6 K/UL (ref 0.1–1.3)
MONOCYTES NFR BLD: 6 % (ref 4–12)
NEUTS SEG # BLD: 6.3 K/UL (ref 1.7–8.2)
NEUTS SEG NFR BLD: 69 % (ref 43–78)
NRBC # BLD: 0 K/UL (ref 0–0.2)
PLATELET # BLD AUTO: 206 K/UL (ref 150–450)
PMV BLD AUTO: 10 FL (ref 9.4–12.3)
POTASSIUM SERPL-SCNC: 3.3 MMOL/L (ref 3.5–5.1)
RBC # BLD AUTO: 4.01 M/UL (ref 4.23–5.6)
SODIUM SERPL-SCNC: 139 MMOL/L (ref 136–145)
WBC # BLD AUTO: 9.1 K/UL (ref 4.3–11.1)

## 2019-04-14 PROCEDURE — 65270000029 HC RM PRIVATE

## 2019-04-14 PROCEDURE — 94760 N-INVAS EAR/PLS OXIMETRY 1: CPT

## 2019-04-14 PROCEDURE — 94640 AIRWAY INHALATION TREATMENT: CPT

## 2019-04-14 PROCEDURE — 74011250636 HC RX REV CODE- 250/636: Performed by: FAMILY MEDICINE

## 2019-04-14 PROCEDURE — 77010033678 HC OXYGEN DAILY

## 2019-04-14 PROCEDURE — 85025 COMPLETE CBC W/AUTO DIFF WBC: CPT

## 2019-04-14 PROCEDURE — 36415 COLL VENOUS BLD VENIPUNCTURE: CPT

## 2019-04-14 PROCEDURE — 74011250636 HC RX REV CODE- 250/636: Performed by: INTERNAL MEDICINE

## 2019-04-14 PROCEDURE — 80048 BASIC METABOLIC PNL TOTAL CA: CPT

## 2019-04-14 PROCEDURE — 74011250637 HC RX REV CODE- 250/637: Performed by: FAMILY MEDICINE

## 2019-04-14 PROCEDURE — 74011000250 HC RX REV CODE- 250: Performed by: FAMILY MEDICINE

## 2019-04-14 PROCEDURE — 74011250637 HC RX REV CODE- 250/637: Performed by: INTERNAL MEDICINE

## 2019-04-14 RX ORDER — FACIAL-BODY WIPES
10 EACH TOPICAL DAILY PRN
Status: DISCONTINUED | OUTPATIENT
Start: 2019-04-14 | End: 2019-04-20 | Stop reason: HOSPADM

## 2019-04-14 RX ORDER — POTASSIUM CHLORIDE 20 MEQ/1
40 TABLET, EXTENDED RELEASE ORAL
Status: COMPLETED | OUTPATIENT
Start: 2019-04-14 | End: 2019-04-14

## 2019-04-14 RX ADMIN — BUDESONIDE 500 MCG: 0.5 INHALANT RESPIRATORY (INHALATION) at 07:55

## 2019-04-14 RX ADMIN — ASPIRIN 81 MG: 81 TABLET, COATED ORAL at 08:38

## 2019-04-14 RX ADMIN — LORATADINE 5 MG: 10 TABLET ORAL at 21:08

## 2019-04-14 RX ADMIN — MORPHINE SULFATE 2 MG: 2 INJECTION, SOLUTION INTRAMUSCULAR; INTRAVENOUS at 16:42

## 2019-04-14 RX ADMIN — Medication 1 TABLET: at 08:38

## 2019-04-14 RX ADMIN — POTASSIUM CHLORIDE 20 MEQ: 20 TABLET, EXTENDED RELEASE ORAL at 08:38

## 2019-04-14 RX ADMIN — MORPHINE SULFATE 2 MG: 2 INJECTION, SOLUTION INTRAMUSCULAR; INTRAVENOUS at 05:23

## 2019-04-14 RX ADMIN — GUAIFENESIN 200 MG: 200 TABLET ORAL at 08:39

## 2019-04-14 RX ADMIN — ACETAMINOPHEN 650 MG: 325 TABLET, FILM COATED ORAL at 20:21

## 2019-04-14 RX ADMIN — CARVEDILOL 6.25 MG: 6.25 TABLET, FILM COATED ORAL at 16:43

## 2019-04-14 RX ADMIN — Medication 10 ML: at 05:27

## 2019-04-14 RX ADMIN — PHENYTOIN SODIUM 300 MG: 100 CAPSULE ORAL at 08:39

## 2019-04-14 RX ADMIN — GUAIFENESIN 200 MG: 200 TABLET ORAL at 15:26

## 2019-04-14 RX ADMIN — SENNOSIDES 8.6 MG: 8.6 TABLET, FILM COATED ORAL at 08:38

## 2019-04-14 RX ADMIN — Medication 2 G: at 23:44

## 2019-04-14 RX ADMIN — PHENYTOIN SODIUM 300 MG: 100 CAPSULE ORAL at 16:43

## 2019-04-14 RX ADMIN — MORPHINE SULFATE 2 MG: 2 INJECTION, SOLUTION INTRAMUSCULAR; INTRAVENOUS at 21:55

## 2019-04-14 RX ADMIN — MORPHINE SULFATE 2 MG: 2 INJECTION, SOLUTION INTRAMUSCULAR; INTRAVENOUS at 12:09

## 2019-04-14 RX ADMIN — CLOPIDOGREL BISULFATE 75 MG: 75 TABLET, FILM COATED ORAL at 08:38

## 2019-04-14 RX ADMIN — POLYETHYLENE GLYCOL 3350 17 G: 17 POWDER, FOR SOLUTION ORAL at 11:00

## 2019-04-14 RX ADMIN — TAMSULOSIN HYDROCHLORIDE 0.4 MG: 0.4 CAPSULE ORAL at 08:38

## 2019-04-14 RX ADMIN — SERTRALINE HYDROCHLORIDE 100 MG: 100 TABLET ORAL at 20:21

## 2019-04-14 RX ADMIN — Medication 2 G: at 08:37

## 2019-04-14 RX ADMIN — ENOXAPARIN SODIUM 40 MG: 40 INJECTION SUBCUTANEOUS at 21:07

## 2019-04-14 RX ADMIN — GUAIFENESIN 200 MG: 200 TABLET ORAL at 21:08

## 2019-04-14 RX ADMIN — POTASSIUM CHLORIDE 40 MEQ: 20 TABLET, EXTENDED RELEASE ORAL at 10:58

## 2019-04-14 RX ADMIN — BUDESONIDE 500 MCG: 0.5 INHALANT RESPIRATORY (INHALATION) at 20:29

## 2019-04-14 RX ADMIN — HYDROCODONE BITARTRATE AND ACETAMINOPHEN 2 TABLET: 5; 325 TABLET ORAL at 15:26

## 2019-04-14 RX ADMIN — MORPHINE SULFATE 2 MG: 2 INJECTION, SOLUTION INTRAMUSCULAR; INTRAVENOUS at 01:12

## 2019-04-14 RX ADMIN — FOLIC ACID 1 MG: 1 TABLET ORAL at 08:38

## 2019-04-14 RX ADMIN — ROSUVASTATIN CALCIUM 40 MG: 20 TABLET, FILM COATED ORAL at 21:08

## 2019-04-14 RX ADMIN — PETROLATUM: 42 OINTMENT TOPICAL at 08:39

## 2019-04-14 RX ADMIN — Medication 10 ML: at 21:09

## 2019-04-14 RX ADMIN — LISINOPRIL 40 MG: 20 TABLET ORAL at 08:38

## 2019-04-14 RX ADMIN — Medication 2 G: at 15:26

## 2019-04-14 RX ADMIN — CARVEDILOL 6.25 MG: 6.25 TABLET, FILM COATED ORAL at 08:38

## 2019-04-14 NOTE — PROGRESS NOTES
Hospitalist Progress Note 2019 Admit Date: 4/10/2019  4:41 PM  
NAME: Roger Burr :  1950 DOS:              19 MRN:  [id number] Attending: Arron Hughes MD 
PCP:  Ermias Wood MD 
Treatment Team: Attending Provider: Alfredo iMchelle MD; Utilization Review: Xander Hurt RN; Care Manager: Bruna Will RN; Consulting Provider: Bob Sosa MD 
 
Full Code SUBJECTIVE: As previously documented: 73yoM with PMH significant for prior CVA with residual L sided hemiparesis, COPD on home O2 2L NC who presents with pain and swelling of LLE. Patient admitted for sepsis secondary to cellulitis of LLE and started on IV abxs. Patient was recently discharged on 2019 for cellulitis of LLE, MRI was neg for OM. Vascular consulted for recurrent cellulitis with suspected PVD. 19    Roger Burr  Stated he had a good night of sleep. Wife at bedside all questions answered. 10+ ROS reviewed and negative except for positive in HPI. Allergies Allergen Reactions  Latex Rash  Adhesive Unknown (comments)  Sulfa (Sulfonamide Antibiotics) Other (comments) Current Facility-Administered Medications Medication Dose Route Frequency  ceFAZolin (ANCEF) 2 g/20 mL in sterile water IV syringe  2 g IntraVENous Q8H  
 albuterol (PROVENTIL VENTOLIN) nebulizer solution 2.5 mg  2.5 mg Nebulization Q6H PRN  
 HYDROcodone-acetaminophen (NORCO) 5-325 mg per tablet 2 Tab  2 Tab Oral Q4H PRN  
 morphine injection 2 mg  2 mg IntraVENous Q4H PRN  mineral oil-hydrophil petrolat (AQUAPHOR) ointment   Topical DAILY  aspirin delayed-release tablet 81 mg  81 mg Oral DAILY  carvedilol (COREG) tablet 6.25 mg  6.25 mg Oral BID  clopidogrel (PLAVIX) tablet 75 mg  75 mg Oral DAILY  budesonide (PULMICORT) 500 mcg/2 ml nebulizer suspension  500 mcg Nebulization BID RT  
 folic acid (FOLVITE) tablet 1 mg  1 mg Oral DAILY  guaiFENesin (ORGANIDIN) tablet 200 mg  200 mg Oral TID  lisinopril (PRINIVIL, ZESTRIL) tablet 40 mg  40 mg Oral DAILY  loratadine (CLARITIN) tablet 5 mg  5 mg Oral QHS  B complex-vitamin C-folic acid (NEPHRO-ALICIA) 0.8 mg tab  1 Tab Oral DAILY  phenytoin ER (DILANTIN ER) ER capsule 300 mg  300 mg Oral BID  rosuvastatin (CRESTOR) tablet 40 mg  40 mg Oral QHS  senna (SENOKOT) tablet 8.6 mg  1 Tab Oral DAILY  sertraline (ZOLOFT) tablet 100 mg  100 mg Oral QHS  tamsulosin (FLOMAX) capsule 0.4 mg  0.4 mg Oral DAILY  LORazepam (ATIVAN) tablet 0.5 mg  0.5 mg Oral TID PRN  potassium chloride (K-DUR, KLOR-CON) SR tablet 20 mEq  20 mEq Oral DAILY  polyethylene glycol (MIRALAX) packet 17 g  17 g Oral DAILY PRN  
 sodium chloride (NS) flush 5-40 mL  5-40 mL IntraVENous Q8H  
 sodium chloride (NS) flush 5-40 mL  5-40 mL IntraVENous PRN  
 acetaminophen (TYLENOL) tablet 650 mg  650 mg Oral Q4H PRN  
 ondansetron (ZOFRAN) injection 4 mg  4 mg IntraVENous Q4H PRN  
 enoxaparin (LOVENOX) injection 40 mg  40 mg SubCUTAneous Q24H Immunization History Administered Date(s) Administered  TB Skin Test (PPD) Intradermal 2019 Objective:  
 
Patient Vitals for the past 24 hrs: 
 Temp Pulse Resp BP SpO2  
19 0757     90 % 19 0722 98.2 °F (36.8 °C) 67 19 125/68 99 % 19 0312 98.3 °F (36.8 °C) 84 18 125/62 91 % 19 0005 98.7 °F (37.1 °C) 90 17 127/74 92 % 19 1950     92 % 19 1907 98.4 °F (36.9 °C) 85 18 96/60 92 % 19 1514 98.1 °F (36.7 °C) 85 18 114/67 96 % 19 1149     94 % 19 1136 98 °F (36.7 °C) 89 18 146/73 96 % Temp (24hrs), Av.3 °F (36.8 °C), Min:98 °F (36.7 °C), Max:98.7 °F (37.1 °C) Oxygen Therapy O2 Sat (%): 90 % (19) Pulse via Oximetry: 92 beats per minute (19) O2 Device: Nasal cannula (19) O2 Flow Rate (L/min): 2 l/min (19) FIO2 (%): 32 % (04/12/19 1923) Oxygen Therapy O2 Sat (%): 90 % (04/14/19 0757) Pulse via Oximetry: 92 beats per minute (04/14/19 0757) O2 Device: Nasal cannula (04/14/19 0757) O2 Flow Rate (L/min): 2 l/min (04/14/19 0757) FIO2 (%): 32 % (04/12/19 1923) Physical Exam: 
General:         Alert, cooperative, no distress HEENT:               NCAT. No obvious deformity. Lungs:                 Few expiratory wheezing b/l. Cardiovascular:   RRR. No m/r/g. No pedal edema b/l. Abdomen:       S/nt/nd. Bowel sounds normal. .  
Skin:         LLE with erythema from foot to mid leg improving. No lesion noted. No drainage. Neurologic:     No gross focal deficit. Psychiatric:         Good mood. Normal affect. DIAGNOSTIC STUDIES Data Review:  
Recent Results (from the past 24 hour(s)) Bradford Lie Collection Time: 04/13/19 11:23 AM  
Result Value Ref Range Vancomycin,trough 10.3 5 - 20 ug/mL CBC WITH AUTOMATED DIFF Collection Time: 04/14/19  5:26 AM  
Result Value Ref Range WBC 9.1 4.3 - 11.1 K/uL  
 RBC 4.01 (L) 4.23 - 5.6 M/uL  
 HGB 12.5 (L) 13.6 - 17.2 g/dL HCT 37.3 (L) 41.1 - 50.3 % MCV 93.0 79.6 - 97.8 FL  
 MCH 31.2 26.1 - 32.9 PG  
 MCHC 33.5 31.4 - 35.0 g/dL  
 RDW 11.7 (L) 11.9 - 14.6 % PLATELET 971 988 - 116 K/uL MPV 10.0 9.4 - 12.3 FL ABSOLUTE NRBC 0.00 0.0 - 0.2 K/uL  
 DF AUTOMATED NEUTROPHILS 69 43 - 78 % LYMPHOCYTES 22 13 - 44 % MONOCYTES 6 4.0 - 12.0 % EOSINOPHILS 2 0.5 - 7.8 % BASOPHILS 0 0.0 - 2.0 % IMMATURE GRANULOCYTES 1 0.0 - 5.0 %  
 ABS. NEUTROPHILS 6.3 1.7 - 8.2 K/UL  
 ABS. LYMPHOCYTES 2.0 0.5 - 4.6 K/UL  
 ABS. MONOCYTES 0.6 0.1 - 1.3 K/UL  
 ABS. EOSINOPHILS 0.2 0.0 - 0.8 K/UL  
 ABS. BASOPHILS 0.0 0.0 - 0.2 K/UL  
 ABS. IMM. GRANS. 0.1 0.0 - 0.5 K/UL METABOLIC PANEL, BASIC Collection Time: 04/14/19  5:26 AM  
Result Value Ref Range Sodium 139 136 - 145 mmol/L  Potassium 3.3 (L) 3.5 - 5.1 mmol/L  
 Chloride 106 98 - 107 mmol/L  
 CO2 26 21 - 32 mmol/L Anion gap 7 7 - 16 mmol/L Glucose 105 (H) 65 - 100 mg/dL BUN 10 8 - 23 MG/DL Creatinine 0.52 (L) 0.8 - 1.5 MG/DL  
 GFR est AA >60 >60 ml/min/1.73m2 GFR est non-AA >60 >60 ml/min/1.73m2 Calcium 8.7 8.3 - 10.4 MG/DL All Micro Results Procedure Component Value Units Date/Time CULTURE, BLOOD [[id number]] Collected:  04/10/19 1750 Order Status:  Completed Specimen:  Blood Updated:  04/14/19 0086 Special Requests: --     
  LEFT 
JUGULAR VEIN Culture result: NO GROWTH 4 DAYS     
 CULTURE, BLOOD [[id number]] Collected:  04/10/19 2015 Order Status:  Completed Specimen:  Blood Updated:  04/14/19 7908 Special Requests: --     
  RIGHT Antecubital 
  
  Culture result: NO GROWTH 4 DAYS Imaging Doylene Guthrie /Studies: CXR Results  (Last 48 hours) None CT Results  (Last 48 hours) None No results found. No results found for this visit on 04/10/19. Labs and Studies from previous 24 hours have been personally reviewed by myself   
ASSESSMENT Active Hospital Problems Diagnosis Date Noted  COPD (chronic obstructive pulmonary disease) ([address] 04/10/2019  Cellulitis 02/21/2019  Essential hypertension 07/14/2018  Left hemiplegia ([address] 07/14/2018 Hospital Problems as of 4/14/2019 Date Reviewed: 3/4/2019 Codes Class Noted - Resolved POA  
 COPD (chronic obstructive pulmonary disease) ([address] ICD-10-CM: J44.9 ICD-9-CM: 477  4/10/2019 - Present Unknown * (Principal) Cellulitis ICD-10-CM: L03.90 ICD-9-CM: 682.9  2/21/2019 - Present Unknown Essential hypertension ICD-10-CM: I10 
ICD-9-CM: 401.9  7/14/2018 - Present Yes Left hemiplegia ([address] ICD-10-CM: G81.94 
ICD-9-CM: 342.90  7/14/2018 - Present Yes A/P: 
 
-Sepsis secondary to cellulitis BC NGTD X-ray LLE/ foot with no acute findings Vascular evaluation appreciated. US with severe L superficial femoral artery stenosis. Dr. Magana Alt will review imaging Was on  vanco and zosyn for 3 days Cont  cefazolin IV total day abxs #4 PT/OT evaluation 
 
-COPD On O2  at home Cont  Nebs PRN and wean O2 as tolerated -HTN Controlled Cont home meds DVT Prophylaxis: lovenox CODE Status: Full Plan of Care Discussed with: patient. Care team. 
 
 
 
 
Lesa Keating MD 
04/14/19

## 2019-04-14 NOTE — PROGRESS NOTES
END OF SHIFT NOTE: 
 
INTAKE/OUTPUT 
04/13 0701 - 04/14 0700 In: 240 [P.O.:240] Out: 200 [Urine:200] Voiding: YES Catheter: NO 
Drain:   
 
 
 
 
 
Flatus: Patient does have flatus present. Stool:  1 occurrences. Characteristics: 
  
Emesis: 0 occurrences. Characteristics: VITAL SIGNS Patient Vitals for the past 12 hrs: 
 Temp Pulse Resp BP SpO2  
04/14/19 0312 98.3 °F (36.8 °C) 84 18 125/62 91 % 04/14/19 0005 98.7 °F (37.1 °C) 90 17 127/74 92 % 04/1950     92 % 04/13/19 1907 98.4 °F (36.9 °C) 85 18 96/60 92 % Pain Assessment Pain Intensity 1: 0 (04/14/19 7986) Pain Location 1: Leg 
Pain Intervention(s) 1: Medication (see MAR) Patient Stated Pain Goal: 0 Ambulating No 
 
Shift report given to oncoming nurse at the bedside.  
 
Erasmo Heath RN

## 2019-04-15 LAB
ANION GAP SERPL CALC-SCNC: 8 MMOL/L (ref 7–16)
BACTERIA SPEC CULT: NORMAL
BACTERIA SPEC CULT: NORMAL
BASOPHILS # BLD: 0 K/UL (ref 0–0.2)
BASOPHILS NFR BLD: 0 % (ref 0–2)
BUN SERPL-MCNC: 12 MG/DL (ref 8–23)
CALCIUM SERPL-MCNC: 8.8 MG/DL (ref 8.3–10.4)
CHLORIDE SERPL-SCNC: 105 MMOL/L (ref 98–107)
CO2 SERPL-SCNC: 28 MMOL/L (ref 21–32)
CREAT SERPL-MCNC: 0.49 MG/DL (ref 0.8–1.5)
DIFFERENTIAL METHOD BLD: ABNORMAL
EOSINOPHIL # BLD: 0.2 K/UL (ref 0–0.8)
EOSINOPHIL NFR BLD: 2 % (ref 0.5–7.8)
ERYTHROCYTE [DISTWIDTH] IN BLOOD BY AUTOMATED COUNT: 11.9 % (ref 11.9–14.6)
GLUCOSE SERPL-MCNC: 130 MG/DL (ref 65–100)
HCT VFR BLD AUTO: 38.9 % (ref 41.1–50.3)
HGB BLD-MCNC: 13 G/DL (ref 13.6–17.2)
IMM GRANULOCYTES # BLD AUTO: 0.1 K/UL (ref 0–0.5)
IMM GRANULOCYTES NFR BLD AUTO: 1 % (ref 0–5)
LYMPHOCYTES # BLD: 1.7 K/UL (ref 0.5–4.6)
LYMPHOCYTES NFR BLD: 16 % (ref 13–44)
MCH RBC QN AUTO: 31 PG (ref 26.1–32.9)
MCHC RBC AUTO-ENTMCNC: 33.4 G/DL (ref 31.4–35)
MCV RBC AUTO: 92.8 FL (ref 79.6–97.8)
MONOCYTES # BLD: 0.7 K/UL (ref 0.1–1.3)
MONOCYTES NFR BLD: 7 % (ref 4–12)
NEUTS SEG # BLD: 7.9 K/UL (ref 1.7–8.2)
NEUTS SEG NFR BLD: 74 % (ref 43–78)
NRBC # BLD: 0 K/UL (ref 0–0.2)
PLATELET # BLD AUTO: 240 K/UL (ref 150–450)
PMV BLD AUTO: 9.7 FL (ref 9.4–12.3)
POTASSIUM SERPL-SCNC: 3.2 MMOL/L (ref 3.5–5.1)
RBC # BLD AUTO: 4.19 M/UL (ref 4.23–5.6)
SERVICE CMNT-IMP: NORMAL
SERVICE CMNT-IMP: NORMAL
SODIUM SERPL-SCNC: 141 MMOL/L (ref 136–145)
WBC # BLD AUTO: 10.6 K/UL (ref 4.3–11.1)

## 2019-04-15 PROCEDURE — 94760 N-INVAS EAR/PLS OXIMETRY 1: CPT

## 2019-04-15 PROCEDURE — 36415 COLL VENOUS BLD VENIPUNCTURE: CPT

## 2019-04-15 PROCEDURE — 85025 COMPLETE CBC W/AUTO DIFF WBC: CPT

## 2019-04-15 PROCEDURE — 80048 BASIC METABOLIC PNL TOTAL CA: CPT

## 2019-04-15 PROCEDURE — 74011250636 HC RX REV CODE- 250/636: Performed by: INTERNAL MEDICINE

## 2019-04-15 PROCEDURE — 74011250637 HC RX REV CODE- 250/637: Performed by: INTERNAL MEDICINE

## 2019-04-15 PROCEDURE — 74011000250 HC RX REV CODE- 250: Performed by: FAMILY MEDICINE

## 2019-04-15 PROCEDURE — 97167 OT EVAL HIGH COMPLEX 60 MIN: CPT

## 2019-04-15 PROCEDURE — 74011250637 HC RX REV CODE- 250/637: Performed by: FAMILY MEDICINE

## 2019-04-15 PROCEDURE — 65270000029 HC RM PRIVATE

## 2019-04-15 PROCEDURE — 97530 THERAPEUTIC ACTIVITIES: CPT

## 2019-04-15 PROCEDURE — 77010033678 HC OXYGEN DAILY

## 2019-04-15 PROCEDURE — 94640 AIRWAY INHALATION TREATMENT: CPT

## 2019-04-15 PROCEDURE — C9113 INJ PANTOPRAZOLE SODIUM, VIA: HCPCS | Performed by: INTERNAL MEDICINE

## 2019-04-15 PROCEDURE — 74011250636 HC RX REV CODE- 250/636: Performed by: FAMILY MEDICINE

## 2019-04-15 PROCEDURE — 86580 TB INTRADERMAL TEST: CPT | Performed by: INTERNAL MEDICINE

## 2019-04-15 PROCEDURE — 97161 PT EVAL LOW COMPLEX 20 MIN: CPT

## 2019-04-15 PROCEDURE — 77030019605

## 2019-04-15 PROCEDURE — 74011000302 HC RX REV CODE- 302: Performed by: INTERNAL MEDICINE

## 2019-04-15 PROCEDURE — 74011000250 HC RX REV CODE- 250: Performed by: INTERNAL MEDICINE

## 2019-04-15 RX ORDER — POTASSIUM CHLORIDE 14.9 MG/ML
20 INJECTION INTRAVENOUS ONCE
Status: DISCONTINUED | OUTPATIENT
Start: 2019-04-15 | End: 2019-04-15

## 2019-04-15 RX ORDER — PANTOPRAZOLE SODIUM 40 MG/1
40 TABLET, DELAYED RELEASE ORAL
Status: DISCONTINUED | OUTPATIENT
Start: 2019-04-16 | End: 2019-04-20 | Stop reason: HOSPADM

## 2019-04-15 RX ORDER — POTASSIUM CHLORIDE 20 MEQ/1
40 TABLET, EXTENDED RELEASE ORAL 2 TIMES DAILY
Status: COMPLETED | OUTPATIENT
Start: 2019-04-15 | End: 2019-04-15

## 2019-04-15 RX ADMIN — Medication 1 TABLET: at 09:58

## 2019-04-15 RX ADMIN — ROSUVASTATIN CALCIUM 40 MG: 20 TABLET, FILM COATED ORAL at 21:01

## 2019-04-15 RX ADMIN — Medication 2 G: at 10:37

## 2019-04-15 RX ADMIN — GUAIFENESIN 200 MG: 200 TABLET ORAL at 09:59

## 2019-04-15 RX ADMIN — ONDANSETRON 4 MG: 2 INJECTION INTRAMUSCULAR; INTRAVENOUS at 05:25

## 2019-04-15 RX ADMIN — MORPHINE SULFATE 2 MG: 2 INJECTION, SOLUTION INTRAMUSCULAR; INTRAVENOUS at 04:21

## 2019-04-15 RX ADMIN — LORATADINE 5 MG: 10 TABLET ORAL at 21:01

## 2019-04-15 RX ADMIN — Medication 10 ML: at 21:18

## 2019-04-15 RX ADMIN — CARVEDILOL 6.25 MG: 6.25 TABLET, FILM COATED ORAL at 10:04

## 2019-04-15 RX ADMIN — MORPHINE SULFATE 2 MG: 2 INJECTION, SOLUTION INTRAMUSCULAR; INTRAVENOUS at 16:59

## 2019-04-15 RX ADMIN — TUBERCULIN PURIFIED PROTEIN DERIVATIVE 5 UNITS: 5 INJECTION INTRADERMAL at 10:00

## 2019-04-15 RX ADMIN — GUAIFENESIN 200 MG: 200 TABLET ORAL at 21:01

## 2019-04-15 RX ADMIN — ALBUTEROL SULFATE 2.5 MG: 2.5 SOLUTION RESPIRATORY (INHALATION) at 20:32

## 2019-04-15 RX ADMIN — Medication 10 ML: at 05:25

## 2019-04-15 RX ADMIN — PETROLATUM: 42 OINTMENT TOPICAL at 10:05

## 2019-04-15 RX ADMIN — PHENYTOIN SODIUM 300 MG: 100 CAPSULE ORAL at 10:04

## 2019-04-15 RX ADMIN — POTASSIUM CHLORIDE 40 MEQ: 20 TABLET, EXTENDED RELEASE ORAL at 09:58

## 2019-04-15 RX ADMIN — POTASSIUM CHLORIDE 20 MEQ: 20 TABLET, EXTENDED RELEASE ORAL at 09:58

## 2019-04-15 RX ADMIN — LISINOPRIL 40 MG: 20 TABLET ORAL at 10:04

## 2019-04-15 RX ADMIN — PHENYTOIN SODIUM 300 MG: 100 CAPSULE ORAL at 17:14

## 2019-04-15 RX ADMIN — ONDANSETRON 4 MG: 2 INJECTION INTRAMUSCULAR; INTRAVENOUS at 09:45

## 2019-04-15 RX ADMIN — Medication 2 G: at 17:03

## 2019-04-15 RX ADMIN — BUDESONIDE 500 MCG: 0.5 INHALANT RESPIRATORY (INHALATION) at 20:33

## 2019-04-15 RX ADMIN — HYDROCODONE BITARTRATE AND ACETAMINOPHEN 2 TABLET: 5; 325 TABLET ORAL at 21:07

## 2019-04-15 RX ADMIN — ENOXAPARIN SODIUM 40 MG: 40 INJECTION SUBCUTANEOUS at 21:01

## 2019-04-15 RX ADMIN — CARVEDILOL 6.25 MG: 6.25 TABLET, FILM COATED ORAL at 17:14

## 2019-04-15 RX ADMIN — ACETAMINOPHEN 650 MG: 325 TABLET, FILM COATED ORAL at 20:01

## 2019-04-15 RX ADMIN — TAMSULOSIN HYDROCHLORIDE 0.4 MG: 0.4 CAPSULE ORAL at 09:57

## 2019-04-15 RX ADMIN — SERTRALINE HYDROCHLORIDE 100 MG: 100 TABLET ORAL at 21:01

## 2019-04-15 RX ADMIN — ASPIRIN 81 MG: 81 TABLET, COATED ORAL at 09:56

## 2019-04-15 RX ADMIN — PANTOPRAZOLE SODIUM 40 MG: 40 INJECTION, POWDER, FOR SOLUTION INTRAVENOUS at 09:00

## 2019-04-15 RX ADMIN — Medication 10 ML: at 14:25

## 2019-04-15 RX ADMIN — FOLIC ACID 1 MG: 1 TABLET ORAL at 09:58

## 2019-04-15 RX ADMIN — CLOPIDOGREL BISULFATE 75 MG: 75 TABLET, FILM COATED ORAL at 09:57

## 2019-04-15 RX ADMIN — POTASSIUM CHLORIDE 40 MEQ: 20 TABLET, EXTENDED RELEASE ORAL at 17:13

## 2019-04-15 RX ADMIN — GUAIFENESIN 200 MG: 200 TABLET ORAL at 17:15

## 2019-04-15 NOTE — PROGRESS NOTES
Problem: Mobility Impaired (Adult and Pediatric) Goal: *Acute Goals and Plan of Care (Insert Text) Description LTG: 
(1.)Mr. Tommie Brewer will move from supine to sit and sit to supine , scoot up and down and roll side to side with MODIFIED INDEPENDENCE within 7 treatment day(s). (2.)Mr. Tommie Brewer will transfer from bed to chair and chair to bed with SUPERVISION using the least restrictive device within 7 treatment day(s). (3.)Mr. Tommie Brewer will ambulate with MINIMAL ASSIST for 15 feet with the least restrictive device within 7 treatment day(s). (4.)Mr. Tommie Brewer will perform exercises per HEP for 10+ minutes to improve strength and mobility within 7 days. ________________________________________________________________________________________________ Outcome: Progressing Towards Goal 
  
PHYSICAL THERAPY: Initial Assessment and AM 4/15/2019 INPATIENT: PT Visit Days : 1 Payor: Bianca Watkins / Plan: [employer] / Product Type: [employer] Medicare /   
  
NAME/AGE/GENDER: Yosvany Verdin is a 80 y.o. male PRIMARY DIAGNOSIS: Cellulitis of left lower extremity [L03.116] Cellulitis Cellulitis ICD-10: Treatment Diagnosis:  
 Generalized Muscle Weakness (M62.81) Difficulty in walking, Not elsewhere classified (R26.2) Other abnormalities of gait and mobility (R26.89) Precaution/Allergies: 
Latex; Adhesive; and Sulfa (sulfonamide antibiotics) ASSESSMENT:  
 
Mr. Tommie Brewer is a 80year old male admitted from home for left LE cellulitis. 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 He lives with wife who assist with ADLs and additionally has extra caregiver/aide assistance 10 hours/wk. Pt reports current with [facility] PT and states he is ambulatory for household distances with davis walker but uses power scooter a good bit as well. He presents sitting up with fair+ to good balance.  Worked on seated functional activities, sit-stand transfer x2, standing static and dynamic balance, and bed to chair transfer. Min assist to complete transfer with heavy verbal cues for transfer safety and technique. Positioned comfortably with LEs elevated, needs in reach. Mobility may be limited due to left LE cellulitis and pain which would make donning his AFO difficult. Does appear to be significantly weaker than baseline at this time based on limited mobility (again unable to don AFO today to assess gait). Plan appears to be home at [place] with wife and aide. Continue HH PT and OT. This section established at most recent assessment PROBLEM LIST (Impairments causing functional limitations): 
Decreased Strength Decreased Transfer Abilities Decreased Ambulation Ability/Technique Decreased Balance Increased Pain Decreased Activity Tolerance INTERVENTIONS PLANNED: (Benefits and precautions of physical therapy have been discussed with the patient.) Balance Exercise Bed Mobility Gait Training Home Exercise Program (HEP) Therapeutic Activites Therapeutic Exercise/Strengthening Transfer Training TREATMENT PLAN: Frequency/Duration: 3 times a week for duration of hospital stay Rehabilitation Potential For Stated Goals: Good RECOMMENDED REHABILITATION/EQUIPMENT: (at time of discharge pending progress): Due to the probability of continued deficits (see above) this patient will likely need continued skilled physical therapy after discharge. Equipment:  
None at this time HISTORY:  
History of Present Injury/Illness (Reason for Referral): 
Per H&P, \"Patient is a 73yoM with PMH significant for prior CVA with residual L sided hemiparesis who presents with pain and swelling of LLE. Reports that he has had recurrent problems with his L leg for quite a while. [facility] who have been following his leg.   He was diagnosed with LLE cellulitis at the end of February/early March and completed a course of Clindamycin. Over the past few days, he has noted worsening erythema and pain. He reports subjective fevers/chills at home. Denies nausea/vomiting. Patient verbalizes wish to go home as soon as possible, but he is understanding about need for hospitalization and initiation of IV abx at this time\" Past Medical History/Comorbidities:  
Mr. Deanna Pedraza  has a past medical history of Chronic obstructive pulmonary disease ([address] Dermatophytosis of nail, History of CVA (cerebrovascular accident), History of paraplegia, and Hypertension. Mr. Deanna Pedraza  has no past surgical history on file. Social History/Living Environment:  
Home Environment: Apartment # Steps to Enter: 0 One/Two Story Residence: One story Living Alone: No 
Support Systems: Spouse/Significant Other/Partner, Home care staff Patient [facility] be [facility] Current DME Used/Available at Home: Brace/Splint, Commode, bedside, Shower chair, Walker, Wheelchair, power, Hospital bed Prior Level of Function/Work/Activity: 
Lives with wife and has additional caregiver assistance 10 hours/wk- help w/bathing. Ambulatory in home with davis walker. Last fall 4-5 weeks ago. Pt current with HH PT and OT Number of Personal Factors/Comorbidities that affect the Plan of Care: 1-2: MODERATE COMPLEXITY EXAMINATION:  
Most Recent Physical Functioning:  
Gross Assessment: 
AROM: Generally decreased, functional 
PROM: Generally decreased, functional 
Strength: Generally decreased, functional 
Coordination: Generally decreased, functional 
Tone: Abnormal 
Sensation: Impaired Posture: 
Posture (WDL): Exceptions to [facility] Posture Assessment: Forward head, Rounded shoulders, Trunk flexion Balance: 
Sitting: Impaired Sitting - Static: Good (unsupported) Sitting - Dynamic: Fair (occasional) Standing: Impaired Standing - Static: Fair Standing - Dynamic : Fair Bed Mobility: Scooting: Stand-by assistance Wheelchair Mobility: 
  
Transfers: 
Sit to Stand: Contact guard assistance Stand to Sit: Contact guard assistance Bed to Chair: Minimum assistance Interventions: Verbal cues; Visual cues; Safety awareness training; Tactile cues;Manual cues Duration: 15 Minutes Gait: 
  
   
  
Body Structures Involved: 
Nerves Muscles Body Functions Affected: 
Neuromusculoskeletal 
Movement Related Skin Related Activities and Participation Affected: 
General Tasks and Demands Mobility Domestic Life Community, Social and [place] Number of elements that affect the Plan of Care: 4+: HIGH COMPLEXITY CLINICAL PRESENTATION:  
Presentation: Stable and uncomplicated: LOW COMPLEXITY CLINICAL DECISION MAKIN[address] [place] [address] AM-PAC? ?6 Clicks? Basic Mobility Inpatient Short Form How much difficulty does the patient currently have. .. Unable A Lot A Little None 1. Turning over in bed (including adjusting bedclothes, sheets and blankets)? ? 1   ? 2   ? 3   ? 4  
2. Sitting down on and standing up from a chair with arms ( e.g., wheelchair, bedside commode, etc.)   ? 1   ? 2   ? 3   ? 4  
3. Moving from lying on back to sitting on the side of the bed?   ? 1   ? 2   ? 3   ? 4 How much help from another person does the patient currently need. .. Total A Lot A Little None 4. Moving to and from a bed to a chair (including a wheelchair)? ? 1   ? 2   ? 3   ? 4  
5. Need to walk in hospital room? ? 1   ? 2   ? 3   ? 4  
6. Climbing 3-5 steps with a railing? ? 1   ? 2   ? 3   ? 4  
© , Trustees of [address] [place] [address], under license to [employer]. All rights reserved Score:  Initial: 15 Most Recent: X (Date: -- ) Interpretation of Tool:  Represents activities that are increasingly more difficult (i.e. Bed mobility, Transfers, Gait). Medical Necessity:    
Patient demonstrates fair 
 rehab potential due to higher previous functional level. Reason for Services/Other Comments: 
Patient continues to demonstrate capacity to improve strength, mobility, balance, transfers which will increase independence, decrease amount of assistance required from caregiver and increase safety Carl Herzog Use of outcome tool(s) and clinical judgement create a POC that gives a: Clear prediction of patient's progress: LOW COMPLEXITY  
  
 
 
 
TREATMENT:  
(In addition to Assessment/Re-Assessment sessions the following treatments were rendered) Pre-treatment Symptoms/Complaints:  \"I'm tired, I didn't sleep last night\" Pain: Initial:  
Pain Intensity 1: 0  Post Session:  0/10 Therapeutic Activity: (  15 Minutes ):  Therapeutic activities including Bed transfers, Chair transfers and seated and standing functional activities to improve mobility, strength, balance and activity tolerance . Required minimal   to promote static and dynamic balance in standing and promote motor control of bilateral, lower extremity(s). Braces/Orthotics/Lines/Etc:  
O2 Device: Room air Treatment/Session Assessment:   
Response to Treatment:  pt performs mobility with min A Interdisciplinary Collaboration:  
Physical Therapist 
Registered Nurse After treatment position/precautions:  
Up in chair Bed/Chair-wheels locked Bed in low position Call light within reach RN notified Compliance with Program/Exercises: Will assess as treatment progresses Recommendations/Intent for next treatment session: \"Next visit will focus on advancements to more challenging activities and reduction in assistance provided\". Total Treatment Duration: PT Patient Time In/Time Out Time In: 1024 Time Out: 1048 Dajuan Barroso DPT

## 2019-04-15 NOTE — PROGRESS NOTES
OCCUPATIONAL THERAPY: Initial Assessment and Discharge 4/15/2019 INPATIENT:   
Payor: Giselle Blakeeladio / Plan: Via [employer] / Product Type: Managed Care Medicare /  
  
NAME/AGE/GENDER: Bárbara Rao is a 80 y.o. male PRIMARY DIAGNOSIS:  Cellulitis of left lower extremity [L03.116] Cellulitis Cellulitis ICD-10: Treatment Diagnosis:  
 Localized edema (R60.1) Precautions/Allergies: 
   Latex; Adhesive; and Sulfa (sulfonamide antibiotics) ASSESSMENT:  
Mr. Honorio Hernandes is a 80year old male admitted with LLE cellulitis. Patient has relevant medical history of CVAs in 2005 and 2016 and COPD. At baseline he lives with his wife in a handicap accessible apartment. He has L sided weakness related to old CVAs. He gets help from wife or home care aide (10 hours/ week) on ADLs such as bathing, dressing, etc. He can generally feed himself using his R dominant hand. His primary means of mobility is a power w/c, but he has been working with HHPT on walking with a davis-walker. Admits to several falls but none recently. Patient supine in bed upon arrival, agreeable to OT evaluation. Reports no pain. Needs encouragement to participate as he is tired and did not sleep well last night. BUE assessment reveals RUE is WFL, LUE is nonfunctional from previous CVA. Patient demonstrates bed mobility with SBA/ CGA. He has his LLE AFO present but is unable to tolerate it secondary to LLE cellulitis. Patient demonstrates need for total assistance today to don his socks and pants. He is currently requiring CGA for functional transfers. He appears to be at his baseline for ADLs. He has a good support system at home to provide him with the help he has needed with ADLs for 14 years now. No acute OT needs identified at this time. Will defer to PT for limited mobility related to LLE infection. Will sign off. This section established at most recent assessment PROBLEM LIST (Impairments causing functional limitations): 
Edema/Girth Decreased Skin Integrity/Hygeine No other NEW deficits this admission INTERVENTIONS PLANNED: (Benefits and precautions of occupational therapy have been discussed with the patient.) NONE    
 
 
 
RECOMMENDED REHABILITATION/EQUIPMENT: (at time of discharge pending progress): Due to the probability of continued deficits (see above) this patient will likely need continued skilled occupational therapy after discharge. Resume  services. Equipment:  
None at this time OCCUPATIONAL PROFILE AND HISTORY:  
History of Present Injury/Illness (Reason for Referral): LLE cellulitis Past Medical History/Comorbidities:  
Mr. Kenia Pittman  has a past medical history of Chronic obstructive pulmonary disease ([facility] [address] Dermatophytosis of nail, History of CVA (cerebrovascular accident), History of paraplegia, and Hypertension. Mr. Kenia Pittman  has no past surgical history on file. Social History/Living Environment:  
Home Environment: Apartment # Steps to Enter: 0 One/Two Story Residence: One story Living Alone: No 
Support Systems: Spouse/Significant Other/Partner, Home care staff Patient Expects to be Discharged to[de-identified] [facility] Current DME Used/Available at Home: Brace/Splint, Commode, bedside, Hospital bed, Grab bars, Oxygen, portable, Shower chair, Other (comment), Wheelchair, power(hemiwalker, leg ) Prior Level of Function/Work/Activity: 
Patient has relevant medical history of CVAs in 2005 and 2016 and COPD. At baseline he lives with his wife in a handicap accessible apartment. He has L sided weakness related to old CVAs. He gets help from wife or home care aide (10 hours/ week) on ADLs such as bathing, dressing, etc. He can generally feed himself using his R dominant hand. His primary means of mobility is a power w/c, but he has been working with [facility] on walking with a davis-walker. Admits to several falls but none recently. Number of Personal Factors/Comorbidities that affect the Plan of Care: Extensive review of physical, cognitive, and psychosocial performance (3+):  HIGH COMPLEXITY ASSESSMENT OF OCCUPATIONAL PERFORMANCE[de-identified]  
Activities of Daily Living:  
Basic ADLs (From Assessment) Complex ADLs (From Assessment) Feeding: Setup Oral Facial Hygiene/Grooming: Minimum assistance Bathing: Maximum assistance Upper Body Dressing: Maximum assistance Lower Body Dressing: Maximum assistance Toileting: Minimum assistance Instrumental ADL Meal Preparation: Total assistance Homemaking: Total assistance Medication Management: Total assistance Financial Management: Total assistance Grooming/Bathing/Dressing Activities of Daily Living Cognitive Retraining Safety/Judgement: Fall prevention; Insight into deficits Lower Body Dressing Assistance Dressing Assistance: Total assistance(dependent) Pants With Elastic Waist: Total assistance(dependent) Socks: Total assistance (dependent) Bed/Mat Mobility Supine to Sit: Stand-by assistance Sit to Stand: Minimum assistance Bed to Chair: Minimum assistance Scooting: Contact guard assistance Most Recent Physical Functioning:  
Gross Assessment: 
AROM: Grossly decreased, non-functional(LUE from previous CVA; RUE is [facility]) PROM: Grossly decreased, non-functional(LUE from previous CVA) Strength: Grossly decreased, non-functional(LUE from previous CVA; RUE is [facility]) Coordination: Grossly decreased, non-functional(LUE from previous CVA) Tone: Abnormal(LUE) Sensation: Impaired(LUE) Posture: 
  
Balance: 
Sitting: Impaired Sitting - Static: Prop sitting Sitting - Dynamic: Fair (occasional) Standing: Impaired Standing - Static: Constant support Bed Mobility: 
Supine to Sit: Stand-by assistance Scooting: Contact guard assistance Wheelchair Mobility: 
  
Transfers: 
Sit to Stand: Minimum assistance Bed to Chair: Minimum assistance Patient Vitals for the past 6 hrs: 
 BP SpO2 O2 Flow Rate (L/min) Pulse 04/15/19 0800  94 % 2 l/min   
04/15/19 1004 (!) 141/99   93 Mental Status Neurologic State: Alert Orientation Level: Oriented to person, Oriented to place, Oriented to situation Cognition: Follows commands Perception: Appears intact Perseveration: No perseveration noted Safety/Judgement: Fall prevention, Insight into deficits Physical Skills Involved: 
Range of Motion Balance Strength Activity Tolerance Fine Motor Control Gross Motor Control Edema Cognitive Skills Affected (resulting in the inability to perform in a timely and safe manner): Expression Psychosocial Skills Affected: 
Habits/Routines Environmental Adaptation Self-Awareness Number of elements that affect the Plan of Care: 5+:  HIGH COMPLEXITY CLINICAL DECISION MAKIN[address] [place] [address] AM-PAC? ?6 Clicks? Daily Activity Inpatient Short Form How much help from another person does the patient currently need. .. Total A Lot A Little None 1. Putting on and taking off regular lower body clothing? ? 1   ? 2   ? 3   ? 4  
2. Bathing (including washing, rinsing, drying)? ? 1   ? 2   ? 3   ? 4  
3. Toileting, which includes using toilet, bedpan or urinal?   ? 1   ? 2   ? 3   ? 4  
4. Putting on and taking off regular upper body clothing? ? 1   ? 2   ? 3   ? 4  
5. Taking care of personal grooming such as brushing teeth? ? 1   ? 2   ? 3   ? 4  
6. Eating meals? ? 1   ? 2   ? 3   ? 4  
© 2007, Trustees of [address] [place] [address], under license to [employer]. All rights reserved Score:  Initial: *9 Most Recent: X (Date: -- ) Interpretation of Tool:  Represents activities that are increasingly more difficult (i.e. Bed mobility, Transfers, Gait).  
 
  
Use of outcome tool(s) and clinical judgement create a POC that gives a: HIGH COMPLEXITY  
 
 
 
TREATMENT:  
 (In addition to Assessment/Re-Assessment sessions the following treatments were rendered) Pre-treatment Symptoms/Complaints:  Tired from lack of sleep Pain: Initial:  
Pain Intensity 1: 0  Post Session:  None Assessment/Reassessment only, no treatment provided today Braces/Orthotics/Lines/Etc:  
O2 Device: Nasal cannula Treatment/Session Assessment:   
Response to Treatment:  tolerated well Interdisciplinary Collaboration:  
Physical Therapist 
Occupational Therapist 
Registered Nurse Student nurses After treatment position/precautions:  
Left with PT for PT eval and treat Compliance with Program/Exercises: Compliant all of the time, Will assess as treatment progresses. Total Treatment Duration: OT Patient Time In/Time Out Time In: 1007 Time Out: 1023 Tammy Lopez OTR/L

## 2019-04-15 NOTE — PROGRESS NOTES
Hospitalist Progress Note 4/15/2019 Admit Date: 4/10/2019  4:41 PM  
NAME: Mary Garza :  1950 DOS:              04/15/19 MRN:  [id number] Attending: Agusto Mcintyre MD 
PCP:  Concetta Trivedi MD 
Treatment Team: Attending Provider: Dino Fall MD; Utilization Review: Yaquelin Barroso RN; Care Manager: Jared López RN; Consulting Provider: Tari Aguiar MD; Physical Therapist: Bassam Kline DPT; Occupational Therapist: Lauren Tellez, OTR/L Full Code SUBJECTIVE: As previously documented: 73yoM with PMH significant for prior CVA with residual L sided hemiparesis, COPD on home O2 2L NC who presents with pain and swelling of LLE. Patient admitted for sepsis secondary to cellulitis of LLE and started on IV abxs. Patient was recently discharged on 2019 for cellulitis of LLE, MRI was neg for OM. Vascular consulted for recurrent cellulitis with suspected PVD. 04/15/19    Mary Garza  Stated was having acid  reflux last night and today is having nausea. 10+ ROS reviewed and negative except for positive in HPI. Allergies Allergen Reactions  Latex Rash  Adhesive Unknown (comments)  Sulfa (Sulfonamide Antibiotics) Other (comments) Current Facility-Administered Medications Medication Dose Route Frequency  potassium chloride (K-DUR, KLOR-CON) SR tablet 40 mEq  40 mEq Oral BID  [START ON 2019] pantoprazole (PROTONIX) tablet 40 mg  40 mg Oral ACB  tuberculin injection 5 Units  5 Units IntraDERMal ONCE  
 bisacodyl (DULCOLAX) suppository 10 mg  10 mg Rectal DAILY PRN  
 ceFAZolin (ANCEF) 2 g/20 mL in sterile water IV syringe  2 g IntraVENous Q8H  
 albuterol (PROVENTIL VENTOLIN) nebulizer solution 2.5 mg  2.5 mg Nebulization Q6H PRN  
 HYDROcodone-acetaminophen (NORCO) 5-325 mg per tablet 2 Tab  2 Tab Oral Q4H PRN  
 morphine injection 2 mg  2 mg IntraVENous Q4H PRN  
  mineral oil-hydrophil petrolat (AQUAPHOR) ointment   Topical DAILY  aspirin delayed-release tablet 81 mg  81 mg Oral DAILY  carvedilol (COREG) tablet 6.25 mg  6.25 mg Oral BID  clopidogrel (PLAVIX) tablet 75 mg  75 mg Oral DAILY  budesonide (PULMICORT) 500 mcg/2 ml nebulizer suspension  500 mcg Nebulization BID RT  
 folic acid (FOLVITE) tablet 1 mg  1 mg Oral DAILY  guaiFENesin (ORGANIDIN) tablet 200 mg  200 mg Oral TID  lisinopril (PRINIVIL, ZESTRIL) tablet 40 mg  40 mg Oral DAILY  loratadine (CLARITIN) tablet 5 mg  5 mg Oral QHS  B complex-vitamin C-folic acid (NEPHRO-ALICIA) 0.8 mg tab  1 Tab Oral DAILY  phenytoin ER (DILANTIN ER) ER capsule 300 mg  300 mg Oral BID  rosuvastatin (CRESTOR) tablet 40 mg  40 mg Oral QHS  senna (SENOKOT) tablet 8.6 mg  1 Tab Oral DAILY  sertraline (ZOLOFT) tablet 100 mg  100 mg Oral QHS  tamsulosin (FLOMAX) capsule 0.4 mg  0.4 mg Oral DAILY  LORazepam (ATIVAN) tablet 0.5 mg  0.5 mg Oral TID PRN  potassium chloride (K-DUR, KLOR-CON) SR tablet 20 mEq  20 mEq Oral DAILY  polyethylene glycol (MIRALAX) packet 17 g  17 g Oral DAILY PRN  
 sodium chloride (NS) flush 5-40 mL  5-40 mL IntraVENous Q8H  
 sodium chloride (NS) flush 5-40 mL  5-40 mL IntraVENous PRN  
 acetaminophen (TYLENOL) tablet 650 mg  650 mg Oral Q4H PRN  
 ondansetron (ZOFRAN) injection 4 mg  4 mg IntraVENous Q4H PRN  
 enoxaparin (LOVENOX) injection 40 mg  40 mg SubCUTAneous Q24H Immunization History Administered Date(s) Administered  TB Skin Test (PPD) Intradermal 02/25/2019, 04/15/2019 Objective:  
 
Patient Vitals for the past 24 hrs: 
 Temp Pulse Resp BP SpO2  
04/15/19 1004  93  (!) 141/99   
04/15/19 0800     94 % 04/15/19 0348 97.3 °F (36.3 °C) 93 18 138/76 93 % 04/14/19 2254 97.3 °F (36.3 °C) 89 18 137/74 94 % 04/14/19 2032     92 % 04/14/19 1935 98.4 °F (36.9 °C) 82 19 107/73 94 % 19 1537 98.1 °F (36.7 °C) 82 19 128/70 92 % 19 1141 98.1 °F (36.7 °C) 84 18 123/67 91 % Temp (24hrs), Av.8 °F (36.6 °C), Min:97.3 °F (36.3 °C), Max:98.4 °F (36.9 °C) Oxygen Therapy O2 Sat (%): 94 % (04/15/19 0800) Pulse via Oximetry: 79 beats per minute (04/15/19 0800) O2 Device: Nasal cannula (04/15/19 0800) O2 Flow Rate (L/min): 2 l/min (04/15/19 0800) FIO2 (%): 32 % (19) Oxygen Therapy O2 Sat (%): 94 % (04/15/19 0800) Pulse via Oximetry: 79 beats per minute (04/15/19 0800) O2 Device: Nasal cannula (04/15/19 0800) O2 Flow Rate (L/min): 2 l/min (04/15/19 0800) FIO2 (%): 32 % (19) Physical Exam: 
General:         Alert, cooperative, no distress HEENT:               NCAT. No obvious deformity. Lungs:                 Few expiratory wheezing b/l. Cardiovascular:   RRR. No m/r/g. No pedal edema b/l. Abdomen:       Minimal tenderness on epigastric region. No G or rebound. Soft,  Bowel sounds normal. .  
Skin:         Still having LLE with erythema from foot to mid  No lesion noted. No drainage. Neurologic:     No gross focal deficit. Psychiatric:         Good mood. Normal affect. DIAGNOSTIC STUDIES Data Review:  
Recent Results (from the past 24 hour(s)) CBC WITH AUTOMATED DIFF Collection Time: 04/15/19  3:16 AM  
Result Value Ref Range WBC 10.6 4.3 - 11.1 K/uL  
 RBC 4.19 (L) 4.23 - 5.6 M/uL  
 HGB 13.0 (L) 13.6 - 17.2 g/dL HCT 38.9 (L) 41.1 - 50.3 % MCV 92.8 79.6 - 97.8 FL  
 MCH 31.0 26.1 - 32.9 PG  
 MCHC 33.4 31.4 - 35.0 g/dL  
 RDW 11.9 11.9 - 14.6 % PLATELET 772 153 - 158 K/uL MPV 9.7 9.4 - 12.3 FL ABSOLUTE NRBC 0.00 0.0 - 0.2 K/uL  
 DF AUTOMATED NEUTROPHILS 74 43 - 78 % LYMPHOCYTES 16 13 - 44 % MONOCYTES 7 4.0 - 12.0 % EOSINOPHILS 2 0.5 - 7.8 % BASOPHILS 0 0.0 - 2.0 % IMMATURE GRANULOCYTES 1 0.0 - 5.0 %  
 ABS. NEUTROPHILS 7.9 1.7 - 8.2 K/UL  
 ABS. LYMPHOCYTES 1.7 0.5 - 4.6 K/UL ABS. MONOCYTES 0.7 0.1 - 1.3 K/UL  
 ABS. EOSINOPHILS 0.2 0.0 - 0.8 K/UL  
 ABS. BASOPHILS 0.0 0.0 - 0.2 K/UL  
 ABS. IMM. GRANS. 0.1 0.0 - 0.5 K/UL METABOLIC PANEL, BASIC Collection Time: 04/15/19  3:16 AM  
Result Value Ref Range Sodium 141 136 - 145 mmol/L Potassium 3.2 (L) 3.5 - 5.1 mmol/L Chloride 105 98 - 107 mmol/L  
 CO2 28 21 - 32 mmol/L Anion gap 8 7 - 16 mmol/L Glucose 130 (H) 65 - 100 mg/dL BUN 12 8 - 23 MG/DL Creatinine 0.49 (L) 0.8 - 1.5 MG/DL  
 GFR est AA >60 >60 ml/min/1.73m2 GFR est non-AA >60 >60 ml/min/1.73m2 Calcium 8.8 8.3 - 10.4 MG/DL All Micro Results Procedure Component Value Units Date/Time CULTURE, BLOOD [[id number]] Collected:  04/10/19 1750 Order Status:  Completed Specimen:  Blood Updated:  04/15/19 3103 Special Requests: --     
  LEFT 
JUGULAR VEIN Culture result: NO GROWTH 5 DAYS     
 CULTURE, BLOOD [[id number]] Collected:  04/10/19 2015 Order Status:  Completed Specimen:  Blood Updated:  04/15/19 1897 Special Requests: --     
  RIGHT Antecubital 
  
  Culture result: NO GROWTH 5 DAYS Imaging Alfornia Jorge /Studies: CXR Results  (Last 48 hours) None CT Results  (Last 48 hours) None No results found. No results found for this visit on 04/10/19. Labs and Studies from previous 24 hours have been personally reviewed by myself   
ASSESSMENT Active Hospital Problems Diagnosis Date Noted  COPD (chronic obstructive pulmonary disease) ([facility] [address] 04/10/2019  Cellulitis 02/21/2019  Essential hypertension 07/14/2018  [facility]leg[facility] 07/14/2018 Hospital Problems as of 4/15/2019 Date Reviewed: 3/4/2019 Codes Class Noted - Resolved POA  
 COPD (chronic obstructive pulmonary disease) ([facility] ICD-10-CM: J44.9 ICD-9-CM: 346  4/10/2019 - Present Unknown * (Principal) Cellulitis ICD-10-CM: L03.90 ICD-9-CM: 682.9  2/21/2019 - Present Unknown Essential hypertension ICD-10-CM: I10 
ICD-9-CM: 401.9  7/14/2018 - Present Yes Left hemiplegia ([address] ICD-10-CM: G81.94 
ICD-9-CM: 342.90  7/14/2018 - Present Yes A/P: 
 
-Sepsis secondary to cellulitis BC NGTD Was on  vanco and zosyn for 3 days Discussed with Vascular NP today, Dr. Rojas and Dr. Eren Church reviewed the case, no acute intervention needed. To follow up at the clinic. Will switch abxs to PO tomorrow AM 
PT/OT evaluation pending -GERD Having reflux symptoms today PPI trial 
 
-COPD On O2  at home Cont  Nebs PRN and wean O2 as tolerated -HTN Stable Cont home meds DVT Prophylaxis: lovenox CODE Status: Full Plan of Care Discussed with: patient. Care team. 
 
 
 
 
Mariaelena Corea MD 
04/15/19

## 2019-04-15 NOTE — PROGRESS NOTES
END OF SHIFT NOTE: 
 
INTAKE/OUTPUT 
04/14 0701 - 04/15 0700 In: 720 [P.O.:720] Out: -  
Voiding: YES Catheter: NO 
Drain:   
 
 
 
 
 
Flatus: Patient does have flatus present. Stool:  1 occurrences. Characteristics: 
  
Emesis: 0 occurrences. Characteristics: VITAL SIGNS Patient Vitals for the past 12 hrs: 
 Temp Pulse Resp BP SpO2  
04/15/19 0348 97.3 °F (36.3 °C) 93 18 138/76 93 % 04/14/19 2254 97.3 °F (36.3 °C) 89 18 137/74 94 % 04/14/19 2032     92 % 04/14/19 1935 98.4 °F (36.9 °C) 82 19 107/73 94 % Pain Assessment Pain Intensity 1: 9 (04/15/19 0421) Pain Location 1: Leg 
Pain Intervention(s) 1: Medication (see MAR) Patient Stated Pain Goal: 0 Ambulating No 
 
Shift report given to oncoming nurse at the bedside.  
 
Pia Coleman RN

## 2019-04-15 NOTE — PROGRESS NOTES
Progress Note Patient: Jadon Whyte MRN: [id number]  SSN: [id number] YOB: 1950  Age: 80 y.o. Sex: male Admission Date: 4/10/2019 LOS: 5 days Subjective:  
 
Patient reports pain unchanged. Keeping B LE elevated on wedge. Imaging reviewed both by Darryl Carter and Farhat Meléndez (on call over weekend). Objective:  
 
Vitals:  
 04/15/19 0800 04/15/19 1004 04/15/19 1140 04/15/19 1524 BP:  (!) 141/99 135/74 125/80 Pulse:  93 95 93 Resp:   18 18 Temp:   97.8 °F (36.6 °C) 97.3 °F (36.3 °C) SpO2: 94%  95% 95% Weight:      
Height:      
  
 
Physical Exam:  
GENERAL: alert, cooperative, no distress LUNG: normal respiratory effort HEART: regular rate and rhythm ABDOMEN: soft, nontender, obese and protuberant but not distended EXTREMITIES: left LE erythema has receded further but skin is deeper red, edema stable, skin hydrated and intact with peeling less pronounced; right LE erythema intensity diminished; B feet without ulcerations / lesions / wounds Lab/Data Review: 
BMP:  
Lab Results Component Value Date/Time  04/15/2019 03:16 AM  
 K 3.2 (L) 04/15/2019 03:16 AM  
  04/15/2019 03:16 AM  
 CO2 28 04/15/2019 03:16 AM  
 AGAP 8 04/15/2019 03:16 AM  
  (H) 04/15/2019 03:16 AM  
 BUN 12 04/15/2019 03:16 AM  
 CREA 0.49 (L) 04/15/2019 03:16 AM  
 GFRAA >60 04/15/2019 03:16 AM  
 GFRNA >60 04/15/2019 03:16 AM  
 
CBC:  
Lab Results Component Value Date/Time WBC 10.6 04/15/2019 03:16 AM  
 HGB 13.0 (L) 04/15/2019 03:16 AM  
 HCT 38.9 (L) 04/15/2019 03:16 AM  
  04/15/2019 03:16 AM  
  
 
Assessment / Plan / Recommendations / Medical Decision Making:  
 
Principal Problem: 
  Cellulitis (2/21/2019) Active Problems: 
  Essential hypertension (7/14/2018) Left hemiplegia ([facility] [address] (7/14/2018) COPD (chronic obstructive pulmonary disease) ([facility] [address] (4/10/2019) Continue current care Elevate LE when not ambulating Patient has multilevel arterial disease not necessarily related to cellulitis; in absence of non-healing wounds, do not recommend surgical intervention during acute cellulitis Signed By: Josh Youngblood PA-C April 15, 2019 Physician Assistant with [facility] Vascular Surgery Ivonne Busby.  Joselin Redd MD / Zeina Boogie MD

## 2019-04-15 NOTE — PROGRESS NOTES
Problem: Mobility Impaired (Adult and Pediatric) Goal: *Acute Goals and Plan of Care (Insert Text) Description LTG: 
(1.)Mr. Randolph Mooney will move from supine to sit and sit to supine , scoot up and down and roll side to side with MODIFIED INDEPENDENCE within 7 treatment day(s). (2.)Mr. Randolph Mooney will transfer from bed to chair and chair to bed with SUPERVISION using the least restrictive device within 7 treatment day(s). (3.)Mr. Randolph Mooney will ambulate with MINIMAL ASSIST for 15 feet with the least restrictive device within 7 treatment day(s). (4.)Mr. Randolph Mooney will perform exercises per HEP for 10+ minutes to improve strength and mobility within 7 days. ________________________________________________________________________________________________ Outcome: Progressing Towards Goal 
  
PHYSICAL THERAPY: Daily Note and PM 4/15/2019 INPATIENT: PT Visit Days : 1 Payor: Jaymie Ramirez / Plan: [employer] / Product Type: [employer] Medicare /   
  
NAME/AGE/GENDER: Kati Larios is a 80 y.o. male PRIMARY DIAGNOSIS: Cellulitis of left lower extremity [L03.116] Cellulitis Cellulitis ICD-10: Treatment Diagnosis:  
 · Generalized Muscle Weakness (M62.81) · Difficulty in walking, Not elsewhere classified (R26.2) · Other abnormalities of gait and mobility (R26.89) Precaution/Allergies: 
Latex; Adhesive; and Sulfa (sulfonamide antibiotics) ASSESSMENT:  
Mr. Randolph Mooney is a 80year old male admitted from home for left LE cellulitis. 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 He lives with wife who assist with ADLs and additionally has extra caregiver/aide assistance 10 hours/wk. [facility] and states he is ambulatory for household distances with davis walker but uses power scooter a good bit as well. PM: Pt up in chair, staff reports he adamantly asks for physical therapy to assist with back to bed, however patient denies this. Unclear. Regardless, he requires min assist for sit-stand which was performed x3. Verbal cues for transfer positioning and technique. Attempt to transfer to left however pt weaker moving to this side. With chair turned around, he was able to transfer back to bed going to the right with only min assist for safety and much improved technique, mechanics. Min assist with legs during back to bed. Positioned comfortably and left with family present, needs in reach. Render Mort  Will benefit from continued acute PT. Mobility may be limited due to left LE cellulitis and pain which would make donning his AFO difficult. Does appear to be significantly weaker than baseline at this time based on limited mobility (again unable to don AFO today to assess gait). Plan appears to be home at [place] with wife and aide. Continue HH PT and OT. This section established at most recent assessment PROBLEM LIST (Impairments causing functional limitations): 1. Decreased Strength 2. Decreased Transfer Abilities 3. Decreased Ambulation Ability/Technique 4. Decreased Balance 5. Increased Pain 6. Decreased Activity Tolerance INTERVENTIONS PLANNED: (Benefits and precautions of physical therapy have been discussed with the patient.) 1. Balance Exercise 2. Bed Mobility 3. Gait Training 4. Home Exercise Program (HEP) 5. Therapeutic Activites 6. Therapeutic Exercise/Strengthening 7. Transfer Training TREATMENT PLAN: Frequency/Duration: 3 times a week for duration of hospital stay Rehabilitation Potential For Stated Goals: Good RECOMMENDED REHABILITATION/EQUIPMENT: (at time of discharge pending progress): Due to the probability of continued deficits (see above) this patient will likely need continued skilled physical therapy after discharge. Equipment:  
? None at this time HISTORY:  
History of Present Injury/Illness (Reason for Referral): 
Per H&P, \"Patient is a 73yoM with PMH significant for prior CVA with residual L sided hemiparesis who presents with pain and swelling of LLE. Reports that he has had recurrent problems with his L leg for quite a while. He reports that [facility] with wound care who have been following his leg. He was diagnosed with LLE cellulitis at the end of February/early March and completed a course of Clindamycin. Over the past few days, he has noted worsening erythema and pain. He reports subjective fevers/chills at home. Denies nausea/vomiting. Patient verbalizes wish to go home as soon as possible, but he is understanding about need for hospitalization and initiation of IV abx at this time\" Past Medical History/Comorbidities:  
Mr. Sina Alfonso  has a past medical history of Chronic obstructive pulmonary disease ([facility] [address] Dermatophytosis of nail, History of CVA (cerebrovascular accident), History of paraplegia, and Hypertension. Mr. Sina Alfonso  has no past surgical history on file. Social History/Living Environment:  
Home Environment: Apartment # Steps to Enter: 0 One/Two Story Residence: One story Living Alone: No 
Support Systems: Spouse/Significant Other/Partner, Home care staff Patient Expects to be Discharged to[de-identified] [facility] Current DME Used/Available at Home: Brace/Splint, Commode, bedside, Shower chair, Walker, Wheelchair, power, Hospital bed Prior Level of Function/Work/Activity: 
Lives with wife and has additional caregiver assistance 10 hours/wk- help w/bathing. Ambulatory in home with davis walker. Last fall 4-5 weeks ago. Pt current with HH PT and OT Number of Personal Factors/Comorbidities that affect the Plan of Care: 1-2: MODERATE COMPLEXITY EXAMINATION:  
Most Recent Physical Functioning:  
Gross Assessment: 
AROM: Generally decreased, functional 
PROM: Generally decreased, functional 
Strength: Generally decreased, functional 
Coordination: Generally decreased, functional 
Tone: Abnormal 
Sensation: Impaired Posture: Posture (WDL): Exceptions to [facility] Posture Assessment: Forward head, Rounded shoulders, Trunk flexion Balance: 
Sitting: Impaired Sitting - Static: Good (unsupported) Sitting - Dynamic: Fair (occasional) Standing: Impaired Standing - Static: Fair Standing - Dynamic : Fair(-) Bed Mobility: 
Sit to Supine: Minimum assistance Scooting: Minimum assistance Wheelchair Mobility: 
  
Transfers: 
Sit to Stand: Minimum assistance Stand to Sit: Minimum assistance Bed to Chair: Minimum assistance Interventions: Verbal cues; Safety awareness training; Tactile cues Duration: 10 Minutes Gait: 
  
   
  
Body Structures Involved: 1. Nerves 2. Muscles Body Functions Affected: 1. Neuromusculoskeletal 
2. Movement Related 3. Skin Related Activities and Participation Affected: 1. General Tasks and Demands 2. Mobility 3. Domestic Life 4. Community, Social and [place] Number of elements that affect the Plan of Care: 4+: HIGH COMPLEXITY CLINICAL PRESENTATION:  
Presentation: Stable and uncomplicated: LOW COMPLEXITY CLINICAL DECISION MAKIN[address] [place] [id number] AM-PAC 6 Clicks Basic Mobility Inpatient Short Form How much difficulty does the patient currently have. .. Unable A Lot A Little None 1. Turning over in bed (including adjusting bedclothes, sheets and blankets)? ? 1   ? 2   ? 3   ? 4  
2. Sitting down on and standing up from a chair with arms ( e.g., wheelchair, bedside commode, etc.)   ? 1   ? 2   ? 3   ? 4  
3. Moving from lying on back to sitting on the side of the bed?   ? 1   ? 2   ? 3   ? 4 How much help from another person does the patient currently need. .. Total A Lot A Little None 4. Moving to and from a bed to a chair (including a wheelchair)? ? 1   ? 2   ? 3   ? 4  
5. Need to walk in hospital room? ? 1   ? 2   ? 3   ? 4  
6. Climbing 3-5 steps with a railing? ? 1   ? 2   ? 3   ? 4  
© 2007, Trustees of [address] [place] [address] [address] [id number], under license to [employer].  All rights reserved Score:  Initial: 15 Most Recent: X (Date: -- ) Interpretation of Tool:  Represents activities that are increasingly more difficult (i.e. Bed mobility, Transfers, Gait). Medical Necessity:    
· Patient demonstrates fair ·  rehab potential due to higher previous functional level. Reason for Services/Other Comments: 
· Patient continues to demonstrate capacity to improve strength, mobility, balance, transfers which will increase independence, decrease amount of assistance required from caregiver and increase safety · . Use of outcome tool(s) and clinical judgement create a POC that gives a: Clear prediction of patient's progress: LOW COMPLEXITY  
  
 
 
 
TREATMENT:  
(In addition to Assessment/Re-Assessment sessions the following treatments were rendered) Pre-treatment Symptoms/Complaints:  \"I just said I needed help getting back in bed\" Pain: Initial:  
Pain Intensity 1: 0  Post Session:  0/10 Therapeutic Activity: (  10 Minutes ):  Therapeutic activities including Bed mobility, Chair transfers, sit-stand transfers x3-4 trials, standing posture, and seated and standing functional activities to improve mobility, strength, balance and activity tolerance . Required minimal assist and cueing   to promote static and dynamic balance in standing and promote motor control of bilateral, lower extremity(s). Braces/Orthotics/Lines/Etc:  
· O2 Device: Room air Treatment/Session Assessment:   
· Response to Treatment:  pt performs mobility with min A 
· Interdisciplinary Collaboration:  
o Physical Therapist 
o Registered Nurse · After treatment position/precautions:  
o Supine in bed 
o Bed/Chair-wheels locked 
o Bed in low position 
o Call light within reach 
o RN notified 
o Family at bedside · Compliance with Program/Exercises: Will assess as treatment progresses · Recommendations/Intent for next treatment session:   \"Next visit will focus on advancements to more challenging activities and reduction in assistance provided\". Total Treatment Duration: PT Patient Time In/Time Out Time In: 1340 Time Out: 1350 LATISHA RamosT

## 2019-04-16 ENCOUNTER — ANESTHESIA EVENT (OUTPATIENT)
Dept: SURGERY | Age: 69
DRG: 872 | End: 2019-04-16
Payer: MEDICARE

## 2019-04-16 LAB
ANION GAP SERPL CALC-SCNC: 9 MMOL/L (ref 7–16)
BASOPHILS # BLD: 0 K/UL (ref 0–0.2)
BASOPHILS NFR BLD: 0 % (ref 0–2)
BUN SERPL-MCNC: 14 MG/DL (ref 8–23)
CALCIUM SERPL-MCNC: 9.3 MG/DL (ref 8.3–10.4)
CHLORIDE SERPL-SCNC: 107 MMOL/L (ref 98–107)
CO2 SERPL-SCNC: 26 MMOL/L (ref 21–32)
CREAT SERPL-MCNC: 0.56 MG/DL (ref 0.8–1.5)
DIFFERENTIAL METHOD BLD: ABNORMAL
EOSINOPHIL # BLD: 0.1 K/UL (ref 0–0.8)
EOSINOPHIL NFR BLD: 1 % (ref 0.5–7.8)
ERYTHROCYTE [DISTWIDTH] IN BLOOD BY AUTOMATED COUNT: 12.2 % (ref 11.9–14.6)
GLUCOSE SERPL-MCNC: 128 MG/DL (ref 65–100)
HCT VFR BLD AUTO: 39.6 % (ref 41.1–50.3)
HGB BLD-MCNC: 13.6 G/DL (ref 13.6–17.2)
IMM GRANULOCYTES # BLD AUTO: 0.1 K/UL (ref 0–0.5)
IMM GRANULOCYTES NFR BLD AUTO: 1 % (ref 0–5)
LYMPHOCYTES # BLD: 1.8 K/UL (ref 0.5–4.6)
LYMPHOCYTES NFR BLD: 13 % (ref 13–44)
MCH RBC QN AUTO: 32 PG (ref 26.1–32.9)
MCHC RBC AUTO-ENTMCNC: 34.3 G/DL (ref 31.4–35)
MCV RBC AUTO: 93.2 FL (ref 79.6–97.8)
MM INDURATION POC: NORMAL MM (ref 0–5)
MONOCYTES # BLD: 0.9 K/UL (ref 0.1–1.3)
MONOCYTES NFR BLD: 6 % (ref 4–12)
NEUTS SEG # BLD: 11.1 K/UL (ref 1.7–8.2)
NEUTS SEG NFR BLD: 80 % (ref 43–78)
NRBC # BLD: 0 K/UL (ref 0–0.2)
PLATELET # BLD AUTO: 267 K/UL (ref 150–450)
PMV BLD AUTO: 9.7 FL (ref 9.4–12.3)
POTASSIUM SERPL-SCNC: 3.7 MMOL/L (ref 3.5–5.1)
PPD POC: NORMAL NEGATIVE
RBC # BLD AUTO: 4.25 M/UL (ref 4.23–5.6)
SODIUM SERPL-SCNC: 142 MMOL/L (ref 136–145)
WBC # BLD AUTO: 14 K/UL (ref 4.3–11.1)

## 2019-04-16 PROCEDURE — 80048 BASIC METABOLIC PNL TOTAL CA: CPT

## 2019-04-16 PROCEDURE — 77030031642 HC BOOT FT ROOKE HFS OSBM -B

## 2019-04-16 PROCEDURE — 65270000029 HC RM PRIVATE

## 2019-04-16 PROCEDURE — 74011000250 HC RX REV CODE- 250: Performed by: INTERNAL MEDICINE

## 2019-04-16 PROCEDURE — 94640 AIRWAY INHALATION TREATMENT: CPT

## 2019-04-16 PROCEDURE — 74011000250 HC RX REV CODE- 250: Performed by: FAMILY MEDICINE

## 2019-04-16 PROCEDURE — 36415 COLL VENOUS BLD VENIPUNCTURE: CPT

## 2019-04-16 PROCEDURE — 94760 N-INVAS EAR/PLS OXIMETRY 1: CPT

## 2019-04-16 PROCEDURE — 74011250636 HC RX REV CODE- 250/636: Performed by: FAMILY MEDICINE

## 2019-04-16 PROCEDURE — 77010033678 HC OXYGEN DAILY

## 2019-04-16 PROCEDURE — 74011250637 HC RX REV CODE- 250/637: Performed by: FAMILY MEDICINE

## 2019-04-16 PROCEDURE — 85025 COMPLETE CBC W/AUTO DIFF WBC: CPT

## 2019-04-16 PROCEDURE — 74011250636 HC RX REV CODE- 250/636: Performed by: INTERNAL MEDICINE

## 2019-04-16 PROCEDURE — 74011250637 HC RX REV CODE- 250/637: Performed by: INTERNAL MEDICINE

## 2019-04-16 PROCEDURE — 92610 EVALUATE SWALLOWING FUNCTION: CPT

## 2019-04-16 RX ADMIN — TAMSULOSIN HYDROCHLORIDE 0.4 MG: 0.4 CAPSULE ORAL at 08:35

## 2019-04-16 RX ADMIN — PHENYTOIN SODIUM 300 MG: 100 CAPSULE ORAL at 16:53

## 2019-04-16 RX ADMIN — CARVEDILOL 6.25 MG: 6.25 TABLET, FILM COATED ORAL at 09:33

## 2019-04-16 RX ADMIN — Medication 2 G: at 01:32

## 2019-04-16 RX ADMIN — ASPIRIN 81 MG: 81 TABLET, COATED ORAL at 08:31

## 2019-04-16 RX ADMIN — BUDESONIDE 500 MCG: 0.5 INHALANT RESPIRATORY (INHALATION) at 07:52

## 2019-04-16 RX ADMIN — ONDANSETRON 4 MG: 2 INJECTION INTRAMUSCULAR; INTRAVENOUS at 23:26

## 2019-04-16 RX ADMIN — CLOPIDOGREL BISULFATE 75 MG: 75 TABLET, FILM COATED ORAL at 08:30

## 2019-04-16 RX ADMIN — Medication 10 ML: at 13:01

## 2019-04-16 RX ADMIN — BUDESONIDE 500 MCG: 0.5 INHALANT RESPIRATORY (INHALATION) at 21:04

## 2019-04-16 RX ADMIN — Medication 2 G: at 23:25

## 2019-04-16 RX ADMIN — PANTOPRAZOLE SODIUM 40 MG: 40 TABLET, DELAYED RELEASE ORAL at 06:34

## 2019-04-16 RX ADMIN — MORPHINE SULFATE 2 MG: 2 INJECTION, SOLUTION INTRAMUSCULAR; INTRAVENOUS at 13:43

## 2019-04-16 RX ADMIN — HYDROCODONE BITARTRATE AND ACETAMINOPHEN 2 TABLET: 5; 325 TABLET ORAL at 16:37

## 2019-04-16 RX ADMIN — GUAIFENESIN 200 MG: 200 TABLET ORAL at 20:33

## 2019-04-16 RX ADMIN — CARVEDILOL 6.25 MG: 6.25 TABLET, FILM COATED ORAL at 16:54

## 2019-04-16 RX ADMIN — Medication 10 ML: at 06:11

## 2019-04-16 RX ADMIN — SERTRALINE HYDROCHLORIDE 100 MG: 100 TABLET ORAL at 20:35

## 2019-04-16 RX ADMIN — GUAIFENESIN 200 MG: 200 TABLET ORAL at 18:32

## 2019-04-16 RX ADMIN — LORATADINE 5 MG: 10 TABLET ORAL at 20:35

## 2019-04-16 RX ADMIN — ACETAMINOPHEN 650 MG: 325 TABLET, FILM COATED ORAL at 18:31

## 2019-04-16 RX ADMIN — Medication 10 ML: at 23:20

## 2019-04-16 RX ADMIN — MORPHINE SULFATE 2 MG: 2 INJECTION, SOLUTION INTRAMUSCULAR; INTRAVENOUS at 23:15

## 2019-04-16 RX ADMIN — MORPHINE SULFATE 2 MG: 2 INJECTION, SOLUTION INTRAMUSCULAR; INTRAVENOUS at 02:33

## 2019-04-16 RX ADMIN — GUAIFENESIN 200 MG: 200 TABLET ORAL at 09:34

## 2019-04-16 RX ADMIN — Medication 2 G: at 08:14

## 2019-04-16 RX ADMIN — PETROLATUM: 42 OINTMENT TOPICAL at 08:45

## 2019-04-16 RX ADMIN — SENNOSIDES 8.6 MG: 8.6 TABLET, FILM COATED ORAL at 08:35

## 2019-04-16 RX ADMIN — PHENYTOIN SODIUM 300 MG: 100 CAPSULE ORAL at 09:34

## 2019-04-16 RX ADMIN — ACETAMINOPHEN 650 MG: 325 TABLET, FILM COATED ORAL at 14:17

## 2019-04-16 RX ADMIN — ALBUTEROL SULFATE 2.5 MG: 2.5 SOLUTION RESPIRATORY (INHALATION) at 07:52

## 2019-04-16 RX ADMIN — Medication 1 TABLET: at 08:32

## 2019-04-16 RX ADMIN — Medication 10 ML: at 23:18

## 2019-04-16 RX ADMIN — HYDROCODONE BITARTRATE AND ACETAMINOPHEN 2 TABLET: 5; 325 TABLET ORAL at 08:34

## 2019-04-16 RX ADMIN — ENOXAPARIN SODIUM 40 MG: 40 INJECTION SUBCUTANEOUS at 20:36

## 2019-04-16 RX ADMIN — HYDROCODONE BITARTRATE AND ACETAMINOPHEN 2 TABLET: 5; 325 TABLET ORAL at 12:47

## 2019-04-16 RX ADMIN — Medication 2 G: at 16:38

## 2019-04-16 RX ADMIN — MORPHINE SULFATE 2 MG: 2 INJECTION, SOLUTION INTRAMUSCULAR; INTRAVENOUS at 18:31

## 2019-04-16 RX ADMIN — LISINOPRIL 40 MG: 20 TABLET ORAL at 08:36

## 2019-04-16 RX ADMIN — POTASSIUM CHLORIDE 20 MEQ: 20 TABLET, EXTENDED RELEASE ORAL at 08:30

## 2019-04-16 RX ADMIN — FOLIC ACID 1 MG: 1 TABLET ORAL at 08:31

## 2019-04-16 RX ADMIN — ROSUVASTATIN CALCIUM 40 MG: 20 TABLET, FILM COATED ORAL at 20:34

## 2019-04-16 NOTE — PROGRESS NOTES
Problem: Dysphagia (Adult) Goal: *Acute Goals and Plan of Care (Insert Text) Description No dysphagia goals identified as patient on baseline diet. SPEECH LANGUAGE PATHOLOGY: BEDSIDE SWALLOW NOTE: INITIAL ASSESSMENT AND DISCHARGE 
 
NAME/AGE/GENDER: Melonie Roque is a 80 y.o. male DATE: 4/16/2019 PRIMARY DIAGNOSIS: Cellulitis of left lower extremity [L03.116] Procedure(s) (LRB): LEFT LOWER EXTREMITY ARTERIOGRAM WITH POSS INTERVENTION ROOM 210 (Left) ICD-10: Treatment Diagnosis: oropharyngeal dysphagia R13.12 INTERDISCIPLINARY COLLABORATION: Registered Nurse PRECAUTIONS/ALLERGIES: Latex; Adhesive; and Sulfa (sulfonamide antibiotics) ASSESSMENT:  
 Mr. Sina Alfonso presents with persistent oropharyngeal dysphagia following prior CVA in 2016. Patient known to speech therapy services from February admission. History of dysphagia with recommendation of chin tuck with all po intake per patient report. At that time patient refused recommended modified barium swallow study. This admission, ST consulted due to complaints of \"painful swallowing\",. Patient pointing to sternum. Reports painful sticking sensation. Tolerated portion of regular meal tray and thin liquids via cup sip with chin tuck without overt s/sx of airway compromise. Declined straws \"it's harder to control\". Recommend continue current diet regular/thin liquids via cup with chin tuck. No acute speech therapy needs at this time as patient tolerating baseline diet with use of strategies. Please re-consult if new concerns arise. May want to consider GI consult if complaints persist.  
?????? ? ? This section established at most recent assessment?????????? 
PROBLEM LIST (Impairments causing functional limitations): 1. Oropharyngeal dysphagia REHABILITATION POTENTIAL FOR STATED GOALS: no goals identified PLAN OF CARE:  
Patient will benefit from skilled intervention to address the following impairments. RECOMMENDATIONS AND PLANNED INTERVENTIONS (Benefits and precautions of therapy have been discussed with the patient.): 
· continue prescribed diet MEDICATIONS: 
· Whole in puree · Crushed in puree ASPIRATION PRECAUTIONS: 
· Slow rate of intake · Small bites/sips · Upright at 90 degrees during meal 
COMPENSATORY STRATEGIES/MODIFICATIONS INCLUDING: 
· Chin tuck · Cup/sip · Small sips and bites OTHER RECOMMENDATIONS (including follow up treatment recommendations): · N/a  
RECOMMENDED DIET MODIFICATIONS DISCUSSED WITH: 
· Nursing · Family · Patient FREQUENCY/DURATION: No further speech therapy indicated at this time as oropharyngeal swallow function is within normal limits. RECOMMENDED REHABILITATION/EQUIPMENT: (at time of discharge pending progress): Due to the probability of continued deficits (see above) this patient will not likely need continued skilled speech therapy after discharge. SUBJECTIVE:  
Oriented x4. 7/10 pain, however RN present and aware. History of Present Injury/Illness: Mr. Anival Bradford  has a past medical history of Chronic obstructive pulmonary disease ([address] Dermatophytosis of nail, History of CVA (cerebrovascular accident), History of paraplegia, and Hypertension. Gopi Gonzalez He also  has no past surgical history on file. Present Symptoms:   
Pain Scale 1: Numeric (0 - 10) Pain Intensity 1: 7 Pain Location 1: Back Pain Intervention(s) 1: Nurse notified Current Medications: No current facility-administered medications on file prior to encounter. Current Outpatient Medications on File Prior to Encounter Medication Sig Dispense Refill  furosemide (LASIX) 20 mg tablet Take 20 mg by mouth daily as needed.  ciprofloxacin HCl (CIPRO) 500 mg tablet Take 1 Tab by mouth two (2) times a day. 30 Tab 0  
 LORazepam (ATIVAN) 0.5 mg tablet Take 0.5 mg by mouth three (3) times daily as needed for Anxiety.  melatonin 3 mg tablet Take 3 mg by mouth daily as needed (SLEEP).  potassium chloride (K-DUR, KLOR-CON) 20 mEq tablet Take 20 mEq by mouth daily.  polyethylene glycol (MIRALAX) 17 gram/dose powder Take 17 g by mouth daily as needed.  raNITIdine (ZANTAC) 150 mg tablet Take 150 mg by mouth Daily (before breakfast).  albuterol (PROVENTIL HFA, VENTOLIN HFA, PROAIR HFA) 90 mcg/actuation inhaler Take 1 Puff by inhalation every four (4) hours as needed for Wheezing. 1 Inhaler 12  
 acetaminophen (TYLENOL) 500 mg tablet Take  by mouth every six (6) hours as needed for Pain.  guaiFENesin (ORGANIDIN) 400 mg tablet Take 200 mg by mouth three (3) times daily.  loratadine (CLARITIN) 10 mg tablet Take 5 mg by mouth nightly.  phenytoin ER (DILANTIN ER) 100 mg ER capsule Take 300 mg by mouth two (2) times a day.  rosuvastatin (CRESTOR) 40 mg tablet Take 40 mg by mouth nightly.  senna (SENOKOT) 8.6 mg tablet Take 1 Tab by mouth daily.  sertraline (ZOLOFT) 100 mg tablet Take  by mouth nightly.  tamsulosin (FLOMAX) 0.4 mg capsule Take 0.4 mg by mouth daily.  ALOE VERA/COLLAGEN (ALOE VESTA 2-N-1 CLEANSER EX) by Apply Externally route.  aspirin delayed-release 81 mg tablet Take 81 mg by mouth daily.  MINERAL OIL/PETROLATUM,WHITE (CLIVE MOISTURE BARRIER EX) by Apply Externally route.  carvedilol (COREG) 12.5 mg tablet Take 6.25 mg by mouth two (2) times a day.  cholecalciferol (VITAMIN D3) 1,000 unit cap Take 2,000 Units by mouth.  clopidogrel (PLAVIX) 75 mg tab Take 75 mg by mouth.  docusate sodium 100 mg/5 mL enem Insert  into rectum.  fluticasone (FLOVENT DISKUS) 50 mcg/actuation inhaler by Nasal route.  acetaminophen-codeine (TYLENOL-CODEINE #3) 300-30 mg per tablet Take 1 Tab by mouth every four (4) hours as needed for Pain.  lisinopril (PRINIVIL, ZESTRIL) 40 mg tablet Take 40 mg by mouth daily.     
 b complex-vitamin c-folic acid 0.8 mg (NEPHRO-ALICIA) 0.8 mg tab tablet Take 1 Tab by mouth daily.  Omeprazole delayed release (PRILOSEC D/R) 20 mg tablet Take 20 mg by mouth daily.  folic acid (FOLVITE) 1 mg tablet Take 1 mg by mouth. Current Dietary Status:   
 Regular/Thin 
 
Social History/Home Situation:   
Home Environment: Apartment # Steps to Enter: 0 One/Two Story Residence: One story Living Alone: No 
Support Systems: Spouse/Significant Other/Partner, Home care staff Patient Expects to be Discharged to[de-identified] [facility] Current DME Used/Available at Home: Brace/Splint, Commode, bedside, Shower chair, Walker, Wheelchair, power, Hospital bed OBJECTIVE:  
Respiratory Status:  Nasal cannula  2 l/min Oral Motor Structure/Speech:  Oral-Motor Structure/Motor Speech Labial: Left droop Dentition: Intact Oral Hygiene: adequate Lingual: Decreased rate, Decreased strength Cognitive and Communication Status: 
Neurologic State: Alert Orientation Level: Oriented X4 Cognition: Follows commands Perseveration: No perseveration noted BEDSIDE SWALLOW EVALUATION Oral Assessment: 
Oral Assessment Labial: Left droop Dentition: Intact Oral Hygiene: adequate Lingual: Decreased rate;Decreased strength P.O. Trials: 
Patient Position: upright in bed The patient was given  amounts of the following:  
Consistency Presented: Puree; Solid; Thin liquid;Mechanical soft How Presented: Self-fed/presented;Cup/sip;Cup/gulp; Spoon ORAL PHASE: 
Bolus Acceptance: No impairment Bolus Formation/Control: No impairment Propulsion: Delayed (# of seconds) Oral Residue: Less than 10% of bolus PHARYNGEAL PHASE: 
Initiation of Swallow: No impairment Laryngeal Elevation: Functional 
Aspiration Signs/Symptoms: None Vocal Quality: (dysarthric) OTHER OBSERVATIONS: 
Rate/bite size: WNL Endurance:  Questionable Comments:  decreased intake x1 week per report Tool Used: Dysphagia Outcome and Severity Scale (EVERT) Score Comments Normal Diet  ? 7 With no strategies or extra time needed Functional Swallow  ? 6 May have mild oral or pharyngeal delay Mild Dysphagia ? 5 Which may require one diet consistency restricted (those who demonstrate penetration which is entirely cleared on MBS would be included) Mild-Moderate Dysphagia  ? 4 With 1-2 diet consistencies restricted Moderate Dysphagia  ? 3 With 2 or more diet consistencies restricted Moderately Severe Dysphagia  ? 2 With partial PO strategies (trials with ST only) Severe Dysphagia  ? 1 With inability to tolerate any PO safely Score:  Initial: 5 Most Recent: X (Date: --) Interpretation of Tool: The Dysphagia Outcome and Severity Scale (EVERT) is a simple, easy-to-use, 7-point scale developed to systematically rate the functional severity of dysphagia based on objective assessment and make recommendations for diet level, independence level, and type of nutrition. Payor: Giuliana Anthony / Plan: [employer] / Product Type: [employer] Medicare /  
 
TREATMENT:  
 (In addition to Assessment/Re-Assessment sessions the following treatments were rendered) Assessment/Reassessment only, no treatment provided today MODALITIES:  
  
  
  
  
  
  
  
  
  
  
    
  
  
  
  
  
  
  
  
   
 
ORAL MOTOR  EXERCISES: 
  
  
  
  
  
  
  
  
  
  
  
  
  
  
  
  
  
  
  
  
  
  
  
  
  
  
    
  
  
  
  
  
  
  
  
  
  
  
  
  
  
  
  
  
  
  
  
  
  
  
  
  
   
 
LARYNGEAL / PHARYNGEAL EXERCISES: 
  
  
  
  
  
  
  
  
  
  
  
  
  
  
  
  
  
  
  
  
  
    
  
  
  
  
  
  
  
  
  
  
  
  
  
  
  
  
  
  
  
   
 
__________________________________________________________________________________________________ Safety: After treatment position/precautions: 
· Call light within reach · RN notified · Family at bedside · Upright in Bed Treatment Assessment:  participated in dysphagia evaluation. No acute needs as patient tolerating baseline diet. Please reconsult if new concerns arise. Total Treatment Duration: 
Time In: 1300 Time Out: 1316 [employer], [facility], [facility] LOY LEUNG, CF-SLP

## 2019-04-16 NOTE — PROGRESS NOTES
END OF SHIFT NOTE: 
 
INTAKE/OUTPUT 
04/15 0701 - 04/16 0700 In: 240 [P.O.:240] Out: -  
Voiding: YES Catheter: NO 
Drain:   
 
 
 
 
 
Flatus: Patient does have flatus present. Stool:  0 occurrences. Characteristics: 
  
Emesis: 0 occurrences. Characteristics: VITAL SIGNS Patient Vitals for the past 12 hrs: 
 Temp Pulse Resp BP SpO2  
04/16/19 0300 97.6 °F (36.4 °C) 83 20 187/75 92 % 04/15/19 2300 98.7 °F (37.1 °C) 92 18 94/60 93 % 04/15/19 2033     93 % Pain Assessment Pain Intensity 1: 0 (04/16/19 0331) Pain Location 1: Leg 
Pain Intervention(s) 1: Medication (see MAR) Patient Stated Pain Goal: 0 Ambulating No 
 
Shift report given to oncoming nurse at the bedside.  
 
Eileen Panchal RN

## 2019-04-16 NOTE — PROGRESS NOTES
Progress Note Patient: Amanda Girard MRN: [id number]  SSN: [id number] YOB: 1950  Age: 80 y.o. Sex: male Admission Date: 4/10/2019 LOS: 6 days Subjective:  
 
Patient reports pain waxes / wanes but is ascending up leg, requiring IV pain medication with unreliable control. Patient was seen in conjunction with Dr. Nato Angeles, who discussed with patient and wife possible catheter-based intervention. Objective:  
 
Vitals:  
 04/16/19 0933 04/16/19 0934 04/16/19 1048 04/16/19 1113 BP: 149/80 149/80 121/74 Pulse: 90 90 95 Resp:   18 Temp:   97.7 °F (36.5 °C) SpO2:   95% 95% Weight:      
Height:      
  
Physical Exam:  
GENERAL: alert, cooperative, no distress LUNG: clear to auscultation bilaterally, normal respiratory effort HEART: regular rate and rhythm ABDOMEN: soft, nontender, obese and protuberant but not distended EXTREMITIES: erythematous L LE with appearance of chronic venous stasis, edema stable, skin shiny with hydration; right LE erythema intensity and size diminished; B feet without ulcerations / lesions / wounds Lab/Data Review: 
BMP:  
Lab Results Component Value Date/Time  04/16/2019 03:55 AM  
 K 3.7 04/16/2019 03:55 AM  
  04/16/2019 03:55 AM  
 CO2 26 04/16/2019 03:55 AM  
 AGAP 9 04/16/2019 03:55 AM  
  (H) 04/16/2019 03:55 AM  
 BUN 14 04/16/2019 03:55 AM  
 CREA 0.56 (L) 04/16/2019 03:55 AM  
 GFRAA >60 04/16/2019 03:55 AM  
 GFRNA >60 04/16/2019 03:55 AM  
 
CBC:  
Lab Results Component Value Date/Time WBC 14.0 (H) 04/16/2019 03:55 AM  
 HGB 13.6 04/16/2019 03:55 AM  
 HCT 39.6 (L) 04/16/2019 03:55 AM  
  04/16/2019 03:55 AM  
  
 
Assessment / Plan / Recommendations / Medical Decision Making:  
 
Principal Problem: 
  Cellulitis (2/21/2019) Active Problems: 
  Essential hypertension (7/14/2018) [facility] (7/14/2018) COPD (chronic obstructive pulmonary disease) ([facility] (4/10/2019) After discussion with patient and wife, Dr. Jess Aparicio recommended arteriogram, and after discussing need for surgery, risks, alternatives, potential complications and anticipated outcomes, the patient elected to proceed. Hospitalist attending and primary RN made aware. Continue current care Elevate LE when not ambulating To OR tomorrow for left lower extremity arteriogram with possible intervention by Estela Jarvis MD 
- NPO after MN tonight 
- consent - IV ABX: on Ancef, will continue junito-operatively Signed By: Waqar Ceballos PA-C April 16, 2019 Physician Assistant with [facility] Vascular Surgery [facility] [employer].  Ade Naik MD / Mily Tilley MD

## 2019-04-17 ENCOUNTER — ANESTHESIA (OUTPATIENT)
Dept: SURGERY | Age: 69
DRG: 872 | End: 2019-04-17
Payer: MEDICARE

## 2019-04-17 ENCOUNTER — APPOINTMENT (OUTPATIENT)
Dept: INTERVENTIONAL RADIOLOGY/VASCULAR | Age: 69
DRG: 872 | End: 2019-04-17
Attending: INTERNAL MEDICINE
Payer: MEDICARE

## 2019-04-17 ENCOUNTER — APPOINTMENT (OUTPATIENT)
Dept: INTERVENTIONAL RADIOLOGY/VASCULAR | Age: 69
DRG: 872 | End: 2019-04-17
Attending: SURGERY
Payer: MEDICARE

## 2019-04-17 LAB
ANION GAP SERPL CALC-SCNC: 10 MMOL/L (ref 7–16)
BASOPHILS # BLD: 0.1 K/UL (ref 0–0.2)
BASOPHILS NFR BLD: 0 % (ref 0–2)
BUN SERPL-MCNC: 12 MG/DL (ref 8–23)
CALCIUM SERPL-MCNC: 8.8 MG/DL (ref 8.3–10.4)
CHLORIDE SERPL-SCNC: 105 MMOL/L (ref 98–107)
CO2 SERPL-SCNC: 25 MMOL/L (ref 21–32)
CREAT SERPL-MCNC: 0.55 MG/DL (ref 0.8–1.5)
DIFFERENTIAL METHOD BLD: ABNORMAL
EOSINOPHIL # BLD: 0 K/UL (ref 0–0.8)
EOSINOPHIL NFR BLD: 0 % (ref 0.5–7.8)
ERYTHROCYTE [DISTWIDTH] IN BLOOD BY AUTOMATED COUNT: 12.1 % (ref 11.9–14.6)
GLUCOSE SERPL-MCNC: 116 MG/DL (ref 65–100)
HCT VFR BLD AUTO: 40.2 % (ref 41.1–50.3)
HGB BLD-MCNC: 13.4 G/DL (ref 13.6–17.2)
IMM GRANULOCYTES # BLD AUTO: 0.2 K/UL (ref 0–0.5)
IMM GRANULOCYTES NFR BLD AUTO: 1 % (ref 0–5)
LYMPHOCYTES # BLD: 2 K/UL (ref 0.5–4.6)
LYMPHOCYTES NFR BLD: 11 % (ref 13–44)
MCH RBC QN AUTO: 31.5 PG (ref 26.1–32.9)
MCHC RBC AUTO-ENTMCNC: 33.3 G/DL (ref 31.4–35)
MCV RBC AUTO: 94.4 FL (ref 79.6–97.8)
MM INDURATION POC: NORMAL 0 (ref 0–5)
MONOCYTES # BLD: 1 K/UL (ref 0.1–1.3)
MONOCYTES NFR BLD: 6 % (ref 4–12)
NEUTS SEG # BLD: 14.7 K/UL (ref 1.7–8.2)
NEUTS SEG NFR BLD: 82 % (ref 43–78)
NRBC # BLD: 0 K/UL (ref 0–0.2)
PLATELET # BLD AUTO: 308 K/UL (ref 150–450)
PMV BLD AUTO: 9.7 FL (ref 9.4–12.3)
POTASSIUM SERPL-SCNC: 3.5 MMOL/L (ref 3.5–5.1)
PPD POC: NORMAL NEGATIVE
RBC # BLD AUTO: 4.26 M/UL (ref 4.23–5.6)
SODIUM SERPL-SCNC: 140 MMOL/L (ref 136–145)
WBC # BLD AUTO: 18 K/UL (ref 4.3–11.1)

## 2019-04-17 PROCEDURE — 74011250637 HC RX REV CODE- 250/637: Performed by: FAMILY MEDICINE

## 2019-04-17 PROCEDURE — C1894 INTRO/SHEATH, NON-LASER: HCPCS

## 2019-04-17 PROCEDURE — 74011250636 HC RX REV CODE- 250/636: Performed by: INTERNAL MEDICINE

## 2019-04-17 PROCEDURE — 04H033Z INSERTION OF INFUSION DEVICE INTO ABDOMINAL AORTA, PERCUTANEOUS APPROACH: ICD-10-PCS | Performed by: SURGERY

## 2019-04-17 PROCEDURE — 74011250636 HC RX REV CODE- 250/636: Performed by: ANESTHESIOLOGY

## 2019-04-17 PROCEDURE — 77010033678 HC OXYGEN DAILY

## 2019-04-17 PROCEDURE — B4101ZZ FLUOROSCOPY OF ABDOMINAL AORTA USING LOW OSMOLAR CONTRAST: ICD-10-PCS | Performed by: SURGERY

## 2019-04-17 PROCEDURE — 94640 AIRWAY INHALATION TREATMENT: CPT

## 2019-04-17 PROCEDURE — C1887 CATHETER, GUIDING: HCPCS

## 2019-04-17 PROCEDURE — 75710 ARTERY X-RAYS ARM/LEG: CPT

## 2019-04-17 PROCEDURE — B41G1ZZ FLUOROSCOPY OF LEFT LOWER EXTREMITY ARTERIES USING LOW OSMOLAR CONTRAST: ICD-10-PCS | Performed by: SURGERY

## 2019-04-17 PROCEDURE — C1769 GUIDE WIRE: HCPCS

## 2019-04-17 PROCEDURE — 74011636320 HC RX REV CODE- 636/320

## 2019-04-17 PROCEDURE — 74011250636 HC RX REV CODE- 250/636: Performed by: HOSPITALIST

## 2019-04-17 PROCEDURE — 36200 PLACE CATHETER IN AORTA: CPT

## 2019-04-17 PROCEDURE — 65270000029 HC RM PRIVATE

## 2019-04-17 PROCEDURE — 76210000006 HC OR PH I REC 0.5 TO 1 HR: Performed by: SURGERY

## 2019-04-17 PROCEDURE — 80048 BASIC METABOLIC PNL TOTAL CA: CPT

## 2019-04-17 PROCEDURE — 94760 N-INVAS EAR/PLS OXIMETRY 1: CPT

## 2019-04-17 PROCEDURE — 85025 COMPLETE CBC W/AUTO DIFF WBC: CPT

## 2019-04-17 PROCEDURE — 74011000250 HC RX REV CODE- 250: Performed by: FAMILY MEDICINE

## 2019-04-17 PROCEDURE — 74011250637 HC RX REV CODE- 250/637: Performed by: INTERNAL MEDICINE

## 2019-04-17 PROCEDURE — 74011250636 HC RX REV CODE- 250/636

## 2019-04-17 PROCEDURE — 76060000033 HC ANESTHESIA 1 TO 1.5 HR: Performed by: SURGERY

## 2019-04-17 PROCEDURE — C1760 CLOSURE DEV, VASC: HCPCS

## 2019-04-17 PROCEDURE — 36415 COLL VENOUS BLD VENIPUNCTURE: CPT

## 2019-04-17 PROCEDURE — 74011250636 HC RX REV CODE- 250/636: Performed by: SURGERY

## 2019-04-17 PROCEDURE — 74011250636 HC RX REV CODE- 250/636: Performed by: FAMILY MEDICINE

## 2019-04-17 RX ORDER — OXYCODONE HYDROCHLORIDE 5 MG/1
5 TABLET ORAL
Status: DISCONTINUED | OUTPATIENT
Start: 2019-04-17 | End: 2019-04-17 | Stop reason: HOSPADM

## 2019-04-17 RX ORDER — SODIUM CHLORIDE 9 MG/ML
75 INJECTION, SOLUTION INTRAVENOUS CONTINUOUS
Status: ACTIVE | OUTPATIENT
Start: 2019-04-17 | End: 2019-04-17

## 2019-04-17 RX ORDER — SODIUM CHLORIDE, SODIUM LACTATE, POTASSIUM CHLORIDE, CALCIUM CHLORIDE 600; 310; 30; 20 MG/100ML; MG/100ML; MG/100ML; MG/100ML
100 INJECTION, SOLUTION INTRAVENOUS CONTINUOUS
Status: DISPENSED | OUTPATIENT
Start: 2019-04-17 | End: 2019-04-18

## 2019-04-17 RX ORDER — NALOXONE HYDROCHLORIDE 0.4 MG/ML
0.1 INJECTION, SOLUTION INTRAMUSCULAR; INTRAVENOUS; SUBCUTANEOUS AS NEEDED
Status: DISCONTINUED | OUTPATIENT
Start: 2019-04-17 | End: 2019-04-17 | Stop reason: HOSPADM

## 2019-04-17 RX ORDER — LIDOCAINE HYDROCHLORIDE 20 MG/ML
INJECTION, SOLUTION EPIDURAL; INFILTRATION; INTRACAUDAL; PERINEURAL AS NEEDED
Status: DISCONTINUED | OUTPATIENT
Start: 2019-04-17 | End: 2019-04-17 | Stop reason: HOSPADM

## 2019-04-17 RX ORDER — PROPOFOL 10 MG/ML
INJECTION, EMULSION INTRAVENOUS
Status: DISCONTINUED | OUTPATIENT
Start: 2019-04-17 | End: 2019-04-17 | Stop reason: HOSPADM

## 2019-04-17 RX ORDER — HYDRALAZINE HYDROCHLORIDE 20 MG/ML
10 INJECTION INTRAMUSCULAR; INTRAVENOUS
Status: DISCONTINUED | OUTPATIENT
Start: 2019-04-17 | End: 2019-04-20 | Stop reason: HOSPADM

## 2019-04-17 RX ORDER — LIDOCAINE HYDROCHLORIDE 20 MG/ML
2-20 INJECTION, SOLUTION INFILTRATION; PERINEURAL ONCE
Status: ACTIVE | OUTPATIENT
Start: 2019-04-17 | End: 2019-04-18

## 2019-04-17 RX ORDER — FAMOTIDINE 20 MG/1
20 TABLET, FILM COATED ORAL ONCE
Status: ACTIVE | OUTPATIENT
Start: 2019-04-17 | End: 2019-04-17

## 2019-04-17 RX ORDER — MORPHINE SULFATE 2 MG/ML
1 INJECTION, SOLUTION INTRAMUSCULAR; INTRAVENOUS
Status: DISCONTINUED | OUTPATIENT
Start: 2019-04-17 | End: 2019-04-20 | Stop reason: HOSPADM

## 2019-04-17 RX ORDER — MIDAZOLAM HYDROCHLORIDE 1 MG/ML
2 INJECTION, SOLUTION INTRAMUSCULAR; INTRAVENOUS
Status: ACTIVE | OUTPATIENT
Start: 2019-04-17 | End: 2019-04-18

## 2019-04-17 RX ORDER — HEPARIN SODIUM 200 [USP'U]/100ML
1000 INJECTION, SOLUTION INTRAVENOUS AS NEEDED
Status: DISCONTINUED | OUTPATIENT
Start: 2019-04-17 | End: 2019-04-20 | Stop reason: HOSPADM

## 2019-04-17 RX ORDER — PROTAMINE SULFATE 10 MG/ML
50 INJECTION, SOLUTION INTRAVENOUS
Status: DISCONTINUED | OUTPATIENT
Start: 2019-04-17 | End: 2019-04-17

## 2019-04-17 RX ORDER — SODIUM CHLORIDE 0.9 % (FLUSH) 0.9 %
5-40 SYRINGE (ML) INJECTION EVERY 8 HOURS
Status: DISCONTINUED | OUTPATIENT
Start: 2019-04-17 | End: 2019-04-17 | Stop reason: HOSPADM

## 2019-04-17 RX ORDER — HYDRALAZINE HYDROCHLORIDE 20 MG/ML
10 INJECTION INTRAMUSCULAR; INTRAVENOUS EVERY 6 HOURS
Status: DISCONTINUED | OUTPATIENT
Start: 2019-04-17 | End: 2019-04-17

## 2019-04-17 RX ORDER — SODIUM CHLORIDE 9 MG/ML
25 INJECTION, SOLUTION INTRAVENOUS CONTINUOUS
Status: DISPENSED | OUTPATIENT
Start: 2019-04-17 | End: 2019-04-18

## 2019-04-17 RX ORDER — DIPHENHYDRAMINE HYDROCHLORIDE 50 MG/ML
12.5 INJECTION, SOLUTION INTRAMUSCULAR; INTRAVENOUS
Status: DISCONTINUED | OUTPATIENT
Start: 2019-04-17 | End: 2019-04-20 | Stop reason: HOSPADM

## 2019-04-17 RX ORDER — SODIUM CHLORIDE 0.9 % (FLUSH) 0.9 %
5-40 SYRINGE (ML) INJECTION AS NEEDED
Status: DISCONTINUED | OUTPATIENT
Start: 2019-04-17 | End: 2019-04-19 | Stop reason: SDUPTHER

## 2019-04-17 RX ORDER — HYDROCODONE BITARTRATE AND ACETAMINOPHEN 7.5; 325 MG/1; MG/1
1 TABLET ORAL AS NEEDED
Status: DISCONTINUED | OUTPATIENT
Start: 2019-04-17 | End: 2019-04-17 | Stop reason: HOSPADM

## 2019-04-17 RX ORDER — LIDOCAINE HYDROCHLORIDE 10 MG/ML
0.1 INJECTION INFILTRATION; PERINEURAL AS NEEDED
Status: DISCONTINUED | OUTPATIENT
Start: 2019-04-17 | End: 2019-04-20 | Stop reason: HOSPADM

## 2019-04-17 RX ORDER — PROPOFOL 10 MG/ML
INJECTION, EMULSION INTRAVENOUS AS NEEDED
Status: DISCONTINUED | OUTPATIENT
Start: 2019-04-17 | End: 2019-04-17 | Stop reason: HOSPADM

## 2019-04-17 RX ORDER — SODIUM CHLORIDE 0.9 % (FLUSH) 0.9 %
5-40 SYRINGE (ML) INJECTION AS NEEDED
Status: DISCONTINUED | OUTPATIENT
Start: 2019-04-17 | End: 2019-04-17 | Stop reason: HOSPADM

## 2019-04-17 RX ORDER — FENTANYL CITRATE 50 UG/ML
100 INJECTION, SOLUTION INTRAMUSCULAR; INTRAVENOUS ONCE
Status: ACTIVE | OUTPATIENT
Start: 2019-04-17 | End: 2019-04-17

## 2019-04-17 RX ORDER — SODIUM CHLORIDE 0.9 % (FLUSH) 0.9 %
5-40 SYRINGE (ML) INJECTION EVERY 8 HOURS
Status: DISCONTINUED | OUTPATIENT
Start: 2019-04-17 | End: 2019-04-17

## 2019-04-17 RX ORDER — HYDROMORPHONE HYDROCHLORIDE 2 MG/ML
0.5 INJECTION, SOLUTION INTRAMUSCULAR; INTRAVENOUS; SUBCUTANEOUS
Status: DISCONTINUED | OUTPATIENT
Start: 2019-04-17 | End: 2019-04-17 | Stop reason: SDUPTHER

## 2019-04-17 RX ORDER — SODIUM CHLORIDE, SODIUM LACTATE, POTASSIUM CHLORIDE, CALCIUM CHLORIDE 600; 310; 30; 20 MG/100ML; MG/100ML; MG/100ML; MG/100ML
25 INJECTION, SOLUTION INTRAVENOUS CONTINUOUS
Status: DISCONTINUED | OUTPATIENT
Start: 2019-04-17 | End: 2019-04-18

## 2019-04-17 RX ORDER — HEPARIN SODIUM 1000 [USP'U]/ML
1000-10000 INJECTION, SOLUTION INTRAVENOUS; SUBCUTANEOUS
Status: DISCONTINUED | OUTPATIENT
Start: 2019-04-17 | End: 2019-04-17

## 2019-04-17 RX ADMIN — PHENYTOIN SODIUM 300 MG: 100 CAPSULE ORAL at 07:57

## 2019-04-17 RX ADMIN — Medication 2 G: at 23:12

## 2019-04-17 RX ADMIN — PROPOFOL 20 MG: 10 INJECTION, EMULSION INTRAVENOUS at 15:10

## 2019-04-17 RX ADMIN — PETROLATUM: 42 OINTMENT TOPICAL at 08:08

## 2019-04-17 RX ADMIN — PROPOFOL 25 MG: 10 INJECTION, EMULSION INTRAVENOUS at 14:54

## 2019-04-17 RX ADMIN — MORPHINE SULFATE 2 MG: 2 INJECTION, SOLUTION INTRAMUSCULAR; INTRAVENOUS at 23:12

## 2019-04-17 RX ADMIN — HYDRALAZINE HYDROCHLORIDE 0.5 MG: 20 INJECTION INTRAMUSCULAR; INTRAVENOUS at 00:53

## 2019-04-17 RX ADMIN — SODIUM CHLORIDE 75 ML/HR: 900 INJECTION, SOLUTION INTRAVENOUS at 17:09

## 2019-04-17 RX ADMIN — BUDESONIDE 500 MCG: 0.5 INHALANT RESPIRATORY (INHALATION) at 20:57

## 2019-04-17 RX ADMIN — MORPHINE SULFATE 1 MG: 2 INJECTION, SOLUTION INTRAMUSCULAR; INTRAVENOUS at 18:48

## 2019-04-17 RX ADMIN — POTASSIUM CHLORIDE 20 MEQ: 20 TABLET, EXTENDED RELEASE ORAL at 07:57

## 2019-04-17 RX ADMIN — GUAIFENESIN 200 MG: 200 TABLET ORAL at 07:58

## 2019-04-17 RX ADMIN — MORPHINE SULFATE 2 MG: 2 INJECTION, SOLUTION INTRAMUSCULAR; INTRAVENOUS at 06:42

## 2019-04-17 RX ADMIN — SODIUM CHLORIDE, SODIUM LACTATE, POTASSIUM CHLORIDE, AND CALCIUM CHLORIDE 25 ML/HR: 600; 310; 30; 20 INJECTION, SOLUTION INTRAVENOUS at 09:14

## 2019-04-17 RX ADMIN — SENNOSIDES 8.6 MG: 8.6 TABLET, FILM COATED ORAL at 07:58

## 2019-04-17 RX ADMIN — LORATADINE 5 MG: 10 TABLET ORAL at 21:42

## 2019-04-17 RX ADMIN — GUAIFENESIN 200 MG: 200 TABLET ORAL at 21:42

## 2019-04-17 RX ADMIN — HEPARIN SODIUM 2000 UNITS: 200 INJECTION, SOLUTION INTRAVENOUS at 15:27

## 2019-04-17 RX ADMIN — Medication 10 ML: at 00:57

## 2019-04-17 RX ADMIN — ENOXAPARIN SODIUM 40 MG: 40 INJECTION SUBCUTANEOUS at 21:42

## 2019-04-17 RX ADMIN — ASPIRIN 81 MG: 81 TABLET, COATED ORAL at 07:59

## 2019-04-17 RX ADMIN — TAMSULOSIN HYDROCHLORIDE 0.4 MG: 0.4 CAPSULE ORAL at 07:58

## 2019-04-17 RX ADMIN — SODIUM CHLORIDE, SODIUM LACTATE, POTASSIUM CHLORIDE, AND CALCIUM CHLORIDE: 600; 310; 30; 20 INJECTION, SOLUTION INTRAVENOUS at 14:45

## 2019-04-17 RX ADMIN — Medication 2 G: at 17:12

## 2019-04-17 RX ADMIN — PANTOPRAZOLE SODIUM 40 MG: 40 TABLET, DELAYED RELEASE ORAL at 06:43

## 2019-04-17 RX ADMIN — LORAZEPAM 0.5 MG: 0.5 TABLET ORAL at 01:46

## 2019-04-17 RX ADMIN — Medication 2 G: at 08:23

## 2019-04-17 RX ADMIN — PROPOFOL 20 MG: 10 INJECTION, EMULSION INTRAVENOUS at 15:13

## 2019-04-17 RX ADMIN — LIDOCAINE HYDROCHLORIDE 100 MG: 20 INJECTION, SOLUTION EPIDURAL; INFILTRATION; INTRACAUDAL; PERINEURAL at 14:54

## 2019-04-17 RX ADMIN — Medication 1 TABLET: at 07:58

## 2019-04-17 RX ADMIN — PROPOFOL 50 MCG/KG/MIN: 10 INJECTION, EMULSION INTRAVENOUS at 14:54

## 2019-04-17 RX ADMIN — CLOPIDOGREL BISULFATE 75 MG: 75 TABLET, FILM COATED ORAL at 07:58

## 2019-04-17 RX ADMIN — HYDROCODONE BITARTRATE AND ACETAMINOPHEN 2 TABLET: 5; 325 TABLET ORAL at 17:06

## 2019-04-17 RX ADMIN — CARVEDILOL 6.25 MG: 6.25 TABLET, FILM COATED ORAL at 17:06

## 2019-04-17 RX ADMIN — CARVEDILOL 6.25 MG: 6.25 TABLET, FILM COATED ORAL at 07:59

## 2019-04-17 RX ADMIN — GUAIFENESIN 200 MG: 200 TABLET ORAL at 17:06

## 2019-04-17 RX ADMIN — ROSUVASTATIN CALCIUM 40 MG: 20 TABLET, FILM COATED ORAL at 21:42

## 2019-04-17 RX ADMIN — LORAZEPAM 0.5 MG: 0.5 TABLET ORAL at 21:43

## 2019-04-17 RX ADMIN — SERTRALINE HYDROCHLORIDE 100 MG: 100 TABLET ORAL at 21:42

## 2019-04-17 RX ADMIN — PHENYTOIN SODIUM 300 MG: 100 CAPSULE ORAL at 17:06

## 2019-04-17 RX ADMIN — FOLIC ACID 1 MG: 1 TABLET ORAL at 07:59

## 2019-04-17 NOTE — PROGRESS NOTES
TRANSFER - OUT REPORT: 
 
Verbal report given to pre-op RN on Maida Blizzard  being transferred to pre-op for ordered procedure Report consisted of patients Situation, Background, Assessment and  
Recommendations(SBAR). Information from the following report(s) SBAR, Kardex, Intake/Output, MAR and Pre Procedure Checklist was reviewed with the receiving nurse. Lines:  
Peripheral IV 04/10/19 Left External jugular (Active) Site Assessment Clean, dry, & intact 4/16/2019  9:00 PM  
Phlebitis Assessment 0 4/16/2019  9:00 PM  
Infiltration Assessment 0 4/16/2019  9:00 PM  
Dressing Status Clean, dry, & intact 4/16/2019  9:00 PM  
Dressing Type Transparent 4/16/2019  9:00 PM  
Hub Color/Line Status Flushed;Patent 4/16/2019  9:00 PM  
Action Taken Open ports on tubing capped 4/16/2019  9:00 PM  
Alcohol Cap Used No 4/16/2019  9:00 PM  
  
 
Opportunity for questions and clarification was provided. Patient transported with: 
 [employer] Went over medication list with pre-op RN, stated to hold only patient's lisinopril.

## 2019-04-17 NOTE — PERIOP NOTES
TRANSFER - OUT REPORT: 
 
Verbal report given to Kevin Murray RN on [facility]  being transferred to IR for ordered procedure Report consisted of patients Situation, Background, Assessment and Recommendations(SBAR). Information from the following report(s) SBAR, Kardex, [facility] and Procedure Verification was reviewed with the receiving nurse. Opportunity for questions and clarification was provided.

## 2019-04-17 NOTE — ROUTINE PROCESS
TRANSFER - OUT REPORT: 
 
Verbal report given to [facility] RN on [facility]  being transferred to [facility] for routine post - op Report consisted of patients Situation, Background, Assessment and  
Recommendations(SBAR). Information from the following report(s) SBAR, OR Summary, Procedure Summary, Intake/Output and MAR was reviewed with the receiving nurse. Lines:  
Peripheral IV 04/10/19 Left External jugular (Active) Site Assessment Clean, dry, & intact 4/17/2019  3:41 PM  
Phlebitis Assessment 0 4/17/2019  3:41 PM  
Infiltration Assessment 0 4/17/2019  3:41 PM  
Dressing Status Clean, dry, & intact 4/17/2019  3:41 PM  
Dressing Type Transparent;Tape 4/17/2019  3:41 PM  
Hub Color/Line Status Infusing 4/17/2019  3:41 PM  
Action Taken Open ports on tubing capped 4/16/2019  9:00 PM  
Alcohol Cap Used No 4/17/2019  3:41 PM  
  
 
Opportunity for questions and clarification was provided. Patient transported with: 
 O2 @ 4 liters VTE prophylaxis orders have not been written for [facility]. Patient and family given floor number and nurses name. Family updated re: pt status after security code verified.

## 2019-04-17 NOTE — ANESTHESIA POSTPROCEDURE EVALUATION
Procedure(s): LEFT LOWER EXTREMITY ARTERIOGRAM WITH POSS INTERVENTION ROOM 210. 
 
total IV anesthesia Anesthesia Post Evaluation Patient location during evaluation: PACU Patient participation: complete - patient participated Level of consciousness: awake and alert Pain management: adequate Airway patency: patent Anesthetic complications: no 
Cardiovascular status: acceptable Respiratory status: acceptable Hydration status: acceptable Post anesthesia nausea and vomiting:  none Vitals Value Taken Time /76 4/17/2019  4:18 PM  
Temp 36.7 °C (98 °F) 4/17/2019  4:13 PM  
Pulse 96 4/17/2019  4:21 PM  
Resp 16 4/17/2019  4:18 PM  
SpO2 94 % 4/17/2019  4:22 PM  
Vitals shown include unvalidated device data.

## 2019-04-17 NOTE — ANESTHESIA PREPROCEDURE EVALUATION
Relevant Problems No relevant active problems Anesthetic History Review of Systems / Medical History Patient summary reviewed Pulmonary COPD Smoker (Former) Neuro/Psych  
 
seizures: well controlled CVA (L hemiplegia) Psychiatric history (Depression) Cardiovascular Hypertension Past MI (? date) and CAD Exercise tolerance: <4 METS: Due debility Comments: EF 60-65% GI/Hepatic/Renal 
  
GERD Endo/Other Other Findings Physical Exam 
 
Airway Mallampati: III 
TM Distance: > 6 cm Neck ROM: decreased range of motion Mouth opening: Diminished (comment) Cardiovascular Regular rate and rhythm,  S1 and S2 normal,  no murmur, click, rub, or gallop Dental 
 
 
  
Pulmonary Breath sounds clear to auscultation Abdominal 
 
 
 
 Other Findings Anesthetic Plan ASA: 3 Anesthesia type: total IV anesthesia Anesthetic plan and risks discussed with: Patient and Spouse Pt took small amount of applesauce with meds @ 8AM (just enough to \"coat\" pills < 1 tsp per nurse). Feel there is minimal increased aspiration risk and will plan to proceed.

## 2019-04-17 NOTE — PROGRESS NOTES
Patient to [address] [place] [address] for procedure. Patient under care of anesthesia, please refer to anesthesia notes for intraprocedure vital signs and IV medication administration.

## 2019-04-17 NOTE — PERIOP NOTES
TRANSFER - IN REPORT: 
 
Verbal report received from Nino Roldan RN on [place] being received from 210 for ordered procedure Report consisted of patients Situation, Background, Assessment and Recommendations(SBAR). Information from the following report(s) SBAR, Kardex, [facility], Recent Results and Procedure Verification was reviewed with the receiving nurse. Opportunity for questions and clarification was provided. Assessment completed upon patients arrival to unit and care assumed.

## 2019-04-17 NOTE — PROGRESS NOTES
Hospitalist Progress Note 2019 Admit Date: 4/10/2019  4:41 PM  
NAME: Maida Blizzard :  1950 DOS:              19 MRN:  [id number] Attending: Rene Baker MD 
PCP:  Sreedhar Salcedo MD 
Treatment Team: Attending Provider: Jordy Giles MD; Utilization Review: Joey Guzman RN; Care Manager: Antoine Mchugh RN; Consulting Provider: Obdulia Champagne MD; Consulting Provider: Serge Ricketts MD 
 
Full Code SUBJECTIVE: As previously documented: 73yoM with PMH significant for prior CVA with residual L sided hemiparesis, COPD on home O2 2L NC who presents with pain and swelling of LLE. Patient admitted for sepsis secondary to cellulitis of LLE and started on IV abxs. Patient was recently discharged on 2019 for cellulitis of LLE, MRI was neg for OM. Vascular consulted for recurrent cellulitis with suspected PVD. Planning for angiogram. 
 
 19    Maida Blizzard  Stated still having dysphagia, worse with solids. Wife at bedside all questions answered 10+ ROS reviewed and negative except for positive in HPI. Allergies Allergen Reactions  Latex Rash  Adhesive Unknown (comments)  Sulfa (Sulfonamide Antibiotics) Other (comments) Current Facility-Administered Medications Medication Dose Route Frequency  lidocaine (XYLOCAINE) 10 mg/mL (1 %) injection 0.1 mL  0.1 mL SubCUTAneous PRN  
 lactated Ringers infusion  100 mL/hr IntraVENous CONTINUOUS  
 0.9% sodium chloride infusion  25 mL/hr IntraVENous CONTINUOUS  
 sodium chloride (NS) flush 5-40 mL  5-40 mL IntraVENous Q8H  
 sodium chloride (NS) flush 5-40 mL  5-40 mL IntraVENous PRN  
 famotidine (PEPCID) tablet 20 mg  20 mg Oral ONCE  
 fentaNYL citrate (PF) injection 100 mcg  100 mcg IntraVENous ONCE  
 midazolam (VERSED) injection 2 mg  2 mg IntraVENous ONCE PRN  
 hydrALAZINE (APRESOLINE) 20 mg/mL injection 10 mg  10 mg IntraVENous Q6H PRN  
  lactated Ringers infusion  25 mL/hr IntraVENous CONTINUOUS  
 heparin (PF) 2 units/ml in NS infusion 2,000 Units  1,000 mL Irrigation PRN  protamine injection 50 mg  50 mg IntraVENous ONCE PRN  
 lidocaine (XYLOCAINE) 20 mg/mL (2 %) injection  mg  2-20 mL SubCUTAneous ONCE  
 heparin (porcine) 1,000 unit/mL injection 1,000-10,000 Units  1,000-10,000 Units IntraVENous ONCE PRN  pantoprazole (PROTONIX) tablet 40 mg  40 mg Oral ACB  bisacodyl (DULCOLAX) suppository 10 mg  10 mg Rectal DAILY PRN  
 ceFAZolin (ANCEF) 2 g/20 mL in sterile water IV syringe  2 g IntraVENous Q8H  
 albuterol (PROVENTIL VENTOLIN) nebulizer solution 2.5 mg  2.5 mg Nebulization Q6H PRN  
 HYDROcodone-acetaminophen (NORCO) 5-325 mg per tablet 2 Tab  2 Tab Oral Q4H PRN  
 morphine injection 2 mg  2 mg IntraVENous Q4H PRN  mineral oil-hydrophil petrolat (AQUAPHOR) ointment   Topical DAILY  aspirin delayed-release tablet 81 mg  81 mg Oral DAILY  carvedilol (COREG) tablet 6.25 mg  6.25 mg Oral BID  clopidogrel (PLAVIX) tablet 75 mg  75 mg Oral DAILY  budesonide (PULMICORT) 500 mcg/2 ml nebulizer suspension  500 mcg Nebulization BID RT  
 folic acid (FOLVITE) tablet 1 mg  1 mg Oral DAILY  guaiFENesin (ORGANIDIN) tablet 200 mg  200 mg Oral TID  lisinopril (PRINIVIL, ZESTRIL) tablet 40 mg  40 mg Oral DAILY  loratadine (CLARITIN) tablet 5 mg  5 mg Oral QHS  B complex-vitamin C-folic acid (NEPHRO-ALICIA) 0.8 mg tab  1 Tab Oral DAILY  phenytoin ER (DILANTIN ER) ER capsule 300 mg  300 mg Oral BID  rosuvastatin (CRESTOR) tablet 40 mg  40 mg Oral QHS  senna (SENOKOT) tablet 8.6 mg  1 Tab Oral DAILY  sertraline (ZOLOFT) tablet 100 mg  100 mg Oral QHS  tamsulosin (FLOMAX) capsule 0.4 mg  0.4 mg Oral DAILY  LORazepam (ATIVAN) tablet 0.5 mg  0.5 mg Oral TID PRN  potassium chloride (K-DUR, KLOR-CON) SR tablet 20 mEq  20 mEq Oral DAILY  polyethylene glycol (MIRALAX) packet 17 g  17 g Oral DAILY PRN  
 sodium chloride (NS) flush 5-40 mL  5-40 mL IntraVENous Q8H  
 sodium chloride (NS) flush 5-40 mL  5-40 mL IntraVENous PRN  
 acetaminophen (TYLENOL) tablet 650 mg  650 mg Oral Q4H PRN  
 ondansetron (ZOFRAN) injection 4 mg  4 mg IntraVENous Q4H PRN  
 enoxaparin (LOVENOX) injection 40 mg  40 mg SubCUTAneous Q24H Immunization History Administered Date(s) Administered  TB Skin Test (PPD) Intradermal 2019, 04/15/2019 Objective:  
 
Patient Vitals for the past 24 hrs: 
 Temp Pulse Resp BP SpO2  
19 1346 98.8 °F (37.1 °C) 92 18 116/58 93 % 19 0820 97.6 °F (36.4 °C) (!) 104 18 109/75 93 % 19 0300 98 °F (36.7 °C) 94 19 117/72 91 % 19 0020    (!) 180/95   
19 2300 98.2 °F (36.8 °C) 94 19 (!) 181/93 94 % 19 2107     94 % 19 1900 98.3 °F (36.8 °C) 92 19 168/88 94 % 19 1502 97.3 °F (36.3 °C) 82 18 124/78 93 % Temp (24hrs), Av °F (36.7 °C), Min:97.3 °F (36.3 °C), Max:98.8 °F (37.1 °C) Oxygen Therapy O2 Sat (%): 93 % (19) Pulse via Oximetry: 83 beats per minute (19) O2 Device: Room air (19) O2 Flow Rate (L/min): 2 l/min (19) FIO2 (%): 32 % (19) Oxygen Therapy O2 Sat (%): 93 % (19) Pulse via Oximetry: 83 beats per minute (19) O2 Device: Room air (19 1346) O2 Flow Rate (L/min): 2 l/min (19) FIO2 (%): 32 % (19) Physical Exam: 
General:         Alert, cooperative, no distress HEENT:               NCAT. No obvious deformity. Lungs:                 Few expiratory wheezing b/l. Cardiovascular:   RRR. No m/r/g. No pedal edema b/l. Abdomen:       Minimal tenderness on epigastric region. No G or rebound. Soft,  Bowel sounds normal. .  
Skin:         Still having LLE with erythema from foot to mid  No lesion noted. No drainage. Neurologic:     No gross focal deficit. Psychiatric:         Good mood. Normal affect. DIAGNOSTIC STUDIES Data Review:  
Recent Results (from the past 24 hour(s)) CBC WITH AUTOMATED DIFF Collection Time: 04/17/19  5:17 AM  
Result Value Ref Range WBC 18.0 (H) 4.3 - 11.1 K/uL  
 RBC 4.26 4.23 - 5.6 M/uL  
 HGB 13.4 (L) 13.6 - 17.2 g/dL HCT 40.2 (L) 41.1 - 50.3 % MCV 94.4 79.6 - 97.8 FL  
 MCH 31.5 26.1 - 32.9 PG  
 MCHC 33.3 31.4 - 35.0 g/dL  
 RDW 12.1 11.9 - 14.6 % PLATELET 389 288 - 333 K/uL MPV 9.7 9.4 - 12.3 FL ABSOLUTE NRBC 0.00 0.0 - 0.2 K/uL  
 DF AUTOMATED NEUTROPHILS 82 (H) 43 - 78 % LYMPHOCYTES 11 (L) 13 - 44 % MONOCYTES 6 4.0 - 12.0 % EOSINOPHILS 0 (L) 0.5 - 7.8 % BASOPHILS 0 0.0 - 2.0 % IMMATURE GRANULOCYTES 1 0.0 - 5.0 %  
 ABS. NEUTROPHILS 14.7 (H) 1.7 - 8.2 K/UL  
 ABS. LYMPHOCYTES 2.0 0.5 - 4.6 K/UL  
 ABS. MONOCYTES 1.0 0.1 - 1.3 K/UL  
 ABS. EOSINOPHILS 0.0 0.0 - 0.8 K/UL  
 ABS. BASOPHILS 0.1 0.0 - 0.2 K/UL  
 ABS. IMM. GRANS. 0.2 0.0 - 0.5 K/UL METABOLIC PANEL, BASIC Collection Time: 04/17/19  5:17 AM  
Result Value Ref Range Sodium 140 136 - 145 mmol/L Potassium 3.5 3.5 - 5.1 mmol/L Chloride 105 98 - 107 mmol/L  
 CO2 25 21 - 32 mmol/L Anion gap 10 7 - 16 mmol/L Glucose 116 (H) 65 - 100 mg/dL BUN 12 8 - 23 MG/DL Creatinine 0.55 (L) 0.8 - 1.5 MG/DL  
 GFR est AA >60 >60 ml/min/1.73m2 GFR est non-AA >60 >60 ml/min/1.73m2 Calcium 8.8 8.3 - 10.4 MG/DL All Micro Results Procedure Component Value Units Date/Time CULTURE, BLOOD [[id number]] Collected:  04/10/19 1750 Order Status:  Completed Specimen:  Blood Updated:  04/15/19 [id number] Special Requests: --     
  LEFT 
JUGULAR VEIN Culture result: NO GROWTH 5 DAYS     
 CULTURE, BLOOD [[id number]] Collected:  04/10/19 2015 Order Status:  Completed Specimen:  Blood Updated:  04/15/19 [id number] Special Requests: --     
  RIGHT Antecubital 
 Culture result: NO GROWTH 5 DAYS Imaging Pat Ckpauly /Studies: CXR Results  (Last 48 hours) None CT Results  (Last 48 hours) None No results found. No results found for this visit on 04/10/19. Labs and Studies from previous 24 hours have been personally reviewed by myself   
ASSESSMENT Active Hospital Problems Diagnosis Date Noted  COPD (chronic obstructive pulmonary disease) ([facility].) 04/10/2019  Cellulitis 02/21/2019  Essential hypertension 07/14/2018  [facility] ([facility] 07/14/2018 Hospital Problems as of 4/17/2019 Date Reviewed: 3/4/2019 Codes Class Noted - Resolved POA  
 COPD (chronic obstructive pulmonary disease) ([facility] ICD-10-CM: J44.9 ICD-9-CM: 609  4/10/2019 - Present Unknown * (Principal) Cellulitis ICD-10-CM: L03.90 ICD-9-CM: 682.9  2/21/2019 - Present Yes Essential hypertension ICD-10-CM: I10 
ICD-9-CM: 401.9  7/14/2018 - Present Yes Left hemiplegia ([facility] ICD-10-CM: G81.94 
ICD-9-CM: 342.90  7/14/2018 - Present Yes A/P: 
 
-Sepsis secondary to cellulitis BC NGTD Unclear why WBC is trending up. LLE cellulitis is improving and patient has been afebrile Vascular planning for arteriogram today Cont ancef IV for now. -Dysphagia S/p speech and swallow evaluation Patient still having persistent dysphagia Will ask GI to evaluate 
 
-COPD On O2  at home Cont  Nebs PRN and wean O2 as tolerated -HTN Stable Cont home meds DVT Prophylaxis: lovenox CODE Status: Full Plan of Care Discussed with: patient. Care team. 
 
 
 
 
Agusto Mcintyre MD 
04/17/19

## 2019-04-17 NOTE — PROGRESS NOTES
1552: Attempted to see patient multiple times today for consult for dysphagia. Patient has been off the floor. Attempted to see patient in Radiology, but he was too \"groggy\" to obtain an adequate history. Will re-attempt to see patient in am. 
 
Jalen Chau. Jozef Gill in collaboration with Dr. Shelton Dundas [facility]

## 2019-04-17 NOTE — BRIEF OP NOTE
BRIEF OPERATIVE NOTE Date of Procedure: 4/17/2019 Preoperative Diagnosis: Left leg pain [M79.605] Postoperative Diagnosis: * No post-op diagnosis entered * Procedure(s): LEFT LOWER EXTREMITY ARTERIOGRAM WITH POSS INTERVENTION ROOM 210 Surgeon(s) and Role: Shabbir Benavidez MD - Primary Surgical Assistant:  
 
Surgical Staff: * No surgical staff found * No case tracking events are documented in the log. Anesthesia: General  
Estimated Blood Loss: 10cc Specimens: * No specimens in log * Findings:   
Complications: None Implants: * No implants in log *

## 2019-04-17 NOTE — CONSULTS
[facility] Consult Note       Primary GI Physician: new    Referring Provider:  Dr Angely Lama Date:  4/17/2019    Admit Date:  4/10/2019    Chief Complaint:  Dysphagia    Subjective:     History of Present Illness:  Patient is a 80 y.o. male with PMH of (but not limited to) COPD, CVA, paraplegia and HTN seen in consultation at the request of Dr. Shayna Chen for evaluation of dysphagia. He was admitted 4/10/19 with worsening LLE pain and edema with cellulitis, admitted previously in late February for the same. Arterial and venus duplex studies were obtained with no evidence of DVT but findings of abnormal right LISA consistent with moderate arterial disease. He underwent left lower extremity arteriogram with intervention with Dr Ai Moore. He has had dysphagia to solids and liquids both since his 2nd CVA. He has had improvement with speech pathology and chin tuck. This hospitalization, he had one episode of odynophagia after eating and drinking without chewing as much as usual. This has since improved. He denies any N&V, melena or unintentional weight loss. He has known GERD which is relieved with TUMS and ranitidine 150mg daily. He has never had a previous EGD or colonoscopy. He has chronic constipation unchanged which is relieved with stool softeners, Miralax and occasional suppositories. He takes Plavix daily. He denies any NSAIDS. He denies any tobacco or ETOH. PMH:  Past Medical History:   Diagnosis Date    Chronic obstructive pulmonary disease ([facility] [address]     Dermatophytosis of nail     History of CVA (cerebrovascular accident)     History of paraplegia     Hypertension        PSH:  No past surgical history on file. Allergies: Allergies   Allergen Reactions    Latex Rash    Adhesive Unknown (comments)    Sulfa (Sulfonamide Antibiotics) Other (comments)       Home Medications:  Prior to Admission medications    Medication Sig Start Date End Date Taking?  Authorizing Provider furosemide (LASIX) 20 mg tablet Take 20 mg by mouth daily as needed. Yes Provider, Historical   ciprofloxacin HCl (CIPRO) 500 mg tablet Take 1 Tab by mouth two (2) times a day. 3/4/19  Yes Kristen Wood MD   LORazepam (ATIVAN) 0.5 mg tablet Take 0.5 mg by mouth three (3) times daily as needed for Anxiety. Yes Provider, Historical   melatonin 3 mg tablet Take 3 mg by mouth daily as needed (SLEEP). Yes Provider, Historical   potassium chloride (K-DUR, KLOR-CON) 20 mEq tablet Take 20 mEq by mouth daily. Yes Provider, Historical   polyethylene glycol (MIRALAX) 17 gram/dose powder Take 17 g by mouth daily as needed. Yes Provider, Historical   raNITIdine (ZANTAC) 150 mg tablet Take 150 mg by mouth Daily (before breakfast). Yes Provider, Historical   albuterol (PROVENTIL HFA, VENTOLIN HFA, PROAIR HFA) 90 mcg/actuation inhaler Take 1 Puff by inhalation every four (4) hours as needed for Wheezing. 7/16/18  Yes Rex Pena MD   acetaminophen (TYLENOL) 500 mg tablet Take  by mouth every six (6) hours as needed for Pain. Yes Provider, Historical   guaiFENesin (ORGANIDIN) 400 mg tablet Take 200 mg by mouth three (3) times daily. Yes Provider, Historical   loratadine (CLARITIN) 10 mg tablet Take 5 mg by mouth nightly. Yes Provider, Historical   phenytoin ER (DILANTIN ER) 100 mg ER capsule Take 300 mg by mouth two (2) times a day. Yes Provider, Historical   rosuvastatin (CRESTOR) 40 mg tablet Take 40 mg by mouth nightly. Yes Provider, Historical   senna (SENOKOT) 8.6 mg tablet Take 1 Tab by mouth daily. Yes Provider, Historical   sertraline (ZOLOFT) 100 mg tablet Take  by mouth nightly. Yes Provider, Historical   tamsulosin (FLOMAX) 0.4 mg capsule Take 0.4 mg by mouth daily. Yes Provider, Historical   ALOE VERA/COLLAGEN (ALOE VESTA 2-N-1 CLEANSER EX) by Apply Externally route. Yes Provider, Historical   aspirin delayed-release 81 mg tablet Take 81 mg by mouth daily.    Yes Provider, Historical MINERAL OIL/PETROLATUM,WHITE (CLIVE MOISTURE BARRIER EX) by Apply Externally route. Yes Provider, Historical   carvedilol (COREG) 12.5 mg tablet Take 6.25 mg by mouth two (2) times a day. Yes Provider, Historical   cholecalciferol (VITAMIN D3) 1,000 unit cap Take 2,000 Units by mouth. Yes Provider, Historical   clopidogrel (PLAVIX) 75 mg tab Take 75 mg by mouth. Yes Provider, Historical   docusate sodium 100 mg/5 mL enem Insert  into rectum. Yes Provider, Historical   fluticasone (FLOVENT DISKUS) 50 mcg/actuation inhaler by Nasal route. Yes Provider, Historical   acetaminophen-codeine (TYLENOL-CODEINE #3) 300-30 mg per tablet Take 1 Tab by mouth every four (4) hours as needed for Pain. Provider, Historical   lisinopril (PRINIVIL, ZESTRIL) 40 mg tablet Take 40 mg by mouth daily. Provider, Historical   b complex-vitamin c-folic acid 0.8 mg (NEPHRO-ALICIA) 0.8 mg tab tablet Take 1 Tab by mouth daily. Provider, Historical   Omeprazole delayed release (PRILOSEC D/R) 20 mg tablet Take 20 mg by mouth daily. Provider, Historical   folic acid (FOLVITE) 1 mg tablet Take 1 mg by mouth.     Provider, Historical       Hospital Medications:  Current Facility-Administered Medications   Medication Dose Route Frequency    lidocaine (XYLOCAINE) 10 mg/mL (1 %) injection 0.1 mL  0.1 mL SubCUTAneous PRN    lactated Ringers infusion  100 mL/hr IntraVENous CONTINUOUS    0.9% sodium chloride infusion  25 mL/hr IntraVENous CONTINUOUS    sodium chloride (NS) flush 5-40 mL  5-40 mL IntraVENous Q8H    sodium chloride (NS) flush 5-40 mL  5-40 mL IntraVENous PRN    famotidine (PEPCID) tablet 20 mg  20 mg Oral ONCE    fentaNYL citrate (PF) injection 100 mcg  100 mcg IntraVENous ONCE    midazolam (VERSED) injection 2 mg  2 mg IntraVENous ONCE PRN    hydrALAZINE (APRESOLINE) 20 mg/mL injection 10 mg  10 mg IntraVENous Q6H PRN    lactated Ringers infusion  25 mL/hr IntraVENous CONTINUOUS    pantoprazole (PROTONIX) tablet 40 mg  40 mg Oral ACB    bisacodyl (DULCOLAX) suppository 10 mg  10 mg Rectal DAILY PRN    ceFAZolin (ANCEF) 2 g/20 mL in sterile water IV syringe  2 g IntraVENous Q8H    albuterol (PROVENTIL VENTOLIN) nebulizer solution 2.5 mg  2.5 mg Nebulization Q6H PRN    HYDROcodone-acetaminophen (NORCO) 5-325 mg per tablet 2 Tab  2 Tab Oral Q4H PRN    morphine injection 2 mg  2 mg IntraVENous Q4H PRN    mineral oil-hydrophil petrolat (AQUAPHOR) ointment   Topical DAILY    aspirin delayed-release tablet 81 mg  81 mg Oral DAILY    carvedilol (COREG) tablet 6.25 mg  6.25 mg Oral BID    clopidogrel (PLAVIX) tablet 75 mg  75 mg Oral DAILY    budesonide (PULMICORT) 500 mcg/2 ml nebulizer suspension  500 mcg Nebulization BID RT    folic acid (FOLVITE) tablet 1 mg  1 mg Oral DAILY    guaiFENesin (ORGANIDIN) tablet 200 mg  200 mg Oral TID    lisinopril (PRINIVIL, ZESTRIL) tablet 40 mg  40 mg Oral DAILY    loratadine (CLARITIN) tablet 5 mg  5 mg Oral QHS    B complex-vitamin C-folic acid (NEPHRO-ALICIA) 0.8 mg tab  1 Tab Oral DAILY    phenytoin ER (DILANTIN ER) ER capsule 300 mg  300 mg Oral BID    rosuvastatin (CRESTOR) tablet 40 mg  40 mg Oral QHS    senna (SENOKOT) tablet 8.6 mg  1 Tab Oral DAILY    sertraline (ZOLOFT) tablet 100 mg  100 mg Oral QHS    tamsulosin (FLOMAX) capsule 0.4 mg  0.4 mg Oral DAILY    LORazepam (ATIVAN) tablet 0.5 mg  0.5 mg Oral TID PRN    potassium chloride (K-DUR, KLOR-CON) SR tablet 20 mEq  20 mEq Oral DAILY    polyethylene glycol (MIRALAX) packet 17 g  17 g Oral DAILY PRN    sodium chloride (NS) flush 5-40 mL  5-40 mL IntraVENous Q8H    sodium chloride (NS) flush 5-40 mL  5-40 mL IntraVENous PRN    acetaminophen (TYLENOL) tablet 650 mg  650 mg Oral Q4H PRN    ondansetron (ZOFRAN) injection 4 mg  4 mg IntraVENous Q4H PRN    enoxaparin (LOVENOX) injection 40 mg  40 mg SubCUTAneous Q24H       Social History:  Social History     Tobacco Use    Smoking status: Former Smoker     Last attempt to quit: 2018     Years since quittin.7    Smokeless tobacco: Never Used   Substance Use Topics    Alcohol use: No       Family History:  No family history on file. Review of Systems:  A detailed 10 system ROS is obtained, with pertinent positives as listed above. All others are negative. Diet:  NPO    Objective:     Physical Exam:  Vitals:  Visit Vitals  /75   Pulse (!) 104 Comment: nurse notified   Temp 97.6 °F (36.4 °C)   Resp 18   Ht 5' 11\" (1.803 m)   Wt 102.1 kg (225 lb)   SpO2 93%   BMI 31.38 kg/m²     Gen:  Pt is alert, cooperative, no acute distress  Skin:  Extremities and face reveal no rashes. HEENT: Sclerae anicteric. Extra-occular muscles are intact. No oral ulcers. No abnormal pigmentation of the lips. The neck is supple. Cardiovascular: tachycardic, No murmurs, gallops, or rubs. Respiratory:  Comfortable breathing with no accessory muscle use. Clear breath sounds anteriorly with no wheezes, rales, or rhonchi. Coarse sounds on O2 nc  GI:  Abdomen nondistended, soft, and nontender. Normal active bowel sounds. No enlargement of the liver or spleen. No masses palpable. Rectal:  Deferred  Musculoskeletal:  No pitting edema of the lower legs. Left lower ext in a boot  Neurological:  Gross memory appears intact. Patient is alert and oriented. Psychiatric:  Mood appears appropriate with judgement intact. Lymphatic:  No cervical or supraclavicular adenopathy.     Laboratory:    Recent Labs     19  0517 19  0355 04/15/19  0316   WBC 18.0* 14.0* 10.6   HGB 13.4* 13.6 13.0*   HCT 40.2* 39.6* 38.9*    267 240   MCV 94.4 93.2 92.8    142 141   K 3.5 3.7 3.2*    107 105   CO2 25 26 28   BUN 12 14 12   CREA 0.55* 0.56* 0.49*   CA 8.8 9.3 8.8   * 128* 130*          Assessment:     Principal Problem:    Cellulitis (2019)    Active Problems:    Essential hypertension (2018)      Left hemiplegia ([address] (7/14/2018)      COPD (chronic obstructive pulmonary disease) ([facility] [address] (4/10/2019)    80 y.o. male with PMH of (but not limited to) COPD, CVA, paraplegia and HTN seen in consultation at the request of Dr. Jean Vasques for evaluation of dysphagia. He has had dysphagia to solids and liquids both since his 2nd CVA. He has had improvement with speech pathology and chin tuck. This hospitalization, he had one episode of odynophagia after eating and drinking without chewing as much as usual. This has since improved. His dysphagia is likely not esophageal stricture, but more likely secondary to his previous CVA. Plan: 1. Barium swallow with tablets. If he has overt stricture/mass, will consider EGD in the future, but he would have to be off Plavix therapy  2. May resume previous diet as directed by speech pathology  3. Continue pantoprazole 40mg daily    Canelo Toro. [address]  [facility]    Patient is seen and examined in collaboration with Dr. Bud Chun. Assessment and plan as per Dr. Bud Chun.

## 2019-04-18 ENCOUNTER — APPOINTMENT (OUTPATIENT)
Dept: GENERAL RADIOLOGY | Age: 69
DRG: 872 | End: 2019-04-18
Attending: NURSE PRACTITIONER
Payer: MEDICARE

## 2019-04-18 LAB
ANION GAP SERPL CALC-SCNC: 8 MMOL/L (ref 7–16)
BASOPHILS # BLD: 0 K/UL (ref 0–0.2)
BASOPHILS NFR BLD: 0 % (ref 0–2)
BUN SERPL-MCNC: 17 MG/DL (ref 8–23)
CALCIUM SERPL-MCNC: 8.7 MG/DL (ref 8.3–10.4)
CHLORIDE SERPL-SCNC: 106 MMOL/L (ref 98–107)
CO2 SERPL-SCNC: 27 MMOL/L (ref 21–32)
CREAT SERPL-MCNC: 0.48 MG/DL (ref 0.8–1.5)
DIFFERENTIAL METHOD BLD: ABNORMAL
EOSINOPHIL # BLD: 0.3 K/UL (ref 0–0.8)
EOSINOPHIL NFR BLD: 4 % (ref 0.5–7.8)
ERYTHROCYTE [DISTWIDTH] IN BLOOD BY AUTOMATED COUNT: 12.1 % (ref 11.9–14.6)
GLUCOSE SERPL-MCNC: 103 MG/DL (ref 65–100)
HCT VFR BLD AUTO: 36.6 % (ref 41.1–50.3)
HGB BLD-MCNC: 12.3 G/DL (ref 13.6–17.2)
IMM GRANULOCYTES # BLD AUTO: 0 K/UL (ref 0–0.5)
IMM GRANULOCYTES NFR BLD AUTO: 1 % (ref 0–5)
LYMPHOCYTES # BLD: 2.5 K/UL (ref 0.5–4.6)
LYMPHOCYTES NFR BLD: 28 % (ref 13–44)
MCH RBC QN AUTO: 31.7 PG (ref 26.1–32.9)
MCHC RBC AUTO-ENTMCNC: 33.6 G/DL (ref 31.4–35)
MCV RBC AUTO: 94.3 FL (ref 79.6–97.8)
MONOCYTES # BLD: 0.6 K/UL (ref 0.1–1.3)
MONOCYTES NFR BLD: 7 % (ref 4–12)
NEUTS SEG # BLD: 5.3 K/UL (ref 1.7–8.2)
NEUTS SEG NFR BLD: 61 % (ref 43–78)
NRBC # BLD: 0 K/UL (ref 0–0.2)
PLATELET # BLD AUTO: 302 K/UL (ref 150–450)
PMV BLD AUTO: 9.6 FL (ref 9.4–12.3)
POTASSIUM SERPL-SCNC: 3.6 MMOL/L (ref 3.5–5.1)
RBC # BLD AUTO: 3.88 M/UL (ref 4.23–5.6)
SODIUM SERPL-SCNC: 141 MMOL/L (ref 136–145)
WBC # BLD AUTO: 8.7 K/UL (ref 4.3–11.1)

## 2019-04-18 PROCEDURE — 74011250637 HC RX REV CODE- 250/637: Performed by: FAMILY MEDICINE

## 2019-04-18 PROCEDURE — 65270000029 HC RM PRIVATE

## 2019-04-18 PROCEDURE — 74011000250 HC RX REV CODE- 250: Performed by: FAMILY MEDICINE

## 2019-04-18 PROCEDURE — 76937 US GUIDE VASCULAR ACCESS: CPT

## 2019-04-18 PROCEDURE — 77010033678 HC OXYGEN DAILY

## 2019-04-18 PROCEDURE — 36415 COLL VENOUS BLD VENIPUNCTURE: CPT

## 2019-04-18 PROCEDURE — 94640 AIRWAY INHALATION TREATMENT: CPT

## 2019-04-18 PROCEDURE — 80048 BASIC METABOLIC PNL TOTAL CA: CPT

## 2019-04-18 PROCEDURE — 74011250636 HC RX REV CODE- 250/636: Performed by: SURGERY

## 2019-04-18 PROCEDURE — 74220 X-RAY XM ESOPHAGUS 1CNTRST: CPT

## 2019-04-18 PROCEDURE — 74011250636 HC RX REV CODE- 250/636: Performed by: FAMILY MEDICINE

## 2019-04-18 PROCEDURE — 94760 N-INVAS EAR/PLS OXIMETRY 1: CPT

## 2019-04-18 PROCEDURE — 74011250637 HC RX REV CODE- 250/637: Performed by: INTERNAL MEDICINE

## 2019-04-18 PROCEDURE — 74011250636 HC RX REV CODE- 250/636: Performed by: INTERNAL MEDICINE

## 2019-04-18 PROCEDURE — 74011000255 HC RX REV CODE- 255: Performed by: INTERNAL MEDICINE

## 2019-04-18 PROCEDURE — 85025 COMPLETE CBC W/AUTO DIFF WBC: CPT

## 2019-04-18 RX ADMIN — HYDROCODONE BITARTRATE AND ACETAMINOPHEN 2 TABLET: 5; 325 TABLET ORAL at 10:51

## 2019-04-18 RX ADMIN — SENNOSIDES 8.6 MG: 8.6 TABLET, FILM COATED ORAL at 08:34

## 2019-04-18 RX ADMIN — GUAIFENESIN 200 MG: 200 TABLET ORAL at 08:41

## 2019-04-18 RX ADMIN — BUDESONIDE 500 MCG: 0.5 INHALANT RESPIRATORY (INHALATION) at 19:25

## 2019-04-18 RX ADMIN — Medication 2 G: at 08:32

## 2019-04-18 RX ADMIN — Medication 1 TABLET: at 08:33

## 2019-04-18 RX ADMIN — PETROLATUM: 42 OINTMENT TOPICAL at 08:34

## 2019-04-18 RX ADMIN — BARIUM SULFATE 20 ML: 980 POWDER, FOR SUSPENSION ORAL at 11:47

## 2019-04-18 RX ADMIN — GUAIFENESIN 200 MG: 200 TABLET ORAL at 16:15

## 2019-04-18 RX ADMIN — ENOXAPARIN SODIUM 40 MG: 40 INJECTION SUBCUTANEOUS at 21:15

## 2019-04-18 RX ADMIN — CARVEDILOL 6.25 MG: 6.25 TABLET, FILM COATED ORAL at 08:34

## 2019-04-18 RX ADMIN — ROSUVASTATIN CALCIUM 40 MG: 20 TABLET, FILM COATED ORAL at 21:14

## 2019-04-18 RX ADMIN — BARIUM SULFATE 30 ML: 0.6 SUSPENSION ORAL at 11:48

## 2019-04-18 RX ADMIN — ASPIRIN 81 MG: 81 TABLET, COATED ORAL at 08:34

## 2019-04-18 RX ADMIN — POTASSIUM CHLORIDE 20 MEQ: 20 TABLET, EXTENDED RELEASE ORAL at 08:34

## 2019-04-18 RX ADMIN — LISINOPRIL 40 MG: 20 TABLET ORAL at 08:34

## 2019-04-18 RX ADMIN — Medication 2 G: at 16:14

## 2019-04-18 RX ADMIN — TAMSULOSIN HYDROCHLORIDE 0.4 MG: 0.4 CAPSULE ORAL at 08:34

## 2019-04-18 RX ADMIN — CARVEDILOL 6.25 MG: 6.25 TABLET, FILM COATED ORAL at 18:39

## 2019-04-18 RX ADMIN — PHENYTOIN SODIUM 300 MG: 100 CAPSULE ORAL at 18:39

## 2019-04-18 RX ADMIN — MORPHINE SULFATE 1 MG: 2 INJECTION, SOLUTION INTRAMUSCULAR; INTRAVENOUS at 21:15

## 2019-04-18 RX ADMIN — BUDESONIDE 500 MCG: 0.5 INHALANT RESPIRATORY (INHALATION) at 07:19

## 2019-04-18 RX ADMIN — CLOPIDOGREL BISULFATE 75 MG: 75 TABLET, FILM COATED ORAL at 08:34

## 2019-04-18 RX ADMIN — PHENYTOIN SODIUM 300 MG: 100 CAPSULE ORAL at 08:33

## 2019-04-18 RX ADMIN — ACETAMINOPHEN 650 MG: 325 TABLET, FILM COATED ORAL at 01:46

## 2019-04-18 RX ADMIN — HYDROCODONE BITARTRATE AND ACETAMINOPHEN 2 TABLET: 5; 325 TABLET ORAL at 16:14

## 2019-04-18 RX ADMIN — GUAIFENESIN 200 MG: 200 TABLET ORAL at 21:15

## 2019-04-18 RX ADMIN — SERTRALINE HYDROCHLORIDE 100 MG: 100 TABLET ORAL at 21:15

## 2019-04-18 RX ADMIN — PANTOPRAZOLE SODIUM 40 MG: 40 TABLET, DELAYED RELEASE ORAL at 08:33

## 2019-04-18 RX ADMIN — FOLIC ACID 1 MG: 1 TABLET ORAL at 08:33

## 2019-04-18 RX ADMIN — Medication 10 ML: at 14:00

## 2019-04-18 RX ADMIN — LORATADINE 5 MG: 10 TABLET ORAL at 21:15

## 2019-04-18 NOTE — PROGRESS NOTES
Progress Note Patient: Summer Herr MRN: [id number]  SSN: [id number] YOB: 1950  Age: 80 y.o. Sex: male Admission Date: 4/10/2019 LOS: 8 days 1 Day Post-Op PROCEDURE(S): 
Left lower extremity arteriogram 
 
Subjective:  
 
Patient has no complaints, family in room. Discussed a'gram findings, patient with extensive atherosclerosis and chronic ischemia, and surgical intervention would not change his cellulitis picture. Patient does not have rest pain, claudication or non-healing LE wounds. Objective:  
 
Vitals:  
 04/18/19 0710 04/18/19 0724 04/18/19 1219 04/18/19 1413 BP: 113/67  121/77 Pulse: 85  82 Resp: 18  18 Temp: 97.9 °F (36.6 °C)  97.4 °F (36.3 °C) SpO2: 94% 96% 94% 95% Weight:      
Height:      
  
Physical Exam:  
GENERAL: alert, cooperative, no distress LUNG: normal respiratory effort EXTREMITIES: right groin puncture wound without induration / erythema / drainage; B LE warm in [place] boots, left foot edema and erythema greatly diminished, leg with chronic venous stasis appearance Lab/Data Review: 
BMP:  
Lab Results Component Value Date/Time  04/18/2019 06:44 AM  
 K 3.6 04/18/2019 06:44 AM  
  04/18/2019 06:44 AM  
 CO2 27 04/18/2019 06:44 AM  
 AGAP 8 04/18/2019 06:44 AM  
  (H) 04/18/2019 06:44 AM  
 BUN 17 04/18/2019 06:44 AM  
 CREA 0.48 (L) 04/18/2019 06:44 AM  
 GFRAA >60 04/18/2019 06:44 AM  
 GFRNA >60 04/18/2019 06:44 AM  
 
CBC:  
Lab Results Component Value Date/Time WBC 8.7 04/18/2019 06:44 AM  
 HGB 12.3 (L) 04/18/2019 06:44 AM  
 HCT 36.6 (L) 04/18/2019 06:44 AM  
  04/18/2019 06:44 AM  
  
 
Assessment / Plan / Recommendations / Medical Decision Making:  
 
Principal Problem: 
  Cellulitis (2/21/2019) Active Problems: 
  Essential hypertension (7/14/2018) Left hemiplegia ([facility] [address] (7/14/2018) [facility] (4/10/2019) Continue current care Patient with extensive atherosclerotic disease and chronic ischemia; without rest pain, claudication or non-healing LE wounds, his risk-benefit ratio favors conservative care. No indication for surgical intervention by vascular. We will gladly be available if necessary but will sign off for now. Signed By: Omar Roberts PA-C April 18, 2019 Physician Assistant with [facility] Rafia Zhou.  Whitney Culp MD / Nico Navarrete MD

## 2019-04-18 NOTE — PROGRESS NOTES
Nutrition Reason for assessment: Referral received from nursing admission Malnutrition Screening Tool Assessment:  
Diet: NPO Food/Nutrition Patient History:  PMH of CVA with left hemiparesis, COPD. Admitted 4-10-19 with LLE cellulitis/sepsis. S/P vascular s/p left arteriogram 4-17-19. Undergoing GI w/u for solid food dysphagia. Had barium swallow today. Pt seen in company of mother. She reports he has only been eating bites at each meal. He says main barrier to eating in leg pain and difficulty wallowing, particularly the white meat chicken. He is hungrier today but has been NPO since yesterday. He is reluctantly receptive to trying ground chicken with gravy and a nutritional supplement. Anthropometrics:Height: 5' 11\" (180.3 cm),  Weight: 102.1 kg (225 lb)-unknown source, Body mass index is 31.38 kg/m². BMI class of overweight for age >71. Macronutrient needs: EER:  3542-0228 kcal /day (15-18 kcal/kg listed BW) EPR: 102-128 grams protein/day (1-1.25 grams/kg listed BW) Intake/Comparative Standards: Current NPO status does not meet kcal or protein needs. Average intake for past 7 day(s)/4 recorded meal(s): 50%. This potentially meets ~50-75% of kcal and ~25-50% of protein needs Nutrition Diagnosis: Inadequate oral intake r/t decreased ability to consume sufficient oral intake as evidenced by decreased appetite, intake and dysphagia as above Intervention: 
Meals and snacks: Continue current diet. Send chicken in ground consistency. F/U GI w/u for dysphagia. Nutrition Supplement Therapy: Ensure Enlive. Discharge nutrition plan: Too soon to determine. Vane Conrad, [address], [address] [place] [address]

## 2019-04-18 NOTE — PROGRESS NOTES
PT note:  Treatment deferred as patient states that he is not doing anything until he get something to eat. RN made aware. Will continue PT efforts.   Nadyaorrlang Norwood, PTA

## 2019-04-18 NOTE — PROGRESS NOTES
Hospitalist Progress Note Admit Date:  4/10/2019  4:41 PM  
Name:  Jadon Whyte Age:  80 y.o. 
:  1950 MRN:  [id number] PCP:  Jodie Price MD 
Treatment Team: Attending Provider: Sadie Berman MD; Utilization Review: Rina Jensen RN; Care Manager: Julio Herrera RN; Consulting Provider: Vince Lopez MD; Consulting Provider: Felipa Hedrick MD; Physical Therapy Assistant: Aubrey Sequeira PTA 
 
HPI/Subjective:  
 
Ms. Valentine Tao is a 81 yo male with PMH of CVA with left hemiparesis, COPD with chronic hypoxic respiratory failure on 2 L NC admitted 4-10-19 with LLE cellulitis/sepsis. He has been managed with ancef and seen by vascular s/p left arteriogram 19. BC NGTD. He has complaints of solid food dysphagia s/p GI consult and pending MBS/ possible EGD pending course. Discharge plans pending.  hungry, admits to solid food dysphagia, some GERD, denies dyspnea or cough, has painful LLE Objective:  
 
Patient Vitals for the past 24 hrs: 
 Temp Pulse Resp BP SpO2  
19 1219 97.4 °F (36.3 °C) 82 18 121/77 94 % 19 0724     96 % 19 0710 97.9 °F (36.6 °C) 85 18 113/67 94 % 19 0439 98.4 °F (36.9 °C) 79 18 103/67 95 % 19 0029 97.5 °F (36.4 °C) 88 17 130/76 94 % 19 2100     97 % 19 2034 97.5 °F (36.4 °C) 90 17 128/79 94 % 19 1618  98 16 155/76 92 % 19 1613 98 °F (36.7 °C) 100 16 144/72 93 % 19 1608  97 16 144/70 93 % 19 1603  93 16 145/68 93 % 19 1559  99 16 137/66 91 % 19 1554  100 16 136/60 93 % 19 1549  98 15 141/66 93 % 19 1541 98.1 °F (36.7 °C) 96 15 139/64 97 % 19 1346 98.8 °F (37.1 °C) 92 18 116/58 93 % Oxygen Therapy O2 Sat (%): 94 % (19 1219) Pulse via Oximetry: 84 beats per minute (19) O2 Device: Nasal cannula (19) O2 Flow Rate (L/min): 3 l/min (19) FIO2 (%): 36 % (04/17/19 2100) Intake/Output Summary (Last 24 hours) at 4/18/2019 1306 Last data filed at 4/18/2019 6447 Gross per 24 hour Intake 755 ml Output  Net 755 ml *Note that automatically entered I/Os may not be accurate; dependent on patient compliance with collection and accurate  by techs. General:    Elderly, no distress, Alert. CV:   RRR. No murmur, rub, or gallop. No edema Lungs:   CTAB. No wheezing, rhonchi, or rales. Anterior exam 
Abdomen:   Soft, nontender, nondistended. Skin:     Mild LLE anterior shin erythema Neuro:  Has left hemiparesis with 1+ edema Data Review: 
I have reviewed all labs, meds, and studies from the last 24 hours: 
 
Recent Results (from the past 24 hour(s)) PLEASE READ & DOCUMENT PPD TEST IN 48 HRS Collection Time: 04/17/19  5:59 PM  
Result Value Ref Range PPD  Negative  
 mm Induration  0 - 5 0  
CBC WITH AUTOMATED DIFF Collection Time: 04/18/19  6:44 AM  
Result Value Ref Range WBC 8.7 4.3 - 11.1 K/uL  
 RBC 3.88 (L) 4.23 - 5.6 M/uL  
 HGB 12.3 (L) 13.6 - 17.2 g/dL HCT 36.6 (L) 41.1 - 50.3 % MCV 94.3 79.6 - 97.8 FL  
 MCH 31.7 26.1 - 32.9 PG  
 MCHC 33.6 31.4 - 35.0 g/dL  
 RDW 12.1 11.9 - 14.6 % PLATELET 947 719 - 625 K/uL MPV 9.6 9.4 - 12.3 FL ABSOLUTE NRBC 0.00 0.0 - 0.2 K/uL  
 DF AUTOMATED NEUTROPHILS 61 43 - 78 % LYMPHOCYTES 28 13 - 44 % MONOCYTES 7 4.0 - 12.0 % EOSINOPHILS 4 0.5 - 7.8 % BASOPHILS 0 0.0 - 2.0 % IMMATURE GRANULOCYTES 1 0.0 - 5.0 %  
 ABS. NEUTROPHILS 5.3 1.7 - 8.2 K/UL  
 ABS. LYMPHOCYTES 2.5 0.5 - 4.6 K/UL  
 ABS. MONOCYTES 0.6 0.1 - 1.3 K/UL  
 ABS. EOSINOPHILS 0.3 0.0 - 0.8 K/UL  
 ABS. BASOPHILS 0.0 0.0 - 0.2 K/UL  
 ABS. IMM. GRANS. 0.0 0.0 - 0.5 K/UL METABOLIC PANEL, BASIC Collection Time: 04/18/19  6:44 AM  
Result Value Ref Range Sodium 141 136 - 145 mmol/L Potassium 3.6 3.5 - 5.1 mmol/L  Chloride 106 98 - 107 mmol/L  
 CO2 27 21 - 32 mmol/L Anion gap 8 7 - 16 mmol/L Glucose 103 (H) 65 - 100 mg/dL BUN 17 8 - 23 MG/DL Creatinine 0.48 (L) 0.8 - 1.5 MG/DL  
 GFR est AA >60 >60 ml/min/1.73m2 GFR est non-AA >60 >60 ml/min/1.73m2 Calcium 8.7 8.3 - 10.4 MG/DL All Micro Results Procedure Component Value Units Date/Time CULTURE, BLOOD [[id number]] Collected:  04/10/19 1750 Order Status:  Completed Specimen:  Blood Updated:  04/15/19 9432 Special Requests: --     
  LEFT 
JUGULAR VEIN Culture result: NO GROWTH 5 DAYS     
 CULTURE, BLOOD [[id number]] Collected:  04/10/19 2015 Order Status:  Completed Specimen:  Blood Updated:  04/15/19 0040 Special Requests: --     
  RIGHT Antecubital 
  
  Culture result: NO GROWTH 5 DAYS No results found for this visit on 04/10/19. Current Meds: 
Current Facility-Administered Medications Medication Dose Route Frequency  lidocaine (XYLOCAINE) 10 mg/mL (1 %) injection 0.1 mL  0.1 mL SubCUTAneous PRN  
 sodium chloride (NS) flush 5-40 mL  5-40 mL IntraVENous PRN  
 hydrALAZINE (APRESOLINE) 20 mg/mL injection 10 mg  10 mg IntraVENous Q6H PRN  
 lactated Ringers infusion  25 mL/hr IntraVENous CONTINUOUS  
 heparin (PF) 2 units/ml in NS infusion 2,000 Units  1,000 mL Irrigation PRN  
 morphine injection 1 mg  1 mg IntraVENous Q1H PRN  
 diphenhydrAMINE (BENADRYL) injection 12.5 mg  12.5 mg IntraVENous Q4H PRN  pantoprazole (PROTONIX) tablet 40 mg  40 mg Oral ACB  bisacodyl (DULCOLAX) suppository 10 mg  10 mg Rectal DAILY PRN  
 ceFAZolin (ANCEF) 2 g/20 mL in sterile water IV syringe  2 g IntraVENous Q8H  
 albuterol (PROVENTIL VENTOLIN) nebulizer solution 2.5 mg  2.5 mg Nebulization Q6H PRN  
 HYDROcodone-acetaminophen (NORCO) 5-325 mg per tablet 2 Tab  2 Tab Oral Q4H PRN  mineral oil-hydrophil petrolat (AQUAPHOR) ointment   Topical DAILY  aspirin delayed-release tablet 81 mg  81 mg Oral DAILY  carvedilol (COREG) tablet 6.25 mg  6.25 mg Oral BID  clopidogrel (PLAVIX) tablet 75 mg  75 mg Oral DAILY  budesonide (PULMICORT) 500 mcg/2 ml nebulizer suspension  500 mcg Nebulization BID RT  
 folic acid (FOLVITE) tablet 1 mg  1 mg Oral DAILY  guaiFENesin (ORGANIDIN) tablet 200 mg  200 mg Oral TID  lisinopril (PRINIVIL, ZESTRIL) tablet 40 mg  40 mg Oral DAILY  loratadine (CLARITIN) tablet 5 mg  5 mg Oral QHS  B complex-vitamin C-folic acid (NEPHRO-ALICIA) 0.8 mg tab  1 Tab Oral DAILY  phenytoin ER (DILANTIN ER) ER capsule 300 mg  300 mg Oral BID  rosuvastatin (CRESTOR) tablet 40 mg  40 mg Oral QHS  senna (SENOKOT) tablet 8.6 mg  1 Tab Oral DAILY  sertraline (ZOLOFT) tablet 100 mg  100 mg Oral QHS  tamsulosin (FLOMAX) capsule 0.4 mg  0.4 mg Oral DAILY  LORazepam (ATIVAN) tablet 0.5 mg  0.5 mg Oral TID PRN  potassium chloride (K-DUR, KLOR-CON) SR tablet 20 mEq  20 mEq Oral DAILY  polyethylene glycol (MIRALAX) packet 17 g  17 g Oral DAILY PRN  
 sodium chloride (NS) flush 5-40 mL  5-40 mL IntraVENous Q8H  
 sodium chloride (NS) flush 5-40 mL  5-40 mL IntraVENous PRN  
 acetaminophen (TYLENOL) tablet 650 mg  650 mg Oral Q4H PRN  
 ondansetron (ZOFRAN) injection 4 mg  4 mg IntraVENous Q4H PRN  
 enoxaparin (LOVENOX) injection 40 mg  40 mg SubCUTAneous Q24H Other Studies (last 24 hours): No results found. Assessment and Plan:  
 
Hospital Problems as of 4/18/2019 Date Reviewed: 3/4/2019 Codes Class Noted - Resolved POA  
 COPD (chronic obstructive pulmonary disease) ([email or web address] ICD-10-CM: J44.9 ICD-9-CM: 988  4/10/2019 - Present Unknown * (Principal) Cellulitis ICD-10-CM: L03.90 ICD-9-CM: 682.9  2/21/2019 - Present Yes Essential hypertension ICD-10-CM: I10 
ICD-9-CM: 401.9  7/14/2018 - Present Yes Left hemiplegia ([facility] [address] ICD-10-CM: G81.94 
ICD-9-CM: 342.90  7/14/2018 - Present Yes Plan: · Sepsis due to LLE cellulitis: continued IV ancef, leukocytosis has resolved · Dysphagia: seen by GI pending MBS, resume diet per SLP · Chronic hypoxic respiratory failure / COPD: stable · HTN: controlled with coreg, lisinopril · CVA: continued plavix, crestor · Seizures: continued dilantin DC planning/Dispo:  pending Diet:  DIET NPO 
DVT ppx:  lovenox Signed: Isabella Zavaleta MD

## 2019-04-18 NOTE — PROGRESS NOTES
END OF SHIFT NOTE: 
 
INTAKE/OUTPUT 
04/17 0701 - 04/18 0700 In: 755 [I.V.:755] Out: -  
Voiding: YES Catheter: NO 
Drain:   
 
 
 
 
 
Flatus: Patient does have flatus present. Stool:  0 occurrences. Characteristics: 
Stool Assessment Stool Appearance: Soft Emesis: 0 occurrences. Characteristics: VITAL SIGNS Patient Vitals for the past 12 hrs: 
 Temp Pulse Resp BP SpO2  
04/18/19 1508 97.7 °F (36.5 °C) 88 18 120/72 95 % 04/18/19 1413     95 % 04/18/19 1219 97.4 °F (36.3 °C) 82 18 121/77 94 % 04/18/19 0724     96 % 04/18/19 0710 97.9 °F (36.6 °C) 85 18 113/67 94 % Pain Assessment Pain Intensity 1: 8 (04/18/19 1614) Pain Location 1: Leg 
Pain Intervention(s) 1: Medication (see MAR) Patient Stated Pain Goal: 0 Ambulating No 
 
Shift report given to oncoming nurse at the bedside.  
 
Cordell Styles RN

## 2019-04-19 LAB
ANION GAP SERPL CALC-SCNC: 8 MMOL/L (ref 7–16)
BASOPHILS # BLD: 0 K/UL (ref 0–0.2)
BASOPHILS NFR BLD: 0 % (ref 0–2)
BUN SERPL-MCNC: 12 MG/DL (ref 8–23)
CALCIUM SERPL-MCNC: 8.8 MG/DL (ref 8.3–10.4)
CHLORIDE SERPL-SCNC: 107 MMOL/L (ref 98–107)
CO2 SERPL-SCNC: 27 MMOL/L (ref 21–32)
CREAT SERPL-MCNC: 0.44 MG/DL (ref 0.8–1.5)
DIFFERENTIAL METHOD BLD: ABNORMAL
EOSINOPHIL # BLD: 0.3 K/UL (ref 0–0.8)
EOSINOPHIL NFR BLD: 3 % (ref 0.5–7.8)
ERYTHROCYTE [DISTWIDTH] IN BLOOD BY AUTOMATED COUNT: 11.9 % (ref 11.9–14.6)
GLUCOSE SERPL-MCNC: 94 MG/DL (ref 65–100)
HCT VFR BLD AUTO: 38.1 % (ref 41.1–50.3)
HGB BLD-MCNC: 12.9 G/DL (ref 13.6–17.2)
IMM GRANULOCYTES # BLD AUTO: 0.1 K/UL (ref 0–0.5)
IMM GRANULOCYTES NFR BLD AUTO: 1 % (ref 0–5)
LYMPHOCYTES # BLD: 2.4 K/UL (ref 0.5–4.6)
LYMPHOCYTES NFR BLD: 24 % (ref 13–44)
MCH RBC QN AUTO: 31.7 PG (ref 26.1–32.9)
MCHC RBC AUTO-ENTMCNC: 33.9 G/DL (ref 31.4–35)
MCV RBC AUTO: 93.6 FL (ref 79.6–97.8)
MONOCYTES # BLD: 0.5 K/UL (ref 0.1–1.3)
MONOCYTES NFR BLD: 5 % (ref 4–12)
NEUTS SEG # BLD: 6.7 K/UL (ref 1.7–8.2)
NEUTS SEG NFR BLD: 67 % (ref 43–78)
NRBC # BLD: 0 K/UL (ref 0–0.2)
PLATELET # BLD AUTO: 363 K/UL (ref 150–450)
PMV BLD AUTO: 9.3 FL (ref 9.4–12.3)
POTASSIUM SERPL-SCNC: 3.5 MMOL/L (ref 3.5–5.1)
RBC # BLD AUTO: 4.07 M/UL (ref 4.23–5.6)
SODIUM SERPL-SCNC: 142 MMOL/L (ref 136–145)
WBC # BLD AUTO: 10 K/UL (ref 4.3–11.1)

## 2019-04-19 PROCEDURE — 74011250637 HC RX REV CODE- 250/637: Performed by: FAMILY MEDICINE

## 2019-04-19 PROCEDURE — 74011250636 HC RX REV CODE- 250/636: Performed by: INTERNAL MEDICINE

## 2019-04-19 PROCEDURE — 36415 COLL VENOUS BLD VENIPUNCTURE: CPT

## 2019-04-19 PROCEDURE — 77010033678 HC OXYGEN DAILY

## 2019-04-19 PROCEDURE — 74011000250 HC RX REV CODE- 250: Performed by: FAMILY MEDICINE

## 2019-04-19 PROCEDURE — 74011250637 HC RX REV CODE- 250/637: Performed by: INTERNAL MEDICINE

## 2019-04-19 PROCEDURE — 97530 THERAPEUTIC ACTIVITIES: CPT

## 2019-04-19 PROCEDURE — 65270000029 HC RM PRIVATE

## 2019-04-19 PROCEDURE — 74011000250 HC RX REV CODE- 250: Performed by: SURGERY

## 2019-04-19 PROCEDURE — 94640 AIRWAY INHALATION TREATMENT: CPT

## 2019-04-19 PROCEDURE — 94760 N-INVAS EAR/PLS OXIMETRY 1: CPT

## 2019-04-19 PROCEDURE — 80048 BASIC METABOLIC PNL TOTAL CA: CPT

## 2019-04-19 PROCEDURE — 74011250636 HC RX REV CODE- 250/636: Performed by: SURGERY

## 2019-04-19 PROCEDURE — 74011250637 HC RX REV CODE- 250/637: Performed by: SURGERY

## 2019-04-19 PROCEDURE — 85025 COMPLETE CBC W/AUTO DIFF WBC: CPT

## 2019-04-19 RX ORDER — AMOXICILLIN AND CLAVULANATE POTASSIUM 400; 57 MG/5ML; MG/5ML
875 POWDER, FOR SUSPENSION ORAL EVERY 12 HOURS
Status: DISCONTINUED | OUTPATIENT
Start: 2019-04-19 | End: 2019-04-20 | Stop reason: HOSPADM

## 2019-04-19 RX ORDER — AMOXICILLIN AND CLAVULANATE POTASSIUM 400; 57 MG/5ML; MG/5ML
875 POWDER, FOR SUSPENSION ORAL EVERY 12 HOURS
Status: DISCONTINUED | OUTPATIENT
Start: 2019-04-19 | End: 2019-04-19

## 2019-04-19 RX ADMIN — CARVEDILOL 6.25 MG: 6.25 TABLET, FILM COATED ORAL at 08:52

## 2019-04-19 RX ADMIN — MORPHINE SULFATE 1 MG: 2 INJECTION, SOLUTION INTRAMUSCULAR; INTRAVENOUS at 07:13

## 2019-04-19 RX ADMIN — ENOXAPARIN SODIUM 40 MG: 40 INJECTION SUBCUTANEOUS at 21:42

## 2019-04-19 RX ADMIN — MORPHINE SULFATE 1 MG: 2 INJECTION, SOLUTION INTRAMUSCULAR; INTRAVENOUS at 03:27

## 2019-04-19 RX ADMIN — Medication 10 ML: at 00:28

## 2019-04-19 RX ADMIN — SERTRALINE HYDROCHLORIDE 100 MG: 100 TABLET ORAL at 21:39

## 2019-04-19 RX ADMIN — SENNOSIDES 8.6 MG: 8.6 TABLET, FILM COATED ORAL at 08:48

## 2019-04-19 RX ADMIN — ACETAMINOPHEN 650 MG: 325 TABLET, FILM COATED ORAL at 17:27

## 2019-04-19 RX ADMIN — GUAIFENESIN 200 MG: 200 TABLET ORAL at 22:02

## 2019-04-19 RX ADMIN — AMOXICILLIN AND CLAVULANATE POTASSIUM 875 MG: 400; 57 POWDER, FOR SUSPENSION ORAL at 21:51

## 2019-04-19 RX ADMIN — Medication 2 G: at 00:00

## 2019-04-19 RX ADMIN — BUDESONIDE 500 MCG: 0.5 INHALANT RESPIRATORY (INHALATION) at 20:33

## 2019-04-19 RX ADMIN — FOLIC ACID 1 MG: 1 TABLET ORAL at 08:48

## 2019-04-19 RX ADMIN — BUDESONIDE 500 MCG: 0.5 INHALANT RESPIRATORY (INHALATION) at 07:58

## 2019-04-19 RX ADMIN — PETROLATUM: 42 OINTMENT TOPICAL at 09:00

## 2019-04-19 RX ADMIN — GUAIFENESIN 200 MG: 200 TABLET ORAL at 08:48

## 2019-04-19 RX ADMIN — Medication 10 ML: at 14:00

## 2019-04-19 RX ADMIN — Medication 1 TABLET: at 08:49

## 2019-04-19 RX ADMIN — LORATADINE 5 MG: 10 TABLET ORAL at 21:39

## 2019-04-19 RX ADMIN — CARVEDILOL 6.25 MG: 6.25 TABLET, FILM COATED ORAL at 17:21

## 2019-04-19 RX ADMIN — PANTOPRAZOLE SODIUM 40 MG: 40 TABLET, DELAYED RELEASE ORAL at 07:30

## 2019-04-19 RX ADMIN — POTASSIUM CHLORIDE 20 MEQ: 20 TABLET, EXTENDED RELEASE ORAL at 08:49

## 2019-04-19 RX ADMIN — PHENYTOIN SODIUM 300 MG: 100 CAPSULE ORAL at 17:28

## 2019-04-19 RX ADMIN — MORPHINE SULFATE 1 MG: 2 INJECTION, SOLUTION INTRAMUSCULAR; INTRAVENOUS at 19:13

## 2019-04-19 RX ADMIN — Medication 5 ML: at 21:55

## 2019-04-19 RX ADMIN — GUAIFENESIN 200 MG: 200 TABLET ORAL at 17:19

## 2019-04-19 RX ADMIN — CLOPIDOGREL BISULFATE 75 MG: 75 TABLET, FILM COATED ORAL at 08:50

## 2019-04-19 RX ADMIN — TAMSULOSIN HYDROCHLORIDE 0.4 MG: 0.4 CAPSULE ORAL at 08:49

## 2019-04-19 RX ADMIN — ASPIRIN 81 MG: 81 TABLET, COATED ORAL at 08:49

## 2019-04-19 RX ADMIN — MORPHINE SULFATE 1 MG: 2 INJECTION, SOLUTION INTRAMUSCULAR; INTRAVENOUS at 15:38

## 2019-04-19 RX ADMIN — PHENYTOIN SODIUM 300 MG: 100 CAPSULE ORAL at 08:50

## 2019-04-19 RX ADMIN — Medication 2 G: at 08:53

## 2019-04-19 RX ADMIN — ROSUVASTATIN CALCIUM 40 MG: 20 TABLET, FILM COATED ORAL at 21:39

## 2019-04-19 RX ADMIN — LISINOPRIL 40 MG: 20 TABLET ORAL at 08:52

## 2019-04-19 NOTE — PROGRESS NOTES
END OF SHIFT NOTE: 
 
INTAKE/OUTPUT 
04/18 0701 - 04/19 0700 In: 61 [P.O.:60] Out: -  
Voiding: YES Catheter: NO 
Drain:   
 
 
 
 
 
Flatus: Patient does have flatus present. Stool:  0 occurrences. Characteristics: 
Emesis: 0 occurrences. Characteristics: VITAL SIGNS Patient Vitals for the past 12 hrs: 
 Temp Pulse Resp BP SpO2  
04/19/19 0723 97.9 °F (36.6 °C) 86 19 117/80 95 % 04/19/19 0444 97.6 °F (36.4 °C) 82 18 132/75 95 % 04/18/19 2308 98.8 °F (37.1 °C) 88 18 129/76 92 % 04/18/19 2111 98.8 °F (37.1 °C) 87 18 127/85 94 % Pain Assessment Pain Intensity 1: 7 (04/19/19 0711) Pain Location 1: Leg 
Pain Intervention(s) 1: Medication (see MAR) Patient Stated Pain Goal: 0 Ambulating No 
 
Shift report given to oncoming nurse at the bedside.  
 
Yair Corona RN

## 2019-04-19 NOTE — PROGRESS NOTES
Hospitalist Progress Note Admit Date:  4/10/2019  4:41 PM  
Name:  Roger Burr Age:  80 y.o. 
:  1950 MRN:  [id number] PCP:  Ermias Wood MD 
Treatment Team: Attending Provider: Tennille Arrieta MD; Utilization Review: Xander Hurt RN; Care Manager: Bruna Will RN; Consulting Provider: Paulie Wu MD; Physical Therapy Assistant: Kishan Silva PTA 
 
HPI/Subjective:  
 
 
Ms. Jose A Boothe is a 81 yo male with PMH of CVA with left hemiparesis, COPD with chronic hypoxic respiratory failure on 2 L NC admitted 4-10-19 with LLE cellulitis/sepsis. He has been managed with ancef day 7 and seen by vascular s/p left arteriogram 19 showing extensive atherosclerosis and chronic ischemia without need for surgical intervention. BC NGTD. He has complaints of solid food dysphagia s/p GI consult and negativeMBS/ possible EGD pending course. Discharge plans pending to home. 19 sleepy, no issues , ok with GI workup, LLE erythema improved Objective:  
 
Patient Vitals for the past 24 hrs: 
 Temp Pulse Resp BP SpO2  
19 0758     96 % 19 0723 97.9 °F (36.6 °C) 86 19 117/80 95 % 19 0444 97.6 °F (36.4 °C) 82 18 132/75 95 % 19 2308 98.8 °F (37.1 °C) 88 18 129/76 92 % 19 2111 98.8 °F (37.1 °C) 87 18 127/85 94 % 19 1925     95 % 19 1508 97.7 °F (36.5 °C) 88 18 120/72 95 % 19 1413     95 % 19 1219 97.4 °F (36.3 °C) 82 18 121/77 94 % Oxygen Therapy O2 Sat (%): 96 % (19) Pulse via Oximetry: 77 beats per minute (19) O2 Device: Nasal cannula (19) O2 Flow Rate (L/min): 2 l/min (19 0759) FIO2 (%): 36 % (19 2100) Intake/Output Summary (Last 24 hours) at 2019 4438 Last data filed at 2019 1840 Gross per 24 hour Intake 60 ml Output  Net 60 ml  
   
*Note that automatically entered I/Os may not be accurate; dependent on patient compliance with collection and accurate  by Musical [employer]ne[employer]. General:    Elderly, no distress, sleepy CV:   RRR. No murmur, rub, or gallop. No edema Lungs:   CTAB. No wheezing, rhonchi, or rales. Anterior exam 
Abdomen:   Soft, nontender, nondistended. Decreased BS Skin:     Mild LLE anterior shin erythema/ mild warmth Neuro:  Has left hemiparesis with 1+ edema Data Review: 
I have reviewed all labs, meds, and studies from the last 24 hours: 
 
Recent Results (from the past 24 hour(s)) CBC WITH AUTOMATED DIFF Collection Time: 04/19/19  4:48 AM  
Result Value Ref Range WBC 10.0 4.3 - 11.1 K/uL  
 RBC 4.07 (L) 4.23 - 5.6 M/uL  
 HGB 12.9 (L) 13.6 - 17.2 g/dL HCT 38.1 (L) 41.1 - 50.3 % MCV 93.6 79.6 - 97.8 FL  
 MCH 31.7 26.1 - 32.9 PG  
 MCHC 33.9 31.4 - 35.0 g/dL  
 RDW 11.9 11.9 - 14.6 % PLATELET 851 358 - 061 K/uL MPV 9.3 (L) 9.4 - 12.3 FL ABSOLUTE NRBC 0.00 0.0 - 0.2 K/uL  
 DF AUTOMATED NEUTROPHILS 67 43 - 78 % LYMPHOCYTES 24 13 - 44 % MONOCYTES 5 4.0 - 12.0 % EOSINOPHILS 3 0.5 - 7.8 % BASOPHILS 0 0.0 - 2.0 % IMMATURE GRANULOCYTES 1 0.0 - 5.0 %  
 ABS. NEUTROPHILS 6.7 1.7 - 8.2 K/UL  
 ABS. LYMPHOCYTES 2.4 0.5 - 4.6 K/UL  
 ABS. MONOCYTES 0.5 0.1 - 1.3 K/UL  
 ABS. EOSINOPHILS 0.3 0.0 - 0.8 K/UL  
 ABS. BASOPHILS 0.0 0.0 - 0.2 K/UL  
 ABS. IMM. GRANS. 0.1 0.0 - 0.5 K/UL METABOLIC PANEL, BASIC Collection Time: 04/19/19  4:48 AM  
Result Value Ref Range Sodium 142 136 - 145 mmol/L Potassium 3.5 3.5 - 5.1 mmol/L Chloride 107 98 - 107 mmol/L  
 CO2 27 21 - 32 mmol/L Anion gap 8 7 - 16 mmol/L Glucose 94 65 - 100 mg/dL BUN 12 8 - 23 MG/DL Creatinine 0.44 (L) 0.8 - 1.5 MG/DL  
 GFR est AA >60 >60 ml/min/1.73m2 GFR est non-AA >60 >60 ml/min/1.73m2 Calcium 8.8 8.3 - 10.4 MG/DL All Micro Results Procedure Component Value Units Date/Time CULTURE, BLOOD [[id number]] Collected:  04/10/19 5963 Order Status:  Completed Specimen:  Blood Updated:  04/15/19 3959 Special Requests: --     
  LEFT 
JUGULAR VEIN Culture result: NO GROWTH 5 DAYS     
 CULTURE, BLOOD [[id number]] Collected:  04/10/19 2015 Order Status:  Completed Specimen:  Blood Updated:  04/15/19 4786 Special Requests: --     
  RIGHT Antecubital 
  
  Culture result: NO GROWTH 5 DAYS No results found for this visit on 04/10/19. Current Meds: 
Current Facility-Administered Medications Medication Dose Route Frequency  lidocaine (XYLOCAINE) 10 mg/mL (1 %) injection 0.1 mL  0.1 mL SubCUTAneous PRN  
 sodium chloride (NS) flush 5-40 mL  5-40 mL IntraVENous PRN  
 hydrALAZINE (APRESOLINE) 20 mg/mL injection 10 mg  10 mg IntraVENous Q6H PRN  
 heparin (PF) 2 units/ml in NS infusion 2,000 Units  1,000 mL Irrigation PRN  
 morphine injection 1 mg  1 mg IntraVENous Q1H PRN  
 diphenhydrAMINE (BENADRYL) injection 12.5 mg  12.5 mg IntraVENous Q4H PRN  pantoprazole (PROTONIX) tablet 40 mg  40 mg Oral ACB  bisacodyl (DULCOLAX) suppository 10 mg  10 mg Rectal DAILY PRN  
 ceFAZolin (ANCEF) 2 g/20 mL in sterile water IV syringe  2 g IntraVENous Q8H  
 albuterol (PROVENTIL VENTOLIN) nebulizer solution 2.5 mg  2.5 mg Nebulization Q6H PRN  
 HYDROcodone-acetaminophen (NORCO) 5-325 mg per tablet 2 Tab  2 Tab Oral Q4H PRN  mineral oil-hydrophil petrolat (AQUAPHOR) ointment   Topical DAILY  aspirin delayed-release tablet 81 mg  81 mg Oral DAILY  carvedilol (COREG) tablet 6.25 mg  6.25 mg Oral BID  clopidogrel (PLAVIX) tablet 75 mg  75 mg Oral DAILY  budesonide (PULMICORT) 500 mcg/2 ml nebulizer suspension  500 mcg Nebulization BID RT  
 folic acid (FOLVITE) tablet 1 mg  1 mg Oral DAILY  guaiFENesin (ORGANIDIN) tablet 200 mg  200 mg Oral TID  lisinopril (PRINIVIL, ZESTRIL) tablet 40 mg  40 mg Oral DAILY  loratadine (CLARITIN) tablet 5 mg  5 mg Oral QHS  B complex-vitamin C-folic acid (NEPHRO-ALICIA) 0.8 mg tab  1 Tab Oral DAILY  phenytoin ER (DILANTIN ER) ER capsule 300 mg  300 mg Oral BID  rosuvastatin (CRESTOR) tablet 40 mg  40 mg Oral QHS  senna (SENOKOT) tablet 8.6 mg  1 Tab Oral DAILY  sertraline (ZOLOFT) tablet 100 mg  100 mg Oral QHS  tamsulosin (FLOMAX) capsule 0.4 mg  0.4 mg Oral DAILY  LORazepam (ATIVAN) tablet 0.5 mg  0.5 mg Oral TID PRN  potassium chloride (K-DUR, KLOR-CON) SR tablet 20 mEq  20 mEq Oral DAILY  polyethylene glycol (MIRALAX) packet 17 g  17 g Oral DAILY PRN  
 sodium chloride (NS) flush 5-40 mL  5-40 mL IntraVENous Q8H  
 sodium chloride (NS) flush 5-40 mL  5-40 mL IntraVENous PRN  
 acetaminophen (TYLENOL) tablet 650 mg  650 mg Oral Q4H PRN  
 ondansetron (ZOFRAN) injection 4 mg  4 mg IntraVENous Q4H PRN  
 enoxaparin (LOVENOX) injection 40 mg  40 mg SubCUTAneous Q24H Other Studies (last 24 hours): 
Xr Ba Swallow Esophogram 
 
Result Date: 4/18/2019 Study: Barium swallow. History: dysphagia, 80 years Male  Hx of CVA x 2 Dysphagia with solids and liquids Recent odynophagia after eating and drinking Comparison: None available. Technique: Effervescent agent as well as high density barium were administered for double-contrast barium swallow study. Findings: Patient swallowed barium without difficulty. No evidence of aspiration. The pharynx and hypopharynx are normal-appearing. There is poor opacification of the lower esophageal mucosa, this may be secondary to patient immobility and clinical condition. No gastroesophageal reflux was witnessed during the exam.  No significant hernia seen. Impression: Technically difficult exam due to patient immobility and clinical condition. Poor opacification of the lower esophageal mucosa, without definite focal mass seen. Elective GI consultation may be helpful in further evaluation.  Total oral contrast: 135 cc of barium sulfate oral suspension. Fluoroscopy time: 1.0 min. Total number of fluoro images obtained: 9 Assessment and Plan:  
 
Hospital Problems as of 4/19/2019 Date Reviewed: 3/4/2019 Codes Class Noted - Resolved POA  
 COPD (chronic obstructive pulmonary disease) ([facility] ICD-10-CM: J44.9 ICD-9-CM: 796  4/10/2019 - Present Unknown * (Principal) Cellulitis ICD-10-CM: L03.90 ICD-9-CM: 682.9  2/21/2019 - Present Yes Essential hypertension ICD-10-CM: I10 
ICD-9-CM: 401.9  7/14/2018 - Present Yes Left hemiplegia ([facility] ICD-10-CM: G81.94 
ICD-9-CM: 342.90  7/14/2018 - Present Yes Plan: 
· Sepsis due to LLE cellulitis: will change ancef to liquid augmentin (due to dysphagia) day 7/14 · Dysphagia: seen by GI , evaluating for EGD · Chronic hypoxic respiratory failure / COPD: stable · HTN: controlled with coreg, lisinopril · CVA: continued plavix, crestor · Seizures: continued dilantin DC planning/Dispo:  Pending home once GI workup complete Diet:  DIET NUTRITIONAL SUPPLEMENTS 
DIET CARDIAC 
DVT ppx:  lovenox Signed: Seun Braxton MD

## 2019-04-19 NOTE — PROGRESS NOTES
04/19/19 0327 Pain 1 Pain Scale 1 Numeric (0 - 10) Pain Intensity 1 9 Pain Location 1 Leg Pain Orientation 1 Left Pain Intervention(s) 1 Medication (see MAR)

## 2019-04-19 NOTE — PROGRESS NOTES
Problem: Mobility Impaired (Adult and Pediatric) Goal: *Acute Goals and Plan of Care (Insert Text) Description LTG: 
(1.)Mr. Ale Benites will move from supine to sit and sit to supine , scoot up and down and roll side to side with MODIFIED INDEPENDENCE within 7 treatment day(s). (2.)Mr. Ale Benites will transfer from bed to chair and chair to bed with SUPERVISION using the least restrictive device within 7 treatment day(s). (3.)Mr. Ale Benites will ambulate with MINIMAL ASSIST for 15 feet with the least restrictive device within 7 treatment day(s). (4.)Mr. Ale Benites will perform exercises per HEP for 10+ minutes to improve strength and mobility within 7 days. ________________________________________________________________________________________________ Outcome: Progressing Towards Goal 
  
PHYSICAL THERAPY: Daily Note and AM 4/19/2019 INPATIENT: PT Visit Days : 1 Payor: Pedro Luis Hernandez / Plan: Rasta Simons / Product Type: [employer] Medicare /   
  
NAME/AGE/GENDER: Ana Gonzalez is a 80 y.o. male PRIMARY DIAGNOSIS: Cellulitis of left lower extremity [L03.116] Cellulitis Cellulitis 2 Days Post-Op ICD-10: Treatment Diagnosis:  
 · Generalized Muscle Weakness (M62.81) · Difficulty in walking, Not elsewhere classified (R26.2) · Other abnormalities of gait and mobility (R26.89) Precaution/Allergies: 
Latex; Adhesive; and Sulfa (sulfonamide antibiotics) ASSESSMENT:  
Mr. Ale Benites is a 80year old male admitted from home for left LE cellulitis. 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 He lives with wife who assist with ADLs and additionally has extra caregiver/aide assistance 10 hours/wk. Pt reports current with [facility] PT and states he is ambulatory for household distances with davis walker but uses power scooter a good bit as well. Patient is supine in bed and agreeable to therapy. Wife and son present.   Patient states that he only has a t-shirt on, wife suggest that he put on a pair of shorts. Patient also states that his brief might need to be changed. Bed mobility for brief change required max assist x 2-3 persons and patient las limited ability to roll in either direction. Supine to sit with the head of the bed elevated required min assist to contact guard assist.  Patient did require assist to scoot further to the edge of the bed. Sit to stand and transfer to the recliner was with contact guard assist as the patient has \"his own way\" of doing transfers. Patient after resting stood so that this writer could put a pad underneath him, he required cues to improve his posture. Patient is left sitting in the recliner with needs within reach and family present. RN updated. Good session. Progress demonstrated. Patient is wearing his AFO on the LLE and davis walker used during the treatment. Angelica Rico  Will benefit from continued acute PT. Does appear to be significantly weaker than baseline at this time. Plan appears to be home at [place] with wife and aide. Continue HH PT and OT. This section established at most recent assessment PROBLEM LIST (Impairments causing functional limitations): 1. Decreased Strength 2. Decreased Transfer Abilities 3. Decreased Ambulation Ability/Technique 4. Decreased Balance 5. Increased Pain 6. Decreased Activity Tolerance INTERVENTIONS PLANNED: (Benefits and precautions of physical therapy have been discussed with the patient.) 1. Balance Exercise 2. Bed Mobility 3. Gait Training 4. Home Exercise Program (HEP) 5. Therapeutic Activites 6. Therapeutic Exercise/Strengthening 7. Transfer Training TREATMENT PLAN: Frequency/Duration: 3 times a week for duration of hospital stay Rehabilitation Potential For Stated Goals: Good RECOMMENDED REHABILITATION/EQUIPMENT: (at time of discharge pending progress): Due to the probability of continued deficits (see above) this patient will likely need continued skilled physical therapy after discharge. Equipment:  
? None at this time HISTORY:  
History of Present Injury/Illness (Reason for Referral): 
Per H&P, \"Patient is a 73yoM with PMH significant for prior CVA with residual L sided hemiparesis who presents with pain and swelling of LLE. Reports that he has had recurrent problems with his L leg for quite a while. He reports that he does have [facility] services with wound care who have been following his leg. He was diagnosed with LLE cellulitis at the end of February/early March and completed a course of Clindamycin. Over the past few days, he has noted worsening erythema and pain. He reports subjective fevers/chills at home. Denies nausea/vomiting. Patient verbalizes wish to go home as soon as possible, but he is understanding about need for hospitalization and initiation of IV abx at this time\" Past Medical History/Comorbidities:  
Mr. Christina Gray  has a past medical history of Chronic obstructive pulmonary disease ([address] Dermatophytosis of nail, History of CVA (cerebrovascular accident), History of paraplegia, and Hypertension. Mr. Christina Gray  has no past surgical history on file. Social History/Living Environment:  
Home Environment: Apartment # Steps to Enter: 0 One/Two Story Residence: One story Living Alone: No 
Support Systems: Spouse/Significant Other/Partner, Home care staff Patient [facility]s [facility] be [facility] Current DME Used/Available at Home: Brace/Splint, Commode, bedside, Shower chair, Walker, Wheelchair, power, Hospital bed Prior Level of Function/Work/Activity: 
Lives with wife and has additional caregiver assistance 10 hours/wk- help w/bathing. Ambulatory in home with davis walker. Last fall 4-5 weeks ago. Pt current with HH PT and OT Number of Personal Factors/Comorbidities that affect the Plan of Care: 1-2: MODERATE COMPLEXITY EXAMINATION:  
Most Recent Physical Functioning: Gross Assessment: 
  
         
  
Posture: 
  
Balance: 
Sitting: Intact Sitting - Static: Good (unsupported) Sitting - Dynamic: Fair (occasional) Standing: Impaired Standing - Static: Fair Standing - Dynamic : Fair Bed Mobility: 
Rolling: Maximum assistance Supine to Sit: Minimum assistance; Moderate assistance Scooting: Moderate assistance Wheelchair Mobility: 
  
Transfers: 
Sit to Stand: Minimum assistance Stand to Sit: Minimum assistance Bed to Chair: Minimum assistance Interventions: Safety awareness training;Verbal cues Gait: 
  
   
  
Body Structures Involved: 1. Nerves 2. Muscles Body Functions Affected: 1. Neuromusculoskeletal 
2. Movement Related 3. Skin Related Activities and Participation Affected: 1. General Tasks and Demands 2. Mobility 3. Domestic Life 4. Community, Social and [place] Number of elements that affect the Plan of Care: 4+: HIGH COMPLEXITY CLINICAL PRESENTATION:  
Presentation: Stable and uncomplicated: LOW COMPLEXITY CLINICAL DECISION MAKIN[address] [place] [id number] AM-[facility] 6 Clicks Basic Mobility Inpatient Short Form How much difficulty does the patient currently have. .. Unable A Lot A Little None 1. Turning over in bed (including adjusting bedclothes, sheets and blankets)? ? 1   ? 2   ? 3   ? 4  
2. Sitting down on and standing up from a chair with arms ( e.g., wheelchair, bedside commode, etc.)   ? 1   ? 2   ? 3   ? 4  
3. Moving from lying on back to sitting on the side of the bed?   ? 1   ? 2   ? 3   ? 4 How much help from another person does the patient currently need. .. Total A Lot A Little None 4. Moving to and from a bed to a chair (including a wheelchair)? ? 1   ? 2   ? 3   ? 4  
5. Need to walk in hospital room? ? 1   ? 2   ? 3   ? 4  
6. Climbing 3-5 steps with a railing? ? 1   ? 2   ? 3   ? 4  
© 2007, Trustees of [address] [place] [address] [address] [address], under license to [employer]. All rights reserved Score:  Initial: 15 Most Recent: X (Date: -- ) Interpretation of Tool:  Represents activities that are increasingly more difficult (i.e. Bed mobility, Transfers, Gait). Medical Necessity:    
· Patient demonstrates fair ·  rehab potential due to higher previous functional level. Reason for Services/Other Comments: 
· Patient continues to demonstrate capacity to improve strength, mobility, balance, transfers which will increase independence, decrease amount of assistance required from caregiver and increase safety · . Use of outcome tool(s) and clinical judgement create a POC that gives a: Clear prediction of patient's progress: LOW COMPLEXITY  
  
 
 
 
TREATMENT:  
(In addition to Assessment/Re-Assessment sessions the following treatments were rendered) Pre-treatment Symptoms/Complaints: \"OK  I am ready to get up\" Pain: Initial:  
Pain Intensity 1: (no number given) Pain Location 1: Leg 
Pain Orientation 1: Left  Post Session:  0/10 Therapeutic Activity: (   24 minutes ):  Therapeutic activities including Bed mobility, Chair transfers, sit-stand transfers, standing posture, and seated and standing functional activities to improve mobility, strength, balance and activity tolerance . Required minimal assist and cueing   to promote static and dynamic balance in standing and promote motor control of bilateral, lower extremity(s). Braces/Orthotics/Lines/Etc:  
· O2 Device: Room air Treatment/Session Assessment:   
· Response to Treatment:  pt performs mobility with min A   Rolling for brief change required much more assistance · Interdisciplinary Collaboration:  
o Physical Therapy Assistant 
o Registered Nurse · After treatment position/precautions:  
o Up in chair 
o Bed/Chair-wheels locked 
o Call light within reach 
o RN notified 
o Family at bedside · Compliance with Program/Exercises: Will assess as treatment progresses · Recommendations/Intent for next treatment session: \"Next visit will focus on advancements to more challenging activities and reduction in assistance provided\". Total Treatment Duration: PT Patient Time In/Time Out Time In: 2041 Time Out: 1056 Saurabh Roblero PTA

## 2019-04-19 NOTE — PROCEDURES
[facility]  PROCEDURE NOTE    Name:  Sunni Woods  MR#:  [id number]  :  1950  ACCOUNT #:  [de-identified]  DATE OF SERVICE:  2019      ANGIOGRAM REPORT    PREOPERATIVE DIAGNOSIS:  Left lower extremity ischemia with ischemic rest pain. POSTOPERATIVE DIAGNOSIS:  .    PROCEDURES PERFORMED:  Abdominal aortogram, left lower extremity arteriogram, ultrasound-guided vascular access. SURGEON:  Nicole Baca MD    ASSISTANT:  None. ANESTHESIA:  IV sedation plus 1% lidocaine with local.    TOTAL CONTRAST:  126 mL. TOTAL FLUORO TIME:  3 minutes. ESTIMATED BLOOD LOSS:  .    SPECIMENS REMOVED:  .    COMPLICATIONS:  .    IMPLANTS:  .    DESCRIPTION OF PROCEDURE:  The patient was brought to the angio suite, placed on the angio table in supine position. Following adequate IV sedation and time-out procedure, the groins were draped and prepped in sterile fashion. Right common femoral artery was then percutaneously punctured under direct vision using ultrasound. An 0.035 guidewire was advanced followed by 5-Citizen of [place] sheath. An Omni Flush catheter was then advanced in to supraceliac aorta. Abdominal aortogram was performed from this level. The catheter was then pulled down just above the aortic bifurcation. Serial images of the pelvic vasculature was obtained in multiple planar views. With chronic left iliac artery occlusion, there was no opportunity to selectively cannulate the left leg, and therefore left lower extremity images were performed with catheter in the aorta. Serial images of the left lower extremity images were performed throughout its length. Once adequate imaging had been obtained and no intervention was felt to be possible from this position, the guidewires and catheter were removed and the puncture site was closed with a Mynx closure device. Sterile dry dressings were applied.   The patient was awakened from anesthesia and transported to the recovery room in stable condition. The patient tolerated the procedure well. No complications. All needle and sponge counts were correct. ANGIOGRAPHIC FINDINGS:  The supraceliac aorta appeared to be fairly normal.  The renal arteries appeared to be widely patent. Although, there is some significant amount of gas in the abdomen, the renal arteries do appear to be normal.  The distal aorta was highly diseased. There was a large well developed SUMMER branch which fills down to the pelvis. The common iliac artery on the left shows an area of significant disease, although no focal stenosis. The left common iliac artery is occluded. Hypogastric artery on the right is patent. Hypogastric artery on the left is occluded. There are multiple robust pelvic collaterals that ultimately fill the profunda femoris artery on the left. The common femoral artery is occluded on this side. The SFA is occluded on this side as well. There is poor filling of the above knee popliteal segment, but it does appear to be patent. The below knee popliteal segment appeared to fairly normal.  There is normal appearing trifurcation and three-vessel runoff to the foot. 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         MD LAZARO [email or web address]  D:  04/18/2019 9:18  T:  04/18/2019 16:14  JOB #:  [id number]

## 2019-04-20 VITALS
BODY MASS INDEX: 31.5 KG/M2 | RESPIRATION RATE: 20 BRPM | TEMPERATURE: 97.8 F | WEIGHT: 225 LBS | SYSTOLIC BLOOD PRESSURE: 102 MMHG | HEART RATE: 89 BPM | HEIGHT: 71 IN | OXYGEN SATURATION: 92 % | DIASTOLIC BLOOD PRESSURE: 67 MMHG

## 2019-04-20 LAB
ANION GAP SERPL CALC-SCNC: 7 MMOL/L (ref 7–16)
BASOPHILS # BLD: 0 K/UL (ref 0–0.2)
BASOPHILS NFR BLD: 0 % (ref 0–2)
BUN SERPL-MCNC: 11 MG/DL (ref 8–23)
CALCIUM SERPL-MCNC: 8.8 MG/DL (ref 8.3–10.4)
CHLORIDE SERPL-SCNC: 107 MMOL/L (ref 98–107)
CO2 SERPL-SCNC: 28 MMOL/L (ref 21–32)
CREAT SERPL-MCNC: 0.44 MG/DL (ref 0.8–1.5)
DIFFERENTIAL METHOD BLD: ABNORMAL
EOSINOPHIL # BLD: 0.3 K/UL (ref 0–0.8)
EOSINOPHIL NFR BLD: 3 % (ref 0.5–7.8)
ERYTHROCYTE [DISTWIDTH] IN BLOOD BY AUTOMATED COUNT: 11.8 % (ref 11.9–14.6)
GLUCOSE SERPL-MCNC: 112 MG/DL (ref 65–100)
HCT VFR BLD AUTO: 39 % (ref 41.1–50.3)
HGB BLD-MCNC: 13 G/DL (ref 13.6–17.2)
IMM GRANULOCYTES # BLD AUTO: 0 K/UL (ref 0–0.5)
IMM GRANULOCYTES NFR BLD AUTO: 0 % (ref 0–5)
LYMPHOCYTES # BLD: 2.3 K/UL (ref 0.5–4.6)
LYMPHOCYTES NFR BLD: 24 % (ref 13–44)
MCH RBC QN AUTO: 31.9 PG (ref 26.1–32.9)
MCHC RBC AUTO-ENTMCNC: 33.3 G/DL (ref 31.4–35)
MCV RBC AUTO: 95.6 FL (ref 79.6–97.8)
MONOCYTES # BLD: 0.5 K/UL (ref 0.1–1.3)
MONOCYTES NFR BLD: 6 % (ref 4–12)
NEUTS SEG # BLD: 6.3 K/UL (ref 1.7–8.2)
NEUTS SEG NFR BLD: 67 % (ref 43–78)
NRBC # BLD: 0 K/UL (ref 0–0.2)
PLATELET # BLD AUTO: 325 K/UL (ref 150–450)
PMV BLD AUTO: 9.4 FL (ref 9.4–12.3)
POTASSIUM SERPL-SCNC: 3.4 MMOL/L (ref 3.5–5.1)
RBC # BLD AUTO: 4.08 M/UL (ref 4.23–5.6)
SODIUM SERPL-SCNC: 142 MMOL/L (ref 136–145)
WBC # BLD AUTO: 9.3 K/UL (ref 4.3–11.1)

## 2019-04-20 PROCEDURE — 74011000250 HC RX REV CODE- 250: Performed by: SURGERY

## 2019-04-20 PROCEDURE — 36415 COLL VENOUS BLD VENIPUNCTURE: CPT

## 2019-04-20 PROCEDURE — 85025 COMPLETE CBC W/AUTO DIFF WBC: CPT

## 2019-04-20 PROCEDURE — 77010033678 HC OXYGEN DAILY

## 2019-04-20 PROCEDURE — 80048 BASIC METABOLIC PNL TOTAL CA: CPT

## 2019-04-20 PROCEDURE — 94760 N-INVAS EAR/PLS OXIMETRY 1: CPT

## 2019-04-20 PROCEDURE — 74011250637 HC RX REV CODE- 250/637: Performed by: SURGERY

## 2019-04-20 PROCEDURE — 94640 AIRWAY INHALATION TREATMENT: CPT

## 2019-04-20 PROCEDURE — 74011250636 HC RX REV CODE- 250/636: Performed by: SURGERY

## 2019-04-20 PROCEDURE — 74011250637 HC RX REV CODE- 250/637: Performed by: INTERNAL MEDICINE

## 2019-04-20 RX ORDER — AMOXICILLIN AND CLAVULANATE POTASSIUM 400; 57 MG/5ML; MG/5ML
875 POWDER, FOR SUSPENSION ORAL EVERY 12 HOURS
Qty: 152.6 ML | Refills: 0 | Status: SHIPPED | OUTPATIENT
Start: 2019-04-20 | End: 2019-04-27

## 2019-04-20 RX ORDER — HYDROCODONE BITARTRATE AND ACETAMINOPHEN 5; 325 MG/1; MG/1
2 TABLET ORAL
Qty: 24 TAB | Refills: 0 | Status: SHIPPED | OUTPATIENT
Start: 2019-04-20 | End: 2019-04-23

## 2019-04-20 RX ORDER — AMOXICILLIN AND CLAVULANATE POTASSIUM 400; 57 MG/5ML; MG/5ML
875 POWDER, FOR SUSPENSION ORAL EVERY 12 HOURS
Qty: 152.6 ML | Refills: 0 | Status: SHIPPED | OUTPATIENT
Start: 2019-04-20 | End: 2019-04-20 | Stop reason: SDUPTHER

## 2019-04-20 RX ADMIN — CARVEDILOL 6.25 MG: 6.25 TABLET, FILM COATED ORAL at 09:48

## 2019-04-20 RX ADMIN — Medication 5 ML: at 05:48

## 2019-04-20 RX ADMIN — PETROLATUM: 42 OINTMENT TOPICAL at 09:49

## 2019-04-20 RX ADMIN — PANTOPRAZOLE SODIUM 40 MG: 40 TABLET, DELAYED RELEASE ORAL at 05:48

## 2019-04-20 RX ADMIN — SENNOSIDES 8.6 MG: 8.6 TABLET, FILM COATED ORAL at 09:48

## 2019-04-20 RX ADMIN — ASPIRIN 81 MG: 81 TABLET, COATED ORAL at 09:48

## 2019-04-20 RX ADMIN — BUDESONIDE 500 MCG: 0.5 INHALANT RESPIRATORY (INHALATION) at 07:22

## 2019-04-20 RX ADMIN — HYDROCODONE BITARTRATE AND ACETAMINOPHEN 2 TABLET: 5; 325 TABLET ORAL at 03:23

## 2019-04-20 RX ADMIN — POTASSIUM CHLORIDE 20 MEQ: 20 TABLET, EXTENDED RELEASE ORAL at 09:48

## 2019-04-20 RX ADMIN — AMOXICILLIN AND CLAVULANATE POTASSIUM 875 MG: 400; 57 POWDER, FOR SUSPENSION ORAL at 11:26

## 2019-04-20 RX ADMIN — MORPHINE SULFATE 1 MG: 2 INJECTION, SOLUTION INTRAMUSCULAR; INTRAVENOUS at 05:48

## 2019-04-20 RX ADMIN — CLOPIDOGREL BISULFATE 75 MG: 75 TABLET, FILM COATED ORAL at 09:47

## 2019-04-20 RX ADMIN — FOLIC ACID 1 MG: 1 TABLET ORAL at 09:47

## 2019-04-20 RX ADMIN — LISINOPRIL 40 MG: 20 TABLET ORAL at 09:47

## 2019-04-20 RX ADMIN — Medication 1 TABLET: at 09:47

## 2019-04-20 RX ADMIN — MORPHINE SULFATE 1 MG: 2 INJECTION, SOLUTION INTRAMUSCULAR; INTRAVENOUS at 01:23

## 2019-04-20 RX ADMIN — Medication 5 ML: at 01:23

## 2019-04-20 RX ADMIN — TAMSULOSIN HYDROCHLORIDE 0.4 MG: 0.4 CAPSULE ORAL at 09:48

## 2019-04-20 RX ADMIN — PHENYTOIN SODIUM 300 MG: 100 CAPSULE ORAL at 09:47

## 2019-04-20 RX ADMIN — GUAIFENESIN 200 MG: 200 TABLET ORAL at 09:47

## 2019-04-20 NOTE — PROGRESS NOTES
Discharge instructions reviewed with patient and his wife, including: diet, activia yogurt 2 times/day while on antibiotics, medications, stop cipro, call for follow up appointment, home health to resume and when to call doctor. Both verbalized understanding and denied questions. Discharge instructions and prescriptions for augmentin and norco given to patient. PIV removed, tip intact

## 2019-04-20 NOTE — DISCHARGE SUMMARY
Hospitalist Discharge Summary     Admit Date:  4/10/2019  4:41 PM   Name:  Garrett Moser   Age:  80 y.o.  :  1950   MRN:  [id number]   PCP:  Lang Kearney MD  Treatment Team: Attending Provider: Telma Montes MD; Utilization Review: Serena Rodriguez RN; Care Manager: Lillie Wilson RN    Problem List for this Hospitalization:  Hospital Problems as of 2019 Date Reviewed: 3/4/2019          Codes Class Noted - Resolved POA    COPD (chronic obstructive pulmonary disease) ([facility] ICD-10-CM: J44.9  ICD-9-CM: 308  4/10/2019 - Present Unknown        * (Principal) Cellulitis ICD-10-CM: L03.90  ICD-9-CM: 682.9  2019 - Present Yes        Essential hypertension ICD-10-CM: I10  ICD-9-CM: 401.9  2018 - Present Yes        Left hemiplegia ([facility] ICD-10-CM: G81.94  ICD-9-CM: 342.90  2018 - Present Yes                Admission HPI from 4/10/2019:    \" Patient is a 73yoM with PMH significant for prior CVA with residual L sided hemiparesis who presents with pain and swelling of LLE. Reports that he has had recurrent problems with his L leg for quite a while. He reports that he does have [facility] services with wound care who have been following his leg. He was diagnosed with LLE cellulitis at the end of February/early March and completed a course of Clindamycin. Over the past few days, he has noted worsening erythema and pain. He reports subjective fevers/chills at home. Denies nausea/vomiting. Patient verbalizes wish to go home as soon as possible, but he is understanding about need for hospitalization and initiation of IV abx at this time. \"    Hospital Course: The patient was admitted. He was continued on antibiotics. 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 \" He is not a candidate for surgical intervention and even if he was- he would decline. He had some epigastric pain and dysphagia. Seen by GI and declining EGD. States he knows it is secondary to potassium being given here and his being rushed to swallow it. His cultures are NGTD. He is stable for discharge to home to follow up as an outpatient. Follow up instructions below. Plan was discussed with the patient and his wife and IDT. All questions answered. Patient was stable at time of discharge and was instructed to call or return if there are any concerns or recurrence of symptoms. Diagnostic Imaging/Tests:   Xr Tib/fib Lt    Result Date: 4/11/2019  Indication:80year-old male with cellulitis of left lower extremity. Comparison exams: None Findings: 4 views of the left tibia and fibula which include frontal and lateral views demonstrate normal bone density with no evidence of fracture or dislocation. There is no evidence of erosive changes identified. There is linear calcifications in the proximal interosseous region of the left tibia and fibula. These are suggestive of vascular calcifications. However a small amount of linear calcification overlaps the medial proximal left fibula. Cannot exclude small amount of periosteal reaction. If there is any clinical concern of osteomyelitis, three-phase bone scan versus MRI may be useful. Degenerative intrameniscal calcifications are noted within the left knee joint. Generalized soft tissue swelling of the left lower extremity. Impression:  1. Generalized soft tissue swelling left lower extremity  2. There are linear calcifications identified in the proximal portion left inner osseous region of the left tibia and fibula as described above. 3.  If there still remains clinical suspicion of osteomyelitis, three-phase bone scan versus MRI may be useful. Xr Foot Lt Min 3 V    Result Date: 4/11/2019  Indication:80year-old male with limited history.  Patient experiencing cellulitis of left lower extremity Comparison exams: 2/22/2019 Findings: 2 views of left foot demonstrate very minimal decreased bony mineralization. No evidence of acute fracture or dislocation. Mild generalized soft tissue swelling is noted. Joint spaces are preserved. Impression:  No acute fracture or dislocation     Duplex Low Ext Arteries With Lisa    Result Date: 4/12/2019  TITLE: Lower extremity arterial ultrasound examination. INDICATION: Recurrent lower extremity cellulitis. Hypertension, cerebrovascular accident, hyperlipidemia. History of tobacco use. TECHNIQUE: Grayscale, color, and Doppler interrogation performed. COMPARISON: None. SEGMENTAL BP:  Diminished segmental blood pressures bilaterally. LISA:  Right = 0.52            Left = 0.37 RIGHT LOWER EXTREMITY:  Peak systolic velocities-- CFA = 138, PFA = 117 cm/sec. Proximal and mid SFA are occluded. Distal SFA is reconstituted. Popliteal = 39, peroneal = 14, PTA = 18, RHONDA = 19 cm/s. LEFT LOWER EXTREMITY:  Peak systolic velocities-- CFA = 118, PFA = 94, proximal SFA = 94, mid SFA = 216, distal SFA = 84, popliteal = 129, peroneal = 21, PTA = 29, RHONDA = 33 cm/sec. Monophasic waveform in the SFA and below. ABDOMEN:  Abdominal pelvic vasculature could not be visualized due to overlying bowel gas. IMPRESSION: Abnormal right LISA consistent with moderate arterial disease. Abnormal left LISA consistent with severe arterial disease. Right superficial femoral artery occlusion. Significant left superficial femoral artery stenosis. Xr Chest Port    Result Date: 4/10/2019  PORTABLE CHEST, April 10, 2019 at 1749 hours CLINICAL HISTORY: Productive cough. COMPARISON:  February 21, 2019. FINDINGS:  AP erect image demonstrates no confluent infiltrate or significant pleural fluid. The heart size is within normal limits without evidence of congestive heart failure or pneumothorax. The bony thorax appears intact on this view. IMPRESSION:  NO ACUTE CARDIOPULMONARY DISEASE IDENTIFIED.     Duplex Lower Ext Venous Bilat    Result Date: 4/11/2019  Bilateral lower extremity venous ultrasound INDICATION:  Pain and swelling, Doppler ultrasound of both lower extremities was performed. FINDINGS:  There is normal flow in the common femoral, superficial femoral, and popliteal veins. Normal compression and augmentation is demonstrated. The proximal calf veins are also patent. IMPRESSION: No evidence of deep venous thrombosis in either lower extremity       Echocardiogram results:  No results found for this visit on 04/10/19. All Micro Results     Procedure Component Value Units Date/Time    CULTURE, BLOOD [[id number]] Collected:  04/10/19 1750    Order Status:  Completed Specimen:  Blood Updated:  04/15/19 0849     Special Requests: --        LEFT  JUGULAR VEIN       Culture result: NO GROWTH 5 DAYS       CULTURE, BLOOD [[id number]] Collected:  04/10/19 2015    Order Status:  Completed Specimen:  Blood Updated:  04/15/19 0849     Special Requests: --        RIGHT  Antecubital       Culture result: NO GROWTH 5 DAYS             Labs: Results:       BMP, Mg, Phos Recent Labs     04/20/19  0354 04/19/19 0448 04/18/19  0644    142 141   K 3.4* 3.5 3.6    107 106   CO2 28 27 27   AGAP 7 8 8   BUN 11 12 17   CREA 0.44* 0.44* 0.48*   CA 8.8 8.8 8.7   * 94 103*      CBC Recent Labs     04/20/19 0354 04/19/19 0448 04/18/19  0644   WBC 9.3 10.0 8.7   RBC 4.08* 4.07* 3.88*   HGB 13.0* 12.9* 12.3*   HCT 39.0* 38.1* 36.6*    363 302   GRANS 67 67 61   LYMPH 24 24 28   EOS 3 3 4   MONOS 6 5 7   BASOS 0 0 0   IG 0 1 1   ANEU 6.3 6.7 5.3   ABL 2.3 2.4 2.5   HECTOR 0.3 0.3 0.3   ABM 0.5 0.5 0.6   ABB 0.0 0.0 0.0   AIG 0.0 0.1 0.0      LFT No results for input(s): SGOT, ALT, TBIL, AP, TP, ALB, GLOB, AGRAT, GPT in the last 72 hours.    Cardiac Testing Lab Results   Component Value Date/Time    BNP 34 (H) 02/22/2019 03:41 AM    BNP 11 07/14/2018 11:45 AM      Coagulation Tests No results found for: PTP, INR, APTT   A1c No results found for: HBA1C, HGBE8, KRE9STNI   Lipid Panel No results found for: CHOL, CHOLPOCT, CHOLX, CHLST, CHOLV, 016879, HDL, LDL, LDLC, DLDLP, 527314, VLDLC, VLDL, TGLX, TRIGL, TRIGP, TGLPOCT, CHHD, CHHDX   Thyroid Panel No results found for: T4, T3U, TSH, TSHEXT     Most Recent UA No results found for: COLOR, APPRN, REFSG, GLORIA, PROTU, GLUCU, KETU, BILU, BLDU, UROU, SAMAN, LEUKU     Allergies   Allergen Reactions    Latex Rash    Adhesive Unknown (comments)    Sulfa (Sulfonamide Antibiotics) Other (comments)     Immunization History   Administered Date(s) Administered    TB Skin Test (PPD) Intradermal 02/25/2019, 04/15/2019       All Labs from Last 24 Hrs:  Recent Results (from the past 24 hour(s))   CBC WITH AUTOMATED DIFF    Collection Time: 04/20/19  3:54 AM   Result Value Ref Range    WBC 9.3 4.3 - 11.1 K/uL    RBC 4.08 (L) 4.23 - 5.6 M/uL    HGB 13.0 (L) 13.6 - 17.2 g/dL    HCT 39.0 (L) 41.1 - 50.3 %    MCV 95.6 79.6 - 97.8 FL    MCH 31.9 26.1 - 32.9 PG    MCHC 33.3 31.4 - 35.0 g/dL    RDW 11.8 (L) 11.9 - 14.6 %    PLATELET 474 983 - 858 K/uL    MPV 9.4 9.4 - 12.3 FL    ABSOLUTE NRBC 0.00 0.0 - 0.2 K/uL    DF AUTOMATED      NEUTROPHILS 67 43 - 78 %    LYMPHOCYTES 24 13 - 44 %    MONOCYTES 6 4.0 - 12.0 %    EOSINOPHILS 3 0.5 - 7.8 %    BASOPHILS 0 0.0 - 2.0 %    IMMATURE GRANULOCYTES 0 0.0 - 5.0 %    ABS. NEUTROPHILS 6.3 1.7 - 8.2 K/UL    ABS. LYMPHOCYTES 2.3 0.5 - 4.6 K/UL    ABS. MONOCYTES 0.5 0.1 - 1.3 K/UL    ABS. EOSINOPHILS 0.3 0.0 - 0.8 K/UL    ABS. BASOPHILS 0.0 0.0 - 0.2 K/UL    ABS. IMM.  GRANS. 0.0 0.0 - 0.5 K/UL   METABOLIC PANEL, BASIC    Collection Time: 04/20/19  3:54 AM   Result Value Ref Range    Sodium 142 136 - 145 mmol/L    Potassium 3.4 (L) 3.5 - 5.1 mmol/L    Chloride 107 98 - 107 mmol/L    CO2 28 21 - 32 mmol/L    Anion gap 7 7 - 16 mmol/L    Glucose 112 (H) 65 - 100 mg/dL    BUN 11 8 - 23 MG/DL    Creatinine 0.44 (L) 0.8 - 1.5 MG/DL    GFR est AA >60 >60 ml/min/1.73m2    GFR est non-AA >60 >60 ml/min/1.73m2    Calcium 8.8 8.3 - 10.4 MG/DL       Discharge Exam:  Patient Vitals for the past 24 hrs:   Temp Pulse Resp BP SpO2   04/20/19 0725 98.7 °F (37.1 °C) 73 20 150/87 97 %   04/20/19 0722     94 %   04/20/19 0329 97.9 °F (36.6 °C) 77 20 130/68 92 %   04/19/19 2355 97.8 °F (36.6 °C) 82 18 124/77 93 %   04/19/19 2033     94 %   04/19/19 1940 97.9 °F (36.6 °C) 85 18 124/72 96 %   04/19/19 1513     90 %   04/19/19 1405 98 °F (36.7 °C) 82 18 121/71 95 %     Oxygen Therapy  O2 Sat (%): 97 % (04/20/19 0725)  Pulse via Oximetry: 80 beats per minute (04/20/19 0722)  O2 Device: Nasal cannula (04/20/19 0722)  O2 Flow Rate (L/min): 2 l/min (04/20/19 0722)  FIO2 (%): 28 % (04/19/19 2033)    Intake/Output Summary (Last 24 hours) at 4/20/2019 1042  Last data filed at 4/19/2019 2355  Gross per 24 hour   Intake 340 ml   Output 0 ml   Net 340 ml       General:    Well nourished. Alert. No distress. Eyes:   Normal sclera. Extraocular movements intact. ENT:  Normocephalic, atraumatic. Moist mucous membranes  CV:   Regular rate and rhythm. No murmur, rub, or gallop. Lungs:  Clear to auscultation bilaterally. No wheezing, rhonchi, or rales. Abdomen: Soft, nontender, nondistended. Bowel sounds normal.   Extremities: Warm and dry. No cyanosis or edema. Neurologic: CN II-XII grossly intact. Sensation intact. Skin:     No rashes or jaundice. Psych:  Normal mood and affect. Discharge Info:   Current Discharge Medication List      START taking these medications    Details   amoxicillin-clavulanate (AUGMENTIN) 400-57 mg/5 mL suspension Take 10.9 mL by mouth every twelve (12) hours for 7 days. Qty: 152.6 mL, Refills: 0      HYDROcodone-acetaminophen (NORCO) 5-325 mg per tablet Take 2 Tabs by mouth every six (6) hours as needed for Pain for up to 3 days. Max Daily Amount: 8 Tabs.   Qty: 24 Tab, Refills: 0    Associated Diagnoses: Cellulitis of left lower extremity         CONTINUE these medications which have NOT CHANGED    Details   furosemide (LASIX) 20 mg tablet Take 20 mg by mouth daily as needed. LORazepam (ATIVAN) 0.5 mg tablet Take 0.5 mg by mouth three (3) times daily as needed for Anxiety. melatonin 3 mg tablet Take 3 mg by mouth daily as needed (SLEEP). potassium chloride (K-DUR, KLOR-CON) 20 mEq tablet Take 20 mEq by mouth daily. polyethylene glycol (MIRALAX) 17 gram/dose powder Take 17 g by mouth daily as needed. raNITIdine (ZANTAC) 150 mg tablet Take 150 mg by mouth Daily (before breakfast). albuterol (PROVENTIL HFA, VENTOLIN HFA, PROAIR HFA) 90 mcg/actuation inhaler Take 1 Puff by inhalation every four (4) hours as needed for Wheezing. Qty: 1 Inhaler, Refills: 12      acetaminophen (TYLENOL) 500 mg tablet Take  by mouth every six (6) hours as needed for Pain.      guaiFENesin (ORGANIDIN) 400 mg tablet Take 200 mg by mouth three (3) times daily. loratadine (CLARITIN) 10 mg tablet Take 5 mg by mouth nightly. phenytoin ER (DILANTIN ER) 100 mg ER capsule Take 300 mg by mouth two (2) times a day. rosuvastatin (CRESTOR) 40 mg tablet Take 40 mg by mouth nightly. senna (SENOKOT) 8.6 mg tablet Take 1 Tab by mouth daily. sertraline (ZOLOFT) 100 mg tablet Take  by mouth nightly. tamsulosin (FLOMAX) 0.4 mg capsule Take 0.4 mg by mouth daily. ALOE VERA/COLLAGEN (ALOE VESTA 2-N-1 CLEANSER EX) by Apply Externally route. aspirin delayed-release 81 mg tablet Take 81 mg by mouth daily. MINERAL OIL/PETROLATUM,WHITE (CLIVE MOISTURE BARRIER EX) by Apply Externally route. carvedilol (COREG) 12.5 mg tablet Take 6.25 mg by mouth two (2) times a day. cholecalciferol (VITAMIN D3) 1,000 unit cap Take 2,000 Units by mouth. clopidogrel (PLAVIX) 75 mg tab Take 75 mg by mouth. docusate sodium 100 mg/5 mL enem Insert  into rectum. fluticasone (FLOVENT DISKUS) 50 mcg/actuation inhaler by Nasal route. acetaminophen-codeine (TYLENOL-CODEINE #3) 300-30 mg per tablet Take 1 Tab by mouth every four (4) hours as needed for Pain. lisinopril (PRINIVIL, ZESTRIL) 40 mg tablet Take 40 mg by mouth daily. b complex-vitamin c-folic acid 0.8 mg (NEPHRO-ALICIA) 0.8 mg tab tablet Take 1 Tab by mouth daily. Omeprazole delayed release (PRILOSEC D/R) 20 mg tablet Take 20 mg by mouth daily. folic acid (FOLVITE) 1 mg tablet Take 1 mg by mouth. STOP taking these medications       ciprofloxacin HCl (CIPRO) 500 mg tablet Comments:   Reason for Stopping:                 Disposition: home with home health    Activity: as tolerated  Diet: DIET NUTRITIONAL SUPPLEMENTS All Meals; Ensure [facility] CARDIAC Mechanical Soft   Eat activia Yogurt twice a day while on abx. No orders of the defined types were placed in this encounter. Follow-up Information     Follow up With Specialties Details Why Contact Info    Judith Urena MD Internal Medicine In 1 week Call on Monday to set up a follow up appointment within 1 week. [phone] [address] [place] [id number]  [phone]      [facility]  In 4 weeks  Kareem Marrufo. [address] [place] [address],[address] [phone]          Time spent in patient discharge planning and coordination 35 minutes.     Signed:  Vernell Horner MD

## 2019-04-20 NOTE — DISCHARGE INSTRUCTIONS
DISCHARGE SUMMARY from Nurse    PATIENT INSTRUCTIONS:    After general anesthesia or intravenous sedation, for 24 hours or while taking prescription Narcotics:  · Limit your activities  · Do not drive and operate hazardous machinery  · Do not make important personal or business decisions  · Do  not drink alcoholic beverages  · If you have not urinated within 8 hours after discharge, please contact your surgeon on call. Report the following to your surgeon:  · Excessive pain, swelling, redness or odor of or around the surgical area  · Temperature over 100.5  · Nausea and vomiting lasting longer than 4 hours or if unable to take medications  · Any signs of decreased circulation or nerve impairment to extremity: change in color, persistent  numbness, tingling, coldness or increase pain  · Any questions    What to do at Home:    Home Health    Diet:  Cardiac, mechanical soft  Recommended activity: Activity as tolerated, keep right groin site clean and dry    If you experience any of the following symptoms unrelieved pain in right leg, increase in redness or drainage, temperature greater than 100.5, any questions or concerns, please follow up with Dr. July Dillard. *  Please give a list of your current medications to your Primary Care Provider. *  Please update this list whenever your medications are discontinued, doses are      changed, or new medications (including over-the-counter products) are added. *  Please carry medication information at all times in case of emergency situations. These are general instructions for a healthy lifestyle:    No smoking/ No tobacco products/ Avoid exposure to second hand smoke  Surgeon General's Warning:  Quitting smoking now greatly reduces serious risk to your health.     Obesity, smoking, and sedentary lifestyle greatly increases your risk for illness    A healthy diet, regular physical exercise & weight monitoring are important for maintaining a healthy lifestyle    You may be retaining fluid if you have a history of heart failure or if you experience any of the following symptoms:  Weight gain of 3 pounds or more overnight or 5 pounds in a week, increased swelling in our hands or feet or shortness of breath while lying flat in bed. Please call your doctor as soon as you notice any of these symptoms; do not wait until your next office visit. Recognize signs and symptoms of STROKE:    F-face looks uneven    A-arms unable to move or move unevenly    S-speech slurred or non-existent    T-time-call 911 as soon as signs and symptoms begin-DO NOT go       Back to bed or wait to see if you get better-TIME IS BRAIN. Warning Signs of HEART ATTACK     Call 911 if you have these symptoms:   Chest discomfort. Most heart attacks involve discomfort in the center of the chest that lasts more than a few minutes, or that goes away and comes back. It can feel like uncomfortable pressure, squeezing, fullness, or pain.  Discomfort in other areas of the upper body. Symptoms can include pain or discomfort in one or both arms, the back, neck, jaw, or stomach.  Shortness of breath with or without chest discomfort.  Other signs may include breaking out in a cold sweat, nausea, or lightheadedness. Don't wait more than five minutes to call 911 - MINUTES MATTER! Fast action can save your life. Calling 911 is almost always the fastest way to get lifesaving treatment. Emergency Medical Services staff can begin treatment when they arrive -- up to an hour sooner than if someone gets to the hospital by car. The discharge information has been reviewed with the patient. The patient verbalized understanding. Discharge medications reviewed with the patient and appropriate educational materials and side effects teaching were provided.   ___________________________________________________________________________________________________________________________________

## 2019-04-20 NOTE — PROGRESS NOTES
Pt is for discharge home with resumed [facility] care for Rn PT and OT follow up. Care Management Interventions PCP Verified by CM: Yes Transition of Care Consult (CM Consult): Home Health [address] No 
Reason Outside [place]: Patient already serviced by other home care/hospice agency Discharge Durable Medical Equipment: No 
Physical Therapy Consult: Yes Occupational Therapy Consult: Yes Speech Therapy Consult: Yes Current Support Network: Lives with Spouse Confirm Follow Up Transport: Family Plan discussed with Pt/Family/Caregiver: Yes Discharge Location Discharge Placement: Home with home health(Home with resumed [facility] services.)

## 2019-04-22 ENCOUNTER — PATIENT OUTREACH (OUTPATIENT)
Dept: CASE MANAGEMENT | Age: 69
End: 2019-04-22

## 2019-04-22 NOTE — PROGRESS NOTES
This note will not be viewable in [address] Second [facility] outreach attempt made to patient's home/ number was unsuccessful. Left message to return call. Will attempt third outreach within 5 business days.

## 2019-04-22 NOTE — PROGRESS NOTES
This note will not be viewable in [address] Initial [facility] call, spoke with patient's wife, Marivel Lancaster. Wendy stated [place] nurse was present at home and requested call back. Will attempt second outreach within 24 hours.

## 2019-04-23 ENCOUNTER — PATIENT OUTREACH (OUTPATIENT)
Dept: CASE MANAGEMENT | Age: 69
End: 2019-04-23

## 2019-04-23 NOTE — Clinical Note
Pt dc 4.20.19 dx: cellulitis; patient has f/u w/ Dr. Carissa Ramos 5/9/19 and has transportation needs and would like assistance and information

## 2019-04-23 NOTE — PROGRESS NOTES
SW received referral from [facility] coordinator re: pt has transportation problems which has prevented the pt from scheduling a follow up appointment with his PCP. TC to pt's home. Spoke with pt's spouse. She stated it was not a good time to talk and asked SW to call her back tomorrow morning after 0900. SW will follow up with the pt/spouse at that time. This note will not be viewable in [address]

## 2019-04-23 NOTE — PROGRESS NOTES
This note will not be viewable in [address] Transition of Care Discharge Follow-up Questionnaire Date/Time of Call: 
 4/23/19 
 1109am  
What was the patient hospitalized for? Cellulitis Does the patient understand his/her diagnosis and/or treatment and what happened during the hospitalization? Yes, spoke with patients wife[facility], , she states understanding of diagnosis and treatment; and is agreeable to call. Hope states patient is [place] trying to keep that leg up Did the patient receive discharge instructions? Yes   
CM Assessed Risk for Readmission:  
 
 
Patient stated Risk for Readmission: Low/moderate r/t diagnosis and/or comorbidities Not stated Review any discharge instructions (see discharge instructions/AVS in [facility]). Ask patient if they understand these. Do they have any questions? Reviewed, understanding is stated, no questions at this time Were home services ordered (nursing, PT, OT, ST, etc.)? Yes, Interim  RN/PT/OT If so, has the first visit occurred? If not, why? (Assist with coordination of services if necessary.) Yes Was any DME ordered? No 
Has home O2 If so, has it been received? If not, why?  (Assist patient in obtaining DME orders &/or equipment if necessary.) N/A Complete a review of all medications (new, continued and discontinued meds per the D/C instructions and medication tab in [facility]). Complete START taking: 
amoxicillin-clavulanate 400-57 mg/5 mL 
suspension (AUGMENTIN) HYDROcodone-acetaminophen 5-325 mg per 
tablet ([address]) STOP taking: 
ciprofloxacin HCl 500 mg tablet (CIPRO) Were all new prescriptions filled? If not, why?  (Assist patient in obtaining medications if necessary  escalate for [facility] &/or SW if ongoing issues are verbalized by pt or anticipated) Yes, Hope states she did not fill the Norco as his pain has been relieved with tylenol at home Does the patient understand the purpose and dosing instructions for all medications? (If patient has questions, provide explanation and education.) Yes Does the patient have any problems in performing ADLs? (If patient is unable to perform ADLs  what is the limiting factor(s)? Do they have a support system that can assist? If no support system is present, discuss possible assistance that they may be able to obtain. Escalate for CCM/SW if ongoing issues are verbalized by pt or anticipated) Independent with Graham Gillespie assists as needed Does the patient have all follow-up appointments scheduled? 7 day f/up with PCP?  
(f/up with PCP may be w/in 14 days if patient has a f/up with their specialist w/in 7 days) 7-14 day f/up with specialist?  
(or per discharge instructions) If f/up has not been made  what actions has the care coordinator made to accomplish this? Has transportation been arranged? Yes Dr. Hali De Jesus nurse was out yesterday Dr. Karli Reid 5/9/19 No, patient has transportation needs Any other questions or concerns expressed by the patient? No other needs or concerns identified. Wendy states her gratitude for follow up. [facility] instructed to call with new questions or concerns. Schedule next appointment with EMILEE HDEZ Coordinator or refer to RN Case Manager/ per the workflow guidelines. When is care coordinators next follow-up call scheduled? If referred for CCM  what RN care manager was the referral assigned? Discussed referral to  CCM due to transportation needs, Wendy was receptive, agreeable and grateful. Information sent to NATALIA Verduzco CCM 
  
[place] Call Completed By: Kaleb Jara LPN Community Care Coordinator

## 2019-04-24 ENCOUNTER — PATIENT OUTREACH (OUTPATIENT)
Dept: CASE MANAGEMENT | Age: 69
End: 2019-04-24

## 2019-04-26 ENCOUNTER — PATIENT OUTREACH (OUTPATIENT)
Dept: CASE MANAGEMENT | Age: 69
End: 2019-04-26

## 2019-04-30 ENCOUNTER — PATIENT OUTREACH (OUTPATIENT)
Dept: CASE MANAGEMENT | Age: 69
End: 2019-04-30

## 2022-10-07 NOTE — DISCHARGE INSTRUCTIONS
DISCHARGE SUMMARY from Nurse    Patient Education        Cellulitis: Care Instructions  Your Care Instructions    Cellulitis is a skin infection caused by bacteria, most often strep or staph. It often occurs after a break in the skin from a scrape, cut, bite, or puncture, or after a rash. Cellulitis may be treated without doing tests to find out what caused it. But your doctor may do tests, if needed, to look for a specific bacteria, like methicillin-resistant Staphylococcus aureus (MRSA). The doctor has checked you carefully, but problems can develop later. If you notice any problems or new symptoms, get medical treatment right away. Follow-up care is a key part of your treatment and safety. Be sure to make and go to all appointments, and call your doctor if you are having problems. It's also a good idea to know your test results and keep a list of the medicines you take. How can you care for yourself at home? · Take your antibiotics as directed. Do not stop taking them just because you feel better. You need to take the full course of antibiotics. · Prop up the infected area on pillows to reduce pain and swelling. Try to keep the area above the level of your heart as often as you can. · If your doctor told you how to care for your wound, follow your doctor's instructions. If you did not get instructions, follow this general advice:  ? Wash the wound with clean water 2 times a day. Don't use hydrogen peroxide or alcohol, which can slow healing. ? You may cover the wound with a thin layer of petroleum jelly, such as Vaseline, and a nonstick bandage. ? Apply more petroleum jelly and replace the bandage as needed. · Be safe with medicines. Take pain medicines exactly as directed. ? If the doctor gave you a prescription medicine for pain, take it as prescribed. ? If you are not taking a prescription pain medicine, ask your doctor if you can take an over-the-counter medicine.   To prevent cellulitis in the RX PROGRESS NOTE: Vancomycin Therapeutic Drug Monitoring      Indication for therapy: Sepsis    ALLERGIES:  No Known Allergies    Most recent height and weight information:  Weight: 57.7 kg (10/06/22 1827)  Height: 5' 1\" (154.9 cm) (10/06/22 1827)    The Following are the calculated  Current Weights for Rani Sheffield     Adjusted Ideal    51.8 kg 47.8 kg             Labs:  Serum Creatinine and Creatinine Clearance:  Serum creatinine: 1.01 mg/dL (H) 10/06/22 1228  Estimated creatinine clearance: 33.9 mL/min (A)    Maximum Temperature (last 24 hours)     Value Max    Temp 102.7 °F (39.3 °C)        WBC (K/mcL)   Date/Time Value   10/06/2022 1228 12.5 (H)     Microbiology Results     None            Assessment/Plan:  Briefly, this is a 85 year old female started on vancomycin for Sepsis, with a target serum trough concentration of 10-15 mcg/mL.  Patient received a dose of vancomycin 1000 mg at 1338.  Initial dosing regimen will be 750 mg every 24 hours (maintenance dose).    Pharmacy will monitor levels as appropriate.    Pharmacy will continue to monitor patient (renal function, microbiology data, risk factors for adverse events, appropriate duration of therapy), will order/monitor serum levels as appropriate, and will adjust dose if/when necessary.      Thank you,    Sydney Ballard RPH  10/6/2022 8:52 PM     future  · Try to prevent cuts, scrapes, or other injuries to your skin. Cellulitis most often occurs where there is a break in the skin. · If you get a scrape, cut, mild burn, or bite, wash the wound with clean water as soon as you can to help avoid infection. Don't use hydrogen peroxide or alcohol, which can slow healing. · If you have swelling in your legs (edema), support stockings and good skin care may help prevent leg sores and cellulitis. · Take care of your feet, especially if you have diabetes or other conditions that increase the risk of infection. Wear shoes and socks. Do not go barefoot. If you have athlete's foot or other skin problems on your feet, talk to your doctor about how to treat them. When should you call for help? Call your doctor now or seek immediate medical care if:    · You have signs that your infection is getting worse, such as:  ? Increased pain, swelling, warmth, or redness. ? Red streaks leading from the area. ? Pus draining from the area. ? A fever.     · You get a rash.    Watch closely for changes in your health, and be sure to contact your doctor if:    · You do not get better as expected. Where can you learn more? Go to http://jose f-walter.info/. Yobani Mcfarlane in the search box to learn more about \"Cellulitis: Care Instructions. \"  Current as of: April 17, 2018  Content Version: 11.9  © 8196-1293 [employer]. Care instructions adapted under license by [employer] (which disclaims liability or warranty for this information). If you have questions about a medical condition or this instruction, always ask your healthcare professional. [address] any warranty or liability for your use of this information.          PATIENT INSTRUCTIONS:    After general anesthesia or intravenous sedation, for 24 hours or while taking prescription Narcotics:  · Limit your activities  · Do not drive and operate hazardous machinery  · Do not make important personal or business decisions  · Do  not drink alcoholic beverages  · If you have not urinated within 8 hours after discharge, please contact your surgeon on call. Report the following to your surgeon:  · Excessive pain, swelling, redness or odor of or around the surgical area  · Temperature over 100.5  · Nausea and vomiting lasting longer than 4 hours or if unable to take medications  · Any signs of decreased circulation or nerve impairment to extremity: change in color, persistent  numbness, tingling, coldness or increase pain  · Any questions    What to do at Home:  Recommended activity: Activity as tolerated. If you experience any of the following symptoms: fever greater than 100.5, redness/ warmth at affected area, please follow up with your MD.    *  Please give a list of your current medications to your Primary Care Provider. *  Please update this list whenever your medications are discontinued, doses are      changed, or new medications (including over-the-counter products) are added. *  Please carry medication information at all times in case of emergency situations. These are general instructions for a healthy lifestyle:    No smoking/ No tobacco products/ Avoid exposure to second hand smoke  Surgeon General's Warning:  Quitting smoking now greatly reduces serious risk to your health. Obesity, smoking, and sedentary lifestyle greatly increases your risk for illness    A healthy diet, regular physical exercise & weight monitoring are important for maintaining a healthy lifestyle    You may be retaining fluid if you have a history of heart failure or if you experience any of the following symptoms:  Weight gain of 3 pounds or more overnight or 5 pounds in a week, increased swelling in our hands or feet or shortness of breath while lying flat in bed.   Please call your doctor as soon as you notice any of these symptoms; do not wait until your next office visit. Recognize signs and symptoms of STROKE:    F-face looks uneven    A-arms unable to move or move unevenly    S-speech slurred or non-existent    T-time-call 911 as soon as signs and symptoms begin-DO NOT go       Back to bed or wait to see if you get better-TIME IS BRAIN. Warning Signs of HEART ATTACK     Call 911 if you have these symptoms:   Chest discomfort. Most heart attacks involve discomfort in the center of the chest that lasts more than a few minutes, or that goes away and comes back. It can feel like uncomfortable pressure, squeezing, fullness, or pain.  Discomfort in other areas of the upper body. Symptoms can include pain or discomfort in one or both arms, the back, neck, jaw, or stomach.  Shortness of breath with or without chest discomfort.  Other signs may include breaking out in a cold sweat, nausea, or lightheadedness. Don't wait more than five minutes to call 911 - MINUTES MATTER! Fast action can save your life. Calling 911 is almost always the fastest way to get lifesaving treatment. Emergency Medical Services staff can begin treatment when they arrive -- up to an hour sooner than if someone gets to the hospital by car. The discharge information has been reviewed with the patient. The patient verbalized understanding. Discharge medications reviewed with the patient and appropriate educational materials and side effects teaching were provided.   ___________________________________________________________________________________________________________________________________

## 2023-01-15 NOTE — ED TRIAGE NOTES
Pt arrives via [facility] from home, pt c/o fever, weakness, given 1000mg of tylenol @1840, pt hx of CVAs uses wheelchair to get around, 96% on 2L. Pt denies urinary symptoms. Pt states he has a normal cough but recently has been coughing up white stuff. Pt is poor historian, wife on the way. No

## 2024-04-19 NOTE — PROGRESS NOTES
Darline Boas  : 1950 [facility] [facility]", [address]  [phone]   [phone]         OUTPATIENT OCCUPATIONAL THERAPY: Daily Note 2017     ICD-10: Treatment Diagnosis:   Hemiplegia, unspecified affecting left nondominant side (G81.94)  Pain in left shoulder (M25.512)    Precautions/Allergies:   Latex; Adhesive; and Sulfa (sulfonamide antibiotics)   Fall Risk Score: 4 (? 5 = High Risk)  MD Orders: OT Evaluate and Treat MEDICAL/REFERRING DIAGNOSIS:   Pain in left shoulder [M25.512]  CVA (cerebral vascular accident) [facility]) [I63.9]   DATE OF ONSET: 2016 and 2005  REFERRING PHYSICIAN: Shayy Graham MD  RETURN PHYSICIAN APPOINTMENT: TBD     INITIAL ASSESSMENT:  Mr. Con Chavez has been seen in OT services for 7 visits so far to address L shoulder pain. Pt is demonstrating some improvement with pt and caregiver educated on L UE protection and position during ADL/functional transfers. Pt has also been working on weightbearing through L UE with some improved ability and decreased pain. Pt's edema in L UE has also decreased with caregiver reporting that patient has been more aware of L UE lately and she feels that this is helping. Pt also working on improving posture and midline orientation during sitting/transfers. Pt is making progress towards goals and with continue to benefit from OT services per plan of care. PLAN OF CARE:   PROBLEM LIST:  1. Decreased Strength  2. Decreased ADL/Functional Activities  3. Decreased Transfer Abilities  4. Decreased Ambulation Ability/Technique  5. Decreased Balance  6. Increased Pain  7. Decreased Activity Tolerance  8. Decreased Flexibility/Joint Mobility  9. Edema/Girth  10. Decreased [place] with Home Exercise Program  11. Decreased Cognition INTERVENTIONS PLANNED:  1. Activities of daily living training  2. Adaptive equipment training  3. Balance training  4.  Clothing management  5. Cognitive training  6. Manual therapy training  7. Modalities  8. Neuromuscular re-eduation  9. Therapeutic activity  10. Therapeutic exercise  11. Wheelchair management   TREATMENT PLAN:  Effective Dates: 7/28/17 TO 10/28/17. Frequency/Duration: 2 times a week for 8 weeks  GOALS: (Goals have been discussed and agreed upon with patient.)  Short-Term Functional Goals: Time Frame: 4 weeks   1. Javy Radford will report decreased pain in L shoulder from 8/10 to 5-6/10 to demonstrate improved comfort with daily activity. MET  2. Javy Radford will tolerate weightbearing activities in L UE with moderate facilitation to improve shoulder strength for daily activity. PROGRESSING TOWARDS  3. Javy Radford will verbalize with independence 3 ways he is using positioning throughout the day/night to help with pain reduction in L shoulder. MET  4. Javy Radford will complete ROM exercises in sitting with moderate cues for posture x 15 minutes to improve posture for daily activity. CONTINUE  Discharge Goals: Time Frame: 8 weeks  1. Javy Radford will report decreased L shoulder pain from 8/10 to 0-2/10 to demonstrate improved positioning and comfort for ADL. CONTINUE  2. Javy Radford will tolerate weightbearing activities in L UE with minimal facilitation to improve L shoulder subluxation. CONTINUE  3. Javy Radford will limit L shoulder subluxation to 1 finger width to decrease pain in L shoulder for daily activity. CONTINUE  4. Javy Radford will be modified independent with HEP for L UE to decrease pain levels in L UE. CONTINUE  5. Javy Radford will complete sitting with fair+ posture for 10 minutes to improve sitting posture for ADL. MET  Rehabilitation Potential For Stated Goals: Good  Regarding Sofía Abernathy's therapy, I certify that the treatment plan above will be carried out by a therapist or under their direction.   Thank you for this referral,  Alannah Calvin OT                 The information in this section was collected on 7/28/17 (except where otherwise noted). OCCUPATIONAL PROFILE & HISTORY:   History of Present Injury/Illness (Reason for Referral):  Patient had initial CVA in 2005 with L sided hemiplegia. Pt reports he had therapy for L UE at that time but has had no other rehab for his L UE since his initial CVA. Pt had second CVA in 2016 with pt declining in his ability to complete functional mobility, but states his L UE has been non-functional since 2005. Pt has severe swelling in the L UE, especially in the L hand. Pt has compression machine that he uses for 1 hour for once a day. Pt's arm is elevated on a pillow during the day and positions it at night on wedge. Pt has a sling but states he can't use it because it pulls on his neck and he has more severe pain with use of the sling. .Pt able to get up and complete functional mobility one time per day. Pt has arm wedge/trough but pt is having difficulty using his new wheelchair and the arm trough is on his new wheelchair and he is currently using his old wheelchair. Pt assists with bathing himself with minimal assistance; Assists with dressing with moderate assistance; minimal assistance with upper body ; grooming with minimal assistance. Pt presents today to outpatient therapy services due to pain in the L shoulder. Past Medical History/Comorbidities:   Mr. Sueellen Lanes  has a past medical history of Dermatophytosis of nail; History of CVA (cerebrovascular accident); and History of paraplegia. Mr. Sueellen Lanes  has no past surgical history on file. Social History/Living Environment:    Lives with wife; Rolando Michael is his aide and is with him 40 hours per week; tub-shower (grab bars) with getting into tub/shower; sits in showerr; Pt is R handed  Prior Level of Function/Work/Activity:  No function in the L UE prior to 8/2016; Pt hasn't had any rehab since 2005- Pt was getting therapy for L UE but states it wasn't very long;  Pt was completing functional mobility with single-point cane   Personal Factors:          Patient behavior: Decreased attention to tasks         Past/Current Experience:  Limited rehab of L upper extremity        Other factors that influence how disability is experienced by the patient:  Cognitive deficits; hx of multiple CVAs  Current Medications:    Current Outpatient Prescriptions:     acetaminophen (TYLENOL) 500 mg tablet, Take  by mouth every six (6) hours as needed for Pain., Disp: , Rfl:     guaiFENesin (ORGANIDIN) 400 mg tablet, Take  by mouth every four (4) hours. , Disp: , Rfl:     lisinopril (PRINIVIL, ZESTRIL) 40 mg tablet, Take 40 mg by mouth daily. , Disp: , Rfl:     loratadine (CLARITIN) 10 mg tablet, Take 10 mg by mouth., Disp: , Rfl:     b complex-vitamin c-folic acid 0.8 mg (NEPHRO-ALICIA) 0.8 mg tab tablet, Take 1 Tab by mouth daily. , Disp: , Rfl:     Omeprazole delayed release (PRILOSEC D/R) 20 mg tablet, Take 20 mg by mouth daily. , Disp: , Rfl:     phenytoin ER (DILANTIN ER) 100 mg ER capsule, Take  by mouth., Disp: , Rfl:     rosuvastatin (CRESTOR) 40 mg tablet, Take 40 mg by mouth nightly., Disp: , Rfl:     senna (SENOKOT) 8.6 mg tablet, Take 1 Tab by mouth daily. , Disp: , Rfl:     sertraline (ZOLOFT) 100 mg tablet, Take  by mouth daily. , Disp: , Rfl:     tamsulosin (FLOMAX) 0.4 mg capsule, Take 0.4 mg by mouth daily. , Disp: , Rfl:     ALOE VERA/COLLAGEN (ALOE VESTA 2-N-1 CLEANSER EX), by Apply Externally route., Disp: , Rfl:     aspirin delayed-release 81 mg tablet, Take 81 mg by mouth., Disp: , Rfl:     MINERAL OIL/PETROLATUM,WHITE (CLIVE MOISTURE BARRIER EX), by Apply Externally route., Disp: , Rfl:     carvedilol (COREG) 12.5 mg tablet, Take 12.5 mg by mouth., Disp: , Rfl:     cholecalciferol (VITAMIN D3) 1,000 unit cap, Take 1,000 Units by mouth., Disp: , Rfl:     clopidogrel (PLAVIX) 75 mg tab, Take 75 mg by mouth., Disp: , Rfl:     docusate sodium 100 mg/5 mL enem, Insert  into rectum. , Disp: , Rfl:     fluticasone (FLOVENT DISKUS) 50 mcg/actuation inhaler, by Nasal route., Disp: , Rfl:     folic acid (FOLVITE) 1 mg tablet, Take 1 mg by mouth., Disp: , Rfl:             Date Last Reviewed:  7/28/17   Complexity Level : Extensive review of physical, cognitive, and psychosocial performance (3+):  HIGH COMPLEXITY   ASSESSMENT OF OCCUPATIONAL PERFORMANCE:   Palpation:          Patient noted to have 1.5 to 2 finger width subluxation in L shoulder with dense hemiplegia in L UE. Pt's L UE could be easily passively ranged with no increased tone at the shoulder, forearm, or wrist. Pt did have some increased tone in the finger flexors with hand resting in finger flexion.    Observation:  Patient sits in posterior pelvic tilt with severely rounded shoulders and forward head flexion  ROM/Strength:             Date:  7/28/17 Date:  7/28/17  Date:  7/28/17 Date:  9/18/17    AROM PROM  STRENGTH STRENGTH   Movement LEFT LEFT  LEFT LEFT   SCAPULAR        Protraction     0 0   Retraction    0 1   Elevation    1/5 1   SHOULDER:        Flexion  0 90 degrees  0/5 0   Abduction  0 90 degrees  0/5 0   External rotation  0 WFL (arm adducted at side)  0/5 0   Internal rotation  0 WFL  0/5 0   Horizontal abduction 0 WFL  0/5 0   Horizontal adduction  0 WFL  0/5 0   Extension  0 WFL  0/5 0   ELBOW:        Flexion  0 110  0/5 0   Extension  0 WFL  0/5 0   FOREARM:        Supination  0 48   0/5 0   Pronation         WRIST:        Wrist flexion  0 35  0/5 0   Wrist extension  0 70  0/5 0   HAND:        Finger Extension  0 WFL  0/5 0           Hand Strength:            0/5 0     Sensation:          Absent to light touch and deep pressure in L UE with vision occluded   Functional Mobility:         Gait/Ambulation:  Pt completes functional mobility using electric power chair: short distances with davis-walker with minimal assistance        Transfers:  Supervision to [facility] for stand pivot transfer   Coordination: Non-functional in L UE  Mental Status:          Decreased attention to tasks; to be further assessed    Edema/Girth: Severe edema in L UE   Left Right    Initial Most Recent Initial Most Recent   Upper  Extremity  L Hand DPC 10.1 inches          Lower  Extremity              Activities of Daily Living:           Basic ADLs (From Assessment) Complex ADLs (From Assessment)         Grooming/Bathing/Dressing Activities of Daily Living                                      Physical Skills Involved:  1. Range of Motion  2. Balance  3. Strength  4. Activity Tolerance  5. Sensation  6. Fine Motor Control  7. Gross Motor Control  8. Pain (Chronic)  9. Edema Cognitive Skills Affected (resulting in the inability to perform in a timely and safe manner):  1. Executive Function  2. Short Term Recall  3. Sustained Attention  4. Divided Attention Psychosocial Skills Affected:  1. Habits/Routines   Number of elements that affect the Plan of Care: 5+:  HIGH COMPLEXITY   CLINICAL DECISION MAKING:   Outcome Measure: Tool Used: Disabilities of the Arm, Shoulder and Hand (DASH) Questionnaire - Quick Version  Score:  Initial: 38/55  Most Recent: X/55 (Date: -- )   Interpretation of Score: The DASH is designed to measure the activities of daily living in person's with upper extremity dysfunction or pain. Each section is scored on a 1-5 scale, 5 representing the greatest disability. The scores of each section are added together for a total score of 55. Score 11 12-19 20-28 29-37 38-45 46-54 55   Modifier CH CI CJ CK CL CM CN     ? Carrying, Moving, and Handling Objects:     - CURRENT STATUS: CL - 60%-79% impaired, limited or restricted    - GOAL STATUS: CK - 40%-59% impaired, limited or restricted    - D/C STATUS:  ---------------To be determined---------------    Tool Used: Fugl-Malone Upper Extremity Motor Assessment   Score:  Initial: 1/66 Most Recent: X (Date: -- )   Interpretation of Score:    Outcome Measure Conversion Site    ? Carrying, Moving, and Handling Objects:     - CURRENT STATUS: CM - 80%-99% impaired, limited or restricted    - GOAL STATUS: CL - 60%-79% impaired, limited or restricted    - D/C STATUS:  ---------------To be determined---------------       Medical Necessity:   · Patient demonstrates fair rehab potential due to higher previous functional level. Reason for Services/Other Comments:  · Patient continues to require skilled intervention due to increased pain and swelling in L UE limiting him with daily activity. Clinical Decision-Making Assessment:     Assessment process, impact of co-morbidities, assessment modification\need for assistance, and selection of interventions: Analytical Complexity:HIGH COMPLEXITY   TREATMENT:   (In addition to Assessment/Re-Assessment sessions the following treatments were rendered)    Pre-treatment Symptoms/Complaints:    Pain: Initial:   Pain Intensity 1: 3  Pain Location 1: Shoulder  Pain Orientation 1: Left  Post Session:  0/10      Therapeutic Activity (10):  Therapeutic activities per grid below to improve mobility, strength, balance and coordination. Required minimal to moderate visual, verbal and manual cues to promote motor control of upper extremity(s), lower extremity(s).    Date:  8/25/17 Date:  9/1/17 Date:  9/15/17 Date:  9/18/17 Date:  9/22/17   Activity/Exercise Parameters Parameters Parameters     Wheelchair to Mat CGA with assistance to protect L UE CGA with assistance to protect L UE Minimal assistance x 2 due to L knee pain today CGA with assistance to protect  L UE CGA with assistance to protect L UE   Mat to Wheelchair CGA with assistance to protect L LUE and to scoot closer to chair for improved safety CGA to minimal assistance with cues to pull wheelchair closer  Minimal assistance x 2 due to L knee pain today CGA with assistance for turning  CGA with assistance for turning   Rolling Side to Side  1) Pt completed working rolling to the L x 5 reps with assistance to pull scapula in protraction  2) Rolling to the R x 5 reps with education of protection of R UE 1) Pt worked on rolling to the L x 5 reps with CGA and assistance to pull scapula in protraction   2) Rolling to the R x 5 reps with pt working on rocking side to side to gain momentum with CGA to minimal assistance   Pt worked on rolling to the L with CGA and reaching for L UE and then rolling to the R with minimal assistance      Side scooting in supine Bridging and cues for pressing through elbows to scoot upper body; minimal to moderate assistance       Supine to sit  Moderate assistance with L LE and lifting trunk upright  Moderate assistance with L LE and lifting trunk upright  Moderate assistance with L LE and lifting trunk (minimally) Moderate assistance with L LE and lifting trunk    Sit to Supine Moderate assistance with trunk/L LE  Moderate assistance with trunk/L LE  Minimal assistance with L LE  Minimal assistance with L LE     Scooting      Forwards/backwards with moderate cues for awareness of the L side and protecting the arm with scooting        Neuromuscular Re-education: ( 30):  Exercise/activities per grid below to improve balance, coordination, kinesthetic sense, posture and proprioception. Required moderate visual, verbal and manual cues to promote motor control of left, upper extremity(s), lower extremity(s).             Date:  9/1/17 Date:  9/15/17 Date:  9/18/17 Date:  9/22/17   Activity/Exercise       Scapular elevation/depressing  Sitting 6-8 reps with decreased attention to task  1) Scapular mobilizations x 15 reps   2) AAROM in side-lying x 15 reps  10 reps AAROM with mobilization and cues to stay on task     Postural Exercises Anterior pelvic tilt with thoracic extension with facilitation of pectoral spread; minimal to moderate cues to stay on task ; pt completed for 15 reps  Anterior pelvic tilt with thoracic extension with facilitation of pectoral spread; minimal to moderate cues to stay on task ; pt completed for 15 reps  Anterior pelvic tilt with thoracic extension with facilitation of pectoral spread; minimal to moderate cues to stay on task ; pt completed for 15 reps  Anterior pelvic tilt with thoracic extension with facilitation of pectoral spread; minimal to moderate cues to stay on task ; pt completed for 15 reps    Tone reduction  Bragg stretching with thumb extension/finger extension stretching  Bragg stretching with thumb extension/finger extension stretching  Bragg stretching with thumb extension/finger extension stretching  Bragg stretching with thumb extension/finger extension stretching    Weightbearing  1) weightbearing into L elbow on wedge and pressing back to midline with moderate cues for alignment of trunk and to press through elbow   2) weightbearing into L UE with elbow extended providing wrist/digit extension stretch with maximal facilitation at the elbow   (5 reps each with additional time) 1)weightbearing into L elbow on wedge and pressing back to midline with moderate cues for alignment of trunk and to press through elbow   2)  weightbearing into L UE with elbow extended providing wrist/digit extension stretch with moderate to maximal facilitation at the elbow   3) Sit to squat weightbearing into B UE with maximal cues for midline orientation  1) Weightbearing into L UE in full extension with moderate facilitation and pt reaching to clips to the L of midline to encourage weight into L side  2) 10 reps weightbearing into elbow with moderate verbal cues to stay on task  1) 5 reps weightbearing into L elbow with maximal cues to stay on task  2) 5 reps with elbow in extension with moderate facilitation of L UE    Mobile Arm Support   10 reps AAROM with L UE in mobile arm support and R hand assisting forward with maximal facilitation of the L UE 10 reps AAROM with L UE in mobile arm support and R hand assisting forward with maximal facilitation of the L UE   Side Scooting        Trunk Rotation  1) 5 reps to each side in sitting position and holding for 15 seconds; Pt tolerated well  2) Supine with L UE abducted and pt rotating to the R and turning head; minimal assistance with good stretch felt through trunk and pectoral muscles  1) 5 reps to each side in sitting position and holding for 15 seconds; Pt tolerated well  2) supine for 2 minutes with pectoral and trunk stretch  1) 5 reps to each side in sitting position and holding for 15 seconds; Pt tolerated well  2) supine for 2 minutes with pectoral and trunk stretch 5 reps to the R side in sitting position and holding for 15 seconds; Pt tolerated well     Therapeutic Exercise: ( 5 minutes):  Exercises per grid below to improve mobility. Required maximal manual cues to promote proper body alignment and promote proper body posture. Progressed repetitions as indicated. Date:  9/15/17 Date:  9/18/17 Date:  9/22//17   Activity/Exercise Parameters Parameters Parameters   Shoulder External Rotation  10 reps with arm abducted to 60 degrees 10 reps with arm abduction to 60 degrees     Shoulder flexion  PROM to 90 degrees 8-10 reps      Shoulder Abduction  PROM to 90 degrees 8-10 reps  PROM to 90 degrees 8-10 reps     Neck ROM   5 reps rotating side to side and into extension                            Treatment/Session Assessment:    · Response to Treatment: Patient extremely talkative during today's session with maximal cues throughout to stay on task. Pt reports he doesn't get many social outings and apologized for talking so much. Pt did report no pain in the L shoulder by the end of session with less of subluxation palpated today. · Compliance with Program/Exercises: Will assess as treatment progresses. · Recommendations/Intent for next treatment session: \"Next visit will focus on advancements to more challenging activities and reduction in assistance provided\".   Total Treatment Duration:  OT Patient Time In/Time Out  Time In: 1100  Time Out: [address] [place], OT Attending Only